# Patient Record
Sex: MALE | Race: WHITE | NOT HISPANIC OR LATINO
[De-identification: names, ages, dates, MRNs, and addresses within clinical notes are randomized per-mention and may not be internally consistent; named-entity substitution may affect disease eponyms.]

---

## 2018-05-23 ENCOUNTER — OTHER (OUTPATIENT)
Age: 83
End: 2018-05-23

## 2019-05-20 ENCOUNTER — EMERGENCY (EMERGENCY)
Facility: HOSPITAL | Age: 84
LOS: 1 days | Discharge: HOME | End: 2019-05-20
Attending: EMERGENCY MEDICINE | Admitting: EMERGENCY MEDICINE
Payer: MEDICARE

## 2019-05-20 VITALS
TEMPERATURE: 98 F | DIASTOLIC BLOOD PRESSURE: 68 MMHG | HEIGHT: 68 IN | RESPIRATION RATE: 16 BRPM | SYSTOLIC BLOOD PRESSURE: 145 MMHG | WEIGHT: 175.93 LBS | HEART RATE: 62 BPM | OXYGEN SATURATION: 97 %

## 2019-05-20 VITALS
OXYGEN SATURATION: 97 % | DIASTOLIC BLOOD PRESSURE: 65 MMHG | SYSTOLIC BLOOD PRESSURE: 153 MMHG | TEMPERATURE: 98 F | HEART RATE: 56 BPM | RESPIRATION RATE: 17 BRPM

## 2019-05-20 DIAGNOSIS — K62.5 HEMORRHAGE OF ANUS AND RECTUM: ICD-10-CM

## 2019-05-20 DIAGNOSIS — K64.4 RESIDUAL HEMORRHOIDAL SKIN TAGS: ICD-10-CM

## 2019-05-20 DIAGNOSIS — Z79.899 OTHER LONG TERM (CURRENT) DRUG THERAPY: ICD-10-CM

## 2019-05-20 LAB
ALBUMIN SERPL ELPH-MCNC: 3.7 G/DL — SIGNIFICANT CHANGE UP (ref 3.3–5)
ALP SERPL-CCNC: 67 U/L — SIGNIFICANT CHANGE UP (ref 40–120)
ALT FLD-CCNC: 23 U/L — SIGNIFICANT CHANGE UP (ref 10–45)
ANION GAP SERPL CALC-SCNC: 8 MMOL/L — SIGNIFICANT CHANGE UP (ref 5–17)
APTT BLD: 22 SEC — LOW (ref 27.5–36.3)
AST SERPL-CCNC: 28 U/L — SIGNIFICANT CHANGE UP (ref 10–40)
BASOPHILS # BLD AUTO: 0.03 K/UL — SIGNIFICANT CHANGE UP (ref 0–0.2)
BASOPHILS NFR BLD AUTO: 0.5 % — SIGNIFICANT CHANGE UP (ref 0–2)
BILIRUB SERPL-MCNC: 0.2 MG/DL — SIGNIFICANT CHANGE UP (ref 0.2–1.2)
BLD GP AB SCN SERPL QL: NEGATIVE — SIGNIFICANT CHANGE UP
BUN SERPL-MCNC: 34 MG/DL — HIGH (ref 7–23)
CALCIUM SERPL-MCNC: 8.9 MG/DL — SIGNIFICANT CHANGE UP (ref 8.4–10.5)
CHLORIDE SERPL-SCNC: 106 MMOL/L — SIGNIFICANT CHANGE UP (ref 96–108)
CO2 SERPL-SCNC: 24 MMOL/L — SIGNIFICANT CHANGE UP (ref 22–31)
CREAT SERPL-MCNC: 1.29 MG/DL — SIGNIFICANT CHANGE UP (ref 0.5–1.3)
EOSINOPHIL # BLD AUTO: 0.3 K/UL — SIGNIFICANT CHANGE UP (ref 0–0.5)
EOSINOPHIL NFR BLD AUTO: 4.9 % — SIGNIFICANT CHANGE UP (ref 0–6)
GLUCOSE SERPL-MCNC: 91 MG/DL — SIGNIFICANT CHANGE UP (ref 70–99)
HCT VFR BLD CALC: 36 % — LOW (ref 39–50)
HGB BLD-MCNC: 11.6 G/DL — LOW (ref 13–17)
IMM GRANULOCYTES NFR BLD AUTO: 0.2 % — SIGNIFICANT CHANGE UP (ref 0–1.5)
INR BLD: 0.99 — SIGNIFICANT CHANGE UP (ref 0.88–1.16)
LYMPHOCYTES # BLD AUTO: 0.95 K/UL — LOW (ref 1–3.3)
LYMPHOCYTES # BLD AUTO: 15.5 % — SIGNIFICANT CHANGE UP (ref 13–44)
MCHC RBC-ENTMCNC: 32.2 GM/DL — SIGNIFICANT CHANGE UP (ref 32–36)
MCHC RBC-ENTMCNC: 33 PG — SIGNIFICANT CHANGE UP (ref 27–34)
MCV RBC AUTO: 102.3 FL — HIGH (ref 80–100)
MONOCYTES # BLD AUTO: 0.72 K/UL — SIGNIFICANT CHANGE UP (ref 0–0.9)
MONOCYTES NFR BLD AUTO: 11.8 % — SIGNIFICANT CHANGE UP (ref 2–14)
NEUTROPHILS # BLD AUTO: 4.1 K/UL — SIGNIFICANT CHANGE UP (ref 1.8–7.4)
NEUTROPHILS NFR BLD AUTO: 67.1 % — SIGNIFICANT CHANGE UP (ref 43–77)
NRBC # BLD: 0 /100 WBCS — SIGNIFICANT CHANGE UP (ref 0–0)
OB PNL STL: NEGATIVE — SIGNIFICANT CHANGE UP
PLATELET # BLD AUTO: 189 K/UL — SIGNIFICANT CHANGE UP (ref 150–400)
POTASSIUM SERPL-MCNC: 4.6 MMOL/L — SIGNIFICANT CHANGE UP (ref 3.5–5.3)
POTASSIUM SERPL-SCNC: 4.6 MMOL/L — SIGNIFICANT CHANGE UP (ref 3.5–5.3)
PROT SERPL-MCNC: 7.1 G/DL — SIGNIFICANT CHANGE UP (ref 6–8.3)
PROTHROM AB SERPL-ACNC: 11.2 SEC — SIGNIFICANT CHANGE UP (ref 10–12.9)
RBC # BLD: 3.52 M/UL — LOW (ref 4.2–5.8)
RBC # FLD: 13.7 % — SIGNIFICANT CHANGE UP (ref 10.3–14.5)
RH IG SCN BLD-IMP: POSITIVE — SIGNIFICANT CHANGE UP
SODIUM SERPL-SCNC: 138 MMOL/L — SIGNIFICANT CHANGE UP (ref 135–145)
WBC # BLD: 6.11 K/UL — SIGNIFICANT CHANGE UP (ref 3.8–10.5)
WBC # FLD AUTO: 6.11 K/UL — SIGNIFICANT CHANGE UP (ref 3.8–10.5)

## 2019-05-20 PROCEDURE — 99284 EMERGENCY DEPT VISIT MOD MDM: CPT

## 2019-05-20 PROCEDURE — 86900 BLOOD TYPING SEROLOGIC ABO: CPT

## 2019-05-20 PROCEDURE — 99284 EMERGENCY DEPT VISIT MOD MDM: CPT | Mod: 25

## 2019-05-20 PROCEDURE — 80053 COMPREHEN METABOLIC PANEL: CPT

## 2019-05-20 PROCEDURE — 85610 PROTHROMBIN TIME: CPT

## 2019-05-20 PROCEDURE — 85025 COMPLETE CBC W/AUTO DIFF WBC: CPT

## 2019-05-20 PROCEDURE — 93010 ELECTROCARDIOGRAM REPORT: CPT

## 2019-05-20 PROCEDURE — 93005 ELECTROCARDIOGRAM TRACING: CPT

## 2019-05-20 PROCEDURE — 36415 COLL VENOUS BLD VENIPUNCTURE: CPT

## 2019-05-20 PROCEDURE — 86850 RBC ANTIBODY SCREEN: CPT

## 2019-05-20 PROCEDURE — 85730 THROMBOPLASTIN TIME PARTIAL: CPT

## 2019-05-20 PROCEDURE — 86901 BLOOD TYPING SEROLOGIC RH(D): CPT

## 2019-05-20 NOTE — ED ADULT NURSE NOTE - NSIMPLEMENTINTERV_GEN_ALL_ED
Implemented All Universal Safety Interventions:  [place] to call system. Call bell, personal items and telephone within reach. Instruct patient to call for assistance. Room bathroom lighting operational. Non-slip footwear when patient is off stretcher. Physically safe environment: no spills, clutter or unnecessary equipment. Stretcher in lowest position, wheels locked, appropriate side rails in place.

## 2019-05-20 NOTE — ED PROVIDER NOTE - OBJECTIVE STATEMENT
83 yo with rectal bleeding this morning, recently constipation w straining to have BM, bright red blood in toilet x 1 today and occurred a few times early in week. pt reports feeling well, had nl Colonoscopy within a few years. no abd pain, no fever, no dizzyness. went to pcp who did rectal and saw blood as per patient.

## 2019-05-20 NOTE — ED ADULT TRIAGE NOTE - CHIEF COMPLAINT QUOTE
pt c/o rectal bleeding, sent by PMD for colonoscopy in the morning. denies any dizziness, use of blood thinners.

## 2019-05-20 NOTE — ED ADULT NURSE NOTE - OBJECTIVE STATEMENT
Patient c /o of rectal bleed, bright red blood when having BM, states ongoing for weeks becoming worse, sent by Dr. Venegas PMD to ED.  States has to strain hard to have BM.  Denies any abdominal or rectal pain, no nausea/vomitting, denies dizziness, weakness.

## 2019-05-20 NOTE — ED CLERICAL - NS ED CLERK NOTE PRE-ARRIVAL INFORMATION; ADDITIONAL PRE-ARRIVAL INFORMATION
85 Y/O M COY PAIZ (SHABBIR IZABEL FATHER) BEING SENT IN BY DR JOSE ALLEN FOR LOWER GI BLEED PROSTSATE CA SLEEP APNEA KAYKAY AND [facility]

## 2019-05-20 NOTE — ED ADULT NURSE NOTE - CHPI ED NUR SYMPTOMS NEG
no fever/no diarrhea/no vomiting/no dysuria/no burning urination/no hematuria/no chills/no nausea/no abdominal distension

## 2019-05-20 NOTE — ED PROVIDER NOTE - CARE PROVIDER_API CALL
Ismael Cobb)  [facility]  [address], [address]  [place] [address]  Phone: [phone]  Fax: [phone]  Follow Up Time: V-Y Plasty Text: The defect edges were debeveled with a #15 scalpel blade.  Given the location of the defect, shape of the defect and the proximity to free margins an V-Y advancement flap was deemed most appropriate.  Using a sterile surgical marker, an appropriate advancement flap was drawn incorporating the defect and placing the expected incisions within the relaxed skin tension lines where possible.    The area thus outlined was incised deep to adipose tissue with a #15 scalpel blade.  The skin margins were undermined to an appropriate distance in all directions utilizing iris scissors.

## 2019-05-20 NOTE — PROGRESS NOTE ADULT - ASSESSMENT
ASSESSMENT/PLAN 85y/o male pt c H/o bronchiectases, asthma, CAROL, prostate ca, hemorrhoids presents c new onset of rectal bledding and new weheezin sensation    1. O2 observe off, recommend CPAP HS 5/0, titrate to comfort  2. Bronchodilators:  Atrovent/ albuterol q 4 – 6 hours as needed, OCEAN nasal spray  3. Corticosteroids: off  4. ID/Antibiotics: off  5. Cardiac/HTN: continue Tamsulosin  6. GI: Rx/ prophylaxis c PPI/H2B, GI   7. Heme: Rx/VT prophylaxis c SCD pending GI work up  8. Obtain CXR in view of new wheezing  9. Obtain CBC/Diff, metabolic panel, PT/PTT  10. Maintain NPO pending GI feedback/Labs  Discussed with ER, ,  ASSESSMENT/PLAN 83y/o male pt c H/o bronchiectases, asthma, CAROL, prostate ca, hemorrhoids presents c new onset of rectal bledding and new wheezing sensation    1. O2 observe off, recommend CPAP HS 5/0, titrate to comfort  2. Bronchodilators:  Atrovent/ albuterol q 4 – 6 hours as needed, OCEAN nasal spray  3. Corticosteroids: off  4. ID/Antibiotics: off  5. Cardiac/HTN: continue Tamsulosin  6. GI: Rx/ prophylaxis c PPI/H2B, GI   7. Heme: Rx/VT prophylaxis c SCD pending GI work up  8. Obtain CXR in view of new wheezing  9. Obtain CBC/Diff, metabolic panel, PT/PTT  10. Maintain NPO pending GI feedback/Labs  Discussed with ER, ,

## 2019-05-20 NOTE — ED PROVIDER NOTE - GASTROINTESTINAL, MLM
Abdomen soft, non-tender, no guarding. tiny external hemorrhoid without bleeding, rectal exam w brown stool, non tender.

## 2019-05-20 NOTE — ED PROVIDER NOTE - NSFOLLOWUPINSTRUCTIONS_ED_ALL_ED_FT
Rectal Bleeding    WHAT YOU NEED TO KNOW:    What can cause rectal bleeding?     Constipation      Hemorrhoids (swollen blood vessels in your rectum)      Anal fissures (tears in the tissue inside your anus)      Medical conditions, such as cancer, colitis, or diverticulitis       Growths, such as tumors or polyps      Medical treatments, such as radiation or rectal surgery    What increases my risk for rectal bleeding?     Older age      Certain medicines, such as blood thinners and NSAIDs      Medical conditions, such as inflammatory bowel disease, liver disease, or HIV    What other signs and symptoms may happen with rectal bleeding? You may have pain in your rectum or anus. You may also have abdominal pain or cramping.    How is the cause of rectal bleeding diagnosed?     Rectal exam: Your healthcare provider may gently insert a gloved finger into your anus. He will collect a bowel movement sample and send it to a lab for tests.      Blood tests: You may need blood taken to check for anemia (low amount of red blood cells).      CT scan: This test is also called a CAT scan. An x-ray machine uses a computer to take pictures of the organs and blood vessels in your abdomen. The pictures may show problems that could cause bleeding. You may be given a dye before the pictures are taken to help healthcare providers see the pictures better. Tell the healthcare provider if you have ever had an allergic reaction to contrast dye.      Colonoscopy: This is a procedure to look inside your lower bowel. It may show where the bleeding is coming from and what is causing it. A tube with a light on the end will be put into your anus and then moved into your colon. If your healthcare provider finds a growth, he may remove it.      Endoscopy: This is a procedure to look inside your upper bowel. It may show where the bleeding is coming from and what is causing it. A tube with a light on the end is inserted into your throat and moved down into your stomach and upper bowel. If your healthcare provider finds a growth, he may remove it. He may put a shot of medicine in bleeding areas to narrow the blood vessels and stop the bleeding. Heat, laser, or electric currents may also be used to make the blood clot.    How is rectal bleeding treated?     Medicine:   Pain medicine: You may be given a prescription medicine to decrease pain. Do not wait until the pain is severe before you take this medicine.      Vasoconstrictors: This medicine decreases the size of your blood vessels and may help stop the bleeding.       Iron supplement: Iron helps your body make more red blood cells.       Steroids: This medicine decreases inflammation in your rectum. It may be applied as a cream, ointment, or lotion.      IV: You may need an IV if you are dehydrated and need extra liquids.      Blood transfusion: You will get whole or parts of blood through an IV during a transfusion. Blood is tested for diseases, such as hepatitis and HIV, to be sure it is safe.       Surgery: You may need surgery to remove hemorrhoids, tumors, or polyps.    What are the risks of rectal bleeding?     You may have abdominal pain or damage to nearby organs and blood vessels with surgery. Even with treatment, rectal bleeding may continue. Or, it may go away for a time and start again.       Without treatment, you may continue to have pain and cramping. You may develop anemia. You may need a blood transfusion. You may lose a large amount of blood. This can be life-threatening.    How can I manage my symptoms? Ask your healthcare provider how much liquid to drink each day and which liquids are best for you. This will help prevent dehydration and constipation.    When should I contact my healthcare provider?     You have a fever.      Your rectal bleeding stopped for a time, but has started again.       You have nausea.      You have cold, sweaty, pale skin.      You have changes in your bowel movements, such as diarrhea.      You have questions or concerns about your condition or care.    When should I seek immediate care or call 911?     You are breathing faster than usual.      You are dizzy, lightheaded, or feel faint.      You are confused or cannot think clearly.      You urinate less than usual or not at all.      Your rectal bleeding is constant or heavy.      You have severe abdominal pain or cramping.    CARE AGREEMENT:    You have the right to help plan your care. Learn about your health condition and how it may be treated. Discuss treatment options with your healthcare providers to decide what care you want to receive. You always have the right to refuse treatment.

## 2019-05-20 NOTE — ED PROVIDER NOTE - CLINICAL SUMMARY MEDICAL DECISION MAKING FREE TEXT BOX
recet 85 yo with rectal bleeding , neg guaic here , stable hbg, most likely hemmorhoid vs radiation proctitis , seen by Dr. Cobb in ER, will d/c for outpt GI f/u

## 2019-05-20 NOTE — PROGRESS NOTE ADULT - SUBJECTIVE AND OBJECTIVE BOX
Interventional, Pulmonary, Critical, Chest Special Procedures.    Pt was seen and fully examined by myself.     Time spent with patient in minutes:    Patient is a 84y old  Male who presents with a chief complaint of rectal bleeding and wheezing sensation. Events leading to this admission reviewed.  The patient now ill appearing, pain free, no active bleeding when seen. Eupneic at rest on RA, minimal wheezing sensation improving c "clearing throat".  HPI:      REVIEW OF SYSTEMS:  Constitutional: No fever, weight loss, chills or fatigue  Eyes: No eye pain, visual disturbances, or discharge  ENMT:  No difficulty hearing, tinnitus, vertigo; No sinus or throat pain. No epistaxis, dysphagia, dysphonia, hoarseness or odynophagia  Neck: No pain, stiffness or neck swelling.  No masses or deformities  Respiratory: No cough, +wheezing, hemoptysis  - COPD  - ILD   - PE   + ASTHMA     - PNEUMONIA  Cardiovascular: No chest pain, dysrhythmia, palpitations, dizziness or edema - CAD   - CHF   - HTN  Gastrointestinal: No abdominal or epigastric pain. No nausea, vomiting or hematemesis; No diarrhea or constipation. + melena no  hematochezia, Icterus.          Genitourinary: No dysuria, frequency, hematuria or incontinence   - CKD/GEMA      - ESRD  Neurological: No headaches, memory loss, loss of strength, numbness or tremors      -DEMENTIA     - STROKE    - SEIZURE  Skin: No itching, burning, rashes or lesions   Lymph Nodes: No enlarged glands  Endocrine: No heat or cold intolerance; No hair loss       - DM     - THYROID DISORDER  Musculoskeletal: No joint pain or swelling; No muscle, back or extremity pain, No edema  Psychiatric: No depression, anxiety, mood swings or difficulty sleeping  Heme/Lymph: No easy bruising or bleeding gums         - ANEMIA      + prostate CANCER   -COAGULOPATHY  Allergy and Immunologic: No hives or eczema    PAST MEDICAL & SURGICAL HISTORY:  CAROL  Prostate surgery    FAMILY HISTORY:    SOCIAL HISTORY:      + former Tobacco     soporadic ETOH    Allergies NKA    No Known Allergies    Intolerances      Vital Signs Last 24 Hrs  T(C): 36.6 (20 May 2019 15:47), Max: 36.6 (20 May 2019 15:47)  T(F): 97.8 (20 May 2019 15:47), Max: 97.8 (20 May 2019 15:47)  HR: 62 (20 May 2019 15:47) (62 - 62)  BP: 145/68 (20 May 2019 15:47) (145/68 - 145/68)  BP(mean): --  RR: 16 (20 May 2019 15:47) (16 - 16)  SpO2: 97% (20 May 2019 15:47) (97% - 97%)        MEDICATIONS:  MEDICATIONS  (STANDING):    MEDICATIONS  (PRN):      PHYSICAL EXAM:  Un Comfortable, no immediate distress  Eyes: PERRL, EOM intact; conjunctiva and sclera clear  Head: Normocephalic;  No Trauma  ENMT: some  nasal discharge, no  hoarseness, cough or hemoptysis  Neck: Supple; non tender; no masses or deformities.    No JVD  Respiratory: CTA,  minimal wheezing R>L  WHEEZING   - RHONCHI  - RALES  - CRACKLES.  Diminished breath sounds  BILATERAL  RIGHT  LEFT  Cardiovascular: Regular rate and rhythm. S1 and S2 Normal; No murmurs, gallops or rubs     - PPM/AICD  Gastrointestinal: Soft non-tender, non-distended; Normal bowel sounds; No hepatosplenomegaly.     -PEG    -  GT   - MOLINA  Genitourinary: No costovertebral angle tenderness. No dysuria  Extremities: AROM, No clubbing, cyanosis or edema    Vascular: Peripheral pulses palpable 2+ bilaterally  Neurological: Alert and responisve to stimuli   Skin: Warm and dry. No obvious rash  Lymph Nodes: No acute cervical or supraclavicular adenopathy  Psychiatric: Cooperative and appropriate mood    DEVICES:  - DENTURES   +IV R / L     - ETUBE   -TRACH   -CTUBE  R / L    LABS:              RADIOLOGY & ADDITIONAL STUDIES (The following images were personally reviewed): Interventional, Pulmonary, Critical, Chest Special Procedures.    Pt was seen and fully examined by myself.     Time spent with patient in minutes:77    Patient is a 84y old  Male who presents with a chief complaint of rectal bleeding and wheezing sensation. Events leading to this admission reviewed.  The patient now ill appearing, pain free, no active bleeding when seen. Eupneic at rest on RA, minimal wheezing sensation improving c "clearing throat      REVIEW OF SYSTEMS:  Constitutional: No fever, weight loss, chills + fatigue  Eyes: No eye pain, visual disturbances, or discharge  ENMT:  No difficulty hearing, tinnitus, vertigo; No sinus or throat pain. No epistaxis, dysphagia, dysphonia, hoarseness or odynophagia  Neck: No pain, stiffness or neck swelling.  No masses or deformities  Respiratory: +cough, +wheezing, hemoptysis  - COPD  - ILD   - PE   + ASTHMA     - PNEUMONIA  Cardiovascular: No chest pain, dysrhythmia, palpitations, dizziness or edema - CAD   - CHF   - HTN  Gastrointestinal: No abdominal or epigastric pain. No nausea, vomiting or hematemesis; No diarrhea or constipation. + melena no  hematochezia, Icterus.          Genitourinary: No dysuria, frequency, hematuria or incontinence   - CKD/GEMA      - ESRD  Neurological: No headaches, memory loss, loss of strength, numbness or tremors      -DEMENTIA     - STROKE    - SEIZURE  Skin: No itching, burning, rashes or lesions   Lymph Nodes: No enlarged glands  Endocrine: No heat or cold intolerance; No hair loss       - DM     - THYROID DISORDER  Musculoskeletal: No joint pain or swelling; No muscle, back or extremity pain, No edema  Psychiatric: No depression, anxiety, mood swings or difficulty sleeping  Heme/Lymph: No easy bruising or bleeding gums         - ANEMIA      + prostate CANCER   -COAGULOPATHY  Allergy and Immunologic: No hives or eczema    PAST MEDICAL & SURGICAL HISTORY:  CAROL  Prostate surgery    FAMILY HISTORY:    SOCIAL HISTORY:      + former Tobacco     soporadic ETOH    Allergies NKA    No Known Allergies    Intolerances      Vital Signs Last 24 Hrs  T(C): 36.6 (20 May 2019 15:47), Max: 36.6 (20 May 2019 15:47)  T(F): 97.8 (20 May 2019 15:47), Max: 97.8 (20 May 2019 15:47)  HR: 62 (20 May 2019 15:47) (62 - 62)  BP: 145/68 (20 May 2019 15:47) (145/68 - 145/68)  BP(mean): --  RR: 16 (20 May 2019 15:47) (16 - 16)  SpO2: 97% (20 May 2019 15:47) (97% - 97%)        MEDICATIONS:  MEDICATIONS  (STANDING):    MEDICATIONS  (PRN):      PHYSICAL EXAM:  Un Comfortable, no immediate distress  Eyes: PERRL, EOM intact; conjunctiva and sclera clear  Head: Normocephalic;  No Trauma  ENMT: some  nasal discharge, no  hoarseness, cough or hemoptysis  Neck: Supple; non tender; no masses or deformities.    No JVD  Respiratory:   minimal wheezing R>L  WHEEZING   - RHONCHI  - RALES  - CRACKLES.  Diminished breath sounds  BILATERAL  RIGHT  LEFT  Cardiovascular: Regular rate and rhythm. S1 and S2 Normal; No murmurs, gallops or rubs     - PPM/AICD  Gastrointestinal: Soft non-tender, non-distended; Normal bowel sounds; No hepatosplenomegaly.     -PEG    -  GT   - MOLINA  Genitourinary: No costovertebral angle tenderness. No dysuria  Extremities: AROM, No clubbing, cyanosis or edema    Vascular: Peripheral pulses palpable 2+ bilaterally  Neurological: Alert and responisve to stimuli   Skin: Warm and dry. No obvious rash  Lymph Nodes: No acute cervical or supraclavicular adenopathy  Psychiatric: Cooperative and appropriate mood    DEVICES:  - DENTURES   +IV R / L     - ETUBE   -TRACH   -CTUBE  R / L    LABS:              RADIOLOGY & ADDITIONAL STUDIES (The following images were personally reviewed):

## 2019-06-25 ENCOUNTER — INPATIENT (INPATIENT)
Facility: HOSPITAL | Age: 84
LOS: 3 days | Discharge: HOME | DRG: 871 | End: 2019-06-29
Attending: INTERNAL MEDICINE | Admitting: INTERNAL MEDICINE
Payer: MEDICARE

## 2019-06-25 VITALS
DIASTOLIC BLOOD PRESSURE: 79 MMHG | HEART RATE: 130 BPM | TEMPERATURE: 102 F | SYSTOLIC BLOOD PRESSURE: 129 MMHG | RESPIRATION RATE: 17 BRPM | OXYGEN SATURATION: 98 %

## 2019-06-25 DIAGNOSIS — J45.909 UNSPECIFIED ASTHMA, UNCOMPLICATED: ICD-10-CM

## 2019-06-25 DIAGNOSIS — A41.9 SEPSIS, UNSPECIFIED ORGANISM: ICD-10-CM

## 2019-06-25 DIAGNOSIS — I48.92 UNSPECIFIED ATRIAL FLUTTER: ICD-10-CM

## 2019-06-25 LAB
ALBUMIN SERPL ELPH-MCNC: 4.5 G/DL — SIGNIFICANT CHANGE UP (ref 3.3–5)
ALP SERPL-CCNC: 80 U/L — SIGNIFICANT CHANGE UP (ref 40–120)
ALT FLD-CCNC: 24 U/L — SIGNIFICANT CHANGE UP (ref 10–45)
ANION GAP SERPL CALC-SCNC: 15 MMOL/L — SIGNIFICANT CHANGE UP (ref 5–17)
APPEARANCE UR: CLEAR — SIGNIFICANT CHANGE UP
APTT BLD: 25.7 SEC — LOW (ref 27.5–36.3)
APTT BLD: 42.3 SEC — HIGH (ref 27.5–36.3)
AST SERPL-CCNC: 27 U/L — SIGNIFICANT CHANGE UP (ref 10–40)
BASOPHILS # BLD AUTO: 0 K/UL — SIGNIFICANT CHANGE UP (ref 0–0.2)
BASOPHILS NFR BLD AUTO: 0.7 % — SIGNIFICANT CHANGE UP (ref 0–2)
BILIRUB SERPL-MCNC: 0.4 MG/DL — SIGNIFICANT CHANGE UP (ref 0.2–1.2)
BILIRUB UR-MCNC: NEGATIVE — SIGNIFICANT CHANGE UP
BLD GP AB SCN SERPL QL: NEGATIVE — SIGNIFICANT CHANGE UP
BUN SERPL-MCNC: 36 MG/DL — HIGH (ref 7–23)
CALCIUM SERPL-MCNC: 9.3 MG/DL — SIGNIFICANT CHANGE UP (ref 8.4–10.5)
CHLORIDE SERPL-SCNC: 100 MMOL/L — SIGNIFICANT CHANGE UP (ref 96–108)
CO2 SERPL-SCNC: 23 MMOL/L — SIGNIFICANT CHANGE UP (ref 22–31)
COLOR SPEC: SIGNIFICANT CHANGE UP
CREAT ?TM UR-MCNC: 93 MG/DL — SIGNIFICANT CHANGE UP
CREAT ?TM UR-MCNC: 94 MG/DL — SIGNIFICANT CHANGE UP
CREAT SERPL-MCNC: 1.36 MG/DL — HIGH (ref 0.5–1.3)
DIFF PNL FLD: ABNORMAL
EOSINOPHIL # BLD AUTO: 0 K/UL — SIGNIFICANT CHANGE UP (ref 0–0.5)
EOSINOPHIL NFR BLD AUTO: 0.2 % — SIGNIFICANT CHANGE UP (ref 0–6)
GLUCOSE SERPL-MCNC: 151 MG/DL — HIGH (ref 70–99)
GLUCOSE UR QL: NEGATIVE — SIGNIFICANT CHANGE UP
HCT VFR BLD CALC: 34.1 % — LOW (ref 39–50)
HCT VFR BLD CALC: 44.7 % — SIGNIFICANT CHANGE UP (ref 39–50)
HGB BLD-MCNC: 11.7 G/DL — LOW (ref 13–17)
HGB BLD-MCNC: 14.3 G/DL — SIGNIFICANT CHANGE UP (ref 13–17)
INR BLD: 1.01 RATIO — SIGNIFICANT CHANGE UP (ref 0.88–1.16)
INR BLD: 1.25 RATIO — HIGH (ref 0.88–1.16)
KETONES UR-MCNC: ABNORMAL
LACTATE BLDV-MCNC: 2 MMOL/L — SIGNIFICANT CHANGE UP (ref 0.7–2)
LACTATE BLDV-MCNC: 2.1 MMOL/L — HIGH (ref 0.7–2)
LEUKOCYTE ESTERASE UR-ACNC: NEGATIVE — SIGNIFICANT CHANGE UP
LYMPHOCYTES # BLD AUTO: 0.2 K/UL — LOW (ref 1–3.3)
LYMPHOCYTES # BLD AUTO: 3 % — LOW (ref 13–44)
MCHC RBC-ENTMCNC: 31.7 PG — SIGNIFICANT CHANGE UP (ref 27–34)
MCHC RBC-ENTMCNC: 31.9 GM/DL — LOW (ref 32–36)
MCHC RBC-ENTMCNC: 34.3 GM/DL — SIGNIFICANT CHANGE UP (ref 32–36)
MCHC RBC-ENTMCNC: 34.3 PG — HIGH (ref 27–34)
MCV RBC AUTO: 99.5 FL — SIGNIFICANT CHANGE UP (ref 80–100)
MCV RBC AUTO: 99.9 FL — SIGNIFICANT CHANGE UP (ref 80–100)
MONOCYTES # BLD AUTO: 0.2 K/UL — SIGNIFICANT CHANGE UP (ref 0–0.9)
MONOCYTES NFR BLD AUTO: 3.7 % — SIGNIFICANT CHANGE UP (ref 2–14)
NEUTROPHILS # BLD AUTO: 5.8 K/UL — SIGNIFICANT CHANGE UP (ref 1.8–7.4)
NEUTROPHILS NFR BLD AUTO: 92.4 % — HIGH (ref 43–77)
NITRITE UR-MCNC: NEGATIVE — SIGNIFICANT CHANGE UP
OB PNL STL: NEGATIVE — SIGNIFICANT CHANGE UP
PH UR: 5.5 — SIGNIFICANT CHANGE UP (ref 5–8)
PLATELET # BLD AUTO: 149 K/UL — LOW (ref 150–400)
PLATELET # BLD AUTO: 209 K/UL — SIGNIFICANT CHANGE UP (ref 150–400)
POTASSIUM SERPL-MCNC: 4.3 MMOL/L — SIGNIFICANT CHANGE UP (ref 3.5–5.3)
POTASSIUM SERPL-SCNC: 4.3 MMOL/L — SIGNIFICANT CHANGE UP (ref 3.5–5.3)
PROT ?TM UR-MCNC: 21 MG/DL — HIGH (ref 0–12)
PROT SERPL-MCNC: 8.4 G/DL — HIGH (ref 6–8.3)
PROT UR-MCNC: ABNORMAL
PROT/CREAT UR-RTO: 0.2 RATIO — SIGNIFICANT CHANGE UP (ref 0–0.2)
PROTHROM AB SERPL-ACNC: 11.5 SEC — SIGNIFICANT CHANGE UP (ref 10–12.9)
PROTHROM AB SERPL-ACNC: 14.3 SEC — HIGH (ref 10–12.9)
RAPID RVP RESULT: SIGNIFICANT CHANGE UP
RBC # BLD: 3.41 M/UL — LOW (ref 4.2–5.8)
RBC # BLD: 4.5 M/UL — SIGNIFICANT CHANGE UP (ref 4.2–5.8)
RBC # FLD: 12.5 % — SIGNIFICANT CHANGE UP (ref 10.3–14.5)
RBC # FLD: 12.6 % — SIGNIFICANT CHANGE UP (ref 10.3–14.5)
RH IG SCN BLD-IMP: POSITIVE — SIGNIFICANT CHANGE UP
RH IG SCN BLD-IMP: POSITIVE — SIGNIFICANT CHANGE UP
SODIUM SERPL-SCNC: 138 MMOL/L — SIGNIFICANT CHANGE UP (ref 135–145)
SODIUM UR-SCNC: 71 MMOL/L — SIGNIFICANT CHANGE UP
SP GR SPEC: 1.02 — SIGNIFICANT CHANGE UP (ref 1.01–1.02)
UROBILINOGEN FLD QL: NEGATIVE — SIGNIFICANT CHANGE UP
WBC # BLD: 6 K/UL — SIGNIFICANT CHANGE UP (ref 3.8–10.5)
WBC # BLD: 6.3 K/UL — SIGNIFICANT CHANGE UP (ref 3.8–10.5)
WBC # FLD AUTO: 6 K/UL — SIGNIFICANT CHANGE UP (ref 3.8–10.5)
WBC # FLD AUTO: 6.3 K/UL — SIGNIFICANT CHANGE UP (ref 3.8–10.5)

## 2019-06-25 PROCEDURE — 71260 CT THORAX DX C+: CPT | Mod: 26

## 2019-06-25 PROCEDURE — 71045 X-RAY EXAM CHEST 1 VIEW: CPT | Mod: 26

## 2019-06-25 PROCEDURE — 74177 CT ABD & PELVIS W/CONTRAST: CPT | Mod: 26

## 2019-06-25 PROCEDURE — 99223 1ST HOSP IP/OBS HIGH 75: CPT

## 2019-06-25 PROCEDURE — 99291 CRITICAL CARE FIRST HOUR: CPT | Mod: GC

## 2019-06-25 PROCEDURE — 93010 ELECTROCARDIOGRAM REPORT: CPT | Mod: 76

## 2019-06-25 RX ORDER — HEPARIN SODIUM 5000 [USP'U]/ML
6500 INJECTION INTRAVENOUS; SUBCUTANEOUS EVERY 6 HOURS
Refills: 0 | Status: DISCONTINUED | OUTPATIENT
Start: 2019-06-25 | End: 2019-06-28

## 2019-06-25 RX ORDER — ALBUTEROL 90 UG/1
2 AEROSOL, METERED ORAL EVERY 6 HOURS
Refills: 0 | Status: DISCONTINUED | OUTPATIENT
Start: 2019-06-25 | End: 2019-06-29

## 2019-06-25 RX ORDER — HEPARIN SODIUM 5000 [USP'U]/ML
3000 INJECTION INTRAVENOUS; SUBCUTANEOUS EVERY 6 HOURS
Refills: 0 | Status: DISCONTINUED | OUTPATIENT
Start: 2019-06-25 | End: 2019-06-28

## 2019-06-25 RX ORDER — PANTOPRAZOLE SODIUM 20 MG/1
40 TABLET, DELAYED RELEASE ORAL
Refills: 0 | Status: DISCONTINUED | OUTPATIENT
Start: 2019-06-25 | End: 2019-06-29

## 2019-06-25 RX ORDER — PIPERACILLIN AND TAZOBACTAM 4; .5 G/20ML; G/20ML
3.38 INJECTION, POWDER, LYOPHILIZED, FOR SOLUTION INTRAVENOUS ONCE
Refills: 0 | Status: COMPLETED | OUTPATIENT
Start: 2019-06-25 | End: 2019-06-25

## 2019-06-25 RX ORDER — ONDANSETRON 8 MG/1
4 TABLET, FILM COATED ORAL ONCE
Refills: 0 | Status: COMPLETED | OUTPATIENT
Start: 2019-06-25 | End: 2019-06-25

## 2019-06-25 RX ORDER — SODIUM CHLORIDE 9 MG/ML
1000 INJECTION INTRAMUSCULAR; INTRAVENOUS; SUBCUTANEOUS
Refills: 0 | Status: DISCONTINUED | OUTPATIENT
Start: 2019-06-25 | End: 2019-06-26

## 2019-06-25 RX ORDER — DILTIAZEM HCL 120 MG
120 CAPSULE, EXT RELEASE 24 HR ORAL DAILY
Refills: 0 | Status: DISCONTINUED | OUTPATIENT
Start: 2019-06-25 | End: 2019-06-29

## 2019-06-25 RX ORDER — HEPARIN SODIUM 5000 [USP'U]/ML
INJECTION INTRAVENOUS; SUBCUTANEOUS
Qty: 25000 | Refills: 0 | Status: DISCONTINUED | OUTPATIENT
Start: 2019-06-25 | End: 2019-06-28

## 2019-06-25 RX ORDER — TAMSULOSIN HYDROCHLORIDE 0.4 MG/1
0.4 CAPSULE ORAL AT BEDTIME
Refills: 0 | Status: DISCONTINUED | OUTPATIENT
Start: 2019-06-25 | End: 2019-06-29

## 2019-06-25 RX ORDER — VANCOMYCIN HCL 1 G
1000 VIAL (EA) INTRAVENOUS ONCE
Refills: 0 | Status: COMPLETED | OUTPATIENT
Start: 2019-06-25 | End: 2019-06-25

## 2019-06-25 RX ORDER — PIPERACILLIN AND TAZOBACTAM 4; .5 G/20ML; G/20ML
3.38 INJECTION, POWDER, LYOPHILIZED, FOR SOLUTION INTRAVENOUS EVERY 8 HOURS
Refills: 0 | Status: DISCONTINUED | OUTPATIENT
Start: 2019-06-25 | End: 2019-06-29

## 2019-06-25 RX ORDER — SODIUM CHLORIDE 9 MG/ML
2400 INJECTION, SOLUTION INTRAVENOUS ONCE
Refills: 0 | Status: COMPLETED | OUTPATIENT
Start: 2019-06-25 | End: 2019-06-25

## 2019-06-25 RX ORDER — ACETAMINOPHEN 500 MG
975 TABLET ORAL ONCE
Refills: 0 | Status: COMPLETED | OUTPATIENT
Start: 2019-06-25 | End: 2019-06-25

## 2019-06-25 RX ADMIN — SODIUM CHLORIDE 2400 MILLILITER(S): 9 INJECTION, SOLUTION INTRAVENOUS at 09:15

## 2019-06-25 RX ADMIN — Medication 975 MILLIGRAM(S): at 09:00

## 2019-06-25 RX ADMIN — Medication 250 MILLIGRAM(S): at 08:34

## 2019-06-25 RX ADMIN — TAMSULOSIN HYDROCHLORIDE 0.4 MILLIGRAM(S): 0.4 CAPSULE ORAL at 21:21

## 2019-06-25 RX ADMIN — Medication 1000 MILLIGRAM(S): at 08:34

## 2019-06-25 RX ADMIN — PIPERACILLIN AND TAZOBACTAM 3.38 GRAM(S): 4; .5 INJECTION, POWDER, LYOPHILIZED, FOR SOLUTION INTRAVENOUS at 08:35

## 2019-06-25 RX ADMIN — Medication 975 MILLIGRAM(S): at 08:29

## 2019-06-25 RX ADMIN — Medication 120 MILLIGRAM(S): at 16:33

## 2019-06-25 RX ADMIN — ONDANSETRON 4 MILLIGRAM(S): 8 TABLET, FILM COATED ORAL at 08:05

## 2019-06-25 RX ADMIN — PIPERACILLIN AND TAZOBACTAM 200 GRAM(S): 4; .5 INJECTION, POWDER, LYOPHILIZED, FOR SOLUTION INTRAVENOUS at 08:05

## 2019-06-25 RX ADMIN — SODIUM CHLORIDE 2400 MILLILITER(S): 9 INJECTION, SOLUTION INTRAVENOUS at 08:00

## 2019-06-25 RX ADMIN — PIPERACILLIN AND TAZOBACTAM 25 GRAM(S): 4; .5 INJECTION, POWDER, LYOPHILIZED, FOR SOLUTION INTRAVENOUS at 23:28

## 2019-06-25 RX ADMIN — PANTOPRAZOLE SODIUM 40 MILLIGRAM(S): 20 TABLET, DELAYED RELEASE ORAL at 18:38

## 2019-06-25 RX ADMIN — HEPARIN SODIUM 1400 UNIT(S)/HR: 5000 INJECTION INTRAVENOUS; SUBCUTANEOUS at 18:03

## 2019-06-25 RX ADMIN — PIPERACILLIN AND TAZOBACTAM 25 GRAM(S): 4; .5 INJECTION, POWDER, LYOPHILIZED, FOR SOLUTION INTRAVENOUS at 15:31

## 2019-06-25 RX ADMIN — SODIUM CHLORIDE 80 MILLILITER(S): 9 INJECTION INTRAMUSCULAR; INTRAVENOUS; SUBCUTANEOUS at 15:31

## 2019-06-25 NOTE — H&P ADULT - NSHPSOURCEINFORD_GEN_ALL_CORE
Immediate Operative Summary


Operative Date


Aug 31, 2017.





Pre-Operative Diagnosis





Right ureteral and renal stones, solitary kidney





Post-Operative Diagnosis





Same as preop





Procedure(s) Performed





Cystoscopy, Right Retrograde pyleogram, right Ureteral Stent insertion





Surgeon


Dr. SHIMON Phillips





Assistant Surgeon(s)


NA





Estimated Blood Loss


0 ml





Findings


Good stent position after completion, stones not clearly seen on  despite 

being present on KUB





Specimens





None, as per surgeon





Drains


6 fr multilength ureteral stent





Anesthesia


MAC





Complication(s)


None





Disposition


Recovery Room / PACU Patient/Chart(s)

## 2019-06-25 NOTE — PATIENT PROFILE ADULT - NSPROGENANESREACTION_GEN_A_NUR
CERTIFICATE OF RETURN TO WORK        This is to certify that Henny Phillip has been under my care on 2/8/2018. She can return to regular work on 2/13/2018.        SIGNATURE:___________________________________________,   2/8/2018                                                        [place] [address]  [phone]      
no previous reaction

## 2019-06-25 NOTE — ED ADULT NURSE NOTE - NSIMPLEMENTINTERV_GEN_ALL_ED
Implemented All Fall with Harm Risk Interventions:  [place] to call system. Call bell, personal items and telephone within reach. Instruct patient to call for assistance. Room bathroom lighting operational. Non-slip footwear when patient is off stretcher. Physically safe environment: no spills, clutter or unnecessary equipment. Stretcher in lowest position, wheels locked, appropriate side rails in place. Provide visual cue, wrist band, yellow gown, etc. Monitor gait and stability. Monitor for mental status changes and reorient to person, place, and time. Review medications for side effects contributing to fall risk. Reinforce activity limits and safety measures with patient and family. Provide visual clues: red socks.

## 2019-06-25 NOTE — ED PROVIDER NOTE - NS ED MD EKG INTERPRETATION 1
narrow complex irregular rhythm with concerns atrial flutter with variable AV block vs. new-onset afib

## 2019-06-25 NOTE — ED PROVIDER NOTE - OBJECTIVE STATEMENT
84 year-old male with history of asthma presents to the Emergency Department for nausea/vomiting ongoing since last night; family reports vomitus was red in color at times.  No fevers, chills, nausea, vomiting, chest pain, shortness of breath, abdominal pain, diarrhea, blood in stool.  Did not take any medications at home.  Denies travel/sick contacts.  No history of alcohol abuse.  No abdominal surgeries.

## 2019-06-25 NOTE — CHART NOTE - NSCHARTNOTEFT_GEN_A_CORE
MEDICINE PA NOTE     CHIEF COMPLAINT  Patient is a 84y old  Male who presents with a chief complaint of nausea and vomiting (25 Jun 2019 15:46)    EVENT SUMMARY   Notified by RN for BRBPR. Pt seen and examined at the bedside. Pt is alert; states that MEDICINE PA NOTE     CHIEF COMPLAINT  Patient is a 84y old  Male who presents with a chief complaint of nausea and vomiting (25 Jun 2019 15:46)    EVENT SUMMARY   Notified by RN for BRBPR. Pt seen and examined at the bedside. Pt is alert; states that when he had a BM earlier around 6pm noticed bright blood on the tissue when he cleaned himself; denies blood in the toilet. Pt states that he had brown loose stool. Pt endorses that he had an episode previously 2- 3 months ago where he was seen in the ED at [facility] monitored and discharged. Pt just started on heparin gtt for afib around 6 pm without bolus.+ flatulence and BM. PT denies abd pain, cramping, constipation, pain with BM, N/V, melena, hematemesis, hemoptysis, numbness, tingling, headache, CP, SOB, dyspnea, palpitations. MEDICINE PA NOTE     CHIEF COMPLAINT  Patient is a 84y old  Male who presents with a chief complaint of nausea and vomiting (25 Jun 2019 15:46)    EVENT SUMMARY   Notified by RN for BRBPR. Pt seen and examined at the bedside. Pt is alert; states that when he had a BM earlier around 6pm noticed bright blood on the tissue when he cleaned himself; denies blood in the toilet. Pt states that he had brown loose stool. Pt endorses that he had an episode previously 2- 3 months ago where he was seen in the ED at [facility] monitored and discharged. Pt just started on heparin gtt for afib around 6 pm without bolus.+ flatulence and BM. PT denies abd pain, cramping, constipation, pain with BM, N/V, melena, hematemesis, hemoptysis, numbness, tingling, headache, CP, SOB, dyspnea, palpitations.      MEDICATIONS  (STANDING):  diltiazem    milliGRAM(s) Oral daily  heparin  Infusion.  Unit(s)/Hr (14 mL/Hr) IV Continuous <Continuous>  pantoprazole    Tablet 40 milliGRAM(s) Oral before breakfast  piperacillin/tazobactam IVPB.. 3.375 Gram(s) IV Intermittent every 8 hours  sodium chloride 0.9%. 1000 milliLiter(s) (80 mL/Hr) IV Continuous <Continuous>  tamsulosin 0.4 milliGRAM(s) Oral at bedtime    MEDICATIONS  (PRN):  ALBUTerol    90 MICROgram(s) HFA Inhaler 2 Puff(s) Inhalation every 6 hours PRN Bronchospasm  heparin  Injectable 6500 Unit(s) IV Push every 6 hours PRN For aPTT less than 40  heparin  Injectable 3000 Unit(s) IV Push every 6 hours PRN For aPTT between 40 - 57    ICU Vital Signs Last 24 Hrs  T(C): 37.8 (25 Jun 2019 21:08), Max: 39.1 (25 Jun 2019 07:40)  T(F): 100.1 (25 Jun 2019 21:08), Max: 102.4 (25 Jun 2019 07:40)  HR: 81 (25 Jun 2019 21:08) (81 - 130)  BP: 105/61 (25 Jun 2019 21:08) (105/61 - 146/83)  BP(mean): --  ABP: --  ABP(mean): --  RR: 18 (25 Jun 2019 21:08) (16 - 36)  SpO2: 94% (25 Jun 2019 21:08) (94% - 100%)    PHYSICAL EXAM   Constitutional:     Eyes:    ENMT:    Neck:    Breasts:    Back:    Respiratory:    Cardiovascular:    Gastrointestinal:    Genitourinary:    Rectal:    Extremities:    Vascular:    Neurological:    Skin:    Lymph Nodes:    Musculoskeletal:    Psychiatric: MEDICINE PA NOTE     CHIEF COMPLAINT  Patient is a 84y old  Male who presents with a chief complaint of nausea and vomiting (25 Jun 2019 15:46)    EVENT SUMMARY   Notified by RN for BRBPR. Pt seen and examined at the bedside. Pt is alert; states that when he had a BM earlier around 6pm noticed bright blood on the tissue when he cleaned himself; denies blood in the toilet. Pt states that he had brown loose stool. Pt endorses that he had an episode previously 2- 3 months ago where he was seen in the ED at [facility] monitored and discharged. Pt just started on heparin gtt for afib around 6 pm without bolus.+ flatulence and BM. PT denies abd pain, cramping, constipation, pain with BM, N/V, melena, hematuria, hematemesis, hemoptysis, numbness, tingling, headache, CP, SOB, dyspnea, palpitations.      MEDICATIONS  (STANDING):  diltiazem    milliGRAM(s) Oral daily  heparin  Infusion.  Unit(s)/Hr (14 mL/Hr) IV Continuous <Continuous>  pantoprazole    Tablet 40 milliGRAM(s) Oral before breakfast  piperacillin/tazobactam IVPB.. 3.375 Gram(s) IV Intermittent every 8 hours  sodium chloride 0.9%. 1000 milliLiter(s) (80 mL/Hr) IV Continuous <Continuous>  tamsulosin 0.4 milliGRAM(s) Oral at bedtime    MEDICATIONS  (PRN):  ALBUTerol    90 MICROgram(s) HFA Inhaler 2 Puff(s) Inhalation every 6 hours PRN Bronchospasm  heparin  Injectable 6500 Unit(s) IV Push every 6 hours PRN For aPTT less than 40  heparin  Injectable 3000 Unit(s) IV Push every 6 hours PRN For aPTT between 40 - 57    ICU Vital Signs Last 24 Hrs  T(C): 37.8 (25 Jun 2019 21:08), Max: 39.1 (25 Jun 2019 07:40)  T(F): 100.1 (25 Jun 2019 21:08), Max: 102.4 (25 Jun 2019 07:40)  HR: 81 (25 Jun 2019 21:08) (81 - 130)  BP: 105/61 (25 Jun 2019 21:08) (105/61 - 146/83)  BP(mean): --  ABP: --  ABP(mean): --  RR: 18 (25 Jun 2019 21:08) (16 - 36)  SpO2: 94% (25 Jun 2019 21:08) (94% - 100%)    PHYSICAL EXAM   Constitutional: NAD, resting comfortably  Respiratory: CTA b/l non labored  Cardiovascular: S1, S2 irregular   Gastrointestinal: soft NT ND, + BS  Neurological: AO x 3    Complete Blood Count + Automated Diff (06.25.19 @ 08:20)    WBC Count: 6.3 K/uL    RBC Count: 4.50 M/uL    Hemoglobin: 14.3 g/dL    Hematocrit: 44.7 %    Mean Cell Volume: 99.5 fl    Mean Cell Hemoglobin: 31.7 pg    Mean Cell Hemoglobin Conc: 31.9 gm/dL    Red Cell Distrib Width: 12.5 %    Platelet Count - Automated: 209 K/uL    Auto Neutrophil #: 5.8 K/uL    Auto Lymphocyte #: 0.2 K/uL    Auto Monocyte #: 0.2 K/uL    Auto Eosinophil #: 0.0 K/uL    Auto Basophil #: 0.0 K/uL    Auto Neutrophil %: 92.4: Differential percentages must be correlated with absolute numbers for  clinical significance. %    Auto Lymphocyte %: 3.0 %    Auto Monocyte %: 3.7 %    Auto Eosinophil %: 0.2 %    Auto Basophil %: 0.7 %    Activated Partial Thromboplastin Time (06.25.19 @ 08:20)    Activated Partial Thromboplastin Time: 25.7: The recommended therapeutic heparin range (full dose) is 58-99 seconds.  Recommended therapeutic Argatroban range is 1.5 to 3.0 times the baseline  APTT value, not to exceed 100 seconds. Recommended therapeutic Refludan  range is 1.5 to 2.5 times thebaseline APTT.  Effective October 30, 2018 the reference range has changed. sec    < from: CT Abdomen and Pelvis w/ IV Cont (06.25.19 @ 09:34) >  FINDINGS:    LOWER CHEST: A large hiatal hernia with a largely intrathoracic stomach   with resultant mild right basilar subsegmental atelectasis. Small   tree-in-bud nodularity in the dependent medial right lower lobe.    LIVER: Ill-defined bilobar hypodensities measuring up to 3.5 cm in the   posterior right hepatic lobe (series 2, image 31).  BILE DUCTS: Normal caliber.  GALLBLADDER: Within normal limits.  SPLEEN: Within normal limits.  PANCREAS: Within normal limits.  ADRENALS: Within normal limits.  KIDNEYS/URETERS: A right lower pole cyst.    BLADDER: Within normal limits.  REPRODUCTIVE ORGANS: Prostate brachytherapy seeds.    BOWEL: Partially imaged predominantly intrathoracic stomach and a large   hiatal hernia, as above. Scattered colonic diverticula without acute   diverticulitis. No bowel obstruction. Appendix not visualized.  PERITONEUM: No ascites.  VESSELS:  Atherosclerotic change.  RETROPERITONEUM: No lymphadenopathy.    ABDOMINAL WALL: Small bilateral fat-containing inguinal hernias.  BONES: Scoliosis with multilevel degenerative change. Calcific tendinitis   of the bilateral hamstrings. Also subchondral cystic change and   chondrocalcinosis of the bilateral hip joints and pubic symphysis.    IMPRESSION:    A partially imaged large hiatal hernia with a largely intrathoracic   stomach.    Indeterminate bilobar liver lesions measuring up to 3.5 cm in the right   hepatic lobe. Cannot exclude metastatic disease in the setting of known   malignancy. Recommend further evaluation with contrast-enhanced MR.    Subchondral cystic change and chondrocalcinosis of the bilateral hip   joints and pubic symphysis, suggestive of CPPD arthropathy.    < end of copied text >    A&P  84 year-old male with history of asthma presents to the Emergency Department for nausea/vomiting ongoing since last night; family reports vomitus was red in color at times. Per pt he went for dinner the night before and Gym the day off felling well until the symptoms started all of sudden last night. The vomiting was copious. He wasn't aware of fever, when vomiting might have coughed up but no cough or SOB prior to the episode. No chills, chest pain, shortness of breath, abdominal pain, diarrhea, blood in stool.  Did not take any medications at home.  Denies travel/sick contacts.  No history of alcohol abuse.  No abdominal surgeries. Pt has a hx of prostate ca many years ago s/p seeds, has been in remission since then, sees his Urologist every few months. (25 Jun 2019 15:46)    BRBPR  - STAT CBC, Coags and T&S  - c/w tele and vitals  - MRI abdomen pending   D/w Dr. Lopez; c/w hep gtt at this time. Will monitor for further bleed. Rn made aware of the plan.     Maryan LI  #[id number]    ADDENDUM MEDICINE PA NOTE     CHIEF COMPLAINT  Patient is a 84y old  Male who presents with a chief complaint of nausea and vomiting (25 Jun 2019 15:46)    EVENT SUMMARY   Notified by RN for BRBPR. Pt seen and examined at the bedside. Pt is alert; states that when he had a BM earlier around 6pm noticed bright blood on the tissue when he cleaned himself; denies blood in the toilet. Pt states that he had brown loose stool. Pt endorses that he had an episode previously 2- 3 months ago where he was seen in the ED at [facility] monitored and discharged. Pt just started on heparin gtt for afib around 6 pm without bolus.+ flatulence and BM. PT denies abd pain, cramping, constipation, pain with BM, N/V, melena, hematuria, hematemesis, hemoptysis, numbness, tingling, headache, CP, SOB, dyspnea, palpitations.      MEDICATIONS  (STANDING):  diltiazem    milliGRAM(s) Oral daily  heparin  Infusion.  Unit(s)/Hr (14 mL/Hr) IV Continuous <Continuous>  pantoprazole    Tablet 40 milliGRAM(s) Oral before breakfast  piperacillin/tazobactam IVPB.. 3.375 Gram(s) IV Intermittent every 8 hours  sodium chloride 0.9%. 1000 milliLiter(s) (80 mL/Hr) IV Continuous <Continuous>  tamsulosin 0.4 milliGRAM(s) Oral at bedtime    MEDICATIONS  (PRN):  ALBUTerol    90 MICROgram(s) HFA Inhaler 2 Puff(s) Inhalation every 6 hours PRN Bronchospasm  heparin  Injectable 6500 Unit(s) IV Push every 6 hours PRN For aPTT less than 40  heparin  Injectable 3000 Unit(s) IV Push every 6 hours PRN For aPTT between 40 - 57    ICU Vital Signs Last 24 Hrs  T(C): 37.8 (25 Jun 2019 21:08), Max: 39.1 (25 Jun 2019 07:40)  T(F): 100.1 (25 Jun 2019 21:08), Max: 102.4 (25 Jun 2019 07:40)  HR: 81 (25 Jun 2019 21:08) (81 - 130)  BP: 105/61 (25 Jun 2019 21:08) (105/61 - 146/83)  BP(mean): --  ABP: --  ABP(mean): --  RR: 18 (25 Jun 2019 21:08) (16 - 36)  SpO2: 94% (25 Jun 2019 21:08) (94% - 100%)    PHYSICAL EXAM   Constitutional: NAD, resting comfortably  Respiratory: CTA b/l non labored  Cardiovascular: S1, S2 irregular   Gastrointestinal: soft NT ND, + BS  Neurological: AO x 3    Complete Blood Count + Automated Diff (06.25.19 @ 08:20)    WBC Count: 6.3 K/uL    RBC Count: 4.50 M/uL    Hemoglobin: 14.3 g/dL    Hematocrit: 44.7 %    Mean Cell Volume: 99.5 fl    Mean Cell Hemoglobin: 31.7 pg    Mean Cell Hemoglobin Conc: 31.9 gm/dL    Red Cell Distrib Width: 12.5 %    Platelet Count - Automated: 209 K/uL    Auto Neutrophil #: 5.8 K/uL    Auto Lymphocyte #: 0.2 K/uL    Auto Monocyte #: 0.2 K/uL    Auto Eosinophil #: 0.0 K/uL    Auto Basophil #: 0.0 K/uL    Auto Neutrophil %: 92.4: Differential percentages must be correlated with absolute numbers for  clinical significance. %    Auto Lymphocyte %: 3.0 %    Auto Monocyte %: 3.7 %    Auto Eosinophil %: 0.2 %    Auto Basophil %: 0.7 %    Activated Partial Thromboplastin Time (06.25.19 @ 08:20)    Activated Partial Thromboplastin Time: 25.7: The recommended therapeutic heparin range (full dose) is 58-99 seconds.  Recommended therapeutic Argatroban range is 1.5 to 3.0 times the baseline  APTT value, not to exceed 100 seconds. Recommended therapeutic Refludan  range is 1.5 to 2.5 times thebaseline APTT.  Effective October 30, 2018 the reference range has changed. sec    < from: CT Abdomen and Pelvis w/ IV Cont (06.25.19 @ 09:34) >  FINDINGS:    LOWER CHEST: A large hiatal hernia with a largely intrathoracic stomach   with resultant mild right basilar subsegmental atelectasis. Small   tree-in-bud nodularity in the dependent medial right lower lobe.    LIVER: Ill-defined bilobar hypodensities measuring up to 3.5 cm in the   posterior right hepatic lobe (series 2, image 31).  BILE DUCTS: Normal caliber.  GALLBLADDER: Within normal limits.  SPLEEN: Within normal limits.  PANCREAS: Within normal limits.  ADRENALS: Within normal limits.  KIDNEYS/URETERS: A right lower pole cyst.    BLADDER: Within normal limits.  REPRODUCTIVE ORGANS: Prostate brachytherapy seeds.    BOWEL: Partially imaged predominantly intrathoracic stomach and a large   hiatal hernia, as above. Scattered colonic diverticula without acute   diverticulitis. No bowel obstruction. Appendix not visualized.  PERITONEUM: No ascites.  VESSELS:  Atherosclerotic change.  RETROPERITONEUM: No lymphadenopathy.    ABDOMINAL WALL: Small bilateral fat-containing inguinal hernias.  BONES: Scoliosis with multilevel degenerative change. Calcific tendinitis   of the bilateral hamstrings. Also subchondral cystic change and   chondrocalcinosis of the bilateral hip joints and pubic symphysis.    IMPRESSION:    A partially imaged large hiatal hernia with a largely intrathoracic   stomach.    Indeterminate bilobar liver lesions measuring up to 3.5 cm in the right   hepatic lobe. Cannot exclude metastatic disease in the setting of known   malignancy. Recommend further evaluation with contrast-enhanced MR.    Subchondral cystic change and chondrocalcinosis of the bilateral hip   joints and pubic symphysis, suggestive of CPPD arthropathy.    < end of copied text >    A&P  84 year-old male with history of asthma presents to the Emergency Department for nausea/vomiting ongoing since last night; family reports vomitus was red in color at times. Per pt he went for dinner the night before and Gym the day off felling well until the symptoms started all of sudden last night. The vomiting was copious. He wasn't aware of fever, when vomiting might have coughed up but no cough or SOB prior to the episode. No chills, chest pain, shortness of breath, abdominal pain, diarrhea, blood in stool.  Did not take any medications at home.  Denies travel/sick contacts.  No history of alcohol abuse.  No abdominal surgeries. Pt has a hx of prostate ca many years ago s/p seeds, has been in remission since then, sees his Urologist every few months. (25 Jun 2019 15:46)    BRBPR  - STAT CBC, Coags and T&S  - c/w tele and vitals  - MRI abdomen pending   D/w Dr. Lopez; c/w hep gtt at this time. Will monitor for further bleed. Rn made aware of the plan.     Maryan LI  #[id number]    ADDENDUM  Complete Blood Count (06.25.19 @ 21:30)    WBC Count: 6.0 K/uL    RBC Count: 3.41 M/uL    Hemoglobin: 11.7: Test repeated. g/dL    Hematocrit: 34.1 %    Mean Cell Volume: 99.9 fl    Mean Cell Hemoglobin: 34.3 pg    Mean Cell Hemoglobin Conc: 34.3 gm/dL    Red Cell Distrib Width: 12.6 %    Platelet Count - Automated: 149 K/uL  Pt with no further BMs; Physical exam unremarkable; rectal exam rectal exam WNL w/o blood. CBC q 6 hrs. Will continue to monitor. RN made aware. MEDICINE PA NOTE     CHIEF COMPLAINT  Patient is a 84y old  Male who presents with a chief complaint of nausea and vomiting (25 Jun 2019 15:46)    EVENT SUMMARY   Notified by RN for BRBPR. Pt seen and examined at the bedside. Pt is alert; states that when he had a BM earlier around 6pm noticed bright blood on the tissue when he cleaned himself; denies blood in the toilet. Pt states that he had brown loose stool. Pt endorses that he had an episode previously 2- 3 months ago where he was seen in the ED at [facility] monitored and discharged. Pt just started on heparin gtt for afib around 6 pm without bolus.+ flatulence and BM. PT denies abd pain, cramping, constipation, pain with BM, N/V, melena, hematuria, hematemesis, hemoptysis, numbness, tingling, headache, CP, SOB, dyspnea, palpitations.      MEDICATIONS  (STANDING):  diltiazem    milliGRAM(s) Oral daily  heparin  Infusion.  Unit(s)/Hr (14 mL/Hr) IV Continuous <Continuous>  pantoprazole    Tablet 40 milliGRAM(s) Oral before breakfast  piperacillin/tazobactam IVPB.. 3.375 Gram(s) IV Intermittent every 8 hours  sodium chloride 0.9%. 1000 milliLiter(s) (80 mL/Hr) IV Continuous <Continuous>  tamsulosin 0.4 milliGRAM(s) Oral at bedtime    MEDICATIONS  (PRN):  ALBUTerol    90 MICROgram(s) HFA Inhaler 2 Puff(s) Inhalation every 6 hours PRN Bronchospasm  heparin  Injectable 6500 Unit(s) IV Push every 6 hours PRN For aPTT less than 40  heparin  Injectable 3000 Unit(s) IV Push every 6 hours PRN For aPTT between 40 - 57    ICU Vital Signs Last 24 Hrs  T(C): 37.8 (25 Jun 2019 21:08), Max: 39.1 (25 Jun 2019 07:40)  T(F): 100.1 (25 Jun 2019 21:08), Max: 102.4 (25 Jun 2019 07:40)  HR: 81 (25 Jun 2019 21:08) (81 - 130)  BP: 105/61 (25 Jun 2019 21:08) (105/61 - 146/83)  BP(mean): --  ABP: --  ABP(mean): --  RR: 18 (25 Jun 2019 21:08) (16 - 36)  SpO2: 94% (25 Jun 2019 21:08) (94% - 100%)    PHYSICAL EXAM   Constitutional: NAD, resting comfortably  Respiratory: CTA b/l non labored  Cardiovascular: S1, S2 irregular   Gastrointestinal: soft NT ND, + BS  Neurological: AO x 3    Complete Blood Count + Automated Diff (06.25.19 @ 08:20)    WBC Count: 6.3 K/uL    RBC Count: 4.50 M/uL    Hemoglobin: 14.3 g/dL    Hematocrit: 44.7 %    Mean Cell Volume: 99.5 fl    Mean Cell Hemoglobin: 31.7 pg    Mean Cell Hemoglobin Conc: 31.9 gm/dL    Red Cell Distrib Width: 12.5 %    Platelet Count - Automated: 209 K/uL    Auto Neutrophil #: 5.8 K/uL    Auto Lymphocyte #: 0.2 K/uL    Auto Monocyte #: 0.2 K/uL    Auto Eosinophil #: 0.0 K/uL    Auto Basophil #: 0.0 K/uL    Auto Neutrophil %: 92.4: Differential percentages must be correlated with absolute numbers for  clinical significance. %    Auto Lymphocyte %: 3.0 %    Auto Monocyte %: 3.7 %    Auto Eosinophil %: 0.2 %    Auto Basophil %: 0.7 %    Activated Partial Thromboplastin Time (06.25.19 @ 08:20)    Activated Partial Thromboplastin Time: 25.7: The recommended therapeutic heparin range (full dose) is 58-99 seconds.  Recommended therapeutic Argatroban range is 1.5 to 3.0 times the baseline  APTT value, not to exceed 100 seconds. Recommended therapeutic Refludan  range is 1.5 to 2.5 times thebaseline APTT.  Effective October 30, 2018 the reference range has changed. sec    < from: CT Abdomen and Pelvis w/ IV Cont (06.25.19 @ 09:34) >  FINDINGS:    LOWER CHEST: A large hiatal hernia with a largely intrathoracic stomach   with resultant mild right basilar subsegmental atelectasis. Small   tree-in-bud nodularity in the dependent medial right lower lobe.    LIVER: Ill-defined bilobar hypodensities measuring up to 3.5 cm in the   posterior right hepatic lobe (series 2, image 31).  BILE DUCTS: Normal caliber.  GALLBLADDER: Within normal limits.  SPLEEN: Within normal limits.  PANCREAS: Within normal limits.  ADRENALS: Within normal limits.  KIDNEYS/URETERS: A right lower pole cyst.    BLADDER: Within normal limits.  REPRODUCTIVE ORGANS: Prostate brachytherapy seeds.    BOWEL: Partially imaged predominantly intrathoracic stomach and a large   hiatal hernia, as above. Scattered colonic diverticula without acute   diverticulitis. No bowel obstruction. Appendix not visualized.  PERITONEUM: No ascites.  VESSELS:  Atherosclerotic change.  RETROPERITONEUM: No lymphadenopathy.    ABDOMINAL WALL: Small bilateral fat-containing inguinal hernias.  BONES: Scoliosis with multilevel degenerative change. Calcific tendinitis   of the bilateral hamstrings. Also subchondral cystic change and   chondrocalcinosis of the bilateral hip joints and pubic symphysis.    IMPRESSION:    A partially imaged large hiatal hernia with a largely intrathoracic   stomach.    Indeterminate bilobar liver lesions measuring up to 3.5 cm in the right   hepatic lobe. Cannot exclude metastatic disease in the setting of known   malignancy. Recommend further evaluation with contrast-enhanced MR.    Subchondral cystic change and chondrocalcinosis of the bilateral hip   joints and pubic symphysis, suggestive of CPPD arthropathy.    < end of copied text >    A&P  84 year-old male with history of asthma presents to the Emergency Department for nausea/vomiting ongoing since last night; family reports vomitus was red in color at times. Per pt he went for dinner the night before and Gym the day off felling well until the symptoms started all of sudden last night. The vomiting was copious. He wasn't aware of fever, when vomiting might have coughed up but no cough or SOB prior to the episode. No chills, chest pain, shortness of breath, abdominal pain, diarrhea, blood in stool.  Did not take any medications at home.  Denies travel/sick contacts.  No history of alcohol abuse.  No abdominal surgeries. Pt has a hx of prostate ca many years ago s/p seeds, has been in remission since then, sees his Urologist every few months. (25 Jun 2019 15:46)    BRBPR  - STAT CBC, Coags and T&S  - c/w tele and vitals  - MRI abdomen pending   D/w Dr. Lopez; c/w hep gtt at this time. Will monitor for further bleed. Rn made aware of the plan.     Maryan LI  #[id number]    ADDENDUM  Complete Blood Count (06.25.19 @ 21:30)    WBC Count: 6.0 K/uL    RBC Count: 3.41 M/uL    Hemoglobin: 11.7: Test repeated. g/dL    Hematocrit: 34.1 %    Mean Cell Volume: 99.9 fl    Mean Cell Hemoglobin: 34.3 pg    Mean Cell Hemoglobin Conc: 34.3 gm/dL    Red Cell Distrib Width: 12.6 %    Platelet Count - Automated: 149 K/uL  Pt with no further BMs; Physical exam unremarkable; rectal exam rectal exam WNL w/o blood. CBC q 6 hrs. Will continue to monitor. RN made aware.    ADDENDUM @ 7 AM  D/w Dr. Lopez regarding overnight event; recommended CBC q 12. Will continue to monitor.     Maryan LI  #[id number]

## 2019-06-25 NOTE — CHART NOTE - NSCHARTNOTEFT_GEN_A_CORE
COY PAIZ    Notified by NP Cardiology start patient on Heparin gtt patient with episode of 's Afib    Seen and Examined asymptomatic   patient without bleeding        Interventions taken   monitor CBC   Discussed With Dr Lopez Cleared to start Heparin Gtt                           Ena ÁLVAREZC

## 2019-06-25 NOTE — H&P ADULT - ASSESSMENT
84 year-old male with history of asthma presents to the Emergency Department for nausea/vomiting ongoing since last night; family reports vomitus was red in color at times. Per pt he went for dinner the night before and Gym the day off felling well until the symptoms started all of sudden last night. The vomiting was copious. He wasn't aware of fever, when vomiting might have coughed up but no cough or SOB prior to the episode. No chills, chest pain, shortness of breath, abdominal pain, diarrhea, blood in stool.  Did not take any medications at home.  Denies travel/sick contacts.  No history of alcohol abuse.  No abdominal surgeries. Pt has a hx of prostate ca many years ago s/p seeds, has been in remission since then, sees his Urologist every few months.

## 2019-06-25 NOTE — ED ADULT NURSE NOTE - OBJECTIVE STATEMENT
Pt is an 85 yo M who was brought to the ED via EMS c/o vomiting. Per EMS, pt stared he vomited x5 since midnight, no diarrhea. On arrival in ED pt febrile, denies fever in the past.. Pt is an 85 yo M who was brought to the ED via EMS c/o vomiting. Per EMS, pt stared he vomited x5 since midnight, no diarrhea. On arrival in ED pt febrile, denies fever in the past. Denies cp/sob/palpitations, no abd pain, no chills, no weakness. A/O x3, no bleeding, no vomiting since arrival in ED.

## 2019-06-25 NOTE — CONSULT NOTE ADULT - SUBJECTIVE AND OBJECTIVE BOX
Patient is a 84y old  Male who presents with a chief complaint of N/V.     HPI:  84 year-old male with history of asthma presents to the Emergency Department for nausea/vomiting ongoing since last night; family reports vomitus was red in color at times. Per pt he went for dinner the night before and Gym the day off felling well until the symptoms started all of sudden last night. The vomiting was copious. He wasn't aware of fever, when vomiting might have coughed up but no cough or SOB prior to the episode. No chills, chest pain, shortness of breath, abdominal pain, diarrhea, blood in stool.  Did not take any medications at home.  Denies travel/sick contacts.  No history of alcohol abuse.  No abdominal surgeries. Pt has a hx of prostate ca many years ago s/p seeds, has been in remission since then, sees his Urologist every few months. Pt febrile in ER.   Currently feels well, no N/V, no cough or SOB right now. Spoke to son he did have hemangiomas in CT in 2002, the largest a little over a cm, but no recent scan.       PAST MEDICAL & SURGICAL HISTORY:  Asthma  Prostate cancer   No significant past surgical history      REVIEW OF SYSTEMS    General: Denies any chills. + Fevers    Skin: No rash  	  Ophthalmologic: Denies any discharge, redness.  	  ENT: No nasal congestion or throat pain.     Respiratory and Thorax: No cough, sputum. Denies shortness of breath.  	  Cardiovascular: No chest pain, palpitations.    Gastrointestinal: Per HPI.     Genitourinary: No dysuria, frequency. No flank pain.    Musculoskeletal: No joint swelling or pain.    Neurological: No extremity weakness.    Psychiatric: No hallucinations	    Endocrine: No abnormal heat/cold intolerance    Allergic/Immunologic: No hives or rash       Social history:  , lives with spouse, no smoking.       FAMILY HISTORY:  Mother: Lung cancer.       Allergies  No Known Allergies      Antimicrobials:  piperacillin/tazobactam IVPB.. 3.375 Gram(s) IV Intermittent every 8 hours        Vital Signs Last 24 Hrs  T(C): 37.2 (25 Jun 2019 12:23), Max: 39.1 (25 Jun 2019 07:40)  T(F): 99 (25 Jun 2019 12:23), Max: 102.4 (25 Jun 2019 07:40)  HR: 107 (25 Jun 2019 12:23) (103 - 130)  BP: 127/64 (25 Jun 2019 12:23) (110/63 - 146/83)  RR: 18 (25 Jun 2019 12:23) (16 - 36)  SpO2: 99% (25 Jun 2019 12:23) (97% - 100%)      PHYSICAL EXAM: Patient in no acute distress.    Constitutional: Comfortable. Awake and alert    Eyes: No discharge or conjunctival injection    ENT: No thrush. No pharyngeal exudate or erythema.    Neck: Supple,     Respiratory: Good air entry bilaterally.    Cardiovascular: S1 S2 wnl, No murmurs.    Gastrointestinal: Soft BS(+) no tenderness, non distended.    Genitourinary: No CVA tenderness     Extremities: No edema.    Vascular: peripheral pulses felt    Neurological: AAO X 3. No grossly focal deficits.    Skin: No rash     Musculoskeletal: No joint swelling.    Psychiatric: Affect normal.                              14.3   6.3   )-----------( 209      ( 25 Jun 2019 08:20 )             44.7       06-25    138  |  100  |  36<H>  ----------------------------<  151<H>  4.3   |  23  |  1.36<H>    Ca    9.3      25 Jun 2019 08:20    TPro  8.4<H>  /  Alb  4.5  /  TBili  0.4  /  DBili  x   /  AST  27  /  ALT  24  /  AlkPhos  80  06-25      Rapid Respiratory Viral Panel (06.25.19 @ 08:20)    Rapid RVP Result: [facility]    Urinalysis (06.25.19 @ 08:46)    Glucose Qualitative, Urine: Negative    Blood, Urine: Trace    pH Urine: 5.5    Color: Light Yellow    Urine Appearance: Clear    Bilirubin: Negative    Ketone - Urine: Small    Specific Gravity: 1.024    Protein, Urine: 30 mg/dL    Urobilinogen: Negative    Nitrite: Negative    Leukocyte Esterase Concentration: Negative    Red Blood Cell - Urine: 1 /hpf    White Blood Cell - Urine: 1 /HPF    Hyaline Casts: 1 /lpf    Bacteria: Negative    Epithelial Cells: 0 /hpf        Radiology: Imaging reviewed personally [ x]  < from: Xray Chest 1 View AP/PA (06.25.19 @ 08:16) >    Impression: Limited exam. No focal consolidation is visualized.      < from: CT Abdomen and Pelvis w/ IV Cont (06.25.19 @ 09:34) >  IMPRESSION:    A partially imaged large hiatal hernia with a largely intrathoracic   stomach.    Indeterminate bilobar liver lesions measuring up to 3.5 cm in the right   hepatic lobe. Cannot exclude metastatic disease in the setting of known   malignancy. Recommend further evaluation with contrast-enhanced MR.    Subchondral cystic change and chondrocalcinosis of the bilateral hip   joints and pubic symphysis, suggestive of CPPD arthropathy.

## 2019-06-25 NOTE — ED PROVIDER NOTE - PHYSICAL EXAMINATION
*Gen: NAD, AAO*3  *HEENT: NC/AT, MMM, airway patent, trachea midline  *CV: tachycardia, S1/S2 present, no murmurs/rubs/gallops  *Resp: no respiratory distress, LCTAB, no wheezing/rales/rhonchi  *Abd: non-distended, soft N/Tx4, no guarding or rigidity  *Neuro: no focal neuro deficits, moving all limbs appropriately  *Extremities: no gross deformity  *Skin: no rashes, no wounds   ~ Chaparro Gonzalez M.D. *Gen: NAD, AAO*3  *HEENT: NC/AT, MMM, airway patent, trachea midline  *CV: tachycardia, S1/S2 present, no murmurs/rubs/gallops  *Resp: no respiratory distress, LCTAB, no wheezing/rales/rhonchi  *Abd: non-distended, soft N/Tx4, no guarding or rigidity  *: Chaperone: Xenia WALKER; rectal exam WNL w/o blood; oocult blood test performed  *Neuro: no focal neuro deficits, moving all limbs appropriately  *Extremities: no gross deformity  *Skin: no rashes, no wounds   ~ Chaparro Gonzalez M.D.

## 2019-06-25 NOTE — H&P ADULT - NSHPLABSRESULTS_GEN_ALL_CORE
Lab Results:  CBC  CBC Full  -  ( 2019 08:20 )  WBC Count : 6.3 K/uL  RBC Count : 4.50 M/uL  Hemoglobin : 14.3 g/dL  Hematocrit : 44.7 %  Platelet Count - Automated : 209 K/uL  Mean Cell Volume : 99.5 fl  Mean Cell Hemoglobin : 31.7 pg  Mean Cell Hemoglobin Concentration : 31.9 gm/dL  Auto Neutrophil # : 5.8 K/uL  Auto Lymphocyte # : 0.2 K/uL  Auto Monocyte # : 0.2 K/uL  Auto Eosinophil # : 0.0 K/uL  Auto Basophil # : 0.0 K/uL  Auto Neutrophil % : 92.4 %  Auto Lymphocyte % : 3.0 %  Auto Monocyte % : 3.7 %  Auto Eosinophil % : 0.2 %  Auto Basophil % : 0.7 %    .		Differential:	[] Automated		[] Manual  Chemistry                        14.3   6.3   )-----------( 209      ( 2019 08:20 )             44.7     06-25    138  |  100  |  36<H>  ----------------------------<  151<H>  4.3   |  23  |  1.36<H>    Ca    9.3      2019 08:20    TPro  8.4<H>  /  Alb  4.5  /  TBili  0.4  /  DBili  x   /  AST  27  /  ALT  24  /  AlkPhos  80  06-25    LIVER FUNCTIONS - ( 2019 08:20 )  Alb: 4.5 g/dL / Pro: 8.4 g/dL / ALK PHOS: 80 U/L / ALT: 24 U/L / AST: 27 U/L / GGT: x           PT/INR - ( 2019 08:20 )   PT: 11.5 sec;   INR: 1.01 ratio         PTT - ( 2019 08:20 )  PTT:25.7 sec  Urinalysis Basic - ( 2019 08:46 )    Color: Light Yellow / Appearance: Clear / S.024 / pH: x  Gluc: x / Ketone: Small  / Bili: Negative / Urobili: Negative   Blood: x / Protein: 30 mg/dL / Nitrite: Negative   Leuk Esterase: Negative / RBC: 1 /hpf / WBC 1 /HPF   Sq Epi: x / Non Sq Epi: 0 /hpf / Bacteria: Negative            MICROBIOLOGY/CULTURES:      RADIOLOGY RESULTS: reviewed

## 2019-06-25 NOTE — ED PROVIDER NOTE - ATTENDING CONTRIBUTION TO CARE
Attending MD [place]:  I personally have seen and examined this patient.  Resident note reviewed and agree on plan of care and except where noted.  See HPI, PE, and MDM for details.      84M presenting from home with 1 day of nausea and vomiting, patient arrives tachycardia, febrile to 102, concern for sepsis of uncertain source at this juncture but concern for intra-abdominal sepsis. Plan for pan culture, empiric IV vanco/zosyn, CT a/p, IV crystalloid resuscitation with close reassessment

## 2019-06-25 NOTE — CONSULT NOTE ADULT - ASSESSMENT
84 year-old male with history of asthma presents to the Emergency Department for nausea/vomiting ongoing since last night.  Pt found to be in sepsis (fever, tachycardia, tachypnea) ? source.   Reviewed CT with radiology, cannot R/O liver abscess.   CT chest with some nonspecific scattered ground glass on my review.       Plan:   Sepsis:   MRI abd/pelvis with contrast   Started Zosyn q8h  Follow up blood cx.   ESR/CRP/Procalcitonin ordered.   GI PCR to r/o gastroenteritis.     GEMA:   Cr higher than his last Cr we have in our system   ? secondary to sepsis  Monitor Cr

## 2019-06-25 NOTE — CONSULT NOTE ADULT - ASSESSMENT
84y Male with history of GERD, asthma presents with nausea and vomiting. Nephrology consulted for elevated Scr.    1) GEMA: Baseline Scr unknown however suspect patient with element of GEMA secondary to pre-renal azotemia in setting of poor PO intake and vomiting. UA bland not suggestive of interstitial disease from PPI and CT without post-obstructive process. Check urine sodium and urine creatinine. Would start maintenance IVF and monitor for ARIANE given CT with IV contrast earlier today. Avoid nephrotoxins. Monitor electrolytes.    2) HTN: BP controlled. Monitor off of medications.    3) Lactic acidosis: Mild and concern for underlying infection for which ID is following. IVF as above. Monitor serum lactate.     4) Proteinuria: Mild and in setting of concentrated urine. Check spot urine TP/CR to quantify.

## 2019-06-25 NOTE — CONSULT NOTE ADULT - ASSESSMENT
84 year old male with hx of asthma, prostate ca s/p seeds , bronchiectases presenting with nausea and vomiting new onset afib/ aflutter     1. New onset afib/ Aflutter  likely in the setting of underlying GI etiology, dehydration; recent n/v, r/o sepsis   Afib on tele with episodes of RVR up to 140, asymptomatic   ekg no evidence of ACS  start cardizem 120 mg daily    CHADsVASc =2 , start hep gtt for now   check echo to eval LV fx, valvular disease   IVF , cv stable no chf on exam. no cp   chest xray unremarkable     2. nausea and vomiting   CT a/p with large hiatal hernia, + liver lesions ? mets   med f/u , plan for MRI abdomen  ID f/u r/o sepsis, pending bcx   iv abx     dvt ppx

## 2019-06-25 NOTE — ED PROVIDER NOTE - CARE PLAN
Principal Discharge DX:	Sepsis Principal Discharge DX:	Sepsis  Secondary Diagnosis:	Atrial flutter with rapid ventricular response

## 2019-06-25 NOTE — ED PROVIDER NOTE - CLINICAL SUMMARY MEDICAL DECISION MAKING FREE TEXT BOX
84 year-old male with history of asthma presents to the Emergency Department for nausea/vomiting ongoing since last night; concern for hematemesis.  + sepsis.  Plan to CT scan to eval for intra-abdominal path, labs, fluids and likely TBA.

## 2019-06-25 NOTE — CONSULT NOTE ADULT - SUBJECTIVE AND OBJECTIVE BOX
CARDIOLOGY CONSULT - Dr. Gilmore         HPI:  84y Male with history of below presents with nausea and vomiting since last night.  Patient being evaluated today for new onset afib/ aflutter noted in the ED. Currently on tele rapid afib hr up to 140s. Patient is asymptomatic. He denies of CHF, arrhythmias, valvular disease or CAD. No recent cardiac testing. He denies any recent or ongoing chest pain, sob, palpitations, orthopnea, dizziness, fever or chills.  ROS otherwise negative.       PAST MEDICAL & SURGICAL HISTORY:  Asthma  prostate CA s/p prostate seeds   No significant past surgical history          PREVIOUS DIAGNOSTIC TESTING:    [ ] Echocardiogram:  [ ]  Catheterization:  [ ] Stress Test:  	        MEDICATIONS:  Home Medications:  Flomax 0.4 mg oral capsule: 1 cap(s) orally once a day (25 Jun 2019 10:57)  omeprazole 40 mg oral delayed release capsule: 1 cap(s) orally once a day (25 Jun 2019 10:57)  ProAir HFA 90 mcg/inh inhalation aerosol: 2 puff(s) inhaled every 6 hours, As Needed (25 Jun 2019 10:57)        MEDICATIONS  (STANDING):  piperacillin/tazobactam IVPB.. 3.375 Gram(s) IV Intermittent every 8 hours  sodium chloride 0.9%. 1000 milliLiter(s) (80 mL/Hr) IV Continuous <Continuous>      FAMILY HISTORY:      SOCIAL HISTORY:    [x ]  + former Tobacco      Allergies    No Known Allergies    Intolerances    	    REVIEW OF SYSTEMS:  CONSTITUTIONAL: No fever, weight loss, or fatigue  EYES: No eye pain, visual disturbances, or discharge  ENMT:  No difficulty hearing, tinnitus, vertigo; No sinus or throat pain  NECK: No pain or stiffness  RESPIRATORY: No cough, wheezing, chills or hemoptysis; No Shortness of Breath  CARDIOVASCULAR: No chest pain, palpitations, passing out, dizziness, or leg swelling  GASTROINTESTINAL: see HPI  GENITOURINARY: No dysuria, frequency, hematuria, or incontinence  NEUROLOGICAL: No headaches, memory loss, loss of strength, numbness, or tremors  SKIN: No itching, burning, rashes, or lesions   	    [x ] All others negative	  [ ] Unable to obtain    PHYSICAL EXAM:  T(C): 37.6 (06-25-19 @ 14:46), Max: 39.1 (06-25-19 @ 07:40)  HR: 93 (06-25-19 @ 14:46) (93 - 130)  BP: 129/85 (06-25-19 @ 14:46) (110/63 - 146/83)  RR: 18 (06-25-19 @ 14:46) (16 - 36)  SpO2: 94% (06-25-19 @ 14:46) (94% - 100%)  Wt(kg): --  I&O's Summary    25 Jun 2019 07:01  -  25 Jun 2019 15:35  --------------------------------------------------------  IN: 210 mL / OUT: 0 mL / NET: 210 mL        Appearance: Normal	  Psychiatry: A & O x 3, Mood & affect appropriate  HEENT:   Normal oral mucosa, PERRL, EOMI	  Lymphatic: No lymphadenopathy  Cardiovascular: Normal S1 S2, irregular tachycardic   Respiratory: Lungs clear to auscultation	  Gastrointestinal:  Soft, Non-tender, + BS	  Skin: No rashes, No ecchymoses, No cyanosis	  Neurologic: Non-focal  Extremities: Normal range of motion, No clubbing, cyanosis or edema  Vascular: Peripheral pulses palpable 2+ bilaterally    TELEMETRY: afib HR 90s, up to 140s when OOB 	    ECG:  aflutter hr 127   lateral TWI	  RADIOLOGY:    < from: CT Abdomen and Pelvis w/ IV Cont (06.25.19 @ 09:34) >  IMPRESSION:    A partially imaged large hiatal hernia with a largely intrathoracic   stomach.    Indeterminate bilobar liver lesions measuring up to 3.5 cm in the right   hepatic lobe. Cannot exclude metastatic disease in the setting of known   malignancy. Recommend further evaluation with contrast-enhanced MR.    Subchondral cystic change and chondrocalcinosis of the bilateral hip   joints and pubic symphysis, suggestive of CPPD arthropathy.      -------------------------------------------------------------------------------------------------------------------  	  < from: Xray Chest 1 View AP/PA (06.25.19 @ 08:16) >    Comparison: None    Findings: The study is somewhat limited secondary to rotation. There is   elevation of the left hemidiaphragm. No focal consolidation or pleural   effusion is seen.    Impression: Limited exam. No focal consolidation is visualized.      < end of copied text >    LABS:	 	    CARDIAC MARKERS:                                  14.3   6.3   )-----------( 209      ( 25 Jun 2019 08:20 )             44.7     06-25    138  |  100  |  36<H>  ----------------------------<  151<H>  4.3   |  23  |  1.36<H>    Ca    9.3      25 Jun 2019 08:20    TPro  8.4<H>  /  Alb  4.5  /  TBili  0.4  /  DBili  x   /  AST  27  /  ALT  24  /  AlkPhos  80  06-25    PT/INR - ( 25 Jun 2019 08:20 )   PT: 11.5 sec;   INR: 1.01 ratio         PTT - ( 25 Jun 2019 08:20 )  PTT:25.7 sec  proBNP:   Lipid Profile:   HgA1c:   TSH:

## 2019-06-25 NOTE — H&P ADULT - NSHPPHYSICALEXAM_GEN_ALL_CORE
General: WN/WD NAD  PERRLA  Neurology: A&Ox3, nonfocal, HENAO x 4  Respiratory: CTA B/L  CV: RRR, S1S2, no murmurs, rubs or gallops  Abdominal: Soft, NT, ND +BS, Last BM  Extremities: No edema, + peripheral pulses  Skin Normal

## 2019-06-25 NOTE — CONSULT NOTE ADULT - SUBJECTIVE AND OBJECTIVE BOX
[facility] NEPHROLOGY- CONSULTATION NOTE    84y Male with history of below presents with nausea and vomiting. Nephrology consulted for elevated Scr.    Patient denies any prior history of kidney disease, proteinuria or microscopic hematuria. Patient denies any NSAID use PTA. Patient on chronic PPI for GERD. Patient with poor appetite and complaints of nausea and vomiting since yesterday evening. No baseline Scr available. Patient not on any diuretic or ACE-I/ARB.    REVIEW OF SYSTEMS:  Gen: no changes in weight  HEENT: no rhinorrhea  Neck: no sore throat  Cards: no chest pain  Resp: no dyspnea  GI: + nausea, + vomiting, no diarrhea  : no dysuria or hematuria  Vascular: no LE edema  Derm: no rashes  Neuro: no numbness/tingling    No Known Allergies      Home Medications Reviewed  Hospital Medications:   MEDICATIONS  (STANDING):  piperacillin/tazobactam IVPB.. 3.375 Gram(s) IV Intermittent every 8 hours      PAST MEDICAL & SURGICAL HISTORY:  Asthma  No significant past surgical history      FAMILY HISTORY:  N/C    SOCIAL HISTORY:  Denies toxic substance use     VITALS:  T(F): 99.6 (19 @ 14:46), Max: 102.4 (19 @ 07:40)  HR: 93 (19 @ 14:46)  BP: 129/85 (19 @ 14:46)  RR: 18 (19 @ 14:46)  SpO2: 94% (19 @ 14:46)  Wt(kg): --     @ 07:01  -   @ 14:55  --------------------------------------------------------  IN: 210 mL / OUT: 0 mL / NET: 210 mL        Weight (kg): 77 ( @ 07:57)    PHYSICAL EXAM:  Gen: NAD, calm  HEENT: dry MM  Neck: no JVD  Cards: RRR, +S1/S2, no M/G/R  Resp: CTA B/L  GI: soft, NT/ND, NABS  : no CVA tenderness  Vascular: no LE edema B/L  Derm: no rashes  Neuro: non-focal    LABS:      138  |  100  |  36<H>  ----------------------------<  151<H>  4.3   |  23  |  1.36<H>    Ca    9.3      2019 08:20    TPro  8.4<H>  /  Alb  4.5  /  TBili  0.4  /  DBili      /  AST  27  /  ALT  24  /  AlkPhos  80      Creatinine Trend: 1.36 <--                        14.3   6.3   )-----------( 209      ( 2019 08:20 )             44.7     Urine Studies:  Urinalysis Basic - ( 2019 08:46 )    Color: Light Yellow / Appearance: Clear / S.024 / pH:   Gluc:  / Ketone: Small  / Bili: Negative / Urobili: Negative   Blood:  / Protein: 30 mg/dL / Nitrite: Negative   Leuk Esterase: Negative / RBC: 1 /hpf / WBC 1 /HPF   Sq Epi:  / Non Sq Epi: 0 /hpf / Bacteria: Negative          RADIOLOGY & ADDITIONAL STUDIES:  < from: Xray Chest 1 View AP/PA (19 @ 08:16) >    Impression: Limited exam. No focal consolidation is visualized.    < end of copied text >      < from: CT Abdomen and Pelvis w/ IV Cont (19 @ 09:34) >  KIDNEYS/URETERS: A right lower pole cyst.    BLADDER: Within normal limits.    < end of copied text >      < from: CT Abdomen and Pelvis w/ IV Cont (19 @ 09:34) >  IMPRESSION:    A partially imaged large hiatal hernia with a largely intrathoracic   stomach.    Indeterminate bilobar liver lesions measuring up to 3.5 cm in the right   hepatic lobe. Cannot exclude metastatic disease in the setting of known   malignancy. Recommend further evaluation with contrast-enhanced MR.    Subchondral cystic change and chondrocalcinosis of the bilateral hip   joints and pubic symphysis, suggestive of CPPD arthropathy.    < end of copied text >

## 2019-06-26 LAB
ALBUMIN SERPL ELPH-MCNC: 2.9 G/DL — LOW (ref 3.3–5)
ALP SERPL-CCNC: 47 U/L — SIGNIFICANT CHANGE UP (ref 40–120)
ALT FLD-CCNC: 23 U/L — SIGNIFICANT CHANGE UP (ref 10–45)
ANION GAP SERPL CALC-SCNC: 10 MMOL/L — SIGNIFICANT CHANGE UP (ref 5–17)
APTT BLD: 62.2 SEC — HIGH (ref 27.5–36.3)
APTT BLD: 77.4 SEC — HIGH (ref 27.5–36.3)
AST SERPL-CCNC: 51 U/L — HIGH (ref 10–40)
BILIRUB SERPL-MCNC: 0.3 MG/DL — SIGNIFICANT CHANGE UP (ref 0.2–1.2)
BUN SERPL-MCNC: 26 MG/DL — HIGH (ref 7–23)
CALCIUM SERPL-MCNC: 7.9 MG/DL — LOW (ref 8.4–10.5)
CHLORIDE SERPL-SCNC: 105 MMOL/L — SIGNIFICANT CHANGE UP (ref 96–108)
CO2 SERPL-SCNC: 23 MMOL/L — SIGNIFICANT CHANGE UP (ref 22–31)
CREAT SERPL-MCNC: 1.43 MG/DL — HIGH (ref 0.5–1.3)
CULTURE RESULTS: SIGNIFICANT CHANGE UP
GLUCOSE SERPL-MCNC: 105 MG/DL — HIGH (ref 70–99)
HCT VFR BLD CALC: 33 % — LOW (ref 39–50)
HCT VFR BLD CALC: 33.1 % — LOW (ref 39–50)
HGB BLD-MCNC: 11 G/DL — LOW (ref 13–17)
HGB BLD-MCNC: 11.2 G/DL — LOW (ref 13–17)
LACTATE SERPL-SCNC: 1 MMOL/L — SIGNIFICANT CHANGE UP (ref 0.7–2)
MCHC RBC-ENTMCNC: 33.4 GM/DL — SIGNIFICANT CHANGE UP (ref 32–36)
MCHC RBC-ENTMCNC: 33.4 PG — SIGNIFICANT CHANGE UP (ref 27–34)
MCHC RBC-ENTMCNC: 33.8 PG — SIGNIFICANT CHANGE UP (ref 27–34)
MCHC RBC-ENTMCNC: 33.9 GM/DL — SIGNIFICANT CHANGE UP (ref 32–36)
MCV RBC AUTO: 100 FL — SIGNIFICANT CHANGE UP (ref 80–100)
MCV RBC AUTO: 99.7 FL — SIGNIFICANT CHANGE UP (ref 80–100)
PLATELET # BLD AUTO: 136 K/UL — LOW (ref 150–400)
PLATELET # BLD AUTO: 159 K/UL — SIGNIFICANT CHANGE UP (ref 150–400)
POTASSIUM SERPL-MCNC: 3.8 MMOL/L — SIGNIFICANT CHANGE UP (ref 3.5–5.3)
POTASSIUM SERPL-SCNC: 3.8 MMOL/L — SIGNIFICANT CHANGE UP (ref 3.5–5.3)
PROT SERPL-MCNC: 5.8 G/DL — LOW (ref 6–8.3)
RBC # BLD: 3.31 M/UL — LOW (ref 4.2–5.8)
RBC # BLD: 3.31 M/UL — LOW (ref 4.2–5.8)
RBC # FLD: 12.5 % — SIGNIFICANT CHANGE UP (ref 10.3–14.5)
RBC # FLD: 12.6 % — SIGNIFICANT CHANGE UP (ref 10.3–14.5)
SODIUM SERPL-SCNC: 138 MMOL/L — SIGNIFICANT CHANGE UP (ref 135–145)
SPECIMEN SOURCE: SIGNIFICANT CHANGE UP
WBC # BLD: 5 K/UL — SIGNIFICANT CHANGE UP (ref 3.8–10.5)
WBC # BLD: 5.4 K/UL — SIGNIFICANT CHANGE UP (ref 3.8–10.5)
WBC # FLD AUTO: 5 K/UL — SIGNIFICANT CHANGE UP (ref 3.8–10.5)
WBC # FLD AUTO: 5.4 K/UL — SIGNIFICANT CHANGE UP (ref 3.8–10.5)

## 2019-06-26 PROCEDURE — 99232 SBSQ HOSP IP/OBS MODERATE 35: CPT

## 2019-06-26 PROCEDURE — 74181 MRI ABDOMEN W/O CONTRAST: CPT | Mod: 26

## 2019-06-26 RX ORDER — SODIUM CHLORIDE 9 MG/ML
1000 INJECTION INTRAMUSCULAR; INTRAVENOUS; SUBCUTANEOUS
Refills: 0 | Status: DISCONTINUED | OUTPATIENT
Start: 2019-06-26 | End: 2019-06-27

## 2019-06-26 RX ORDER — ACETAMINOPHEN 500 MG
650 TABLET ORAL EVERY 6 HOURS
Refills: 0 | Status: DISCONTINUED | OUTPATIENT
Start: 2019-06-26 | End: 2019-06-29

## 2019-06-26 RX ADMIN — Medication 650 MILLIGRAM(S): at 10:53

## 2019-06-26 RX ADMIN — SODIUM CHLORIDE 80 MILLILITER(S): 9 INJECTION INTRAMUSCULAR; INTRAVENOUS; SUBCUTANEOUS at 05:54

## 2019-06-26 RX ADMIN — Medication 120 MILLIGRAM(S): at 05:52

## 2019-06-26 RX ADMIN — HEPARIN SODIUM 1400 UNIT(S)/HR: 5000 INJECTION INTRAVENOUS; SUBCUTANEOUS at 00:50

## 2019-06-26 RX ADMIN — PIPERACILLIN AND TAZOBACTAM 25 GRAM(S): 4; .5 INJECTION, POWDER, LYOPHILIZED, FOR SOLUTION INTRAVENOUS at 15:16

## 2019-06-26 RX ADMIN — TAMSULOSIN HYDROCHLORIDE 0.4 MILLIGRAM(S): 0.4 CAPSULE ORAL at 21:00

## 2019-06-26 RX ADMIN — PIPERACILLIN AND TAZOBACTAM 25 GRAM(S): 4; .5 INJECTION, POWDER, LYOPHILIZED, FOR SOLUTION INTRAVENOUS at 09:03

## 2019-06-26 RX ADMIN — Medication 650 MILLIGRAM(S): at 11:20

## 2019-06-26 RX ADMIN — HEPARIN SODIUM 1400 UNIT(S)/HR: 5000 INJECTION INTRAVENOUS; SUBCUTANEOUS at 08:18

## 2019-06-26 RX ADMIN — PIPERACILLIN AND TAZOBACTAM 25 GRAM(S): 4; .5 INJECTION, POWDER, LYOPHILIZED, FOR SOLUTION INTRAVENOUS at 23:52

## 2019-06-26 RX ADMIN — SODIUM CHLORIDE 80 MILLILITER(S): 9 INJECTION INTRAMUSCULAR; INTRAVENOUS; SUBCUTANEOUS at 15:17

## 2019-06-26 RX ADMIN — PANTOPRAZOLE SODIUM 40 MILLIGRAM(S): 20 TABLET, DELAYED RELEASE ORAL at 05:52

## 2019-06-26 NOTE — PROGRESS NOTE ADULT - ASSESSMENT
84 year old male with hx of asthma, prostate ca s/p seeds , bronchiectases presenting with nausea and vomiting new onset afib/ aflutter     1. New onset afib/ Aflutter  likely in the setting of underlying GI etiology, dehydration; recent n/v, r/o sepsis   rate improved/ c/w  cardizem 120 mg daily    ekg no evidence of ACS  events noted, cbc noted, stool guaic negative. closely monitor CBC, no active bleeding noted  CHADsVASc =2 , c/w hep gtt for now   check echo to eval LV fx, valvular disease   cv stable no chf on exam. no cp   chest xray unremarkable     2. nausea and vomiting   CT a/p with large hiatal hernia, + liver lesions ? mets   ct chest with findings suspicious for bronchopneumonia    med f/u , plan for MRI abdomen  ID f/u r/o sepsis, pending bcx   iv abx     dvt ppx

## 2019-06-26 NOTE — PROGRESS NOTE ADULT - ASSESSMENT
84y Male with history of GERD, asthma presents with nausea and vomiting. Nephrology consulted for elevated Scr.    1) GEMA: Baseline Scr unknown however suspect patient with element of GEMA secondary to pre-renal azotemia in setting of poor PO intake and vomiting given low FeNa. UA bland not suggestive of interstitial disease from PPI and CT without post-obstructive process. Suspect delay in renal recovery due to contrast administration on 6/25. Would continue with gentle IVF for another 24 hours. Avoid nephrotoxins. Monitor electrolytes.    2) HTN: BP controlled. Rate control as per cardiology.    3) Lactic acidosis: Resolved with IVF.    4) Proteinuria: Mild (spot urine TP/CR 0.2) for which would defer further inpatient work up at this time.

## 2019-06-26 NOTE — PROGRESS NOTE ADULT - ASSESSMENT
84 year-old male with history of asthma presents to the Emergency Department for nausea/vomiting ongoing since last night.  Pt found to be in sepsis (fever, tachycardia, tachypnea) ? source possible pneumonia vs liver   Reviewed CT with radiology, cannot R/O liver abscess.   CT chest with possible pneumonia, maybe aspirated when vomiting.       Plan:   Sepsis:   MRI abd/pelvis with contrast if feasible given elevated Cr.   Continue with Zosyn q8h  Follow prelim up blood cx. NTD   ESR/CRP/Procalcitonin ordered, not performed.   GI PCR to r/o gastroenteritis.       GEMA:   Cr higher   ? secondary to sepsis  Monitor Cr

## 2019-06-26 NOTE — PROGRESS NOTE ADULT - ASSESSMENT
Problem/Plan - 1:  ·  Problem: Sepsis.  Plan: ro liver abscess  check MRI abdomen  cw zosyn  fu cultures  ID fu.     Problem/Plan - 2:  ·  Problem: Atrial flutter with rapid ventricular response.  Plan: telemonitor   cards fu.   cw heparin drip     Problem/Plan - 3:  ·  Problem: Asthma.  Plan: cw proair.

## 2019-06-26 NOTE — PROGRESS NOTE ADULT - SUBJECTIVE AND OBJECTIVE BOX
Patient is a 84y old  Male who presents with a chief complaint of nausea and vomiting (2019 17:25)      INTERVAL HPI/OVERNIGHT EVENTS:  T(C): 37 (19 @ 11:55), Max: 37.8 (19 @ 21:08)  HR: 72 (19 @ 11:55) (72 - 95)  BP: 118/65 (19 @ 11:55) (105/61 - 128/68)  RR: 18 (19 @ 11:55) (18 - 20)  SpO2: 93% (19 @ 11:55) (92% - 94%)  Wt(kg): --  I&O's Summary    2019 07:  -  2019 07:00  --------------------------------------------------------  IN: 1758 mL / OUT: 400 mL / NET: 1358 mL    2019 07:01  -  2019 18:33  --------------------------------------------------------  IN: 420 mL / OUT: 0 mL / NET: 420 mL        LABS:                        11.0   5.0   )-----------( 136      ( 2019 07:19 )             33.1         138  |  105  |  26<H>  ----------------------------<  105<H>  3.8   |  23  |  1.43<H>    Ca    7.9<L>      2019 07:19    TPro  5.8<L>  /  Alb  2.9<L>  /  TBili  0.3  /  DBili  x   /  AST  51<H>  /  ALT  23  /  AlkPhos  47      PT/INR - ( 2019 21:30 )   PT: 14.3 sec;   INR: 1.25 ratio         PTT - ( 2019 07:19 )  PTT:77.4 sec  Urinalysis Basic - ( 2019 08:46 )    Color: Light Yellow / Appearance: Clear / S.024 / pH: x  Gluc: x / Ketone: Small  / Bili: Negative / Urobili: Negative   Blood: x / Protein: 30 mg/dL / Nitrite: Negative   Leuk Esterase: Negative / RBC: 1 /hpf / WBC 1 /HPF   Sq Epi: x / Non Sq Epi: 0 /hpf / Bacteria: Negative      CAPILLARY BLOOD GLUCOSE            Urinalysis Basic - ( 2019 08:46 )    Color: Light Yellow / Appearance: Clear / S.024 / pH: x  Gluc: x / Ketone: Small  / Bili: Negative / Urobili: Negative   Blood: x / Protein: 30 mg/dL / Nitrite: Negative   Leuk Esterase: Negative / RBC: 1 /hpf / WBC 1 /HPF   Sq Epi: x / Non Sq Epi: 0 /hpf / Bacteria: Negative        MEDICATIONS  (STANDING):  diltiazem    milliGRAM(s) Oral daily  heparin  Infusion.  Unit(s)/Hr (14 mL/Hr) IV Continuous <Continuous>  pantoprazole    Tablet 40 milliGRAM(s) Oral before breakfast  piperacillin/tazobactam IVPB.. 3.375 Gram(s) IV Intermittent every 8 hours  sodium chloride 0.9%. 1000 milliLiter(s) (80 mL/Hr) IV Continuous <Continuous>  tamsulosin 0.4 milliGRAM(s) Oral at bedtime    MEDICATIONS  (PRN):  acetaminophen   Tablet .. 650 milliGRAM(s) Oral every 6 hours PRN Temp greater or equal to 38C (100.4F), Mild Pain (1 - 3)  ALBUTerol    90 MICROgram(s) HFA Inhaler 2 Puff(s) Inhalation every 6 hours PRN Bronchospasm  heparin  Injectable 6500 Unit(s) IV Push every 6 hours PRN For aPTT less than 40  heparin  Injectable 3000 Unit(s) IV Push every 6 hours PRN For aPTT between 40 - 57          PHYSICAL EXAM:  GENERAL: NAD, well-groomed, well-developed  HEAD:  Atraumatic, Normocephalic  CHEST/LUNG: Clear to percussion bilaterally; No rales, rhonchi, wheezing, or rubs  HEART: Regular rate and rhythm; No murmurs, rubs, or gallops  ABDOMEN: Soft, Nontender, Nondistended; Bowel sounds present  EXTREMITIES:  2+ Peripheral Pulses, No clubbing, cyanosis, or edema  LYMPH: No lymphadenopathy noted  SKIN: No rashes or lesions    Care Discussed with Consultants/Other Providers [ ] YES  [ ] NO

## 2019-06-26 NOTE — PROVIDER CONTACT NOTE (OTHER) - ACTION/TREATMENT ORDERED:
PA made aware. Oral Tylenol to be given and brace from home to be used as per PA. Continue to monitor patient.
will continue to monitor, will do STAT lab coags and CBC

## 2019-06-26 NOTE — PROVIDER CONTACT NOTE (OTHER) - ASSESSMENT
Pt A&Ox4. VSS. Patient complains of right hand pain with numbing sensation on some fingertips. Pulses are +2 and palpable. Right hand has +1 edema. Pt denies tingling feeling, states it is an aching and throbbing pain 4/10. Patient has a right hand brace from home at bedside, not in use.

## 2019-06-26 NOTE — PROGRESS NOTE ADULT - SUBJECTIVE AND OBJECTIVE BOX
84y old  Male who presents with a chief complaint of nausea and vomiting (26 Jun 2019 12:01)      Interval history:  Afebrile, had BRBPR overnight, no abdominal pain, denies cough.     No Known Allergies    Antimicrobials:    piperacillin/tazobactam IVPB.. 3.375 Gram(s) IV Intermittent every 8 hours    REVIEW OF SYSTEMS:    No chest pain or palpitations  No cough, no SOB  No N/V/D, no abdominal pain  No dysuria or frequency  No rash.     Vital Signs Last 24 Hrs  T(C): 37 (06-26-19 @ 11:55), Max: 37.8 (06-25-19 @ 21:08)  T(F): 98.6 (06-26-19 @ 11:55), Max: 100.1 (06-25-19 @ 21:08)  HR: 72 (06-26-19 @ 11:55) (72 - 95)  BP: 118/65 (06-26-19 @ 11:55) (105/61 - 128/68)  BP(mean): --  RR: 18 (06-26-19 @ 11:55) (18 - 20)  SpO2: 93% (06-26-19 @ 11:55) (92% - 94%)    PHYSICAL EXAM:                            11.0   5.0   )-----------( 136      ( 26 Jun 2019 07:19 )             33.1   06-26    138  |  105  |  26<H>  ----------------------------<  105<H>  3.8   |  23  |  1.43<H>    Ca    7.9<L>      26 Jun 2019 07:19    TPro  5.8<L>  /  Alb  2.9<L>  /  TBili  0.3  /  DBili  x   /  AST  51<H>  /  ALT  23  /  AlkPhos  47  06-26      LIVER FUNCTIONS - ( 26 Jun 2019 07:19 )  Alb: 2.9 g/dL / Pro: 5.8 g/dL / ALK PHOS: 47 U/L / ALT: 23 U/L / AST: 51 U/L / GGT: x               Culture - Blood (collected 25 Jun 2019 11:15)  Source: .Blood  Preliminary Report (26 Jun 2019 12:01):    No growth to date.    Culture - Blood (collected 25 Jun 2019 11:15)  Source: .Blood  Preliminary Report (26 Jun 2019 12:01):    No growth to date.    Culture - Urine (collected 25 Jun 2019 11:14)  Source: .Urine  Final Report (26 Jun 2019 11:11):    <10,000 CFU/mL Normal Urogenital Maria Guadalupe        Radiology: 84y old  Male who presents with a chief complaint of nausea and vomiting (26 Jun 2019 12:01)      Interval history:  Afebrile, had BRBPR overnight, no abdominal pain, denies cough.       No Known Allergies      Antimicrobials:  piperacillin/tazobactam IVPB.. 3.375 Gram(s) IV Intermittent every 8 hours      REVIEW OF SYSTEMS:  No chest pain   No N/V  No dysuria   No rash.       Vital Signs Last 24 Hrs  T(C): 37 (06-26-19 @ 11:55), Max: 37.8 (06-25-19 @ 21:08)  T(F): 98.6 (06-26-19 @ 11:55), Max: 100.1 (06-25-19 @ 21:08)  HR: 72 (06-26-19 @ 11:55) (72 - 95)  BP: 118/65 (06-26-19 @ 11:55) (105/61 - 128/68)  RR: 18 (06-26-19 @ 11:55) (18 - 20)  SpO2: 93% (06-26-19 @ 11:55) (92% - 94%)      PHYSICAL EXAM:  Patient in no acute distress. Alert, awake.   No icterus, no oral ulcers.  Cardiovascular: S1S2 normal.  Lungs: + air entry B/L lung fields.  Gastrointestinal: soft, nontender, nondistended.  Extremities: no edema.  IV sites not inflamed.                           11.0   5.0   )-----------( 136      ( 26 Jun 2019 07:19 )             33.1   06-26    138  |  105  |  26<H>  ----------------------------<  105<H>  3.8   |  23  |  1.43<H>    Ca    7.9<L>      26 Jun 2019 07:19    TPro  5.8<L>  /  Alb  2.9<L>  /  TBili  0.3  /  DBili  x   /  AST  51<H>  /  ALT  23  /  AlkPhos  47  06-26      LIVER FUNCTIONS - ( 26 Jun 2019 07:19 )  Alb: 2.9 g/dL / Pro: 5.8 g/dL / ALK PHOS: 47 U/L / ALT: 23 U/L / AST: 51 U/L / GGT: x               Culture - Blood (collected 25 Jun 2019 11:15)  Source: .Blood  Preliminary Report (26 Jun 2019 12:01):    No growth to date.    Culture - Blood (collected 25 Jun 2019 11:15)  Source: .Blood  Preliminary Report (26 Jun 2019 12:01):    No growth to date.    Culture - Urine (collected 25 Jun 2019 11:14)  Source: .Urine  Final Report (26 Jun 2019 11:11):    <10,000 CFU/mL Normal Urogenital Maria Guadalupe      < from: CT Chest w/ IV Cont (06.25.19 @ 09:51) >  IMPRESSION: Scattered tree-in-bud nodules in both lungs and small   infiltrate in the right lower lobe.  The findings are suspicious for   bronchopneumonia.  Follow-up chest CT is recommended in one month to   ensure resolution of the imaging findings.

## 2019-06-26 NOTE — PROGRESS NOTE ADULT - SUBJECTIVE AND OBJECTIVE BOX
[facility] NEPHROLOGY- PROGRESS NOTE    84y Male with history of GERD, asthma presents with nausea and vomiting. Nephrology consulted for elevated Scr.    REVIEW OF SYSTEMS:  Gen: no changes in weight  Cards: no chest pain  Resp: no dyspnea  GI: + nausea and vomiting resolved, no diarrhea  Vascular: no LE edema    No Known Allergies      Hospital Medications: Medications reviewed      VITALS:  T(F): 98.6 (19 @ 11:55), Max: 100.1 (19 @ 21:08)  HR: 72 (19 @ 11:55)  BP: 118/65 (19 @ 11:55)  RR: 18 (19 @ 11:55)  SpO2: 93% (19 @ 11:55)  Wt(kg): --     @ 07:01  -   @ 07:00  --------------------------------------------------------  IN: 1758 mL / OUT: 400 mL / NET: 1358 mL     @ 07:01  -   @ 12:03  --------------------------------------------------------  IN: 240 mL / OUT: 0 mL / NET: 240 mL        PHYSICAL EXAM:    Gen: NAD, calm  Cards: Irregularly irregular, +S1/S2, no M/G/R  Resp: CTA B/L  GI: soft, NT/ND, NABS  Vascular: no LE edema B/L      LABS:      138  |  105  |  26<H>  ----------------------------<  105<H>  3.8   |  23  |  1.43<H>    Ca    7.9<L>      2019 07:19    TPro  5.8<L>  /  Alb  2.9<L>  /  TBili  0.3  /  DBili      /  AST  51<H>  /  ALT  23  /  AlkPhos  47      Creatinine Trend: 1.43 <--, 1.36 <--                        11.0   5.0   )-----------( 136      ( 2019 07:19 )             33.1     Urine Studies:  Urinalysis Basic - ( 2019 08:46 )    Color: Light Yellow / Appearance: Clear / S.024 / pH:   Gluc:  / Ketone: Small  / Bili: Negative / Urobili: Negative   Blood:  / Protein: 30 mg/dL / Nitrite: Negative   Leuk Esterase: Negative / RBC: 1 /hpf / WBC 1 /HPF   Sq Epi:  / Non Sq Epi: 0 /hpf / Bacteria: Negative      Sodium, Random Urine: 71 mmol/L ( @ 15:48)  Creatinine, Random Urine: 93 mg/dL ( @ 15:48)  Creatinine, Random Urine: 94 mg/dL ( @ 15:48)  Protein/Creatinine Ratio Calculation: 0.2 Ratio ( @ 15:48)

## 2019-06-26 NOTE — PROGRESS NOTE ADULT - SUBJECTIVE AND OBJECTIVE BOX
CARDIOLOGY FOLLOW UP - Dr. Gilmore    CC no cp or sob       PHYSICAL EXAM:  T(C): 36.9 (06-26-19 @ 05:29), Max: 37.8 (06-25-19 @ 21:08)  HR: 93 (06-26-19 @ 05:29) (81 - 107)  BP: 124/62 (06-26-19 @ 05:29) (105/61 - 129/85)  RR: 20 (06-26-19 @ 05:29) (18 - 20)  SpO2: 93% (06-26-19 @ 05:29) (92% - 99%)  Wt(kg): --  I&O's Summary    25 Jun 2019 07:01  -  26 Jun 2019 07:00  --------------------------------------------------------  IN: 1758 mL / OUT: 400 mL / NET: 1358 mL    26 Jun 2019 07:01  -  26 Jun 2019 10:49  --------------------------------------------------------  IN: 240 mL / OUT: 0 mL / NET: 240 mL        Appearance: Normal	  Cardiovascular: Normal S1 S2,irregular   Respiratory: diminished at base 	  Gastrointestinal:  Soft, Non-tender, + BS	  Extremities: Normal range of motion, No clubbing, cyanosis or edema        MEDICATIONS  (STANDING):  diltiazem    milliGRAM(s) Oral daily  heparin  Infusion.  Unit(s)/Hr (14 mL/Hr) IV Continuous <Continuous>  pantoprazole    Tablet 40 milliGRAM(s) Oral before breakfast  piperacillin/tazobactam IVPB.. 3.375 Gram(s) IV Intermittent every 8 hours  sodium chloride 0.9%. 1000 milliLiter(s) (80 mL/Hr) IV Continuous <Continuous>  tamsulosin 0.4 milliGRAM(s) Oral at bedtime      TELEMETRY: afib hr 90s - 100, pvc 	    ECG:  	  RADIOLOGY:   DIAGNOSTIC TESTING:  [ ] Echocardiogram:  [ ]  Catheterization:  [ ] Stress Test:    OTHER: 	  < from: CT Chest w/ IV Cont (06.25.19 @ 09:51) >    IMPRESSION: Scattered tree-in-bud nodules in both lungs and small   infiltrate in the right lower lobe.  The findings are suspicious for   bronchopneumonia.  Follow-up chest CTis recommended in one month to   ensure resolution of the imaging findings.        < end of copied text >    LABS:	 	                            11.0   5.0   )-----------( 136      ( 26 Jun 2019 07:19 )             33.1     06-26    138  |  105  |  26<H>  ----------------------------<  105<H>  3.8   |  23  |  1.43<H>    Ca    7.9<L>      26 Jun 2019 07:19    TPro  5.8<L>  /  Alb  2.9<L>  /  TBili  0.3  /  DBili  x   /  AST  51<H>  /  ALT  23  /  AlkPhos  47  06-26    PT/INR - ( 25 Jun 2019 21:30 )   PT: 14.3 sec;   INR: 1.25 ratio         PTT - ( 26 Jun 2019 07:19 )  PTT:77.4 sec

## 2019-06-27 DIAGNOSIS — R11.2 NAUSEA WITH VOMITING, UNSPECIFIED: ICD-10-CM

## 2019-06-27 LAB
ANION GAP SERPL CALC-SCNC: 11 MMOL/L — SIGNIFICANT CHANGE UP (ref 5–17)
APTT BLD: 95.6 SEC — HIGH (ref 27.5–36.3)
BUN SERPL-MCNC: 21 MG/DL — SIGNIFICANT CHANGE UP (ref 7–23)
CALCIUM SERPL-MCNC: 7.8 MG/DL — LOW (ref 8.4–10.5)
CHLORIDE SERPL-SCNC: 104 MMOL/L — SIGNIFICANT CHANGE UP (ref 96–108)
CO2 SERPL-SCNC: 23 MMOL/L — SIGNIFICANT CHANGE UP (ref 22–31)
CREAT SERPL-MCNC: 1.48 MG/DL — HIGH (ref 0.5–1.3)
GLUCOSE BLDC GLUCOMTR-MCNC: 104 MG/DL — HIGH (ref 70–99)
GLUCOSE BLDC GLUCOMTR-MCNC: 94 MG/DL — SIGNIFICANT CHANGE UP (ref 70–99)
GLUCOSE SERPL-MCNC: 108 MG/DL — HIGH (ref 70–99)
HCT VFR BLD CALC: 32.1 % — LOW (ref 39–50)
HGB BLD-MCNC: 10.7 G/DL — LOW (ref 13–17)
MCHC RBC-ENTMCNC: 33.3 GM/DL — SIGNIFICANT CHANGE UP (ref 32–36)
MCHC RBC-ENTMCNC: 33.5 PG — SIGNIFICANT CHANGE UP (ref 27–34)
MCV RBC AUTO: 100 FL — SIGNIFICANT CHANGE UP (ref 80–100)
PLATELET # BLD AUTO: 141 K/UL — LOW (ref 150–400)
POTASSIUM SERPL-MCNC: 3.8 MMOL/L — SIGNIFICANT CHANGE UP (ref 3.5–5.3)
POTASSIUM SERPL-SCNC: 3.8 MMOL/L — SIGNIFICANT CHANGE UP (ref 3.5–5.3)
RBC # BLD: 3.2 M/UL — LOW (ref 4.2–5.8)
RBC # FLD: 12.6 % — SIGNIFICANT CHANGE UP (ref 10.3–14.5)
SODIUM SERPL-SCNC: 138 MMOL/L — SIGNIFICANT CHANGE UP (ref 135–145)
WBC # BLD: 4.6 K/UL — SIGNIFICANT CHANGE UP (ref 3.8–10.5)
WBC # FLD AUTO: 4.6 K/UL — SIGNIFICANT CHANGE UP (ref 3.8–10.5)

## 2019-06-27 PROCEDURE — 99232 SBSQ HOSP IP/OBS MODERATE 35: CPT

## 2019-06-27 PROCEDURE — 74183 MRI ABD W/O CNTR FLWD CNTR: CPT | Mod: 26

## 2019-06-27 RX ORDER — IPRATROPIUM/ALBUTEROL SULFATE 18-103MCG
3 AEROSOL WITH ADAPTER (GRAM) INHALATION ONCE
Refills: 0 | Status: COMPLETED | OUTPATIENT
Start: 2019-06-27 | End: 2019-06-28

## 2019-06-27 RX ORDER — SODIUM CHLORIDE 0.65 %
1 AEROSOL, SPRAY (ML) NASAL
Refills: 0 | Status: DISCONTINUED | OUTPATIENT
Start: 2019-06-27 | End: 2019-06-29

## 2019-06-27 RX ADMIN — PIPERACILLIN AND TAZOBACTAM 25 GRAM(S): 4; .5 INJECTION, POWDER, LYOPHILIZED, FOR SOLUTION INTRAVENOUS at 09:02

## 2019-06-27 RX ADMIN — TAMSULOSIN HYDROCHLORIDE 0.4 MILLIGRAM(S): 0.4 CAPSULE ORAL at 21:07

## 2019-06-27 RX ADMIN — PIPERACILLIN AND TAZOBACTAM 25 GRAM(S): 4; .5 INJECTION, POWDER, LYOPHILIZED, FOR SOLUTION INTRAVENOUS at 23:18

## 2019-06-27 RX ADMIN — PIPERACILLIN AND TAZOBACTAM 25 GRAM(S): 4; .5 INJECTION, POWDER, LYOPHILIZED, FOR SOLUTION INTRAVENOUS at 17:20

## 2019-06-27 RX ADMIN — ALBUTEROL 2 PUFF(S): 90 AEROSOL, METERED ORAL at 23:13

## 2019-06-27 RX ADMIN — Medication 120 MILLIGRAM(S): at 05:38

## 2019-06-27 RX ADMIN — PANTOPRAZOLE SODIUM 40 MILLIGRAM(S): 20 TABLET, DELAYED RELEASE ORAL at 05:38

## 2019-06-27 RX ADMIN — Medication 1 SPRAY(S): at 07:03

## 2019-06-27 RX ADMIN — HEPARIN SODIUM 1400 UNIT(S)/HR: 5000 INJECTION INTRAVENOUS; SUBCUTANEOUS at 09:02

## 2019-06-27 NOTE — PROGRESS NOTE ADULT - ASSESSMENT
84 year old male with hx of asthma, prostate ca s/p seeds , bronchiectases presenting with nausea and vomiting new onset afib/ aflutter     1. New onset afib/ Aflutter  likely in the setting of underlying GI etiology, dehydration; recent n/v  rate improved/ c/w  cardizem 120 mg daily    ekg no evidence of ACS  cbc noted, stool guaic negative. closely monitor CBC, no active bleeding noted  CHADsVASc =2 , c/w hep gtt for now   pending echo to eval LV fx, valvular disease   cv stable no chf on exam. no cp   chest xray unremarkable     2. nausea and vomiting   CT a/p with large hiatal hernia, + liver lesions ? mets   ct chest with findings suspicious for bronchopneumonia    MRI abdomen  limited study, 3 cm mass in segment 6 of the liver remains indeterminate; med f/u   ID f/u bc negative   iv abx     3. Anemia   cbc slowly downtrending, recent occult stool negative   on hep gtt for afib   med f/u , consider gi or hem c/s ?       dvt ppx

## 2019-06-27 NOTE — PROGRESS NOTE ADULT - SUBJECTIVE AND OBJECTIVE BOX
CARDIOLOGY FOLLOW UP - Dr. Gilmore    CC no cp or sob   no overt signs of bleeding        PHYSICAL EXAM:  T(C): 36.7 (06-27-19 @ 05:22), Max: 37.4 (06-26-19 @ 21:15)  HR: 98 (06-27-19 @ 05:22) (72 - 98)  BP: 124/58 (06-27-19 @ 05:22) (107/53 - 154/71)  RR: 18 (06-27-19 @ 05:22) (18 - 18)  SpO2: 95% (06-27-19 @ 05:22) (93% - 95%)  Wt(kg): --  I&O's Summary    26 Jun 2019 07:01  -  27 Jun 2019 07:00  --------------------------------------------------------  IN: 2336 mL / OUT: 700 mL / NET: 1636 mL        Appearance: Normal	  Cardiovascular: Normal S1 S2,irregular   Respiratory: Lungs clear to auscultation	  Gastrointestinal:  Soft, Non-tender, + BS	  Extremities: Normal range of motion, No clubbing, cyanosis or edema        MEDICATIONS  (STANDING):  diltiazem    milliGRAM(s) Oral daily  heparin  Infusion.  Unit(s)/Hr (14 mL/Hr) IV Continuous <Continuous>  pantoprazole    Tablet 40 milliGRAM(s) Oral before breakfast  piperacillin/tazobactam IVPB.. 3.375 Gram(s) IV Intermittent every 8 hours  sodium chloride 0.9%. 1000 milliLiter(s) (80 mL/Hr) IV Continuous <Continuous>  tamsulosin 0.4 milliGRAM(s) Oral at bedtime      TELEMETRY: Afib HR 80-90. pvc	    ECG:  	  < from: MR Abdomen No Cont (06.26.19 @ 18:20) >    IMPRESSION:     Limited study. 3 cm mass in segment 6 of the liver remains indeterminate,   although abscess appears unlikely. Consider contrast-enhanced ultrasound   for further characterization.          < end of copied text >    RADIOLOGY:   DIAGNOSTIC TESTING:  [ ] Echocardiogram:  [ ]  Catheterization:  [ ] Stress Test:    OTHER: 	    LABS:	 	                            10.7   4.6   )-----------( 141      ( 27 Jun 2019 07:24 )             32.1     06-27    138  |  104  |  21  ----------------------------<  108<H>  3.8   |  23  |  1.48<H>    Ca    7.8<L>      27 Jun 2019 07:24    TPro  5.8<L>  /  Alb  2.9<L>  /  TBili  0.3  /  DBili  x   /  AST  51<H>  /  ALT  23  /  AlkPhos  47  06-26    PT/INR - ( 25 Jun 2019 21:30 )   PT: 14.3 sec;   INR: 1.25 ratio         PTT - ( 27 Jun 2019 07:24 )  PTT:95.6 sec

## 2019-06-27 NOTE — PROGRESS NOTE ADULT - ASSESSMENT
Problem/Plan - 1:  ·  Problem: Sepsis.  Plan:   MRI abdomen reviewed  cw zosyn  fu cultures  ID fu.     Problem/Plan - 2:  ·  Problem: Atrial flutter with rapid ventricular response.  Plan: telemonitor   cards fu.   cw heparin drip     Problem/Plan - 3:  ·  Problem: Asthma.  Plan: cw proair.

## 2019-06-27 NOTE — PROGRESS NOTE ADULT - SUBJECTIVE AND OBJECTIVE BOX
Patient is a 84y old  Male who presents with a chief complaint of nausea and vomiting (27 Jun 2019 16:57)      INTERVAL HPI/OVERNIGHT EVENTS:  T(C): 36.7 (06-27-19 @ 11:56), Max: 37.4 (06-26-19 @ 21:15)  HR: 67 (06-27-19 @ 11:56) (67 - 98)  BP: 133/65 (06-27-19 @ 11:56) (107/53 - 154/71)  RR: 18 (06-27-19 @ 11:56) (18 - 18)  SpO2: 92% (06-27-19 @ 11:56) (92% - 95%)  Wt(kg): --  I&O's Summary    26 Jun 2019 07:01  -  27 Jun 2019 07:00  --------------------------------------------------------  IN: 2336 mL / OUT: 700 mL / NET: 1636 mL    27 Jun 2019 07:01  -  27 Jun 2019 17:31  --------------------------------------------------------  IN: 240 mL / OUT: 0 mL / NET: 240 mL        LABS:                        10.7   4.6   )-----------( 141      ( 27 Jun 2019 07:24 )             32.1     06-27    138  |  104  |  21  ----------------------------<  108<H>  3.8   |  23  |  1.48<H>    Ca    7.8<L>      27 Jun 2019 07:24    TPro  5.8<L>  /  Alb  2.9<L>  /  TBili  0.3  /  DBili  x   /  AST  51<H>  /  ALT  23  /  AlkPhos  47  06-26    PT/INR - ( 25 Jun 2019 21:30 )   PT: 14.3 sec;   INR: 1.25 ratio         PTT - ( 27 Jun 2019 07:24 )  PTT:95.6 sec    CAPILLARY BLOOD GLUCOSE      POCT Blood Glucose.: 104 mg/dL (27 Jun 2019 12:36)  POCT Blood Glucose.: 94 mg/dL (27 Jun 2019 08:37)            MEDICATIONS  (STANDING):  diltiazem    milliGRAM(s) Oral daily  heparin  Infusion.  Unit(s)/Hr (14 mL/Hr) IV Continuous <Continuous>  pantoprazole    Tablet 40 milliGRAM(s) Oral before breakfast  piperacillin/tazobactam IVPB.. 3.375 Gram(s) IV Intermittent every 8 hours  tamsulosin 0.4 milliGRAM(s) Oral at bedtime    MEDICATIONS  (PRN):  acetaminophen   Tablet .. 650 milliGRAM(s) Oral every 6 hours PRN Temp greater or equal to 38C (100.4F), Mild Pain (1 - 3)  ALBUTerol    90 MICROgram(s) HFA Inhaler 2 Puff(s) Inhalation every 6 hours PRN Bronchospasm  heparin  Injectable 6500 Unit(s) IV Push every 6 hours PRN For aPTT less than 40  heparin  Injectable 3000 Unit(s) IV Push every 6 hours PRN For aPTT between 40 - 57  sodium chloride 0.65% Nasal 1 Spray(s) Both Nostrils four times a day PRN Nasal Congestion          PHYSICAL EXAM:  GENERAL: NAD, well-groomed, well-developed  HEAD:  Atraumatic, Normocephalic  CHEST/LUNG: Clear to percussion bilaterally; No rales, rhonchi, wheezing, or rubs  HEART: Regular rate and rhythm; No murmurs, rubs, or gallops  ABDOMEN: Soft, Nontender, Nondistended; Bowel sounds present  EXTREMITIES:  2+ Peripheral Pulses, No clubbing, cyanosis, or edema  LYMPH: No lymphadenopathy noted  SKIN: No rashes or lesions    Care Discussed with Consultants/Other Providers [ ] YES  [ ] NO

## 2019-06-27 NOTE — PROGRESS NOTE ADULT - ASSESSMENT
84 year-old male with history of asthma presents to the Emergency Department for nausea/vomiting ongoing since last night.  Pt found to be in sepsis (fever, tachycardia, tachypnea) ? source possible pneumonia vs liver   Reviewed CT with radiology, cannot R/O liver abscess.   CT chest with possible pneumonia, maybe aspirated when vomiting.       Plan:   Sepsis: resolved.   MRI abd/pelvis with hemangioma.   Continue with Zosyn q8h for possible aspiration pneumonia.   day 3/7 of abx today.  Can switch to po Moxifloxacin 400 mg po q24h on discharge to complete therapy until 7/1/19.   Follow prelim up blood cx. NTD   GI PCR to r/o gastroenteritis as an etiology for N/V.        GEMA:   Cr higher   ? secondary to sepsis  Monitor Cr

## 2019-06-27 NOTE — CONSULT NOTE ADULT - PROBLEM SELECTOR RECOMMENDATION 9
nausea vomiting resolved  can do gi pcr if diarrhea persists   ? resolving gastroenteritis  does have intrathoracic stomach but no further nausea or vomiting  mri revealed hemangioma; no further workup   d/w patient

## 2019-06-27 NOTE — PROGRESS NOTE ADULT - SUBJECTIVE AND OBJECTIVE BOX
[facility] NEPHROLOGY- PROGRESS NOTE    84y Male with history of GERD, asthma presents with nausea and vomiting. Nephrology consulted for elevated Scr.    REVIEW OF SYSTEMS:  Gen: no changes in weight  Cards: no chest pain  Resp: no dyspnea  GI: + nausea and vomiting resolved, no diarrhea  Vascular: no LE edema    No Known Allergies      Hospital Medications: Medications reviewed      VITALS:  T(F): 98 (19 @ 11:56), Max: 99.4 (19 @ 21:15)  HR: 67 (19 @ 11:56)  BP: 133/65 (19 @ 11:56)  RR: 18 (19 @ 11:56)  SpO2: 92% (19 @ 11:56)  Wt(kg): --     @ 07:01  -   @ 07:00  --------------------------------------------------------  IN: 2336 mL / OUT: 700 mL / NET: 1636 mL      PHYSICAL EXAM:    Gen: NAD, calm  Cards: Irregularly irregular, +S1/S2, no M/G/R  Resp: CTA B/L  GI: soft, NT/ND, NABS  Vascular: no LE edema B/L      LABS:      138  |  104  |  21  ----------------------------<  108<H>  3.8   |  23  |  1.48<H>    Ca    7.8<L>      2019 07:24    TPro  5.8<L>  /  Alb  2.9<L>  /  TBili  0.3  /  DBili      /  AST  51<H>  /  ALT  23  /  AlkPhos  47      Creatinine Trend: 1.48 <--, 1.43 <--, 1.36 <--                        10.7   4.6   )-----------( 141      ( 2019 07:24 )             32.1     Urine Studies:  Urinalysis Basic - ( 2019 08:46 )    Color: Light Yellow / Appearance: Clear / S.024 / pH:   Gluc:  / Ketone: Small  / Bili: Negative / Urobili: Negative   Blood:  / Protein: 30 mg/dL / Nitrite: Negative   Leuk Esterase: Negative / RBC: 1 /hpf / WBC 1 /HPF   Sq Epi:  / Non Sq Epi: 0 /hpf / Bacteria: Negative      Sodium, Random Urine: 71 mmol/L (-25 @ 15:48)  Creatinine, Random Urine: 93 mg/dL ( @ 15:48)  Creatinine, Random Urine: 94 mg/dL (25 @ 15:48)  Protein/Creatinine Ratio Calculation: 0.2 Ratio (06-25 @ 15:48)        < from: MR Abdomen No Cont (19 @ 18:20) >  IMPRESSION:     Limited study. 3 cm mass in segment 6 of the liver remains indeterminate,   although abscess appears unlikely. Consider contrast-enhanced ultrasound   for further characterization.    < end of copied text >

## 2019-06-27 NOTE — CONSULT NOTE ADULT - SUBJECTIVE AND OBJECTIVE BOX
[facility]  Pankaj Pandya PA-C  [address] [place] [address]  [phone]      Chief Complaint:  Patient is a 84y old  Male who presents with a chief complaint of nausea and vomiting (2019 14:12)      HPI:84 year-old male with history of asthma presents to the Emergency Department for nausea/vomiting ongoing since last night; family reports vomitus was red in color at times. Per pt he went for dinner the night before and Gym the day off felling well until the symptoms started all of sudden last night. The vomiting was copious. He wasn't aware of fever, when vomiting might have coughed up but no cough or SOB prior to the episode. No chills, chest pain, shortness of breath, abdominal pain, diarrhea, blood in stool.  Did not take any medications at home.  Denies travel/sick contacts.  No history of alcohol abuse.  No abdominal surgeries. Pt has a hx of prostate ca many years ago s/p seeds, has been in remission since then, sees his Urologist every few months.   his nausea and vomiting has resolved  he is eating well but did have diarrhea today  his MRI revealed a hemangioma     Allergies:  No Known Allergies      Medications:  acetaminophen   Tablet .. 650 milliGRAM(s) Oral every 6 hours PRN  ALBUTerol    90 MICROgram(s) HFA Inhaler 2 Puff(s) Inhalation every 6 hours PRN  diltiazem    milliGRAM(s) Oral daily  heparin  Infusion.  Unit(s)/Hr IV Continuous <Continuous>  heparin  Injectable 6500 Unit(s) IV Push every 6 hours PRN  heparin  Injectable 3000 Unit(s) IV Push every 6 hours PRN  pantoprazole    Tablet 40 milliGRAM(s) Oral before breakfast  piperacillin/tazobactam IVPB.. 3.375 Gram(s) IV Intermittent every 8 hours  sodium chloride 0.65% Nasal 1 Spray(s) Both Nostrils four times a day PRN  tamsulosin 0.4 milliGRAM(s) Oral at bedtime      PMHX/PSHX:  Asthma  GERD (gastroesophageal reflux disease)  No significant past surgical history      Family history:  No pertinent family history in first degree relatives      Social History:     ROS:     General:  No wt loss, fevers, chills, night sweats, fatigue,   Eyes:  Good vision, no reported pain  ENT:  No sore throat, pain, runny nose, dysphagia  CV:  No pain, palpitations, hypo/hypertension  Resp:  No dyspnea, cough, tachypnea, wheezing  GI:  No pain, No nausea, No vomiting, No diarrhea, No constipation, No weight loss, No fever, No pruritis, No rectal bleeding, No tarry stools, No dysphagia,  :  No pain, bleeding, incontinence, nocturia  Muscle:  No pain, weakness  Neuro:  No weakness, tingling, memory problems  Psych:  No fatigue, insomnia, mood problems, depression  Endocrine:  No polyuria, polydipsia, cold/heat intolerance  Heme:  No petechiae, ecchymosis, easy bruisability  Skin:  No rash, tattoos, scars, edema      PHYSICAL EXAM:   Vital Signs:  Vital Signs Last 24 Hrs  T(C): 36.7 (2019 11:56), Max: 37.4 (2019 21:15)  T(F): 98 (2019 11:56), Max: 99.4 (2019 21:15)  HR: 67 (2019 11:56) (67 - 98)  BP: 133/65 (2019 11:56) (107/53 - 154/71)  BP(mean): --  RR: 18 (2019 11:56) (18 - 18)  SpO2: 92% (2019 11:56) (92% - 95%)  Daily     Daily Weight in k.1 (2019 05:22)    GENERAL:  Appears stated age, well-groomed, well-nourished, no distress  HEENT:  NC/AT,  conjunctivae clear and pink, no thyromegaly, nodules, adenopathy, no JVD, sclera -anicteric  CHEST:  Full & symmetric excursion, no increased effort, breath sounds clear  HEART:  Regular rhythm, S1, S2, no murmur/rub/S3/S4, no abdominal bruit, no edema  ABDOMEN:  Soft, non-tender, non-distended, normoactive bowel sounds,  no masses ,no hepato-splenomegaly, no signs of chronic liver disease  EXTEREMITIES:  no cyanosis,clubbing or edema  SKIN:  No rash/erythema/ecchymoses/petechiae/wounds/abscess/warm/dry  NEURO:  Alert, oriented, no asterixis, no tremor, no encephalopathy    LABS:                        10.7   4.6   )-----------( 141      ( 2019 07:24 )             32.1     06-    138  |  104  |  21  ----------------------------<  108<H>  3.8   |  23  |  1.48<H>    Ca    7.8<L>      2019 07:24    TPro  5.8<L>  /  Alb  2.9<L>  /  TBili  0.3  /  DBili  x   /  AST  51<H>  /  ALT  23  /  AlkPhos  47  06-26    LIVER FUNCTIONS - ( 2019 07:19 )  Alb: 2.9 g/dL / Pro: 5.8 g/dL / ALK PHOS: 47 U/L / ALT: 23 U/L / AST: 51 U/L / GGT: x           PT/INR - ( 2019 21:30 )   PT: 14.3 sec;   INR: 1.25 ratio         PTT - ( 2019 07:24 )  PTT:95.6 sec        Imaging:

## 2019-06-27 NOTE — PROGRESS NOTE ADULT - ASSESSMENT
84y Male with history of GERD, asthma presents with nausea and vomiting. Nephrology consulted for elevated Scr.    1) GEMA: Baseline Scr unknown however suspect patient with element of GEMA secondary to pre-renal azotemia in setting of poor PO intake and vomiting given low FeNa. UA bland not suggestive of interstitial disease from PPI and CT without post-obstructive process. Suspect delay in renal recovery due to contrast administration on 6/25. Continue with supportive care. Avoid nephrotoxins. Monitor electrolytes.    2) HTN: BP controlled. Rate control as per cardiology.    3) Lactic acidosis: Resolved with IVF.    4) Proteinuria: Mild (spot urine TP/CR 0.2) for which would defer further inpatient work up at this time.

## 2019-06-27 NOTE — PROGRESS NOTE ADULT - SUBJECTIVE AND OBJECTIVE BOX
84y old  Male who presents with a chief complaint of nausea and vomiting (27 Jun 2019 17:30)      Interval history:  Afebrile, had an episode of loose BM after eating today. S/P MRI, wants to go home before weekend.     No Known Allergies    Antimicrobials:  piperacillin/tazobactam IVPB.. 3.375 Gram(s) IV Intermittent every 8 hours      REVIEW OF SYSTEMS:  No chest pain  No SOB  No N/V  No dysuria  No rash.       Vital Signs Last 24 Hrs  T(C): 36.7 (06-27-19 @ 11:56), Max: 37.4 (06-26-19 @ 21:15)  T(F): 98 (06-27-19 @ 11:56), Max: 99.4 (06-26-19 @ 21:15)  HR: 67 (06-27-19 @ 11:56) (67 - 98)  BP: 133/65 (06-27-19 @ 11:56) (107/53 - 154/71)  RR: 18 (06-27-19 @ 11:56) (18 - 18)  SpO2: 92% (06-27-19 @ 11:56) (92% - 95%)      PHYSICAL EXAM:  Patient in no acute distress. Alert, awake.   No icterus, no oral ulcers.  Cardiovascular: S1S2 normal.  Lungs: + air entry B/L lung fields.  Gastrointestinal: soft, nontender, nondistended.  Extremities: no edema.  IV sites not inflamed.                             10.7   4.6   )-----------( 141      ( 27 Jun 2019 07:24 )             32.1   06-27    138  |  104  |  21  ----------------------------<  108<H>  3.8   |  23  |  1.48<H>    Ca    7.8<L>      27 Jun 2019 07:24    TPro  5.8<L>  /  Alb  2.9<L>  /  TBili  0.3  /  DBili  x   /  AST  51<H>  /  ALT  23  /  AlkPhos  47  06-26      LIVER FUNCTIONS - ( 26 Jun 2019 07:19 )  Alb: 2.9 g/dL / Pro: 5.8 g/dL / ALK PHOS: 47 U/L / ALT: 23 U/L / AST: 51 U/L / GGT: x               Culture - Blood (collected 25 Jun 2019 11:15)  Source: .Blood  Preliminary Report (26 Jun 2019 12:01):    No growth to date.    Culture - Blood (collected 25 Jun 2019 11:15)  Source: .Blood  Preliminary Report (26 Jun 2019 12:01):    No growth to date.    Culture - Urine (collected 25 Jun 2019 11:14)  Source: .Urine  Final Report (26 Jun 2019 11:11):    <10,000 CFU/mL Normal Urogenital Maria Guadalupe      Radiology:  < from: MR Abdomen w/wo IV Cont (06.27.19 @ 14:20) >  IMPRESSION:     Hepatic hemangioma, described above.

## 2019-06-28 ENCOUNTER — TRANSCRIPTION ENCOUNTER (OUTPATIENT)
Age: 84
End: 2019-06-28

## 2019-06-28 DIAGNOSIS — Z71.89 OTHER SPECIFIED COUNSELING: ICD-10-CM

## 2019-06-28 LAB
ANION GAP SERPL CALC-SCNC: 10 MMOL/L — SIGNIFICANT CHANGE UP (ref 5–17)
APTT BLD: 86 SEC — HIGH (ref 27.5–36.3)
BUN SERPL-MCNC: 14 MG/DL — SIGNIFICANT CHANGE UP (ref 7–23)
CA-I BLD-SCNC: 1.14 MMOL/L — SIGNIFICANT CHANGE UP (ref 1.12–1.3)
CALCIUM SERPL-MCNC: 8.6 MG/DL — SIGNIFICANT CHANGE UP (ref 8.4–10.5)
CHLORIDE SERPL-SCNC: 104 MMOL/L — SIGNIFICANT CHANGE UP (ref 96–108)
CO2 SERPL-SCNC: 25 MMOL/L — SIGNIFICANT CHANGE UP (ref 22–31)
CREAT SERPL-MCNC: 1.39 MG/DL — HIGH (ref 0.5–1.3)
GLUCOSE SERPL-MCNC: 163 MG/DL — HIGH (ref 70–99)
HCT VFR BLD CALC: 31.6 % — LOW (ref 39–50)
HGB BLD-MCNC: 10.8 G/DL — LOW (ref 13–17)
MCHC RBC-ENTMCNC: 34 GM/DL — SIGNIFICANT CHANGE UP (ref 32–36)
MCHC RBC-ENTMCNC: 34.1 PG — HIGH (ref 27–34)
MCV RBC AUTO: 100 FL — SIGNIFICANT CHANGE UP (ref 80–100)
PLATELET # BLD AUTO: 141 K/UL — LOW (ref 150–400)
POTASSIUM SERPL-MCNC: 4 MMOL/L — SIGNIFICANT CHANGE UP (ref 3.5–5.3)
POTASSIUM SERPL-SCNC: 4 MMOL/L — SIGNIFICANT CHANGE UP (ref 3.5–5.3)
RBC # BLD: 3.16 M/UL — LOW (ref 4.2–5.8)
RBC # FLD: 12.4 % — SIGNIFICANT CHANGE UP (ref 10.3–14.5)
SODIUM SERPL-SCNC: 139 MMOL/L — SIGNIFICANT CHANGE UP (ref 135–145)
WBC # BLD: 5.4 K/UL — SIGNIFICANT CHANGE UP (ref 3.8–10.5)
WBC # FLD AUTO: 5.4 K/UL — SIGNIFICANT CHANGE UP (ref 3.8–10.5)

## 2019-06-28 RX ORDER — METOPROLOL TARTRATE 50 MG
25 TABLET ORAL
Refills: 0 | Status: DISCONTINUED | OUTPATIENT
Start: 2019-06-28 | End: 2019-06-29

## 2019-06-28 RX ORDER — APIXABAN 2.5 MG/1
5 TABLET, FILM COATED ORAL
Refills: 0 | Status: DISCONTINUED | OUTPATIENT
Start: 2019-06-28 | End: 2019-06-29

## 2019-06-28 RX ADMIN — TAMSULOSIN HYDROCHLORIDE 0.4 MILLIGRAM(S): 0.4 CAPSULE ORAL at 21:39

## 2019-06-28 RX ADMIN — PIPERACILLIN AND TAZOBACTAM 25 GRAM(S): 4; .5 INJECTION, POWDER, LYOPHILIZED, FOR SOLUTION INTRAVENOUS at 15:56

## 2019-06-28 RX ADMIN — Medication 120 MILLIGRAM(S): at 06:00

## 2019-06-28 RX ADMIN — ALBUTEROL 2 PUFF(S): 90 AEROSOL, METERED ORAL at 12:07

## 2019-06-28 RX ADMIN — Medication 3 MILLILITER(S): at 00:02

## 2019-06-28 RX ADMIN — PANTOPRAZOLE SODIUM 40 MILLIGRAM(S): 20 TABLET, DELAYED RELEASE ORAL at 06:00

## 2019-06-28 RX ADMIN — PIPERACILLIN AND TAZOBACTAM 25 GRAM(S): 4; .5 INJECTION, POWDER, LYOPHILIZED, FOR SOLUTION INTRAVENOUS at 09:26

## 2019-06-28 RX ADMIN — HEPARIN SODIUM 1400 UNIT(S)/HR: 5000 INJECTION INTRAVENOUS; SUBCUTANEOUS at 08:00

## 2019-06-28 RX ADMIN — APIXABAN 5 MILLIGRAM(S): 2.5 TABLET, FILM COATED ORAL at 20:21

## 2019-06-28 NOTE — PHYSICAL THERAPY INITIAL EVALUATION ADULT - PRECAUTIONS/LIMITATIONS, REHAB EVAL
He wasn't aware of fever, when vomiting might have coughed up but no cough or SOB prior to the episode. No chills, chest pain, shortness of breath, abdominal pain, diarrhea, blood in stool.  Did not take any medications at home.  Denies travel/sick contacts.  No history of alcohol abuse.  No abdominal surgeries. Pt has a hx of prostate ca many years ago s/p seeds, has been in remission since then, sees his Urologist every few months. He wasn't aware of fever, when vomiting might have coughed up but no cough or SOB prior to the episode. No chills, chest pain, shortness of breath, abdominal pain, diarrhea, blood in stool.  Did not take any medications at home.  Denies travel/sick contacts.  No history of alcohol abuse.  No abdominal surgeries. Pt has a hx of prostate ca many years ago s/p seeds, has been in remission since then, sees his Urologist every few months.  Pt admitted with new onset Afib/Aflutter. Also found to be in sepsis (fever, tachycardia, tachypnea) ? source possible pneumonia vs liver. fall precautions/He wasn't aware of fever, when vomiting might have coughed up but no cough or SOB prior to the episode. No chills, chest pain, shortness of breath, abdominal pain, diarrhea, blood in stool.  Did not take any medications at home.  Denies travel/sick contacts.  No history of alcohol abuse.  No abdominal surgeries. Pt has a hx of prostate ca many years ago s/p seeds, has been in remission since then, sees his Urologist every few months.  Pt admitted with new onset Afib/Aflutter. Also found to be in sepsis (fever, tachycardia, tachypnea) ? source possible pneumonia vs liver.

## 2019-06-28 NOTE — PROGRESS NOTE ADULT - SUBJECTIVE AND OBJECTIVE BOX
[facility] NEPHROLOGY- PROGRESS NOTE    84y Male with history of GERD, asthma presents with nausea and vomiting. Nephrology consulted for elevated Scr.    REVIEW OF SYSTEMS:  Gen: no changes in weight  Cards: no chest pain  Resp: no dyspnea  GI: + nausea and vomiting resolved, no diarrhea  Vascular: no LE edema    No Known Allergies      Hospital Medications: Medications reviewed      VITALS:  T(F): 98.7 (19 @ 05:21), Max: 99 (19 @ 21:21)  HR: 82 (19 @ 05:21)  BP: 156/61 (19 @ 05:21)  RR: 18 (19 @ 05:21)  SpO2: 96% (19 @ 05:21)  Wt(kg): --     @ 07:01  -   @ 07:00  --------------------------------------------------------  IN: 748 mL / OUT: 500 mL / NET: 248 mL     @ 07:01  -   @ 10:20  --------------------------------------------------------  IN: 0 mL / OUT: 400 mL / NET: -400 mL      PHYSICAL EXAM:    Gen: NAD, calm  Cards: Irregularly irregular, +S1/S2, no M/G/R  Resp: CTA B/L  GI: soft, NT/ND, NABS  Vascular: no LE edema B/L      LABS:      138  |  104  |  21  ----------------------------<  108<H>  3.8   |  23  |  1.48<H>    Ca    7.8<L>      2019 07:24      Creatinine Trend: 1.48 <--, 1.43 <--, 1.36 <--                        10.8   5.4   )-----------( 141      ( 2019 07:15 )             31.6     Urine Studies:  Urinalysis Basic - ( 2019 08:46 )    Color: Light Yellow / Appearance: Clear / S.024 / pH:   Gluc:  / Ketone: Small  / Bili: Negative / Urobili: Negative   Blood:  / Protein: 30 mg/dL / Nitrite: Negative   Leuk Esterase: Negative / RBC: 1 /hpf / WBC 1 /HPF   Sq Epi:  / Non Sq Epi: 0 /hpf / Bacteria: Negative      Sodium, Random Urine: 71 mmol/L ( @ 15:48)  Creatinine, Random Urine: 93 mg/dL ( @ 15:48)  Creatinine, Random Urine: 94 mg/dL ( @ 15:48)  Protein/Creatinine Ratio Calculation: 0.2 Ratio ( @ 15:48)

## 2019-06-28 NOTE — CONSULT NOTE ADULT - ATTENDING COMMENTS
Abelardo Villegas  Pager: [phone]. If no response or past 5 pm call [phone].
[facility] NEPHROLOGY  Bismark Ware M.D.  Brenden Rangel D.O.  Barbi Quiñones M.D.  Cindi Flores, MSN, ANP-C    Telephone: [phone]  Facsimile: [phone]    [address] [place] [address]
Patient seen and examined, agree with the above assessment and plan by HEIDE Sung.  start cardizem for rate control  echo  hydration  IVF
I personally interviewed and examined the patient. He has right atrial flutter with fair heart rate control on diltiazem. We should commence apixaban 5 mg bid for anticoagulation. Given that he has a large hiatus hernia, JOSE may not be desirable and we could wait for 3-4 weeks of anticoagulation before ablation or cardioversion to restore sinus rhythm. It would also be worth advancing low dose beta blockers - metoprolol tartrate 25 bid.     He is still short of breath and we should obtain an echocardiogram to ensure he has normal LV function. If LV function is impaired, earlier restoration of sinus rhythm would be worthwhile.

## 2019-06-28 NOTE — PHYSICAL THERAPY INITIAL EVALUATION ADULT - PERTINENT HX OF CURRENT PROBLEM, REHAB EVAL
Pt is an 84 year-old male with history of asthma presents to the Emergency Department for nausea/vomiting ongoing since last night; family reports vomitus was red in color at times. Per pt he went for dinner the night before and Gym the day of felling well until the symptoms started all of sudden last night. The vomiting was copious.

## 2019-06-28 NOTE — DISCHARGE NOTE NURSING/CASE MANAGEMENT/SOCIAL WORK - NSDCDPATPORTLINK_GEN_ALL_CORE
You can access the [facility] Portal, offered by [facility], by registering with the following website: [email or web address]

## 2019-06-28 NOTE — PHYSICAL THERAPY INITIAL EVALUATION ADULT - LIVES WITH, PROFILE
Pt lives in a private house with spouse. Pt reports 0 stairs to enter, ~18 stairs to bedroom/bathroom.

## 2019-06-28 NOTE — DISCHARGE NOTE PROVIDER - NSDCCPCAREPLAN_GEN_ALL_CORE_FT
PRINCIPAL DISCHARGE DIAGNOSIS  Diagnosis: Sepsis  Assessment and Plan of Treatment: sepsis from aspiration pneumonia  Now resolved      SECONDARY DISCHARGE DIAGNOSES  Diagnosis: GEMA (acute kidney injury)  Assessment and Plan of Treatment: Kidney injury from dehydration; improving    Diagnosis: Aspiration pneumonia  Assessment and Plan of Treatment: complete antibiotics    Diagnosis: Atrial flutter with rapid ventricular response  Assessment and Plan of Treatment: Atrial fibrillation is the most common heart rhythm problem.  The condition puts you at risk for has stroke and heart attack  It helps if you control your blood pressure, not drink more than 1-2 alcohol drinks per day, cut down on caffeine, getting treatment for over active thyroid gland, and get regular exercise  Call your doctor if you feel your heart racing or beating unusually, chest tightness or pain, lightheaded, faint, shortness of breath especially with exercise  It is important to take your heart medication as prescribed  You may be on anticoagulation which is very important to take as directed - you may need blood work to monitor drug levels PRINCIPAL DISCHARGE DIAGNOSIS  Diagnosis: Sepsis  Assessment and Plan of Treatment: sepsis from aspiration pneumonia  Now resolved      SECONDARY DISCHARGE DIAGNOSES  Diagnosis: GEMA (acute kidney injury)  Assessment and Plan of Treatment: Kidney injury from dehydration; improving    Diagnosis: Aspiration pneumonia  Assessment and Plan of Treatment: complete antibiotics    Diagnosis: Atrial flutter with rapid ventricular response  Assessment and Plan of Treatment: Atrial fibrillation is the most common heart rhythm problem.  The condition puts you at risk for has stroke and heart attack  It helps if you control your blood pressure, not drink more than 1-2 alcohol drinks per day, cut down on caffeine, getting treatment for over active thyroid gland, and get regular exercise  Call your doctor if you feel your heart racing or beating unusually, chest tightness or pain, lightheaded, faint, shortness of breath especially with exercise  It is important to take your heart medication as prescribed  You may be on anticoagulation which is very important to take as directed -   Follow up with EPS in 3 weeks

## 2019-06-28 NOTE — PROGRESS NOTE ADULT - SUBJECTIVE AND OBJECTIVE BOX
INTERVAL HPI/OVERNIGHT EVENTS:    pt seen and examined, he denies abdominal pain, n/v/d  tolerating PO well    MEDICATIONS  (STANDING):  diltiazem    milliGRAM(s) Oral daily  heparin  Infusion.  Unit(s)/Hr (14 mL/Hr) IV Continuous <Continuous>  pantoprazole    Tablet 40 milliGRAM(s) Oral before breakfast  piperacillin/tazobactam IVPB.. 3.375 Gram(s) IV Intermittent every 8 hours  tamsulosin 0.4 milliGRAM(s) Oral at bedtime    MEDICATIONS  (PRN):  acetaminophen   Tablet .. 650 milliGRAM(s) Oral every 6 hours PRN Temp greater or equal to 38C (100.4F), Mild Pain (1 - 3)  ALBUTerol    90 MICROgram(s) HFA Inhaler 2 Puff(s) Inhalation every 6 hours PRN Bronchospasm  heparin  Injectable 6500 Unit(s) IV Push every 6 hours PRN For aPTT less than 40  heparin  Injectable 3000 Unit(s) IV Push every 6 hours PRN For aPTT between 40 - 57  sodium chloride 0.65% Nasal 1 Spray(s) Both Nostrils four times a day PRN Nasal Congestion      Allergies    No Known Allergies    Intolerances        Review of Systems:    General:  No wt loss, fevers, chills, night sweats,fatigue,   Eyes:  Good vision, no reported pain  ENT:  No sore throat, pain, runny nose, dysphagia  CV:  No pain, palpitations, hypo/hypertension  Resp:  No dyspnea, cough, tachypnea, wheezing  GI:  No pain, No nausea, No vomiting, No diarrhea, No constipation, No weight loss, No fever, No pruritis, No rectal bleeding, No melena, No dysphagia  :  No pain, bleeding, incontinence, nocturia  Muscle:  No pain, weakness  Neuro:  No weakness, tingling, memory problems  Psych:  No fatigue, insomnia, mood problems, depression  Endocrine:  No polyuria, polydypsia, cold/heat intolerance  Heme:  No petechiae, ecchymosis, easy bruisability  Skin:  No rash, tattoos, scars, edema      Vital Signs Last 24 Hrs  T(C): 36.6 (28 Jun 2019 12:48), Max: 37.2 (27 Jun 2019 21:21)  T(F): 97.9 (28 Jun 2019 12:48), Max: 99 (27 Jun 2019 21:21)  HR: 95 (28 Jun 2019 14:55) (79 - 95)  BP: 168/80 (28 Jun 2019 14:55) (132/63 - 168/80)  BP(mean): --  RR: 19 (28 Jun 2019 12:48) (18 - 19)  SpO2: 96% (28 Jun 2019 14:55) (93% - 96%)    PHYSICAL EXAM:    Constitutional: NAD, well-developed  HEENT: EOMI, throat clear  Neck: No LAD, supple  Respiratory: CTA and P  Cardiovascular: S1 and S2, RRR, no M  Gastrointestinal: BS+, soft, NT/ND, neg HSM,  Extremities: No peripheral edema, neg clubing, cyanosis  Vascular: 2+ peripheral pulses  Neurological: A/O x 3, no focal deficits  Psychiatric: Normal mood, normal affect  Skin: No rashes      LABS:                        10.8   5.4   )-----------( 141      ( 28 Jun 2019 07:15 )             31.6     06-28    139  |  104  |  14  ----------------------------<  163<H>  4.0   |  25  |  1.39<H>    Ca    8.6      28 Jun 2019 10:46      PTT - ( 28 Jun 2019 07:15 )  PTT:86.0 sec      RADIOLOGY & ADDITIONAL TESTS:

## 2019-06-28 NOTE — PHYSICAL THERAPY INITIAL EVALUATION ADULT - IMPAIRMENTS CONTRIBUTING TO GAIT DEVIATIONS, PT EVAL
decreased strength/impaired postural control/impaired balance/narrow base of support/Pt mildly unsteady without AD, 0 LOB while ambulating.

## 2019-06-28 NOTE — DISCHARGE NOTE PROVIDER - CARE PROVIDER_API CALL
Hamlet Chavis (MD)  Cardiac Electrophysiology; Cardiovascular Disease; Internal Medicine  [address] [place] [address]  Phone: [phone]  Fax: [phone]  Follow Up Time:

## 2019-06-28 NOTE — PROGRESS NOTE ADULT - SUBJECTIVE AND OBJECTIVE BOX
CARDIOLOGY FOLLOW UP - Dr. Gilmore    CC no cp, sob, palps       PHYSICAL EXAM:  T(C): 37.1 (06-28-19 @ 05:21), Max: 37.2 (06-27-19 @ 21:21)  HR: 82 (06-28-19 @ 05:21) (67 - 84)  BP: 156/61 (06-28-19 @ 05:21) (132/63 - 156/61)  RR: 18 (06-28-19 @ 05:21) (18 - 18)  SpO2: 96% (06-28-19 @ 05:21) (92% - 96%)  Wt(kg): --  I&O's Summary    27 Jun 2019 07:01  -  28 Jun 2019 07:00  --------------------------------------------------------  IN: 748 mL / OUT: 500 mL / NET: 248 mL    28 Jun 2019 07:01  -  28 Jun 2019 10:44  --------------------------------------------------------  IN: 240 mL / OUT: 400 mL / NET: -160 mL        Appearance: Normal	  Cardiovascular: Normal S1 S2 irregular   Respiratory: diminished at base   Gastrointestinal:  Soft, Non-tender, + BS	  Extremities: Normal range of motion, No clubbing, cyanosis or edema        MEDICATIONS  (STANDING):  diltiazem    milliGRAM(s) Oral daily  heparin  Infusion.  Unit(s)/Hr (14 mL/Hr) IV Continuous <Continuous>  pantoprazole    Tablet 40 milliGRAM(s) Oral before breakfast  piperacillin/tazobactam IVPB.. 3.375 Gram(s) IV Intermittent every 8 hours  tamsulosin 0.4 milliGRAM(s) Oral at bedtime      TELEMETRY: afib / aflutter hr 80, pvc 	    ECG:  	  RADIOLOGY:   DIAGNOSTIC TESTING:  [ ] Echocardiogram:  [ ]  Catheterization:  [ ] Stress Test:    OTHER: 	    LABS:	 	                            10.8   5.4   )-----------( 141      ( 28 Jun 2019 07:15 )             31.6     06-27    138  |  104  |  21  ----------------------------<  108<H>  3.8   |  23  |  1.48<H>    Ca    7.8<L>      27 Jun 2019 07:24      PTT - ( 28 Jun 2019 07:15 )  PTT:86.0 sec

## 2019-06-28 NOTE — PROGRESS NOTE ADULT - ASSESSMENT
Problem/Plan - 1:  ·  Problem: Sepsis.  Plan:   MRI abdomen reviewed  cw zosyn  fu cultures  ID fu.     Problem/Plan - 2:  ·  Problem: Atrial flutter with rapid ventricular response.  Plan: telemonitor   cards fu.   cw heparin drip   switch to NOVC as per cards  EP fu    Problem/Plan - 3:  ·  Problem: Asthma.  Plan: cw proair.

## 2019-06-28 NOTE — PHYSICAL THERAPY INITIAL EVALUATION ADULT - DISCHARGE DISPOSITION, PT EVAL
Home PT for strength/balance training, progression of gait . Family to assist pt as needed upon d/c.

## 2019-06-28 NOTE — CONSULT NOTE ADULT - SUBJECTIVE AND OBJECTIVE BOX
Author: VIV Davis, cardiology fellow   All cardiology service information may be found 24/7 at [email or web address], password: [email or web address]    CHIEF COMPLAINT: nausea/vomiting    HISTORY OF PRESENT ILLNESS:  84M with PMH of asthma and prostate CA s/p seeds who presented with nausea/vomiting. Admitted for sepsis and new AFib with RVR. EP consulted for cardioversion.       Cardiologist:    Allergies  No Known Allergies    MEDICATIONS:  diltiazem    milliGRAM(s) Oral daily  heparin  Infusion.  Unit(s)/Hr IV Continuous <Continuous>  heparin  Injectable 6500 Unit(s) IV Push every 6 hours PRN  heparin  Injectable 3000 Unit(s) IV Push every 6 hours PRN  tamsulosin 0.4 milliGRAM(s) Oral at bedtime  piperacillin/tazobactam IVPB.. 3.375 Gram(s) IV Intermittent every 8 hours  ALBUTerol    90 MICROgram(s) HFA Inhaler 2 Puff(s) Inhalation every 6 hours PRN  acetaminophen   Tablet .. 650 milliGRAM(s) Oral every 6 hours PRN  pantoprazole    Tablet 40 milliGRAM(s) Oral before breakfast  sodium chloride 0.65% Nasal 1 Spray(s) Both Nostrils four times a day PRN    PAST MEDICAL & SURGICAL HISTORY:  Asthma  GERD (gastroesophageal reflux disease)  No significant past surgical history    FAMILY HISTORY:  No pertinent family history in first degree relatives    SOCIAL HISTORY:    Non-smoker  Denies EtOH, illicit drugs    REVIEW OF SYSTEMS:  General: no fatigue/malaise, weight loss/gain.  Skin: no rashes.  Ophthalmologic: no blurred vision, no loss of vision. 	  ENT: no sore throat, rhinorrhea, sinus congestion.  Respiratory: no SOB, cough or wheeze.  Gastrointestinal:  no N/V/D, no melena/hematemesis/hematochezia.  Genitourinary: no dysuria/hesitancy or hematuria.  Musculoskeletal: no myalgias or arthralgias.  Neurological: no changes in vision or hearing, no lightheadedness/dizziness, no syncope/near syncope	  Psychiatric: no unusual stress/anxiety.   Hematology/Lymphatics: no unusual bleeding, bruising and no lymphadenopathy.  Endocrine: no unusual thirst.   All others negative except as stated above and in HPI.    PHYSICAL EXAM:  T(C): 36.6 (06-28-19 @ 12:48), Max: 37.2 (06-27-19 @ 21:21)  HR: 84 (06-28-19 @ 12:48) (79 - 84)  BP: 134/63 (06-28-19 @ 12:48) (132/63 - 156/61)  RR: 19 (06-28-19 @ 12:48) (18 - 19)  SpO2: 95% (06-28-19 @ 12:48) (93% - 96%)  Wt(kg): --  I&O's Summary    27 Jun 2019 07:01  -  28 Jun 2019 07:00  --------------------------------------------------------  IN: 748 mL / OUT: 500 mL / NET: 248 mL    28 Jun 2019 07:01  -  28 Jun 2019 13:53  --------------------------------------------------------  IN: 240 mL / OUT: 400 mL / NET: -160 mL        Appearance: no acute distress  HEENT:   Normal oral mucosa, PERRL, EOMI	  Lymphatic: No lymphadenopathy  Cardiovascular: RRR, Normal S1 S2, No JVD, No murmurs, No edema  Respiratory: Lungs clear to auscultation	  Psychiatry: A & O x 3, Mood & affect appropriate  Gastrointestinal:  Soft, Non-tender, + BS	  Skin: No rashes, No ecchymoses, No cyanosis	  Neurologic: Non-focal  Extremities: Normal range of motion, No clubbing, cyanosis or edema  Vascular: Peripheral pulses palpable 2+ bilaterally      LABS:	 	    CBC Full  -  ( 28 Jun 2019 07:15 )  WBC Count : 5.4 K/uL  Hemoglobin : 10.8 g/dL  Hematocrit : 31.6 %  Platelet Count - Automated : 141 K/uL  Mean Cell Volume : 100.0 fl  Mean Cell Hemoglobin : 34.1 pg  Mean Cell Hemoglobin Concentration : 34.0 gm/dL  Auto Neutrophil # : x  Auto Lymphocyte # : x  Auto Monocyte # : x  Auto Eosinophil # : x  Auto Basophil # : x  Auto Neutrophil % : x  Auto Lymphocyte % : x  Auto Monocyte % : x  Auto Eosinophil % : x  Auto Basophil % : x    06-28    139  |  104  |  14  ----------------------------<  163<H>  4.0   |  25  |  1.39<H>  06-27    138  |  104  |  21  ----------------------------<  108<H>  3.8   |  23  |  1.48<H>    Ca    8.6      28 Jun 2019 10:46  Ca    7.8<L>      27 Jun 2019 07:24        proBNP:   Lipid Profile:   HgA1c:   TSH:       CARDIAC MARKERS:        TELEMETRY: 	    EKG:  	    RADIOLOGY:  < from: MR Abdomen w/wo IV Cont (06.27.19 @ 14:20) >  FINDINGS:    LOWER CHEST: Large esophageal hiatal hernia with intrathoracic stomach.    LIVER: Normal size and contour. Lobular mass in segment 6 measures 3.0 x   2.5 cm, demonstrates T1 hypointensity, T2 hyperintensity and has an early   discontinuous peripheral nodular enhancement with centripetal filling   compatible with hemangioma. Additional scattered hepatic cysts.    IMPRESSION:     Hepatic hemangioma, described above.    < end of copied text >    	  ASSESSMENT/PLAN:    #AFib/Flutter	  - [id number]: 2  - Continue therapeutic anticoagulation      ***In progress    Marcelle Davis MD  Cardiology Fellow - PGY4  Text or call: [phone] M-F from [phone]  For all New Consults and Questions:  [email or web address]  Login: [email or web address] Author: VIV Davis, cardiology fellow   All cardiology service information may be found 24/7 at [email or web address], password: [email or web address]    CHIEF COMPLAINT: nausea/vomiting    HISTORY OF PRESENT ILLNESS:  84M with PMH of asthma and prostate CA s/p seeds who presented with nausea/vomiting. Admitted for sepsis and new AFib with RVR. EP consulted for cardioversion.       Allergies  No Known Allergies    MEDICATIONS:  diltiazem    milliGRAM(s) Oral daily  heparin  Infusion.  Unit(s)/Hr IV Continuous <Continuous>  heparin  Injectable 6500 Unit(s) IV Push every 6 hours PRN  heparin  Injectable 3000 Unit(s) IV Push every 6 hours PRN  tamsulosin 0.4 milliGRAM(s) Oral at bedtime  piperacillin/tazobactam IVPB.. 3.375 Gram(s) IV Intermittent every 8 hours  ALBUTerol    90 MICROgram(s) HFA Inhaler 2 Puff(s) Inhalation every 6 hours PRN  acetaminophen   Tablet .. 650 milliGRAM(s) Oral every 6 hours PRN  pantoprazole    Tablet 40 milliGRAM(s) Oral before breakfast  sodium chloride 0.65% Nasal 1 Spray(s) Both Nostrils four times a day PRN    PAST MEDICAL & SURGICAL HISTORY:  Asthma  GERD (gastroesophageal reflux disease)  No significant past surgical history    FAMILY HISTORY:  No pertinent family history in first degree relatives    SOCIAL HISTORY:    Non-smoker  Denies EtOH, illicit drugs    REVIEW OF SYSTEMS:  General: no fatigue/malaise, weight loss/gain.  Skin: no rashes.  Ophthalmologic: no blurred vision, no loss of vision. 	  ENT: no sore throat, rhinorrhea, sinus congestion.  Respiratory: no SOB, cough or wheeze.  Gastrointestinal:  no N/V/D, no melena/hematemesis/hematochezia.  Genitourinary: no dysuria/hesitancy or hematuria.  Musculoskeletal: no myalgias or arthralgias.  Neurological: no changes in vision or hearing, no lightheadedness/dizziness, no syncope/near syncope	  Psychiatric: no unusual stress/anxiety.   Hematology/Lymphatics: no unusual bleeding, bruising and no lymphadenopathy.  Endocrine: no unusual thirst.   All others negative except as stated above and in HPI.    PHYSICAL EXAM:  T(C): 36.6 (06-28-19 @ 12:48), Max: 37.2 (06-27-19 @ 21:21)  HR: 84 (06-28-19 @ 12:48) (79 - 84)  BP: 134/63 (06-28-19 @ 12:48) (132/63 - 156/61)  RR: 19 (06-28-19 @ 12:48) (18 - 19)  SpO2: 95% (06-28-19 @ 12:48) (93% - 96%)  Wt(kg): --  I&O's Summary    27 Jun 2019 07:01  -  28 Jun 2019 07:00  --------------------------------------------------------  IN: 748 mL / OUT: 500 mL / NET: 248 mL    28 Jun 2019 07:01  -  28 Jun 2019 13:53  --------------------------------------------------------  IN: 240 mL / OUT: 400 mL / NET: -160 mL        Appearance: no acute distress  HEENT:   Normal oral mucosa, PERRL, EOMI	  Lymphatic: No lymphadenopathy  Cardiovascular: RRR, Normal S1 S2, No JVD, No murmurs, No edema  Respiratory: Lungs clear to auscultation	  Psychiatry: A & O x 3, Mood & affect appropriate  Gastrointestinal:  Soft, Non-tender, + BS	  Skin: No rashes, No ecchymoses, No cyanosis	  Neurologic: Non-focal  Extremities: Normal range of motion, No clubbing, cyanosis or edema  Vascular: Peripheral pulses palpable 2+ bilaterally      LABS:	 	    CBC Full  -  ( 28 Jun 2019 07:15 )  WBC Count : 5.4 K/uL  Hemoglobin : 10.8 g/dL  Hematocrit : 31.6 %  Platelet Count - Automated : 141 K/uL  Mean Cell Volume : 100.0 fl  Mean Cell Hemoglobin : 34.1 pg  Mean Cell Hemoglobin Concentration : 34.0 gm/dL  Auto Neutrophil # : x  Auto Lymphocyte # : x  Auto Monocyte # : x  Auto Eosinophil # : x  Auto Basophil # : x  Auto Neutrophil % : x  Auto Lymphocyte % : x  Auto Monocyte % : x  Auto Eosinophil % : x  Auto Basophil % : x    06-28    139  |  104  |  14  ----------------------------<  163<H>  4.0   |  25  |  1.39<H>  06-27    138  |  104  |  21  ----------------------------<  108<H>  3.8   |  23  |  1.48<H>    Ca    8.6      28 Jun 2019 10:46  Ca    7.8<L>      27 Jun 2019 07:24        proBNP:   Lipid Profile:   HgA1c:   TSH:       CARDIAC MARKERS:        TELEMETRY: AFlutter 80-100s, occasionally up to 120 bpm, PVCs    EKG:  	    RADIOLOGY:  < from: MR Abdomen w/wo IV Cont (06.27.19 @ 14:20) >  FINDINGS:    LOWER CHEST: Large esophageal hiatal hernia with intrathoracic stomach.    LIVER: Normal size and contour. Lobular mass in segment 6 measures 3.0 x   2.5 cm, demonstrates T1 hypointensity, T2 hyperintensity and has an early   discontinuous peripheral nodular enhancement with centripetal filling   compatible with hemangioma. Additional scattered hepatic cysts.    IMPRESSION:     Hepatic hemangioma, described above.    < end of copied text >    	  ASSESSMENT/PLAN:    #AFib/Flutter	  - [id number]: 2  - Continue therapeutic anticoagulation      ***In progress    Marcelle Davis MD  Cardiology Fellow - PGY4  Text or call: [phone] M-F from [phone]  For all New Consults and Questions:  [email or web address]  Login: [email or web address] Author: VIV Davis, cardiology fellow   All cardiology service information may be found 24/7 at [email or web address], password: [email or web address]    CHIEF COMPLAINT: nausea/vomiting    HISTORY OF PRESENT ILLNESS:  84M with PMH of asthma and prostate CA s/p seeds who presented with nausea/vomiting. Admitted for sepsis and new AFib with RVR. EP consulted for cardioversion.       Allergies  No Known Allergies    MEDICATIONS:  diltiazem    milliGRAM(s) Oral daily  heparin  Infusion.  Unit(s)/Hr IV Continuous <Continuous>  heparin  Injectable 6500 Unit(s) IV Push every 6 hours PRN  heparin  Injectable 3000 Unit(s) IV Push every 6 hours PRN  tamsulosin 0.4 milliGRAM(s) Oral at bedtime  piperacillin/tazobactam IVPB.. 3.375 Gram(s) IV Intermittent every 8 hours  ALBUTerol    90 MICROgram(s) HFA Inhaler 2 Puff(s) Inhalation every 6 hours PRN  acetaminophen   Tablet .. 650 milliGRAM(s) Oral every 6 hours PRN  pantoprazole    Tablet 40 milliGRAM(s) Oral before breakfast  sodium chloride 0.65% Nasal 1 Spray(s) Both Nostrils four times a day PRN    PAST MEDICAL & SURGICAL HISTORY:  Asthma  GERD (gastroesophageal reflux disease)  No significant past surgical history    FAMILY HISTORY:  No pertinent family history in first degree relatives    SOCIAL HISTORY:    Non-smoker  Denies EtOH, illicit drugs    REVIEW OF SYSTEMS:  General: no fatigue/malaise, weight loss/gain.  Skin: no rashes.  Ophthalmologic: no blurred vision, no loss of vision. 	  ENT: no sore throat, rhinorrhea, sinus congestion.  Respiratory: no SOB, cough or wheeze.  Gastrointestinal:  no N/V/D, no melena/hematemesis/hematochezia.  Genitourinary: no dysuria/hesitancy or hematuria.  Musculoskeletal: no myalgias or arthralgias.  Neurological: no changes in vision or hearing, no lightheadedness/dizziness, no syncope/near syncope	  Psychiatric: no unusual stress/anxiety.   Hematology/Lymphatics: no unusual bleeding, bruising and no lymphadenopathy.  Endocrine: no unusual thirst.   All others negative except as stated above and in HPI.    PHYSICAL EXAM:  T(C): 36.6 (06-28-19 @ 12:48), Max: 37.2 (06-27-19 @ 21:21)  HR: 84 (06-28-19 @ 12:48) (79 - 84)  BP: 134/63 (06-28-19 @ 12:48) (132/63 - 156/61)  RR: 19 (06-28-19 @ 12:48) (18 - 19)  SpO2: 95% (06-28-19 @ 12:48) (93% - 96%)  Wt(kg): --  I&O's Summary    27 Jun 2019 07:01  -  28 Jun 2019 07:00  --------------------------------------------------------  IN: 748 mL / OUT: 500 mL / NET: 248 mL    28 Jun 2019 07:01  -  28 Jun 2019 13:53  --------------------------------------------------------  IN: 240 mL / OUT: 400 mL / NET: -160 mL        Appearance: no acute distress  HEENT:   Normal oral mucosa, PERRL, EOMI	  Lymphatic: No lymphadenopathy  Cardiovascular: RRR, Normal S1 S2, No JVD, No murmurs, No edema  Respiratory: Lungs clear to auscultation	  Psychiatry: A & O x 3, Mood & affect appropriate  Gastrointestinal:  Soft, Non-tender, + BS	  Skin: No rashes, No ecchymoses, No cyanosis	  Neurologic: Non-focal  Extremities: Normal range of motion, No clubbing, cyanosis or edema  Vascular: Peripheral pulses palpable 2+ bilaterally      LABS:	 	    CBC Full  -  ( 28 Jun 2019 07:15 )  WBC Count : 5.4 K/uL  Hemoglobin : 10.8 g/dL  Hematocrit : 31.6 %  Platelet Count - Automated : 141 K/uL  Mean Cell Volume : 100.0 fl  Mean Cell Hemoglobin : 34.1 pg  Mean Cell Hemoglobin Concentration : 34.0 gm/dL  Auto Neutrophil # : x  Auto Lymphocyte # : x  Auto Monocyte # : x  Auto Eosinophil # : x  Auto Basophil # : x  Auto Neutrophil % : x  Auto Lymphocyte % : x  Auto Monocyte % : x  Auto Eosinophil % : x  Auto Basophil % : x    06-28    139  |  104  |  14  ----------------------------<  163<H>  4.0   |  25  |  1.39<H>  06-27    138  |  104  |  21  ----------------------------<  108<H>  3.8   |  23  |  1.48<H>    Ca    8.6      28 Jun 2019 10:46  Ca    7.8<L>      27 Jun 2019 07:24        proBNP:   Lipid Profile:   HgA1c:   TSH:       CARDIAC MARKERS:        TELEMETRY: AFlutter 80-100s, occasionally up to 120 bpm, PVCs    EKG:  	    RADIOLOGY:  < from: MR Abdomen w/wo IV Cont (06.27.19 @ 14:20) >  FINDINGS:    LOWER CHEST: Large esophageal hiatal hernia with intrathoracic stomach.    LIVER: Normal size and contour. Lobular mass in segment 6 measures 3.0 x   2.5 cm, demonstrates T1 hypointensity, T2 hyperintensity and has an early   discontinuous peripheral nodular enhancement with centripetal filling   compatible with hemangioma. Additional scattered hepatic cysts.    IMPRESSION:     Hepatic hemangioma, described above.    < end of copied text >    	  ASSESSMENT/PLAN:    #Typical AFlutter	  - [id number]: 2  - Continue therapeutic anticoagulation      ***In progress    Marcelle Davis MD  Cardiology Fellow - PGY4  Text or call: [phone] M-F from [phone]  For all New Consults and Questions:  [email or web address]  Login: [email or web address] Author: VIV Davis, cardiology fellow   All cardiology service information may be found 24/7 at [email or web address], password: [email or web address]    CHIEF COMPLAINT: nausea/vomiting    HISTORY OF PRESENT ILLNESS:  84M with PMH of bronchiectasis, sleep apnea and prostate CA s/p seeds who presented with nausea/vomiting. Admitted for sepsis possibly due to aspiration PNA and new AFlutter with RVR. EP consulted for further management of AFlutter    Patient has no prior cardiac history. He denies chest pain, palpitations, lightheadedness/dizziness, pre-syncope/syncope. He does have chronic dyspnea on exertion, but attributes it to his reactive airway disease.     Allergies  No Known Allergies    MEDICATIONS:  diltiazem    milliGRAM(s) Oral daily  heparin  Infusion.  Unit(s)/Hr IV Continuous <Continuous>  heparin  Injectable 6500 Unit(s) IV Push every 6 hours PRN  heparin  Injectable 3000 Unit(s) IV Push every 6 hours PRN  tamsulosin 0.4 milliGRAM(s) Oral at bedtime  piperacillin/tazobactam IVPB.. 3.375 Gram(s) IV Intermittent every 8 hours  ALBUTerol    90 MICROgram(s) HFA Inhaler 2 Puff(s) Inhalation every 6 hours PRN  acetaminophen   Tablet .. 650 milliGRAM(s) Oral every 6 hours PRN  pantoprazole    Tablet 40 milliGRAM(s) Oral before breakfast  sodium chloride 0.65% Nasal 1 Spray(s) Both Nostrils four times a day PRN    PAST MEDICAL & SURGICAL HISTORY:  Asthma  GERD (gastroesophageal reflux disease)  No significant past surgical history    FAMILY HISTORY:  No pertinent family history in first degree relatives    SOCIAL HISTORY:    Non-smoker  Denies EtOH, illicit drugs    REVIEW OF SYSTEMS:  General: no fatigue/malaise, weight loss/gain.  Skin: no rashes.  Ophthalmologic: no blurred vision, no loss of vision. 	  ENT: no sore throat, rhinorrhea, sinus congestion.  Respiratory: no SOB, cough or wheeze.  Gastrointestinal:  see HPI  Genitourinary: no dysuria/hesitancy or hematuria.  Musculoskeletal: no myalgias or arthralgias.  Neurological: no changes in vision or hearing, no lightheadedness/dizziness, no syncope/near syncope	  Psychiatric: no unusual stress/anxiety.   Hematology/Lymphatics: no unusual bleeding, bruising and no lymphadenopathy.  Endocrine: no unusual thirst.   All others negative except as stated above and in HPI.    PHYSICAL EXAM:  T(C): 36.6 (06-28-19 @ 12:48), Max: 37.2 (06-27-19 @ 21:21)  HR: 84 (06-28-19 @ 12:48) (79 - 84)  BP: 134/63 (06-28-19 @ 12:48) (132/63 - 156/61)  RR: 19 (06-28-19 @ 12:48) (18 - 19)  SpO2: 95% (06-28-19 @ 12:48) (93% - 96%)  Wt(kg): --  I&O's Summary    27 Jun 2019 07:01  -  28 Jun 2019 07:00  --------------------------------------------------------  IN: 748 mL / OUT: 500 mL / NET: 248 mL    28 Jun 2019 07:01  -  28 Jun 2019 13:53  --------------------------------------------------------  IN: 240 mL / OUT: 400 mL / NET: -160 mL        Appearance: no acute distress, sitting in chair  HEENT:   Normal oral mucosa, PERRL, EOMI	  Lymphatic: No lymphadenopathy  Cardiovascular: regular rate, irregularly irregular, Normal S1 S2, No JVD, No murmurs, trace LE pitting edema  Respiratory: Lungs clear to auscultation	  Psychiatry: A & O x 3, Mood & affect appropriate  Gastrointestinal:  Soft, Non-tender, + BS	  Skin: No rashes, No ecchymoses, No cyanosis	  Neurologic: Non-focal  Extremities: Normal range of motion, No clubbing, cyanosis  Vascular: Peripheral pulses palpable 2+ bilaterally      LABS:	 	    CBC Full  -  ( 28 Jun 2019 07:15 )  WBC Count : 5.4 K/uL  Hemoglobin : 10.8 g/dL  Hematocrit : 31.6 %  Platelet Count - Automated : 141 K/uL  Mean Cell Volume : 100.0 fl  Mean Cell Hemoglobin : 34.1 pg  Mean Cell Hemoglobin Concentration : 34.0 gm/dL  Auto Neutrophil # : x  Auto Lymphocyte # : x  Auto Monocyte # : x  Auto Eosinophil # : x  Auto Basophil # : x  Auto Neutrophil % : x  Auto Lymphocyte % : x  Auto Monocyte % : x  Auto Eosinophil % : x  Auto Basophil % : x    06-28    139  |  104  |  14  ----------------------------<  163<H>  4.0   |  25  |  1.39<H>  06-27    138  |  104  |  21  ----------------------------<  108<H>  3.8   |  23  |  1.48<H>    Ca    8.6      28 Jun 2019 10:46  Ca    7.8<L>      27 Jun 2019 07:24        proBNP:   Lipid Profile:   HgA1c:   TSH:       CARDIAC MARKERS:        TELEMETRY: AFlutter 80-100s, occasionally up to 120 bpm, PVCs    EKG 6/25/19 10:29: Typical AFlutter,  bpm	    RADIOLOGY:  < from: MR Abdomen w/wo IV Cont (06.27.19 @ 14:20) >  FINDINGS:    LOWER CHEST: Large esophageal hiatal hernia with intrathoracic stomach.    LIVER: Normal size and contour. Lobular mass in segment 6 measures 3.0 x   2.5 cm, demonstrates T1 hypointensity, T2 hyperintensity and has an early   discontinuous peripheral nodular enhancement with centripetal filling   compatible with hemangioma. Additional scattered hepatic cysts.    IMPRESSION:     Hepatic hemangioma, described above.    < end of copied text >    	  ASSESSMENT/PLAN:    #Typical AFlutter	  - [id number]: 2  - Continue therapeutic anticoagulation  - Check TTE and TSH      ***In progress    Marcelle Davis MD  Cardiology Fellow - PGY4  Text or call: [phone] M-F from [phone]  For all New Consults and Questions:  [email or web address]  Login: Secret Author: VIV Davis, cardiology fellow   All cardiology service information may be found 24/7 at [email or web address], password: [email or web address]    CHIEF COMPLAINT: nausea/vomiting    HISTORY OF PRESENT ILLNESS:  84M with PMH of bronchiectasis, sleep apnea and prostate CA s/p seeds who presented with nausea/vomiting. Admitted for sepsis possibly due to aspiration PNA and new AFlutter with RVR. EP consulted for further management of AFlutter    Patient has no prior cardiac history. He denies chest pain, palpitations, lightheadedness/dizziness, pre-syncope/syncope. He does have chronic dyspnea on exertion, but attributes it to his reactive airway disease.     Allergies  No Known Allergies    MEDICATIONS:  diltiazem    milliGRAM(s) Oral daily  heparin  Infusion.  Unit(s)/Hr IV Continuous <Continuous>  heparin  Injectable 6500 Unit(s) IV Push every 6 hours PRN  heparin  Injectable 3000 Unit(s) IV Push every 6 hours PRN  tamsulosin 0.4 milliGRAM(s) Oral at bedtime  piperacillin/tazobactam IVPB.. 3.375 Gram(s) IV Intermittent every 8 hours  ALBUTerol    90 MICROgram(s) HFA Inhaler 2 Puff(s) Inhalation every 6 hours PRN  acetaminophen   Tablet .. 650 milliGRAM(s) Oral every 6 hours PRN  pantoprazole    Tablet 40 milliGRAM(s) Oral before breakfast  sodium chloride 0.65% Nasal 1 Spray(s) Both Nostrils four times a day PRN    PAST MEDICAL & SURGICAL HISTORY:  Asthma  GERD (gastroesophageal reflux disease)  No significant past surgical history    FAMILY HISTORY:  No pertinent family history in first degree relatives    SOCIAL HISTORY:    Non-smoker  Denies EtOH, illicit drugs    REVIEW OF SYSTEMS:  General: no fatigue/malaise, weight loss/gain.  Skin: no rashes.  Ophthalmologic: no blurred vision, no loss of vision. 	  ENT: no sore throat, rhinorrhea, sinus congestion.  Respiratory: no SOB, cough or wheeze.  Gastrointestinal:  see HPI  Genitourinary: no dysuria/hesitancy or hematuria.  Musculoskeletal: no myalgias or arthralgias.  Neurological: no changes in vision or hearing, no lightheadedness/dizziness, no syncope/near syncope	  Psychiatric: no unusual stress/anxiety.   Hematology/Lymphatics: no unusual bleeding, bruising and no lymphadenopathy.  Endocrine: no unusual thirst.   All others negative except as stated above and in HPI.    PHYSICAL EXAM:  T(C): 36.6 (06-28-19 @ 12:48), Max: 37.2 (06-27-19 @ 21:21)  HR: 84 (06-28-19 @ 12:48) (79 - 84)  BP: 134/63 (06-28-19 @ 12:48) (132/63 - 156/61)  RR: 19 (06-28-19 @ 12:48) (18 - 19)  SpO2: 95% (06-28-19 @ 12:48) (93% - 96%)  Wt(kg): --  I&O's Summary    27 Jun 2019 07:01  -  28 Jun 2019 07:00  --------------------------------------------------------  IN: 748 mL / OUT: 500 mL / NET: 248 mL    28 Jun 2019 07:01  -  28 Jun 2019 13:53  --------------------------------------------------------  IN: 240 mL / OUT: 400 mL / NET: -160 mL        Appearance: no acute distress, sitting in chair  HEENT:   Normal oral mucosa, PERRL, EOMI	  Lymphatic: No lymphadenopathy  Cardiovascular: regular rate, irregularly irregular, Normal S1 S2, No JVD, No murmurs, trace LE pitting edema  Respiratory: Lungs clear to auscultation	  Psychiatry: A & O x 3, Mood & affect appropriate  Gastrointestinal:  Soft, Non-tender, + BS	  Skin: No rashes, No ecchymoses, No cyanosis	  Neurologic: Non-focal  Extremities: Normal range of motion, No clubbing, cyanosis  Vascular: Peripheral pulses palpable 2+ bilaterally      LABS:	 	    CBC Full  -  ( 28 Jun 2019 07:15 )  WBC Count : 5.4 K/uL  Hemoglobin : 10.8 g/dL  Hematocrit : 31.6 %  Platelet Count - Automated : 141 K/uL  Mean Cell Volume : 100.0 fl  Mean Cell Hemoglobin : 34.1 pg  Mean Cell Hemoglobin Concentration : 34.0 gm/dL  Auto Neutrophil # : x  Auto Lymphocyte # : x  Auto Monocyte # : x  Auto Eosinophil # : x  Auto Basophil # : x  Auto Neutrophil % : x  Auto Lymphocyte % : x  Auto Monocyte % : x  Auto Eosinophil % : x  Auto Basophil % : x    06-28    139  |  104  |  14  ----------------------------<  163<H>  4.0   |  25  |  1.39<H>  06-27    138  |  104  |  21  ----------------------------<  108<H>  3.8   |  23  |  1.48<H>    Ca    8.6      28 Jun 2019 10:46  Ca    7.8<L>      27 Jun 2019 07:24        proBNP:   Lipid Profile:   HgA1c:   TSH:       CARDIAC MARKERS:        TELEMETRY: AFlutter 80-100s, occasionally up to 120 bpm, PVCs    EKG 6/25/19 10:29: Typical AFlutter,  bpm	    RADIOLOGY:  < from: MR Abdomen w/wo IV Cont (06.27.19 @ 14:20) >  FINDINGS:    LOWER CHEST: Large esophageal hiatal hernia with intrathoracic stomach.    LIVER: Normal size and contour. Lobular mass in segment 6 measures 3.0 x   2.5 cm, demonstrates T1 hypointensity, T2 hyperintensity and has an early   discontinuous peripheral nodular enhancement with centripetal filling   compatible with hemangioma. Additional scattered hepatic cysts.    IMPRESSION:     Hepatic hemangioma, described above.    < end of copied text >    	  ASSESSMENT/PLAN:  84M with PMH of bronchiectasis, sleep apnea and prostate CA s/p seeds who presented with nausea/vomiting. Admitted for sepsis possibly due to aspiration PNA and new AFlutter with RVR. EP consulted for further management of AFlutter    #Typical AFlutter	  - [id number]: 2  - Continue therapeutic anticoagulation  - Check TTE and TSH      ***In progress    Marcelle Davis MD  Cardiology Fellow - PGY4  Text or call: [phone] M-F from [phone]  For all New Consults and Questions:  [email or web address]  Login: [email or web address] Author: VIV Davis, cardiology fellow   All cardiology service information may be found 24/7 at [email or web address], password: [email or web address]    CHIEF COMPLAINT: nausea/vomiting    HISTORY OF PRESENT ILLNESS:  84M with PMH of bronchiectasis, sleep apnea and prostate CA s/p seeds who presented with nausea/vomiting. Admitted for sepsis possibly due to aspiration PNA and new AFlutter with RVR. EP consulted for further management of AFlutter    Patient has no prior cardiac history. He denies chest pain, palpitations, lightheadedness/dizziness, pre-syncope/syncope. He does have chronic dyspnea on exertion, but attributes it to his reactive airway disease.     Allergies  No Known Allergies    MEDICATIONS:  diltiazem    milliGRAM(s) Oral daily  heparin  Infusion.  Unit(s)/Hr IV Continuous <Continuous>  heparin  Injectable 6500 Unit(s) IV Push every 6 hours PRN  heparin  Injectable 3000 Unit(s) IV Push every 6 hours PRN  tamsulosin 0.4 milliGRAM(s) Oral at bedtime  piperacillin/tazobactam IVPB.. 3.375 Gram(s) IV Intermittent every 8 hours  ALBUTerol    90 MICROgram(s) HFA Inhaler 2 Puff(s) Inhalation every 6 hours PRN  acetaminophen   Tablet .. 650 milliGRAM(s) Oral every 6 hours PRN  pantoprazole    Tablet 40 milliGRAM(s) Oral before breakfast  sodium chloride 0.65% Nasal 1 Spray(s) Both Nostrils four times a day PRN    PAST MEDICAL & SURGICAL HISTORY:  Asthma  GERD (gastroesophageal reflux disease)  No significant past surgical history    FAMILY HISTORY:  No pertinent family history in first degree relatives    SOCIAL HISTORY:    Non-smoker  Denies EtOH, illicit drugs    REVIEW OF SYSTEMS:  General: no fatigue/malaise, weight loss/gain.  Skin: no rashes.  Ophthalmologic: no blurred vision, no loss of vision. 	  ENT: no sore throat, rhinorrhea, sinus congestion.  Respiratory: no SOB, cough or wheeze.  Gastrointestinal:  see HPI  Genitourinary: no dysuria/hesitancy or hematuria.  Musculoskeletal: no myalgias or arthralgias.  Neurological: no changes in vision or hearing, no lightheadedness/dizziness, no syncope/near syncope	  Psychiatric: no unusual stress/anxiety.   Hematology/Lymphatics: no unusual bleeding, bruising and no lymphadenopathy.  Endocrine: no unusual thirst.   All others negative except as stated above and in HPI.    PHYSICAL EXAM:  T(C): 36.6 (06-28-19 @ 12:48), Max: 37.2 (06-27-19 @ 21:21)  HR: 84 (06-28-19 @ 12:48) (79 - 84)  BP: 134/63 (06-28-19 @ 12:48) (132/63 - 156/61)  RR: 19 (06-28-19 @ 12:48) (18 - 19)  SpO2: 95% (06-28-19 @ 12:48) (93% - 96%)  Wt(kg): --  I&O's Summary    27 Jun 2019 07:01  -  28 Jun 2019 07:00  --------------------------------------------------------  IN: 748 mL / OUT: 500 mL / NET: 248 mL    28 Jun 2019 07:01  -  28 Jun 2019 13:53  --------------------------------------------------------  IN: 240 mL / OUT: 400 mL / NET: -160 mL        Appearance: no acute distress, sitting in chair  HEENT:   Normal oral mucosa, PERRL, EOMI	  Lymphatic: No lymphadenopathy  Cardiovascular: regular rate, irregularly irregular, Normal S1 S2, No JVD, No murmurs, trace LE pitting edema  Respiratory: Lungs clear to auscultation	  Psychiatry: A & O x 3, Mood & affect appropriate  Gastrointestinal:  Soft, Non-tender, + BS	  Skin: No rashes, No ecchymoses, No cyanosis	  Neurologic: Non-focal  Extremities: Normal range of motion, No clubbing, cyanosis  Vascular: Peripheral pulses palpable 2+ bilaterally      LABS:	 	    CBC Full  -  ( 28 Jun 2019 07:15 )  WBC Count : 5.4 K/uL  Hemoglobin : 10.8 g/dL  Hematocrit : 31.6 %  Platelet Count - Automated : 141 K/uL  Mean Cell Volume : 100.0 fl  Mean Cell Hemoglobin : 34.1 pg  Mean Cell Hemoglobin Concentration : 34.0 gm/dL  Auto Neutrophil # : x  Auto Lymphocyte # : x  Auto Monocyte # : x  Auto Eosinophil # : x  Auto Basophil # : x  Auto Neutrophil % : x  Auto Lymphocyte % : x  Auto Monocyte % : x  Auto Eosinophil % : x  Auto Basophil % : x    06-28    139  |  104  |  14  ----------------------------<  163<H>  4.0   |  25  |  1.39<H>  06-27    138  |  104  |  21  ----------------------------<  108<H>  3.8   |  23  |  1.48<H>    Ca    8.6      28 Jun 2019 10:46  Ca    7.8<L>      27 Jun 2019 07:24        proBNP:   Lipid Profile:   HgA1c:   TSH:       CARDIAC MARKERS:        TELEMETRY: AFlutter 80-100s, occasionally up to 120 bpm, PVCs    EKG 6/25/19 10:29: Typical AFlutter,  bpm	    RADIOLOGY:  < from: MR Abdomen w/wo IV Cont (06.27.19 @ 14:20) >  FINDINGS:    LOWER CHEST: Large esophageal hiatal hernia with intrathoracic stomach.    LIVER: Normal size and contour. Lobular mass in segment 6 measures 3.0 x   2.5 cm, demonstrates T1 hypointensity, T2 hyperintensity and has an early   discontinuous peripheral nodular enhancement with centripetal filling   compatible with hemangioma. Additional scattered hepatic cysts.    IMPRESSION:     Hepatic hemangioma, described above.    < end of copied text >    	  ASSESSMENT/PLAN:  84M with PMH of bronchiectasis, sleep apnea and prostate CA s/p seeds who presented with nausea/vomiting. Admitted for sepsis possibly due to aspiration PNA and new AFlutter with RVR. EP consulted for further management of AFlutter. Of note, MRI abdomen shows a large hiatal hernia and intrathoracic stomach, which will make a visualizing the JEREMÍAS difficult with JOSE. Would prefer to anticoagulate for 4 weeks and have patient f/u with Dr. Chavis in clinic to discuss ablation.     #Typical AFlutter	  - [id number]: 2  - Rate controlled  - Continue diltiazem, but check TTE to make sure EF is okay  - Continue therapeutic anticoagulation, recommend switching to apixaban 5 mg BID  - Start metoprolol tartrate 25 mg BID  - Check TTE (assess EF) and TSH  - Patient should ambulate to see if symptomatic   - If he remains rate controlled and asymptomatic, possible discharge home this weekend with follow-up with Dr. Chavis in 3-4 weeks    Plan discussed with patient and Dr. Partha Davis MD  Cardiology Fellow - PGY4  Text or call: [phone] M-F from [phone]  For all New Consults and Questions:  [email or web address]  Login: [email or web address]

## 2019-06-28 NOTE — DISCHARGE NOTE PROVIDER - HOSPITAL COURSE
84 year-old male with history of asthma presents to the Emergency Department for nausea/vomiting ongoing since last night; family reports vomitus was red in color at times; Pt found to be in sepsis (fever, tachycardia, tachypnea) ? source. Started Zosyn; CT  chest with Tree in bud opacities,  likely PNA. CT Abd with lrge hiatal hernia and bilobar liver lesions.      MRI abd/pelvis confirmed liver lesion as hemangioma. Continued with Zosyn q8h for possible aspiration pneumonia. Plan to Rx  for 7 days until  7/1/19.     Noted GEMA on admission 1.36-1.48.  Baseline Scr unknown however suspect patient with element of GEMA secondary to pre-renal azotemia in setting of poor PO intake and vomiting given low FeNa. UA bland not suggestive of interstitial disease from PPI and CT without post-obstructive process. Suspect delay in renal recovery due to contrast administration on 6/25. Renal consult appreciated. .    Pt with new onset afib/ aflutter ; likely in the setting of underlying GI etiology, dehydration; recent n/v    rate stable c/w  cardizem 120 mg daily      ekg no evidence of ACS    cbc noted, stool guaic negative. closely monitor CBC, no active bleeding noted    CHADsVASc =2 , c/w hep gtt for now     pending echo to eval LV fx, valvular disease, will likely transition to NOAC pending echo results/renal fx    Ep to eval for new afib/aflutter    cv stable no chf on exam. no cp     chest xray unremarkable 84 year-old male with history of asthma presents to the Emergency Department for nausea/vomiting ongoing since last night; family reports vomitus was red in color at times; Pt found to be in sepsis (fever, tachycardia, tachypnea) ? source. Started Zosyn; CT  chest with Tree in bud opacities,  likely PNA. CT Abd with lrge hiatal hernia and bilobar liver lesions.      MRI abd/pelvis confirmed liver lesion as hemangioma. Continued with Zosyn q8h for possible aspiration pneumonia. Plan to Rx  for 7 days until  7/1/19.     Noted GEMA on admission 1.36-1.48.  Baseline Scr unknown; GEMA secondary to pre-renal azotemia in setting of poor PO intake and vomiting given low FeNa.  Suspect delay in renal recovery due to contrast administration on 6/25. Renal consult appreciated. .    Pt with new onset afib/ aflutter ; likely in the setting of underlying GI etiology, dehydration; recent n/v; rate stable c/w  cardizem 120 mg daily      ekg no evidence of ACS; CHADsVASc =2 , c/w hep gtt for now ; Ep  eval for new afib/aflutter 84 year-old male with history of asthma presents to the Emergency Department for nausea/vomiting ongoing since last night; family reports vomitus was red in color at times; Pt found to be in sepsis (fever, tachycardia, tachypnea) ? source. Started Zosyn; CT  chest with Tree in bud opacities,  likely PNA. CT Abd with lrge hiatal hernia and bilobar liver lesions.      MRI abd/pelvis confirmed liver lesion as hemangioma. Continued with Zosyn q8h for possible aspiration pneumonia. Plan to Rx  for 7 days until  7/1/19. switched to Avelox on discharge.     Noted GEMA on admission 1.36-1.48.  Baseline Scr unknown; GEMA secondary to pre-renal azotemia in setting of poor PO intake and vomiting given low FeNa.  Suspect delay in renal recovery due to contrast administration on 6/25. Renal consult appreciated. .    Pt with new onset afib/ aflutter ; likely in the setting of underlying GI etiology, dehydration; recent n/v; rate stable c/w  cardizem 120 mg daily      ekg no evidence of ACS; CHADsVASc =2 , c/w hep gtt for now ; Ep  eval for new afib/aflutter.  ECHO with no valvular Dz; switched to ELiquis. Started metoprolol for better rate  control. ADvised follow up in 3-4 weeks for cardioversion Vs ablation. discharged home.

## 2019-06-28 NOTE — DISCHARGE NOTE PROVIDER - NSDCQMSTAIRS_GEN_ALL_CORE
Discussed preventative health, cancer screening, immunizations, and safety issues  I recommend getting the Shingrix shot to help prevent Shingles  You can get it a pharmacy, and they can administer it there  It is a two shot series with the second shot needed between 2-6 months after the first shot  I would not recommend getting the shot before an important or fun event in case you were to have a reaction to the shot like a sore arm or flu-like symptoms  I make the same recommendation about any shot, as people can have a reaction to any shot  Yes

## 2019-06-28 NOTE — PROGRESS NOTE ADULT - SUBJECTIVE AND OBJECTIVE BOX
Patient is a 84y old  Male who presents with a chief complaint of nausea and vomiting (28 Jun 2019 15:43)      INTERVAL HPI/OVERNIGHT EVENTS:  T(C): 36.6 (06-28-19 @ 12:48), Max: 37.2 (06-27-19 @ 21:21)  HR: 95 (06-28-19 @ 14:55) (79 - 95)  BP: 168/80 (06-28-19 @ 14:55) (132/63 - 168/80)  RR: 19 (06-28-19 @ 12:48) (18 - 19)  SpO2: 96% (06-28-19 @ 14:55) (93% - 96%)  Wt(kg): --  I&O's Summary    27 Jun 2019 07:01  -  28 Jun 2019 07:00  --------------------------------------------------------  IN: 748 mL / OUT: 500 mL / NET: 248 mL    28 Jun 2019 07:01  -  28 Jun 2019 16:58  --------------------------------------------------------  IN: 340 mL / OUT: 400 mL / NET: -60 mL        LABS:                        10.8   5.4   )-----------( 141      ( 28 Jun 2019 07:15 )             31.6     06-28    139  |  104  |  14  ----------------------------<  163<H>  4.0   |  25  |  1.39<H>    Ca    8.6      28 Jun 2019 10:46      PTT - ( 28 Jun 2019 07:15 )  PTT:86.0 sec    CAPILLARY BLOOD GLUCOSE                MEDICATIONS  (STANDING):  diltiazem    milliGRAM(s) Oral daily  heparin  Infusion.  Unit(s)/Hr (14 mL/Hr) IV Continuous <Continuous>  pantoprazole    Tablet 40 milliGRAM(s) Oral before breakfast  piperacillin/tazobactam IVPB.. 3.375 Gram(s) IV Intermittent every 8 hours  tamsulosin 0.4 milliGRAM(s) Oral at bedtime    MEDICATIONS  (PRN):  acetaminophen   Tablet .. 650 milliGRAM(s) Oral every 6 hours PRN Temp greater or equal to 38C (100.4F), Mild Pain (1 - 3)  ALBUTerol    90 MICROgram(s) HFA Inhaler 2 Puff(s) Inhalation every 6 hours PRN Bronchospasm  heparin  Injectable 6500 Unit(s) IV Push every 6 hours PRN For aPTT less than 40  heparin  Injectable 3000 Unit(s) IV Push every 6 hours PRN For aPTT between 40 - 57  sodium chloride 0.65% Nasal 1 Spray(s) Both Nostrils four times a day PRN Nasal Congestion          PHYSICAL EXAM:  GENERAL: NAD, well-groomed, well-developed  HEAD:  Atraumatic, Normocephalic  CHEST/LUNG: Clear to percussion bilaterally; No rales, rhonchi, wheezing, or rubs  HEART: Regular rate and rhythm; No murmurs, rubs, or gallops  ABDOMEN: Soft, Nontender, Nondistended; Bowel sounds present  EXTREMITIES:  2+ Peripheral Pulses, No clubbing, cyanosis, or edema  LYMPH: No lymphadenopathy noted  SKIN: No rashes or lesions    Care Discussed with Consultants/Other Providers [ ] YES  [ ] NO

## 2019-06-28 NOTE — PROGRESS NOTE ADULT - ASSESSMENT
84 year old male with hx of asthma, prostate ca s/p seeds , bronchiectases presenting with nausea and vomiting new onset afib/ aflutter     1. New onset afib/ Aflutter  likely in the setting of underlying GI etiology, dehydration; recent n/v  rate stable c/w  cardizem 120 mg daily    ekg no evidence of ACS  cbc noted, stool guaic negative. closely monitor CBC, no active bleeding noted  CHADsVASc =2 , c/w hep gtt for now   pending echo to eval LV fx, valvular disease, will transition to NOAC pending echo results  cv stable no chf on exam. no cp   chest xray unremarkable     2. nausea and vomiting   CT a/p with large hiatal hernia, + liver lesions ? mets   ct chest with findings suspicious for bronchopneumonia    MRI abdomen  limited study, 3 cm mass in segment 6 of the liver remains indeterminate; med f/u   bcx negative   iv abx possible aspiration pneumonia. ID f/u    3. Anemia   recent occult stool negative   med f/u , gi f/u    dvt ppx 84 year old male with hx of asthma, prostate ca s/p seeds , bronchiectases presenting with nausea and vomiting new onset afib/ aflutter     1. New onset afib/ Aflutter  likely in the setting of underlying GI etiology, dehydration; recent n/v  rate stable c/w  cardizem 120 mg daily    ekg no evidence of ACS  cbc noted, stool guaic negative. closely monitor CBC, no active bleeding noted  CHADsVASc =2 , c/w hep gtt for now   pending echo to eval LV fx, valvular disease, will likely transition to NOAC pending echo results/renal fx  Ep to eval for new afib/aflutter  cv stable no chf on exam. no cp   chest xray unremarkable     2. nausea and vomiting   CT a/p with large hiatal hernia, + liver lesions ? mets   ct chest with findings suspicious for bronchopneumonia    MRI abdomen  limited study, 3 cm mass in segment 6 of the liver remains indeterminate; med f/u   bcx negative   iv abx possible aspiration pneumonia. ID f/u    3. Anemia   recent occult stool negative   med f/u , gi f/u    dvt ppx

## 2019-06-28 NOTE — PHYSICAL THERAPY INITIAL EVALUATION ADULT - PLANNED THERAPY INTERVENTIONS, PT EVAL
Stair Negotiation Training: GOAL- Patient will be able to negotiate up & down 1 flight of stairs independently with single railing, step to gait pattern, in 2 weeks./gait training/balance training/bed mobility training/transfer training/strengthening

## 2019-06-29 VITALS — SYSTOLIC BLOOD PRESSURE: 158 MMHG | HEART RATE: 82 BPM | DIASTOLIC BLOOD PRESSURE: 62 MMHG

## 2019-06-29 LAB
ANION GAP SERPL CALC-SCNC: 12 MMOL/L — SIGNIFICANT CHANGE UP (ref 5–17)
BUN SERPL-MCNC: 14 MG/DL — SIGNIFICANT CHANGE UP (ref 7–23)
CALCIUM SERPL-MCNC: 8.8 MG/DL — SIGNIFICANT CHANGE UP (ref 8.4–10.5)
CHLORIDE SERPL-SCNC: 101 MMOL/L — SIGNIFICANT CHANGE UP (ref 96–108)
CO2 SERPL-SCNC: 24 MMOL/L — SIGNIFICANT CHANGE UP (ref 22–31)
CREAT SERPL-MCNC: 1.35 MG/DL — HIGH (ref 0.5–1.3)
GLUCOSE SERPL-MCNC: 112 MG/DL — HIGH (ref 70–99)
HCT VFR BLD CALC: 30 % — LOW (ref 39–50)
HGB BLD-MCNC: 9.8 G/DL — LOW (ref 13–17)
MCHC RBC-ENTMCNC: 32.7 GM/DL — SIGNIFICANT CHANGE UP (ref 32–36)
MCHC RBC-ENTMCNC: 32.9 PG — SIGNIFICANT CHANGE UP (ref 27–34)
MCV RBC AUTO: 101 FL — HIGH (ref 80–100)
PLATELET # BLD AUTO: 134 K/UL — LOW (ref 150–400)
POTASSIUM SERPL-MCNC: 4 MMOL/L — SIGNIFICANT CHANGE UP (ref 3.5–5.3)
POTASSIUM SERPL-SCNC: 4 MMOL/L — SIGNIFICANT CHANGE UP (ref 3.5–5.3)
RBC # BLD: 2.99 M/UL — LOW (ref 4.2–5.8)
RBC # FLD: 12.4 % — SIGNIFICANT CHANGE UP (ref 10.3–14.5)
SODIUM SERPL-SCNC: 137 MMOL/L — SIGNIFICANT CHANGE UP (ref 135–145)
TSH SERPL-MCNC: 1.66 UIU/ML — SIGNIFICANT CHANGE UP (ref 0.27–4.2)
WBC # BLD: 4.4 K/UL — SIGNIFICANT CHANGE UP (ref 3.8–10.5)
WBC # FLD AUTO: 4.4 K/UL — SIGNIFICANT CHANGE UP (ref 3.8–10.5)

## 2019-06-29 PROCEDURE — 86850 RBC ANTIBODY SCREEN: CPT

## 2019-06-29 PROCEDURE — 83605 ASSAY OF LACTIC ACID: CPT

## 2019-06-29 PROCEDURE — 86900 BLOOD TYPING SEROLOGIC ABO: CPT

## 2019-06-29 PROCEDURE — 71260 CT THORAX DX C+: CPT

## 2019-06-29 PROCEDURE — 85730 THROMBOPLASTIN TIME PARTIAL: CPT

## 2019-06-29 PROCEDURE — 94640 AIRWAY INHALATION TREATMENT: CPT

## 2019-06-29 PROCEDURE — 87798 DETECT AGENT NOS DNA AMP: CPT

## 2019-06-29 PROCEDURE — 85610 PROTHROMBIN TIME: CPT

## 2019-06-29 PROCEDURE — 93005 ELECTROCARDIOGRAM TRACING: CPT

## 2019-06-29 PROCEDURE — 82570 ASSAY OF URINE CREATININE: CPT

## 2019-06-29 PROCEDURE — 82330 ASSAY OF CALCIUM: CPT

## 2019-06-29 PROCEDURE — 86901 BLOOD TYPING SEROLOGIC RH(D): CPT

## 2019-06-29 PROCEDURE — 87633 RESP VIRUS 12-25 TARGETS: CPT

## 2019-06-29 PROCEDURE — 71045 X-RAY EXAM CHEST 1 VIEW: CPT

## 2019-06-29 PROCEDURE — 87486 CHLMYD PNEUM DNA AMP PROBE: CPT

## 2019-06-29 PROCEDURE — 81001 URINALYSIS AUTO W/SCOPE: CPT

## 2019-06-29 PROCEDURE — 93306 TTE W/DOPPLER COMPLETE: CPT

## 2019-06-29 PROCEDURE — 80053 COMPREHEN METABOLIC PANEL: CPT

## 2019-06-29 PROCEDURE — 93306 TTE W/DOPPLER COMPLETE: CPT | Mod: 26

## 2019-06-29 PROCEDURE — 80048 BASIC METABOLIC PNL TOTAL CA: CPT

## 2019-06-29 PROCEDURE — 96361 HYDRATE IV INFUSION ADD-ON: CPT

## 2019-06-29 PROCEDURE — 82272 OCCULT BLD FECES 1-3 TESTS: CPT

## 2019-06-29 PROCEDURE — 74183 MRI ABD W/O CNTR FLWD CNTR: CPT

## 2019-06-29 PROCEDURE — 74181 MRI ABDOMEN W/O CONTRAST: CPT

## 2019-06-29 PROCEDURE — 99285 EMERGENCY DEPT VISIT HI MDM: CPT | Mod: 25

## 2019-06-29 PROCEDURE — 84156 ASSAY OF PROTEIN URINE: CPT

## 2019-06-29 PROCEDURE — 74177 CT ABD & PELVIS W/CONTRAST: CPT

## 2019-06-29 PROCEDURE — 87040 BLOOD CULTURE FOR BACTERIA: CPT

## 2019-06-29 PROCEDURE — 85027 COMPLETE CBC AUTOMATED: CPT

## 2019-06-29 PROCEDURE — 87581 M.PNEUMON DNA AMP PROBE: CPT

## 2019-06-29 PROCEDURE — 94660 CPAP INITIATION&MGMT: CPT

## 2019-06-29 PROCEDURE — 96375 TX/PRO/DX INJ NEW DRUG ADDON: CPT | Mod: XU

## 2019-06-29 PROCEDURE — 82962 GLUCOSE BLOOD TEST: CPT

## 2019-06-29 PROCEDURE — 84300 ASSAY OF URINE SODIUM: CPT

## 2019-06-29 PROCEDURE — 97162 PT EVAL MOD COMPLEX 30 MIN: CPT

## 2019-06-29 PROCEDURE — 84443 ASSAY THYROID STIM HORMONE: CPT

## 2019-06-29 PROCEDURE — 87086 URINE CULTURE/COLONY COUNT: CPT

## 2019-06-29 PROCEDURE — 96365 THER/PROPH/DIAG IV INF INIT: CPT | Mod: XU

## 2019-06-29 PROCEDURE — A9585: CPT

## 2019-06-29 RX ORDER — DILTIAZEM HCL 120 MG
1 CAPSULE, EXT RELEASE 24 HR ORAL
Qty: 30 | Refills: 0
Start: 2019-06-29 | End: 2019-07-28

## 2019-06-29 RX ORDER — APIXABAN 2.5 MG/1
1 TABLET, FILM COATED ORAL
Qty: 60 | Refills: 0
Start: 2019-06-29 | End: 2019-07-28

## 2019-06-29 RX ORDER — APIXABAN 2.5 MG/1
1 TABLET, FILM COATED ORAL
Qty: 0 | Refills: 0 | DISCHARGE
Start: 2019-06-29

## 2019-06-29 RX ORDER — OMEPRAZOLE 10 MG/1
0 CAPSULE, DELAYED RELEASE ORAL
Qty: 0 | Refills: 0 | DISCHARGE

## 2019-06-29 RX ORDER — METOPROLOL TARTRATE 50 MG
1 TABLET ORAL
Qty: 60 | Refills: 0
Start: 2019-06-29 | End: 2019-07-28

## 2019-06-29 RX ORDER — MOXIFLOXACIN HYDROCHLORIDE TABLETS, 400 MG 400 MG/1
1 TABLET, FILM COATED ORAL
Qty: 2 | Refills: 0
Start: 2019-06-29 | End: 2019-06-30

## 2019-06-29 RX ADMIN — PIPERACILLIN AND TAZOBACTAM 25 GRAM(S): 4; .5 INJECTION, POWDER, LYOPHILIZED, FOR SOLUTION INTRAVENOUS at 16:25

## 2019-06-29 RX ADMIN — Medication 25 MILLIGRAM(S): at 18:43

## 2019-06-29 RX ADMIN — PIPERACILLIN AND TAZOBACTAM 25 GRAM(S): 4; .5 INJECTION, POWDER, LYOPHILIZED, FOR SOLUTION INTRAVENOUS at 00:13

## 2019-06-29 RX ADMIN — APIXABAN 5 MILLIGRAM(S): 2.5 TABLET, FILM COATED ORAL at 08:11

## 2019-06-29 RX ADMIN — Medication 120 MILLIGRAM(S): at 05:57

## 2019-06-29 RX ADMIN — Medication 25 MILLIGRAM(S): at 05:57

## 2019-06-29 RX ADMIN — PANTOPRAZOLE SODIUM 40 MILLIGRAM(S): 20 TABLET, DELAYED RELEASE ORAL at 05:57

## 2019-06-29 RX ADMIN — PIPERACILLIN AND TAZOBACTAM 25 GRAM(S): 4; .5 INJECTION, POWDER, LYOPHILIZED, FOR SOLUTION INTRAVENOUS at 08:11

## 2019-06-29 NOTE — PROGRESS NOTE ADULT - ATTENDING COMMENTS
Abelardo Villegas  Pager: [phone]. If no response or past 5 pm call [phone].
[facility] NEPHROLOGY  Bismark Ware M.D.  Brenden Rangel D.O.  Barbi Quiñones M.D.  Cindi Flores, MSN, ANP-C    Telephone: [phone]  Facsimile: [phone]    [address] [place] [address]
[facility] NEPHROLOGY  Bismark Ware M.D.  Brenden Rangel D.O.  Barbi Quiñones M.D.  Cindi Flores, MSN, ANP-C    Telephone: [phone]  Facsimile: [phone]    [address] [place] [address]
[facility] NEPHROLOGY  Bismark Ware M.D.  Brenden Rangel D.O.  Barbi Quiñones M.D.  Cindi Flores, MSN, ANP-C    Telephone: [phone]  Facsimile: [phone]    [address] [place] [address]
[facility] NEPHROLOGY  Bismark Ware M.D.  Brenden Rangel D.O.  Barbi Quiñones M.D.  Cindi Flores, MSN, ANP-C    Telephone: [phone]  Facsimile: [phone]    [address] [place] [address]
Agree with above NP note.  cv stable  new onset AF with RVR'  now rate better controlled  cont cardizem as ordered  cont a/c  hold off on oral a/c until further work up completed for liver mass  trend heme  await echo
Agree with above NP note.  cv stable  now rate better controlled  cont cardizem as ordered  eventual NOAC pending echo
agree with the above assessment and plan by HEIDE Sung.  yudi norris for rate control  echo  hydration
Abelardo Villegas  Pager: [phone]. If no response or past 5 pm call [phone].
Agree with above NP note.  cv stable  now rate better controlled  cont cardizem as ordered  eventual NOAC pending echo  spoke to son, cont current mgmt

## 2019-06-29 NOTE — PROGRESS NOTE ADULT - ASSESSMENT
84M with PMH of bronchiectasis, sleep apnea and prostate CA s/p seeds who presented with nausea/vomiting. Admitted for sepsis possibly due to aspiration PNA and new AFlutter with RVR. MRI of abdomen with large hiatal hernia and intrathoracic stomach. EP consulted for further management of AFlutter.

## 2019-06-29 NOTE — PROGRESS NOTE ADULT - ASSESSMENT
84y Male with history of GERD, asthma presents with nausea and vomiting. Nephrology consulted for elevated Scr.    1) GEMA: Baseline Scr unknown however suspect patient with element of GEMA secondary to pre-renal azotemia in setting of poor PO intake and vomiting given low FeNa. UA bland not suggestive of interstitial disease from PPI and CT without post-obstructive process. Suspect delay in renal recovery due to contrast administration on 6/25, though now starting to improve again. Continue with supportive care. Avoid nephrotoxins. Monitor electrolytes.    2) HTN: BP controlled. Rate control as per cardiology.    3) Lactic acidosis: Resolved with IVF.    4) Proteinuria: Mild (spot urine TP/CR 0.2) for which would defer further inpatient work up at this time.

## 2019-06-29 NOTE — PROGRESS NOTE ADULT - REASON FOR ADMISSION
nausea and vomiting

## 2019-06-29 NOTE — PROGRESS NOTE ADULT - PROVIDER SPECIALTY LIST ADULT
Cardiology
Gastroenterology
Hospitalist
Hospitalist
Infectious Disease
Nephrology
Hospitalist
Infectious Disease
Electrophysiology
Gastroenterology

## 2019-06-29 NOTE — PROGRESS NOTE ADULT - SUBJECTIVE AND OBJECTIVE BOX
CARDIOLOGY FOLLOW UP - Dr. Gilmore    CC no cp or sob       PHYSICAL EXAM:  T(C): 36.7 (06-29-19 @ 06:33), Max: 36.9 (06-28-19 @ 21:43)  HR: 84 (06-29-19 @ 06:33) (64 - 99)  BP: 121/65 (06-29-19 @ 06:33) (121/65 - 168/80)  RR: 18 (06-29-19 @ 06:33) (18 - 19)  SpO2: 95% (06-29-19 @ 06:33) (95% - 96%)  Wt(kg): --  I&O's Summary    28 Jun 2019 07:01  -  29 Jun 2019 07:00  --------------------------------------------------------  IN: 580 mL / OUT: 920 mL / NET: -340 mL        Appearance: Normal	  Cardiovascular: Normal S1 S2, irregular   Respiratory: Lungs clear to auscultation	  Gastrointestinal:  Soft, Non-tender, + BS	  Extremities: Normal range of motion, No clubbing, cyanosis or edema        MEDICATIONS  (STANDING):  apixaban 5 milliGRAM(s) Oral two times a day  diltiazem    milliGRAM(s) Oral daily  metoprolol tartrate 25 milliGRAM(s) Oral two times a day  pantoprazole    Tablet 40 milliGRAM(s) Oral before breakfast  piperacillin/tazobactam IVPB.. 3.375 Gram(s) IV Intermittent every 8 hours  tamsulosin 0.4 milliGRAM(s) Oral at bedtime      TELEMETRY: afib HR 90s , pvc 	    ECG:  	  RADIOLOGY:   DIAGNOSTIC TESTING:  [ ] Echocardiogram: PENDING    [ ]  Catheterization:  [ ] Stress Test:    OTHER: 	    LABS:	 	                            10.8   5.4   )-----------( 141      ( 28 Jun 2019 07:15 )             31.6     06-29    137  |  101  |  14  ----------------------------<  112<H>  4.0   |  24  |  1.35<H>    Ca    8.8      29 Jun 2019 06:48      PTT - ( 28 Jun 2019 07:15 )  PTT:86.0 sec

## 2019-06-29 NOTE — PROGRESS NOTE ADULT - PROBLEM SELECTOR PLAN 1
nausea vomiting resolved  can do gi pcr if diarrhea persists   ? resolving gastroenteritis  does have intrathoracic stomach but no further nausea or vomiting  mri revealed hemangioma; no further workup   d/w patient.
Rate controlled Typical Aflutter continue to monitor telemetry   keep K+>4,MG++>2  Continue diltiazem, but check TTE check LV function   continue metoprolol tartrate 25 mg BID  [id number]: 2 Continue anticoagulation apixaban 5 mg BID  Given large hiatus hernia, and intrathoracic stomach JOSE may not be desirable favor anticoagulation x 3- 4 weeks before ablation or cardioversion to restore sinus rhythm. Although OOB to chair Remains slightly SOB which he attributes to asthma, follow up echocardiogram to ensure he has normal LV function. If LV function is impaired, earlier restoration of sinus rhythm would be worthwhile.   If he remains rate controlled and asymptomatic, possible discharge home this weekend with follow-up with Dr. Chavis 3- 4 weeks    [phone]
nausea vomiting resolved  can do gi pcr if diarrhea persists   ? resolving gastroenteritis  does have intrathoracic stomach but no further nausea or vomiting  mri revealed hemangioma; no further workup   d/w patient.

## 2019-06-29 NOTE — PROGRESS NOTE ADULT - PROBLEM SELECTOR PLAN 2
Advanced care planning was discussed with patient and family.  Advanced care planning forms were reviewed and discussed.  Risks, benefits and alternatives of gastroenterologic procedures were discussed in detail and all questions were answered.    30 minutes spent.
Advanced care planning was discussed with patient and family.  Advanced care planning forms were reviewed and discussed.  Risks, benefits and alternatives of gastroenterologic procedures were discussed in detail and all questions were answered.    30 minutes spent.

## 2019-06-29 NOTE — PROGRESS NOTE ADULT - SUBJECTIVE AND OBJECTIVE BOX
24H hour events: no complaints     MEDICATIONS:  apixaban 5 milliGRAM(s) Oral two times a day  diltiazem    milliGRAM(s) Oral daily  metoprolol tartrate 25 milliGRAM(s) Oral two times a day  tamsulosin 0.4 milliGRAM(s) Oral at bedtime    piperacillin/tazobactam IVPB.. 3.375 Gram(s) IV Intermittent every 8 hours    ALBUTerol    90 MICROgram(s) HFA Inhaler 2 Puff(s) Inhalation every 6 hours PRN    acetaminophen   Tablet .. 650 milliGRAM(s) Oral every 6 hours PRN    pantoprazole    Tablet 40 milliGRAM(s) Oral before breakfast      sodium chloride 0.65% Nasal 1 Spray(s) Both Nostrils four times a day PRN      REVIEW OF SYSTEMS:  See HPI, otherwise ROS negative.    PHYSICAL EXAM:  T(C): 36.9 (06-29-19 @ 11:45), Max: 36.9 (06-28-19 @ 21:43)  HR: 77 (06-29-19 @ 11:45) (64 - 99)  BP: 127/67 (06-29-19 @ 11:45) (121/65 - 168/80)  RR: 16 (06-29-19 @ 11:45) (16 - 19)  SpO2: 94% (06-29-19 @ 11:45) (94% - 96%)  Wt(kg): --  I&O's Summary    28 Jun 2019 07:01  -  29 Jun 2019 07:00  --------------------------------------------------------  IN: 580 mL / OUT: 920 mL / NET: -340 mL    Appearance: Alert. NAD	OOB to chair   Cardiovascular: +S1S2 RRR no m/g/r  Respiratory: CTA B/L	  Psychiatry: A & O x 3, Mood & affect appropriate  Gastrointestinal:  Soft, NT. ND. +BS	  Skin: No rashes	  Neurologic: Non-focal  Extremities: No edema BLE  Vascular: Peripheral pulses feet warm     LABS:	 	    CBC Full  -  ( 29 Jun 2019 06:48 )  WBC Count : 4.4 K/uL  Hemoglobin : 9.8 g/dL  Hematocrit : 30.0 %  Platelet Count - Automated : 134 K/uL  Mean Cell Volume : 101.0 fl  Mean Cell Hemoglobin : 32.9 pg  Mean Cell Hemoglobin Concentration : 32.7 gm/dL  Auto Neutrophil # : x  Auto Lymphocyte # : x  Auto Monocyte # : x  Auto Eosinophil # : x  Auto Basophil # : x  Auto Neutrophil % : x  Auto Lymphocyte % : x  Auto Monocyte % : x  Auto Eosinophil % : x  Auto Basophil % : x    06-29    137  |  101  |  14  ----------------------------<  112<H>  4.0   |  24  |  1.35<H>  06-28    139  |  104  |  14  ----------------------------<  163<H>  4.0   |  25  |  1.39<H>    Ca    8.8      29 Jun 2019 06:48  Ca    8.6      28 Jun 2019 10:46    proBNP:   Lipid Profile:   HgA1c:   TSH: Thyroid Stimulating Hormone, Serum: 1.66 uIU/mL (06-29 @ 10:03)    CARDIAC MARKERS:    TELEMETRY: Atria flutter 70 -100 occ PVC   < from: CT Chest w/ IV Cont (06.25.19 @ 09:51) >  CT of the Chest was performed without intravenous contrast.  Sagittal and coronal reformats were performed.      FINDINGS:    LUNGS AND AIRWAYS: Patent central airways.  There are patchy tree-in-bud   nodules in the left upper lobe, lingula, right middle lobe and right   lower lobe.  There is a small infiltrate posteriorly in the right lower   lobe (2:82).  There is subsegmental atelectasis in the right lung base.    PLEURA: No pleural effusion or pneumothorax.    MEDIASTINUM AND ZANE: No gross lymphadenopathy; hilar lymph nodes are   suboptimally assessed without contrast..    VESSELS: Mild atherosclerotic calcification of the thoracic aorta.    HEART: The heart appears mildly enlarged.  No pericardial effusion.  Mild   atherosclerotic calcification of the coronary arteries.  Dystrophic   calcification of the mitral annulus.    CHEST WALL AND LOWER NECK: Trace gynecomastia bilaterally.    VISUALIZED UPPER ABDOMEN: Large hiatal hernia with intrathoracic stomach.    Hypodense liver lesions reported on recent abdominal CT scan are not   well visualized on this noncontrast chest CT.  There is residual IV   contrast in the renal collecting systems from prior abdominal CT scan    BONES: Severe osteoarthrosis in both glenohumeral joints.  Marked   dextroscoliosis of the thoracolumbar spine.    IMPRESSION: Scattered tree-in-bud nodules in both lungs and small   infiltrate in the right lower lobe.  The findings are suspicious for   bronchopneumonia.  Follow-up chest CTis recommended in one month to   ensure resolution of the imaging findings.

## 2019-06-29 NOTE — PROGRESS NOTE ADULT - SUBJECTIVE AND OBJECTIVE BOX
GASTROENTEROLOGY    Patient seen and examined at bedside  He denies nausea/vomiting, abdominal pain  Tolerating diet      MEDICATIONS  (STANDING):  apixaban 5 milliGRAM(s) Oral two times a day  diltiazem    milliGRAM(s) Oral daily  metoprolol tartrate 25 milliGRAM(s) Oral two times a day  pantoprazole    Tablet 40 milliGRAM(s) Oral before breakfast  piperacillin/tazobactam IVPB.. 3.375 Gram(s) IV Intermittent every 8 hours  tamsulosin 0.4 milliGRAM(s) Oral at bedtime        T(F): 98.4 (06-29-19 @ 11:45), Max: 98.4 (06-28-19 @ 21:43)  HR: 77 (06-29-19 @ 11:45) (64 - 99)  BP: 127/67 (06-29-19 @ 11:45) (121/65 - 168/80)  RR: 16 (06-29-19 @ 11:45) (16 - 18)  SpO2: 94% (06-29-19 @ 11:45) (94% - 96%)  Wt(kg): --    PHYSICAL EXAM  GENERAL:   NAD  HEENT:  NC/AT, no JVD, sclera-anicteric  CHEST:  clear to ascultation bilaterally, respirations nonlabored  HEART:  +S1+S2 regular rate and rhythm   ABDOMEN:  Soft, non-tender, non-distended, normoactive bowel sounds, no masses  EXTREMITIES:  no cyanosis, clubbing or edema  NEURO:  Alert, oriented  SKIN:  No rash/warm/dry      LABS:                        9.8<L>  4.4   )-----------( 134<L>    ( 29 Jun 2019 06:48 )             30.0<L>  29 Jun 2019 06:48    06-29    137  |  101  |  14  ----------------------------<  112<H>  4.0   |  24  |  1.35<H>    Ca    8.8      29 Jun 2019 06:48        PTT - ( 28 Jun 2019 07:15 )  PTT:86.0 sec  I&O's Detail    28 Jun 2019 07:01  -  29 Jun 2019 07:00  --------------------------------------------------------  IN:    Oral Fluid: 580 mL  Total IN: 580 mL    OUT:    Voided: 920 mL  Total OUT: 920 mL    Total NET: -340 mL

## 2019-06-29 NOTE — PROGRESS NOTE ADULT - ASSESSMENT
84 year old male with hx of asthma, prostate ca s/p seeds , bronchiectases presenting with nausea and vomiting new onset afib/ aflutter     1. New onset afib/ Aflutter  likely in the setting of underlying GI etiology, dehydration; recent n/v  ekg no evidence of ACS  EP eval noted :JOSE may not be desirable given that he has a large hiatus hernia, will continue  3-4 weeks of anticoagulation before ablation or cardioversion is planned   rate controlled  c/w  cardizem 120 mg daily , started on lopressor 25 mg BID   cbc stable; CHADsVASc =2 , now on apixaban 5 mg BID  pending echo to eval LV fx, valvular disease  cv stable no chf on exam. no cp   chest xray unremarkable     2. nausea and vomiting , resolved   CT a/p with large hiatal hernia, + liver lesions ? mets   ct chest with findings suspicious for bronchopneumonia    MRI abdomen  limited study, 3 cm mass in segment 6 of the liver remains indeterminate; med f/u   bcx negative   iv abx possible aspiration pneumonia. ID f/u    3. Anemia   recent occult stool negative   med f/u , gi f/u    dvt ppx  dc planning pending echo, will f/u with EP

## 2019-06-29 NOTE — PROGRESS NOTE ADULT - SUBJECTIVE AND OBJECTIVE BOX
[facility] NEPHROLOGY- PROGRESS NOTE    84y Male with history of GERD, asthma presents with nausea and vomiting. Nephrology consulted for elevated Scr.    Pt feels okay.    REVIEW OF SYSTEMS:  Gen: no changes in weight  Cards: no chest pain  Resp: no dyspnea  GI: + nausea and vomiting resolved, no diarrhea  Vascular: no LE edema    No Known Allergies      Hospital Medications: Medications reviewed    VITALS:  T(F): 98 (19 @ 06:33), Max: 98.4 (19 @ 21:43)  HR: 84 (19 @ 06:33)  BP: 121/65 (19 @ 06:33)  RR: 18 (19 @ 06:33)  SpO2: 95% (19 @ 06:33)  Wt(kg): --     @ 07:01  -   @ 07:00  --------------------------------------------------------  IN: 580 mL / OUT: 920 mL / NET: -340 mL      PHYSICAL EXAM:  Gen: NAD, calm  Cards: Irregularly irregular, +S1/S2, no M/G/R  Resp: CTA B/L  GI: soft, NT/ND, NABS  Vascular: no LE edema B/L          LABS:      137  |  101  |  14  ----------------------------<  112<H>  4.0   |  24  |  1.35<H>    Ca    8.8      2019 06:48      Creatinine Trend: 1.35 <--, 1.39 <--, 1.48 <--, 1.43 <--, 1.36 <--                        9.8    4.4   )-----------( 134      ( 2019 06:48 )             30.0     Urine Studies:  Urinalysis Basic - ( 2019 08:46 )    Color: Light Yellow / Appearance: Clear / S.024 / pH:   Gluc:  / Ketone: Small  / Bili: Negative / Urobili: Negative   Blood:  / Protein: 30 mg/dL / Nitrite: Negative   Leuk Esterase: Negative / RBC: 1 /hpf / WBC 1 /HPF   Sq Epi:  / Non Sq Epi: 0 /hpf / Bacteria: Negative      Sodium, Random Urine: 71 mmol/L ( @ 15:48)  Creatinine, Random Urine: 93 mg/dL ( @ 15:48)  Creatinine, Random Urine: 94 mg/dL ( @ 15:48)  Protein/Creatinine Ratio Calculation: 0.2 Ratio ( @ 15:48)

## 2019-06-30 LAB
CULTURE RESULTS: SIGNIFICANT CHANGE UP
CULTURE RESULTS: SIGNIFICANT CHANGE UP
SPECIMEN SOURCE: SIGNIFICANT CHANGE UP
SPECIMEN SOURCE: SIGNIFICANT CHANGE UP

## 2019-07-09 ENCOUNTER — MOBILE ON CALL (OUTPATIENT)
Age: 84
End: 2019-07-09

## 2019-07-09 ENCOUNTER — OTHER (OUTPATIENT)
Age: 84
End: 2019-07-09

## 2019-09-30 NOTE — ED ADULT TRIAGE NOTE - WEIGHT IN LBS
HOSPITALIST ATTENDING PROGRESS NOTE    Chart and meds reviewed.  Patient seen and examined.    HPI: Tolerated chemo yesterday, Pulse ox test documents that she qualifies for home oxygen. d/w , He would like her to have pulm evaluaiton and rad onc evaluation and have a plan for outpatient management prior to going home.     All 10 systems reviewed and found to be negative with the exception of what has been described above.    MEDICATIONS  (STANDING):  ALBUTerol/ipratropium for Nebulization 3 milliLiter(s) Nebulizer every 6 hours  apixaban 5 milliGRAM(s) Oral every 12 hours  aspirin enteric coated 81 milliGRAM(s) Oral daily  atorvastatin 40 milliGRAM(s) Oral at bedtime  buDESOnide 160 MICROgram(s)/formoterol 4.5 MICROgram(s) Inhaler 2 Puff(s) Inhalation two times a day  dextrose 5%. 1000 milliLiter(s) (50 mL/Hr) IV Continuous <Continuous>  dextrose 50% Injectable 12.5 Gram(s) IV Push once  dextrose 50% Injectable 25 Gram(s) IV Push once  dextrose 50% Injectable 25 Gram(s) IV Push once  diltiazem    milliGRAM(s) Oral daily  diltiazem Infusion 5 mG/Hr (5 mL/Hr) IV Continuous <Continuous>  fentaNYL   Patch  12 MICROgram(s)/Hr 1 Patch Transdermal every 72 hours  fentaNYL   Patch  25 MICROgram(s)/Hr 1 Patch Transdermal every 72 hours  gabapentin 300 milliGRAM(s) Oral three times a day  guaiFENesin  milliGRAM(s) Oral every 12 hours  insulin lispro (HumaLOG) corrective regimen sliding scale   SubCutaneous three times a day before meals  losartan 50 milliGRAM(s) Oral daily  methylPREDNISolone sodium succinate Injectable 40 milliGRAM(s) IV Push every 8 hours  senna 2 Tablet(s) Oral at bedtime    MEDICATIONS  (PRN):  ALPRAZolam 0.25 milliGRAM(s) Oral two times a day PRN Anxiety  dextrose 40% Gel 15 Gram(s) Oral once PRN Blood Glucose LESS THAN 70 milliGRAM(s)/deciliter  glucagon  Injectable 1 milliGRAM(s) IntraMuscular once PRN Glucose LESS THAN 70 milligrams/deciliter  morphine  - Injectable 4 milliGRAM(s) IV Push every 4 hours PRN Severe Pain (7 - 10)  morphine  - Injectable 2 milliGRAM(s) IV Push every 4 hours PRN Moderate Pain (4 - 6)  morphine Concentrate 5 milliGRAM(s) Oral every 4 hours PRN pain or dyspnea      VITALS:  T(F): 98.3 (09-30-19 @ 11:50), Max: 98.3 (09-30-19 @ 11:50)  HR: 92 (09-30-19 @ 11:50) (74 - 99)  BP: 150/67 (09-30-19 @ 11:50) (107/73 - 150/67)  RR: 20 (09-30-19 @ 11:50) (19 - 20)  SpO2: 93% (09-30-19 @ 11:50) (93% - 97%)  Wt(kg): --    I&O's Summary      CAPILLARY BLOOD GLUCOSE      POCT Blood Glucose.: 181 mg/dL (30 Sep 2019 11:58)  POCT Blood Glucose.: 158 mg/dL (30 Sep 2019 08:34)  POCT Blood Glucose.: 181 mg/dL (30 Sep 2019 07:22)  POCT Blood Glucose.: 140 mg/dL (29 Sep 2019 16:49)      PHYSICAL EXAM:    HEENT:  pupils equal and reactive, EOMI, no oropharyngeal lesions, erythema, exudates, oral thrush  NECK:   supple, no carotid bruits, no palpable lymph nodes, no thyromegaly  CV:  +S1, +S2, regular, no murmurs or rubs  RESP:   lungs clear to auscultation bilaterally, no wheezing, rales, rhonchi, good air entry bilaterally  BREAST:  not examined  GI:  abdomen soft, non-tender, non-distended, normal BS, no bruits, no abdominal masses, no palpable masses  RECTAL:  not examined  :  not examined  MSK:   normal muscle tone, no atrophy, no rigidity, no contractions  EXT:  no clubbing, no cyanosis, no edema, no calf pain, swelling or erythema  VASCULAR:  pulses equal and symmetric in the upper and lower extremities  NEURO:  AAOX3, no focal neurological deficits, follows all commands, able to move extremities spontaneously  SKIN:  no ulcers, lesions or rashes    LABS:                            12.5   12.60 )-----------( 226      ( 30 Sep 2019 07:13 )             38.8     09-30    141  |  108  |  28<H>  ----------------------------<  189<H>  4.7   |  26  |  0.77    Ca    8.4<L>      30 Sep 2019 07:13    TPro  5.6<L>  /  Alb  2.8<L>  /  TBili  0.3  /  DBili  x   /  AST  16  /  ALT  38  /  AlkPhos  80  09-30        LIVER FUNCTIONS - ( 30 Sep 2019 07:13 )  Alb: 2.8 g/dL / Pro: 5.6 gm/dL / ALK PHOS: 80 U/L / ALT: 38 U/L / AST: 16 U/L / GGT: x                                             CULTURES: 175.9

## 2020-02-03 NOTE — ED ADULT NURSE NOTE - CAS EDN DISCHARGE ASSESSMENT
[place]  Database checked on 2/3/2020 for [place] prior to prescribing controlled substances and no activity (or no suspected fraudulent activity) was found. Last filled on 12/30/19 #28, 1/7/2020 #28, 1/16/2020 #28 and 1/21/2020 #28.  Called to [facility] [address].  
Alert and oriented to person, place and time

## 2021-05-19 ENCOUNTER — INPATIENT (INPATIENT)
Facility: HOSPITAL | Age: 86
LOS: 0 days | Discharge: HOME | DRG: 812 | End: 2021-05-20
Attending: INTERNAL MEDICINE | Admitting: INTERNAL MEDICINE
Payer: COMMERCIAL

## 2021-05-19 VITALS
HEIGHT: 68 IN | OXYGEN SATURATION: 98 % | TEMPERATURE: 98 F | HEART RATE: 74 BPM | DIASTOLIC BLOOD PRESSURE: 78 MMHG | RESPIRATION RATE: 18 BRPM | WEIGHT: 184.97 LBS | SYSTOLIC BLOOD PRESSURE: 129 MMHG

## 2021-05-19 DIAGNOSIS — I48.92 UNSPECIFIED ATRIAL FLUTTER: ICD-10-CM

## 2021-05-19 DIAGNOSIS — K92.2 GASTROINTESTINAL HEMORRHAGE, UNSPECIFIED: ICD-10-CM

## 2021-05-19 DIAGNOSIS — R63.8 OTHER SYMPTOMS AND SIGNS CONCERNING FOOD AND FLUID INTAKE: ICD-10-CM

## 2021-05-19 DIAGNOSIS — D64.9 ANEMIA, UNSPECIFIED: ICD-10-CM

## 2021-05-19 DIAGNOSIS — G47.33 OBSTRUCTIVE SLEEP APNEA (ADULT) (PEDIATRIC): ICD-10-CM

## 2021-05-19 DIAGNOSIS — Z98.890 OTHER SPECIFIED POSTPROCEDURAL STATES: Chronic | ICD-10-CM

## 2021-05-19 DIAGNOSIS — N18.9 CHRONIC KIDNEY DISEASE, UNSPECIFIED: ICD-10-CM

## 2021-05-19 LAB
ALBUMIN SERPL ELPH-MCNC: 2.9 G/DL — LOW (ref 3.3–5)
ALP SERPL-CCNC: 58 U/L — SIGNIFICANT CHANGE UP (ref 40–120)
ALT FLD-CCNC: 15 U/L — SIGNIFICANT CHANGE UP (ref 10–45)
ANION GAP SERPL CALC-SCNC: 7 MMOL/L — SIGNIFICANT CHANGE UP (ref 5–17)
ANISOCYTOSIS BLD QL: SLIGHT — SIGNIFICANT CHANGE UP
APTT BLD: 32.6 SEC — SIGNIFICANT CHANGE UP (ref 27.5–35.5)
AST SERPL-CCNC: 22 U/L — SIGNIFICANT CHANGE UP (ref 10–40)
BASOPHILS # BLD AUTO: 0.04 K/UL — SIGNIFICANT CHANGE UP (ref 0–0.2)
BASOPHILS NFR BLD AUTO: 0.9 % — SIGNIFICANT CHANGE UP (ref 0–2)
BILIRUB SERPL-MCNC: <0.2 MG/DL — SIGNIFICANT CHANGE UP (ref 0.2–1.2)
BLD GP AB SCN SERPL QL: NEGATIVE — SIGNIFICANT CHANGE UP
BLD GP AB SCN SERPL QL: NEGATIVE — SIGNIFICANT CHANGE UP
BUN SERPL-MCNC: 35 MG/DL — HIGH (ref 7–23)
CALCIUM SERPL-MCNC: 8.3 MG/DL — LOW (ref 8.4–10.5)
CHLORIDE SERPL-SCNC: 107 MMOL/L — SIGNIFICANT CHANGE UP (ref 96–108)
CO2 SERPL-SCNC: 26 MMOL/L — SIGNIFICANT CHANGE UP (ref 22–31)
CREAT SERPL-MCNC: 1.32 MG/DL — HIGH (ref 0.5–1.3)
EOSINOPHIL # BLD AUTO: 0.08 K/UL — SIGNIFICANT CHANGE UP (ref 0–0.5)
EOSINOPHIL NFR BLD AUTO: 1.7 % — SIGNIFICANT CHANGE UP (ref 0–6)
FERRITIN SERPL-MCNC: 99 NG/ML — SIGNIFICANT CHANGE UP (ref 30–400)
GIANT PLATELETS BLD QL SMEAR: PRESENT — SIGNIFICANT CHANGE UP
GLUCOSE SERPL-MCNC: 82 MG/DL — SIGNIFICANT CHANGE UP (ref 70–99)
HAPTOGLOB SERPL-MCNC: 134 MG/DL — SIGNIFICANT CHANGE UP (ref 34–200)
HCT VFR BLD CALC: 22.9 % — LOW (ref 39–50)
HGB BLD-MCNC: 7 G/DL — CRITICAL LOW (ref 13–17)
HYPOCHROMIA BLD QL: SLIGHT — SIGNIFICANT CHANGE UP
INR BLD: 0.98 — SIGNIFICANT CHANGE UP (ref 0.88–1.16)
IRON SATN MFR SERPL: 234 UG/DL — HIGH (ref 45–165)
IRON SATN MFR SERPL: 82 % — HIGH (ref 16–55)
LDH SERPL L TO P-CCNC: 166 U/L — SIGNIFICANT CHANGE UP (ref 50–242)
LYMPHOCYTES # BLD AUTO: 0.54 K/UL — LOW (ref 1–3.3)
LYMPHOCYTES # BLD AUTO: 11.3 % — LOW (ref 13–44)
MACROCYTES BLD QL: SLIGHT — SIGNIFICANT CHANGE UP
MANUAL SMEAR VERIFICATION: SIGNIFICANT CHANGE UP
MCHC RBC-ENTMCNC: 30.6 GM/DL — LOW (ref 32–36)
MCHC RBC-ENTMCNC: 32.6 PG — SIGNIFICANT CHANGE UP (ref 27–34)
MCV RBC AUTO: 106.5 FL — HIGH (ref 80–100)
MONOCYTES # BLD AUTO: 0.37 K/UL — SIGNIFICANT CHANGE UP (ref 0–0.9)
MONOCYTES NFR BLD AUTO: 7.8 % — SIGNIFICANT CHANGE UP (ref 2–14)
NEUTROPHILS # BLD AUTO: 3.75 K/UL — SIGNIFICANT CHANGE UP (ref 1.8–7.4)
NEUTROPHILS NFR BLD AUTO: 78.3 % — HIGH (ref 43–77)
OVALOCYTES BLD QL SMEAR: SLIGHT — SIGNIFICANT CHANGE UP
PLAT MORPH BLD: NORMAL — SIGNIFICANT CHANGE UP
PLATELET # BLD AUTO: 234 K/UL — SIGNIFICANT CHANGE UP (ref 150–400)
POIKILOCYTOSIS BLD QL AUTO: SLIGHT — SIGNIFICANT CHANGE UP
POLYCHROMASIA BLD QL SMEAR: SLIGHT — SIGNIFICANT CHANGE UP
POTASSIUM SERPL-MCNC: 4.5 MMOL/L — SIGNIFICANT CHANGE UP (ref 3.5–5.3)
POTASSIUM SERPL-SCNC: 4.5 MMOL/L — SIGNIFICANT CHANGE UP (ref 3.5–5.3)
PROT SERPL-MCNC: 6.1 G/DL — SIGNIFICANT CHANGE UP (ref 6–8.3)
PROTHROM AB SERPL-ACNC: 11.8 SEC — SIGNIFICANT CHANGE UP (ref 10.6–13.6)
RBC # BLD: 2.15 M/UL — LOW (ref 4.2–5.8)
RBC # BLD: 2.15 M/UL — LOW (ref 4.2–5.8)
RBC # FLD: 15.3 % — HIGH (ref 10.3–14.5)
RBC BLD AUTO: ABNORMAL
RETICS #: 112.4 K/UL — SIGNIFICANT CHANGE UP (ref 25–125)
RETICS/RBC NFR: 5.2 % — HIGH (ref 0.5–2.5)
RH IG SCN BLD-IMP: POSITIVE — SIGNIFICANT CHANGE UP
RH IG SCN BLD-IMP: POSITIVE — SIGNIFICANT CHANGE UP
SARS-COV-2 RNA SPEC QL NAA+PROBE: SIGNIFICANT CHANGE UP
SODIUM SERPL-SCNC: 140 MMOL/L — SIGNIFICANT CHANGE UP (ref 135–145)
TIBC SERPL-MCNC: 284 UG/DL — SIGNIFICANT CHANGE UP (ref 220–430)
TSH SERPL-MCNC: 1.11 UIU/ML — SIGNIFICANT CHANGE UP (ref 0.27–4.2)
UIBC SERPL-MCNC: 50 UG/DL — LOW (ref 110–370)
WBC # BLD: 4.79 K/UL — SIGNIFICANT CHANGE UP (ref 3.8–10.5)
WBC # FLD AUTO: 4.79 K/UL — SIGNIFICANT CHANGE UP (ref 3.8–10.5)

## 2021-05-19 PROCEDURE — 93010 ELECTROCARDIOGRAM REPORT: CPT

## 2021-05-19 PROCEDURE — 99285 EMERGENCY DEPT VISIT HI MDM: CPT | Mod: CS

## 2021-05-19 RX ORDER — ALBUTEROL 90 UG/1
2 AEROSOL, METERED ORAL
Qty: 0 | Refills: 0 | DISCHARGE

## 2021-05-19 RX ORDER — TAMSULOSIN HYDROCHLORIDE 0.4 MG/1
1 CAPSULE ORAL
Qty: 0 | Refills: 0 | DISCHARGE

## 2021-05-19 RX ORDER — PANTOPRAZOLE SODIUM 20 MG/1
40 TABLET, DELAYED RELEASE ORAL EVERY 12 HOURS
Refills: 0 | Status: DISCONTINUED | OUTPATIENT
Start: 2021-05-19 | End: 2021-05-20

## 2021-05-19 RX ORDER — OMEPRAZOLE 10 MG/1
1 CAPSULE, DELAYED RELEASE ORAL
Qty: 0 | Refills: 0 | DISCHARGE

## 2021-05-19 RX ADMIN — PANTOPRAZOLE SODIUM 40 MILLIGRAM(S): 20 TABLET, DELAYED RELEASE ORAL at 18:11

## 2021-05-19 NOTE — H&P ADULT - NSHPPHYSICALEXAM_GEN_ALL_CORE
VITAL SIGNS:  T(C): 36.8 (05-19-21 @ 18:23), Max: 36.8 (05-19-21 @ 18:23)  T(F): 98.2 (05-19-21 @ 18:23), Max: 98.2 (05-19-21 @ 18:23)  HR: 68 (05-19-21 @ 18:23) (68 - 79)  BP: 116/64 (05-19-21 @ 18:23) (116/54 - 129/78)  BP(mean): --  RR: 18 (05-19-21 @ 18:23) (18 - 18)  SpO2: 98% (05-19-21 @ 18:23) (98% - 98%)  Wt(kg): --    PHYSICAL EXAM:  Constitutional: WDWN resting comfortably in bed; NAD  Head: NC/AT  Eyes: PERRL, EOMI, anicteric sclera  ENT: no nasal discharge; uvula midline, no oropharyngeal erythema or exudates; MMM  Neck: supple; no JVD or thyromegaly  Respiratory: CTA B/L; no W/R/R, no retractions  Cardiac: +S1/S2; RRR; no M/R/G; PMI non-displaced  Gastrointestinal: soft, NT/ND; no rebound or guarding; +BSx4  Genitourinary: normal external genitalia  Back: spine midline, no bony tenderness or step-offs; no CVAT B/L  Extremities: WWP, no clubbing or cyanosis; no peripheral edema  Musculoskeletal: NROM x4; no joint swelling, tenderness or erythema  Vascular: 2+ radial, femoral, DP/PT pulses B/L  Dermatologic: skin warm, dry and intact; no rashes, wounds, or scars  Lymphatic: no submandibular or cervical LAD  Neurologic: AAOx3; CNII-XII grossly intact; no focal deficits  Psychiatric: affect and characteristics of appearance, verbalizations, behaviors are appropriate VITAL SIGNS:  T(C): 36.8 (05-19-21 @ 18:23), Max: 36.8 (05-19-21 @ 18:23)  T(F): 98.2 (05-19-21 @ 18:23), Max: 98.2 (05-19-21 @ 18:23)  HR: 68 (05-19-21 @ 18:23) (68 - 79)  BP: 116/64 (05-19-21 @ 18:23) (116/54 - 129/78)  BP(mean): --  RR: 18 (05-19-21 @ 18:23) (18 - 18)  SpO2: 98% (05-19-21 @ 18:23) (98% - 98%)  Wt(kg): --    PHYSICAL EXAM:  Constitutional: WDWN resting comfortably in bed; NAD  Head: NC/AT  Eyes: PERRL, EOMI, anicteric sclera  ENT: no nasal discharge; uvula midline, no oropharyngeal erythema or exudates; MMM  Neck: supple, no LAD  Respiratory: CTA B/L; no W/R/R, no retractions  Cardiac: +S1/S2; RRR; no M/R/G  Gastrointestinal: soft, NT/ND; no rebound or guarding; +BSx4  Extremities: WWP, mild pitting edema in ankles R>L  Vascular: 2+ radial, DP pulses B/L  Dermatologic: no rashes  Neurologic: AAOx3; CNII-XII grossly intact; no focal deficits  Psychiatric: affect and characteristics of appearance, verbalizations, behaviors are appropriate

## 2021-05-19 NOTE — H&P ADULT - PROBLEM SELECTOR PLAN 4
Patient with elevated Cr upon admission to 1.32.  Appears to be at baseline.  Unsure etiology.    - ctm  - daily BMP

## 2021-05-19 NOTE — ED PROVIDER NOTE - CLINICAL SUMMARY MEDICAL DECISION MAKING FREE TEXT BOX
85M PMH asthma, liver hemangioma, hiatal hernia, pafib, p/w anemia. Pt referred to Dr. Urena (heme) by PMD Dr. Venegas. Hgb today was 7, referred to ED for 2 U PRBC. Pt c/o occasional BRBPR. Also occasional black stool which he attributes to being on iron. Had brief lightheadedness 2d ago but none since then. No other systemic symptoms. Vitals wnl, exam as above. Very well appearing.  ddx: Anemia, unclear etiology.  Labs, likely PRBC/admission.   hgb 7.9 on 3/17/21

## 2021-05-19 NOTE — H&P ADULT - NSICDXPASTSURGICALHX_GEN_ALL_CORE_FT
PAST SURGICAL HISTORY:  No significant past surgical history      PAST SURGICAL HISTORY:  H/O knee surgery

## 2021-05-19 NOTE — ED PROVIDER NOTE - OBJECTIVE STATEMENT
85M PMH asthma, liver hemangioma, hiatal hernia, pafib, p/w anemia. Pt referred to Dr. Urena (heme) by PMD Dr. Venegas. Hgb today was 7, referred to ED for 2 U PRBC. Pt c/o occasional BRBPR. Also occasional black stool which he attributes to being on iron. Had brief lightheadedness 2d ago but none since then. No other systemic symptoms.   Denies f/c, URI symptoms, SOB/CP, fatigue, NVD, abd pain, urinary complaints, focal weakness/numbness.

## 2021-05-19 NOTE — H&P ADULT - HISTORY OF PRESENT ILLNESS
Pt is an 86 y/o male w hx of sleep apnea, osteoarthritis, remote history of prostate CA in remission, paroxysmal AF (off AC), large hiatal hernia, and iron deficiency anemia who presents for evaluation of anemia. Patient was seen by Dr. Urena outpatient and noted to have significantly decreased hemoglobin of 7.6 from baseline of 9-10 and patient was sent in to ED for evaluation and treatment. He denies any dizziness or lightheadedness at present as well as any shortness of breath. Of note, he has been having occasional BRBPR and dark stool but he was also recently placed on iron. He does not take any blood thinners, was briefly on eliquis in the past for paroxysmal AF which occurred during sepsis one time and has not recurred.  Pt is an 84 y/o male w hx of sleep apnea, osteoarthritis, carpal tunnel, remote history of prostate CA in remission, paroxysmal AF (off AC), large hiatal hernia, and iron deficiency anemia who presents for evaluation of anemia. Patient was seen by Dr. Urena outpatient and noted to have significantly decreased hemoglobin of 7.6 from baseline of 9-10 and patient was sent in to ED for evaluation and treatment. He denies any dizziness or lightheadedness at present as well as any shortness of breath but has been feeling "sleepy". Of note, for the past year he has been having occasional BRBPR when he wiped after bowel movements but thought it was 2/2 straining.  He denies any rectal or abdominal pain.  He has also had dark stool but was recently placed on iron.  He does not take any blood thinners, and per Dr. Venegas, he was briefly on eliquis in the past for paroxysmal AF which occurred during sepsis one time and has not recurred.  His last C-scope was "a couple of years ago".  In the ED:  Initial vital signs: T: 97.8 F, HR: 74, BP: 129/78, R: 18, SpO2: 98% on RA  ED course:   Labs: significant for Hgb 7.0, BUN/Cr 35/1.32  Imaging:  CXR: none  EKG: sinus rhythm with 1st degree AVB and LBB  Medications: none  Consults: none

## 2021-05-19 NOTE — ED ADULT TRIAGE NOTE - CHIEF COMPLAINT QUOTE
sent in by MD fernando for blood transfusion. Hgb 7.7 yesterday. last week has had x 1 episode of bright red blood per rectum.

## 2021-05-19 NOTE — ED PROVIDER NOTE - PROGRESS NOTE DETAILS
Mary: dr. jonas had requested 2U PRBC. I d/w dr. fernando who will call GI. I also updated pt's son (pt request), Dr. pro. Will admit for further care.

## 2021-05-19 NOTE — H&P ADULT - ASSESSMENT
Pt is an 86 y/o male w hx of sleep apnea, osteoarthritis, carpal tunnel, remote history of prostate CA in remission, paroxysmal AF (off AC), large hiatal hernia, and iron deficiency anemia who presents for evaluation of anemia

## 2021-05-19 NOTE — CONSULT NOTE ADULT - SUBJECTIVE AND OBJECTIVE BOX
Pt is an 86 y/o male w hx of sleep apnea, osteoarthritis, carpal tunnel, remote history of prostate CA in remission, paroxysmal AF (off AC), large hiatal hernia, and iron deficiency anemia who presents for evaluation of anemia. Patient was seen by Dr. Urena outpatient and noted to have significantly decreased hemoglobin of 7.6 from baseline of 9-10 and patient was sent in to ED for evaluation and treatment.    REVIEW OF SYSTEMS:  Constitutional: No fever, weight loss or fatigue  ENMT:  No difficulty hearing, tinnitus, vertigo; No sinus or throat pain  Respiratory: No cough, wheezing, chills or hemoptysis  Cardiovascular: No chest pain, palpitations, dizziness or leg swelling  Gastrointestinal: No abdominal or epigastric pain. No nausea, vomiting or hematemesis; No diarrhea or constipation. ? melena   Skin: No itching, burning, rashes or lesions   Musculoskeletal: No joint pain or swelling; No muscle, back or extremity pain    PAST MEDICAL & SURGICAL HISTORY:  Obstructive sleep apnea on CPAP    Carpal tunnel syndrome    H/O prostate cancer    Hiatal hernia    JULIUS (iron deficiency anemia)    H/O knee surgery        FAMILY HISTORY:  FH: heart attack (Father)        SOCIAL HISTORY:  Smoking Status: [ ] Current, [ ] Former, [ ] Never  Pack Years:    MEDICATIONS:  MEDICATIONS  (STANDING):  pantoprazole  Injectable 40 milliGRAM(s) IV Push every 12 hours    MEDICATIONS  (PRN):      Allergies    No Known Allergies    Intolerances        Vital Signs Last 24 Hrs  T(C): 36.5 (20 May 2021 10:16), Max: 36.8 (19 May 2021 18:23)  T(F): 97.7 (20 May 2021 10:16), Max: 98.2 (19 May 2021 18:23)  HR: 65 (20 May 2021 10:16) (62 - 79)  BP: 112/60 (20 May 2021 10:16) (112/59 - 136/63)  BP(mean): --  RR: 16 (20 May 2021 10:16) (14 - 19)  SpO2: 96% (20 May 2021 10:16) (96% - 99%)        PHYSICAL EXAM:    General:  in no acute distress  HEENT: MMM, conjunctiva and sclera clear  Gastrointestinal: Soft, non-tender non-distended; Normal bowel sounds; No rebound or guarding  Extremities: Normal range of motion, No clubbing, cyanosis or edema  Neurological: Alert and oriented x3  Skin: Warm and dry. No obvious rash              RADIOLOGY & ADDITIONAL STUDIES:

## 2021-05-19 NOTE — H&P ADULT - NSHPSOCIALHISTORY_GEN_ALL_CORE
Tobacco: Denies  EtOH: Denies  Drugs: Denies Tobacco: remote  EtOH: occasional  Drugs: Denies  Retired   , lives with wife

## 2021-05-19 NOTE — H&P ADULT - PROBLEM SELECTOR PLAN 2
Hgb upon admission 7.0.  Baseline ~9-10.  Has noticed blood in stool X1 year.  Hx of JULIUS and has been taking iron.  However, Iron studies upon admission more consistent with ACD.  Has had colonoscopies in the past.  - workup as above

## 2021-05-19 NOTE — H&P ADULT - PROBLEM SELECTOR PLAN 6
F: none  E: Replete K<4, Mg<2  N: Regular diet, NPO after midnight  DVT proph: SCD  GI proph: protonix

## 2021-05-19 NOTE — CONSULT NOTE ADULT - ASSESSMENT
acute posthemorrhagic anemia  h/o hiatal hernia  melena  Plan: IV PPI  may eat today  NPO after midnight for EGD in am

## 2021-05-19 NOTE — H&P ADULT - NSICDXFAMILYHX_GEN_ALL_CORE_FT
FAMILY HISTORY:  No pertinent family history in first degree relatives     FAMILY HISTORY:  Father  Still living? Unknown  FH: heart attack, Age at diagnosis: Age Unknown

## 2021-05-19 NOTE — H&P ADULT - NSHPLABSRESULTS_GEN_ALL_CORE
LABS:                        7.0    4.79  )-----------( 234      ( 19 May 2021 14:42 )             22.9     05-19    140  |  107  |  35<H>  ----------------------------<  82  4.5   |  26  |  1.32<H>    Ca    8.3<L>      19 May 2021 14:42    TPro  6.1  /  Alb  2.9<L>  /  TBili  <0.2  /  DBili  x   /  AST  22  /  ALT  15  /  AlkPhos  58  05-19    PT/INR - ( 19 May 2021 14:42 )   PT: 11.8 sec;   INR: 0.98          PTT - ( 19 May 2021 14:42 )  PTT:32.6 sec    CAPILLARY BLOOD GLUCOSE          RADIOLOGY & ADDITIONAL TESTS: Reviewed.

## 2021-05-19 NOTE — H&P ADULT - NSICDXPASTMEDICALHX_GEN_ALL_CORE_FT
PAST MEDICAL HISTORY:  Asthma     GERD (gastroesophageal reflux disease)      PAST MEDICAL HISTORY:  Carpal tunnel syndrome     H/O prostate cancer     Hiatal hernia     JULIUS (iron deficiency anemia)     Obstructive sleep apnea on CPAP

## 2021-05-19 NOTE — ED ADULT NURSE NOTE - OBJECTIVE STATEMENT
c/o dark stool and dizziness x 1 episode 2 days ago.  Sent in by Dr. Urena for low hgb.  Asymptomatic on arrival. Hx anemia.  Denies pain, dizziness, sob, falls / trauma, n/v/d, chest pain, fever, chills.  VSS.  Labs sent.  Patient in stable condition.  Continue to monitor.

## 2021-05-19 NOTE — ED PROVIDER NOTE - PHYSICAL EXAMINATION
RN basilio chaperone: soft formed brown stool, no black stool or brbpr.   trace b/l LE pitting edema  no lightheaded even w/ standing/walking  normal equal distal pulses, steady unassisted gait.

## 2021-05-19 NOTE — H&P ADULT - PROBLEM SELECTOR PLAN 1
Patient with hx of blood in stool X1 year.  Hgb 7.7 on outpatient labs.  GI consulted.    - f/u Dr. Cobb  - EGD tomorrow  - ordered for 2u PRBC  - maintain active type and screen

## 2021-05-20 ENCOUNTER — TRANSCRIPTION ENCOUNTER (OUTPATIENT)
Age: 86
End: 2021-05-20

## 2021-05-20 ENCOUNTER — RESULT REVIEW (OUTPATIENT)
Age: 86
End: 2021-05-20

## 2021-05-20 VITALS — OXYGEN SATURATION: 96 % | RESPIRATION RATE: 18 BRPM

## 2021-05-20 LAB
ANION GAP SERPL CALC-SCNC: 7 MMOL/L — SIGNIFICANT CHANGE UP (ref 5–17)
BASOPHILS # BLD AUTO: 0.03 K/UL — SIGNIFICANT CHANGE UP (ref 0–0.2)
BASOPHILS NFR BLD AUTO: 0.7 % — SIGNIFICANT CHANGE UP (ref 0–2)
BUN SERPL-MCNC: 27 MG/DL — HIGH (ref 7–23)
CALCIUM SERPL-MCNC: 8 MG/DL — LOW (ref 8.4–10.5)
CHLORIDE SERPL-SCNC: 107 MMOL/L — SIGNIFICANT CHANGE UP (ref 96–108)
CO2 SERPL-SCNC: 27 MMOL/L — SIGNIFICANT CHANGE UP (ref 22–31)
COVID-19 SPIKE DOMAIN AB INTERP: POSITIVE
COVID-19 SPIKE DOMAIN ANTIBODY RESULT: >250 U/ML — HIGH
CREAT SERPL-MCNC: 1.25 MG/DL — SIGNIFICANT CHANGE UP (ref 0.5–1.3)
EOSINOPHIL # BLD AUTO: 0.25 K/UL — SIGNIFICANT CHANGE UP (ref 0–0.5)
EOSINOPHIL NFR BLD AUTO: 5.5 % — SIGNIFICANT CHANGE UP (ref 0–6)
FOLATE SERPL-MCNC: 19.3 NG/ML — SIGNIFICANT CHANGE UP
GLUCOSE SERPL-MCNC: 77 MG/DL — SIGNIFICANT CHANGE UP (ref 70–99)
HCT VFR BLD CALC: 28.2 % — LOW (ref 39–50)
HGB BLD-MCNC: 8.8 G/DL — LOW (ref 13–17)
IMM GRANULOCYTES NFR BLD AUTO: 0.4 % — SIGNIFICANT CHANGE UP (ref 0–1.5)
LYMPHOCYTES # BLD AUTO: 0.9 K/UL — LOW (ref 1–3.3)
LYMPHOCYTES # BLD AUTO: 20 % — SIGNIFICANT CHANGE UP (ref 13–44)
MAGNESIUM SERPL-MCNC: 1.9 MG/DL — SIGNIFICANT CHANGE UP (ref 1.6–2.6)
MCHC RBC-ENTMCNC: 31 PG — SIGNIFICANT CHANGE UP (ref 27–34)
MCHC RBC-ENTMCNC: 31.2 GM/DL — LOW (ref 32–36)
MCV RBC AUTO: 99.3 FL — SIGNIFICANT CHANGE UP (ref 80–100)
MONOCYTES # BLD AUTO: 0.65 K/UL — SIGNIFICANT CHANGE UP (ref 0–0.9)
MONOCYTES NFR BLD AUTO: 14.4 % — HIGH (ref 2–14)
NEUTROPHILS # BLD AUTO: 2.66 K/UL — SIGNIFICANT CHANGE UP (ref 1.8–7.4)
NEUTROPHILS NFR BLD AUTO: 59 % — SIGNIFICANT CHANGE UP (ref 43–77)
NRBC # BLD: 0 /100 WBCS — SIGNIFICANT CHANGE UP (ref 0–0)
PHOSPHATE SERPL-MCNC: 3.6 MG/DL — SIGNIFICANT CHANGE UP (ref 2.5–4.5)
PLATELET # BLD AUTO: 227 K/UL — SIGNIFICANT CHANGE UP (ref 150–400)
POTASSIUM SERPL-MCNC: 4.3 MMOL/L — SIGNIFICANT CHANGE UP (ref 3.5–5.3)
POTASSIUM SERPL-SCNC: 4.3 MMOL/L — SIGNIFICANT CHANGE UP (ref 3.5–5.3)
RBC # BLD: 2.84 M/UL — LOW (ref 4.2–5.8)
RBC # FLD: 17.8 % — HIGH (ref 10.3–14.5)
SARS-COV-2 IGG+IGM SERPL QL IA: >250 U/ML — HIGH
SARS-COV-2 IGG+IGM SERPL QL IA: POSITIVE
SODIUM SERPL-SCNC: 141 MMOL/L — SIGNIFICANT CHANGE UP (ref 135–145)
VIT B12 SERPL-MCNC: 610 PG/ML — SIGNIFICANT CHANGE UP (ref 232–1245)
WBC # BLD: 4.51 K/UL — SIGNIFICANT CHANGE UP (ref 3.8–10.5)
WBC # FLD AUTO: 4.51 K/UL — SIGNIFICANT CHANGE UP (ref 3.8–10.5)

## 2021-05-20 PROCEDURE — 36415 COLL VENOUS BLD VENIPUNCTURE: CPT

## 2021-05-20 PROCEDURE — 84443 ASSAY THYROID STIM HORMONE: CPT

## 2021-05-20 PROCEDURE — 86850 RBC ANTIBODY SCREEN: CPT

## 2021-05-20 PROCEDURE — 86900 BLOOD TYPING SEROLOGIC ABO: CPT

## 2021-05-20 PROCEDURE — 82607 VITAMIN B-12: CPT

## 2021-05-20 PROCEDURE — 83550 IRON BINDING TEST: CPT

## 2021-05-20 PROCEDURE — 80048 BASIC METABOLIC PNL TOTAL CA: CPT

## 2021-05-20 PROCEDURE — 43239 EGD BIOPSY SINGLE/MULTIPLE: CPT

## 2021-05-20 PROCEDURE — U0005: CPT

## 2021-05-20 PROCEDURE — 36430 TRANSFUSION BLD/BLD COMPNT: CPT

## 2021-05-20 PROCEDURE — P9016: CPT

## 2021-05-20 PROCEDURE — 83010 ASSAY OF HAPTOGLOBIN QUANT: CPT

## 2021-05-20 PROCEDURE — 85025 COMPLETE CBC W/AUTO DIFF WBC: CPT

## 2021-05-20 PROCEDURE — 85610 PROTHROMBIN TIME: CPT

## 2021-05-20 PROCEDURE — 80053 COMPREHEN METABOLIC PANEL: CPT

## 2021-05-20 PROCEDURE — 83615 LACTATE (LD) (LDH) ENZYME: CPT

## 2021-05-20 PROCEDURE — 86901 BLOOD TYPING SEROLOGIC RH(D): CPT

## 2021-05-20 PROCEDURE — 85045 AUTOMATED RETICULOCYTE COUNT: CPT

## 2021-05-20 PROCEDURE — U0003: CPT

## 2021-05-20 PROCEDURE — 84100 ASSAY OF PHOSPHORUS: CPT

## 2021-05-20 PROCEDURE — 85730 THROMBOPLASTIN TIME PARTIAL: CPT

## 2021-05-20 PROCEDURE — 88305 TISSUE EXAM BY PATHOLOGIST: CPT | Mod: 26

## 2021-05-20 PROCEDURE — 83735 ASSAY OF MAGNESIUM: CPT

## 2021-05-20 PROCEDURE — 88305 TISSUE EXAM BY PATHOLOGIST: CPT

## 2021-05-20 PROCEDURE — 83540 ASSAY OF IRON: CPT

## 2021-05-20 PROCEDURE — 99285 EMERGENCY DEPT VISIT HI MDM: CPT | Mod: 25

## 2021-05-20 PROCEDURE — 82728 ASSAY OF FERRITIN: CPT

## 2021-05-20 PROCEDURE — 86923 COMPATIBILITY TEST ELECTRIC: CPT

## 2021-05-20 PROCEDURE — 94660 CPAP INITIATION&MGMT: CPT

## 2021-05-20 PROCEDURE — 86769 SARS-COV-2 COVID-19 ANTIBODY: CPT

## 2021-05-20 PROCEDURE — 82746 ASSAY OF FOLIC ACID SERUM: CPT

## 2021-05-20 RX ORDER — PANTOPRAZOLE SODIUM 20 MG/1
1 TABLET, DELAYED RELEASE ORAL
Qty: 60 | Refills: 0
Start: 2021-05-20 | End: 2021-06-18

## 2021-05-20 RX ORDER — PANTOPRAZOLE SODIUM 20 MG/1
1 TABLET, DELAYED RELEASE ORAL
Qty: 0 | Refills: 0 | DISCHARGE

## 2021-05-20 RX ORDER — ASPIRIN/CALCIUM CARB/MAGNESIUM 324 MG
0 TABLET ORAL
Qty: 0 | Refills: 0 | DISCHARGE

## 2021-05-20 RX ADMIN — PANTOPRAZOLE SODIUM 40 MILLIGRAM(S): 20 TABLET, DELAYED RELEASE ORAL at 06:50

## 2021-05-20 NOTE — DISCHARGE NOTE PROVIDER - NSDCCPCAREPLAN_GEN_ALL_CORE_FT
PRINCIPAL DISCHARGE DIAGNOSIS  Diagnosis: Anemia  Assessment and Plan of Treatment:        PRINCIPAL DISCHARGE DIAGNOSIS  Diagnosis: Anemia  Assessment and Plan of Treatment: You came into the hospital because your blood levels were low. You were given 2 units of blood to help get your blood levels up. You had an EGD done to look for a source of bleeding, which none was seen. You will be discharged on Protonix 40mg to take once in the morning and once at night. Please do not take Kenneth 325mg until your see Dr. Cobb at your appointment.  Please follow up with Dr. Ismael Cobb at his office [facility] [address] [place] [id number]. His office phone number is [phone].

## 2021-05-20 NOTE — DISCHARGE NOTE PROVIDER - NSDCMRMEDTOKEN_GEN_ALL_CORE_FT
Kenneth Aspirin 325 mg oral tablet:   Centrum oral tablet, chewable:   iron sulfate: 65 milligram(s) orally once a day  pantoprazole 40 mg oral delayed release tablet: 1 tab(s) orally once a day   Kenneth Aspirin 325 mg oral tablet:   Centrum oral tablet, chewable:   iron sulfate: 65 milligram(s) orally once a day  Protonix 40 mg oral delayed release tablet: 1 tab(s) orally 2 times a day    Centrum oral tablet, chewable:   iron sulfate: 65 milligram(s) orally once a day  Protonix 40 mg oral delayed release tablet: 1 tab(s) orally 2 times a day

## 2021-05-20 NOTE — PROGRESS NOTE ADULT - PROBLEM SELECTOR PLAN 1
Patient with hx of blood in stool X1 year.  Hgb 7.7 on outpatient labs.  GI consulted.    - f/u Dr. Cobb  - EGD tomorrow  - s/p for 2u PRBC  - maintain active type and screen

## 2021-05-20 NOTE — DISCHARGE NOTE PROVIDER - HOSPITAL COURSE
Pt is an 84 y/o male w hx of sleep apnea, osteoarthritis, carpal tunnel, remote history of prostate CA in remission, paroxysmal AF (off AC), large hiatal hernia, and iron deficiency anemia who presents for evaluation of anemia       Problem/Plan - 1:  ·  Problem: GIB (gastrointestinal bleeding).  Plan: Patient with hx of blood in stool X1 year.  Hgb 7.7 on outpatient labs.  GI consulted.    - f/u Dr. Cobb  - EGD tomorrow  - s/p for 2u PRBC  - maintain active type and screen.      Problem/Plan - 2:  ·  Problem: Anemia.  Plan: Hgb upon admission 7.0.  Baseline ~9-10.  Has noticed blood in stool X1 year.  Hx of JULIUS and has been taking iron.  However, Iron studies upon admission more consistent with ACD.  Has had colonoscopies in the past.  - s/p 2U PRBCs Hb corrected appropriately to 8.8  - workup as above.      Problem/Plan - 3:  ·  Problem: CAROL (obstructive sleep apnea).  Plan: On home CPAP 9.  - CPAP overnight.      Problem/Plan - 4:  ·  Problem: Chronic kidney disease.  Plan: Patient with elevated Cr upon admission to 1.32.  Appears to be at baseline.  Unsure etiology.    - ctm  - daily BMP.      Problem/Plan - 5:  ·  Problem: Atrial flutter.  Plan: Not on any home meds.      Pt is an 86 y/o male w hx of sleep apnea, osteoarthritis, carpal tunnel, remote history of prostate CA in remission, paroxysmal AF (off AC), large hiatal hernia, and iron deficiency anemia who presents for evaluation of anemia       Problem/Plan - 1:  ·  Problem: GIB (gastrointestinal bleeding).  Plan: Patient with hx of blood in stool X1 year.    - Hb on admission 7.0 s/p for 2u PRBC corrected to 8.8  - EGD with no active bleed  - dc'd on PPI BID  - f/u Dr. Cobb Outpt     Problem/Plan - 2:  ·  Problem: Anemia.   - Hgb upon admission 7.0.  Baseline ~9-10.  Has noticed blood in stool X1 year.  Hx of JULIUS and has been taking iron.  However, Iron studies upon admission more consistent with ACD.  Has had colonoscopies in the past.  - s/p 2U PRBCs Hb corrected appropriately to 8.8       Problem/Plan - 3:  ·  Problem: CAROL (obstructive sleep apnea).    - C/w home CPAP 9.      Problem/Plan - 4:  ·  Problem: Chronic kidney disease  - Patient with elevated Cr upon admission to 1.32.  Appears to be at baseline, Etiology Unclear       Problem/Plan - 5:  ·  Problem: Atrial flutter  - Not on any home meds.     New Meds: PPI 40mg BID

## 2021-05-20 NOTE — PATIENT PROFILE ADULT - FALL HARM RISK
Area M Indication Text: Tumors in this location are included in Area M (cheek, forehead, scalp, neck, jawline and pretibial skin).  Mohs surgery is indicated for tumors in these anatomic locations. other

## 2021-05-20 NOTE — DISCHARGE NOTE NURSING/CASE MANAGEMENT/SOCIAL WORK - PATIENT PORTAL LINK FT
You can access the FollowMyHealth Patient Portal offered by [facility] by registering at the following website: [email or web address]. By joining [employer]’s FollowMyHealth portal, you will also be able to view your health information using other applications (apps) compatible with our system.

## 2021-05-20 NOTE — PROGRESS NOTE ADULT - ASSESSMENT
EGD = severe erosive esophagitis with shallow ulcers, large hiatal hernia with erosions in esophagus and gastritis, + duodenitis  Plan: try using PPI BID  this is likely cause of iron deficiency from chromic blood loss from erosive esophagitis
Pt is an 84 y/o male w hx of sleep apnea, osteoarthritis, carpal tunnel, remote history of prostate CA in remission, paroxysmal AF (off AC), large hiatal hernia, and iron deficiency anemia who presents for evaluation of anemia

## 2021-05-20 NOTE — PROGRESS NOTE ADULT - PROBLEM SELECTOR PLAN 2
Hgb upon admission 7.0.  Baseline ~9-10.  Has noticed blood in stool X1 year.  Hx of JULIUS and has been taking iron.  However, Iron studies upon admission more consistent with ACD.  Has had colonoscopies in the past.  - s/p 2U PRBCs Hb corrected appropriately to 8.8  - workup as above

## 2021-05-20 NOTE — DISCHARGE NOTE PROVIDER - NSDCFUADDAPPT_GEN_ALL_CORE_FT
Please follow up with Dr. Ismael Cobb at his office [address], [place] [address]. His office phone number is [phone].

## 2021-05-20 NOTE — PROGRESS NOTE ADULT - SUBJECTIVE AND OBJECTIVE BOX
Pt seen and examined   EGD today    REVIEW OF SYSTEMS:  Constitutional: No fever, weight loss or fatigue  Cardiovascular: No chest pain, palpitations, dizziness or leg swelling  Gastrointestinal: No abdominal or epigastric pain. No nausea, vomiting or hematemesis; No diarrhea or constipation. No melena or hematochezia.  Skin: No itching, burning, rashes or lesions       MEDICATIONS:  MEDICATIONS  (STANDING):  pantoprazole  Injectable 40 milliGRAM(s) IV Push every 12 hours    MEDICATIONS  (PRN):      Allergies    No Known Allergies    Intolerances        Vital Signs Last 24 Hrs  T(C): 36.5 (20 May 2021 10:16), Max: 36.8 (19 May 2021 18:23)  T(F): 97.7 (20 May 2021 10:16), Max: 98.2 (19 May 2021 18:23)  HR: 65 (20 May 2021 10:16) (62 - 79)  BP: 112/60 (20 May 2021 10:16) (112/59 - 136/63)  BP(mean): --  RR: 16 (20 May 2021 10:16) (14 - 19)  SpO2: 96% (20 May 2021 10:16) (96% - 99%)      PHYSICAL EXAM:    General: Wthin; in no acute distress  HEENT: MMM, conjunctiva and sclera clear  Gastrointestinal: Soft non-tender non-distended; Normal bowel sounds; No hepatosplenomegaly  Skin: Warm and dry. No obvious rash    LABS:      CBC Full  -  ( 20 May 2021 07:12 )  WBC Count : 4.51 K/uL  RBC Count : 2.84 M/uL  Hemoglobin : 8.8 g/dL  Hematocrit : 28.2 %  Platelet Count - Automated : 227 K/uL  Mean Cell Volume : 99.3 fl  Mean Cell Hemoglobin : 31.0 pg  Mean Cell Hemoglobin Concentration : 31.2 gm/dL  Auto Neutrophil # : 2.66 K/uL  Auto Lymphocyte # : 0.90 K/uL  Auto Monocyte # : 0.65 K/uL  Auto Eosinophil # : 0.25 K/uL  Auto Basophil # : 0.03 K/uL  Auto Neutrophil % : 59.0 %  Auto Lymphocyte % : 20.0 %  Auto Monocyte % : 14.4 %  Auto Eosinophil % : 5.5 %  Auto Basophil % : 0.7 %    05-20    141  |  107  |  27<H>  ----------------------------<  77  4.3   |  27  |  1.25    Ca    8.0<L>      20 May 2021 07:12  Phos  3.6     05-20  Mg     1.9     05-20    TPro  6.1  /  Alb  2.9<L>  /  TBili  <0.2  /  DBili  x   /  AST  22  /  ALT  15  /  AlkPhos  58  05-19    PT/INR - ( 19 May 2021 14:42 )   PT: 11.8 sec;   INR: 0.98          PTT - ( 19 May 2021 14:42 )  PTT:32.6 sec                  RADIOLOGY & ADDITIONAL STUDIES (The following images were personally reviewed):
INTERVAL HPI/OVERNIGHT EVENTS:  Patient was seen and examined at bedside. As per nurse and patient, no o/n events, patient resting comfortably. No complaints at this time. Patient denies: fever, chills, dizziness, weakness, HA, Changes in vision, CP, palpitations, SOB, cough, N/V/D/C, dysuria, changes in bowel movements, LE edema. ROS otherwise negative.    VITAL SIGNS:  T(F): 97.7 (05-20-21 @ 10:16)  HR: 65 (05-20-21 @ 10:16)  BP: 112/60 (05-20-21 @ 10:16)  RR: 16 (05-20-21 @ 10:16)  SpO2: 96% (05-20-21 @ 10:16)  Wt(kg): --    PHYSICAL EXAM:    Constitutional: resting comfortably in bed; NAD  Head: NC/AT  Eyes: PERRL, EOMI, anicteric sclera  ENT: no nasal discharge; uvula midline, no oropharyngeal erythema or exudates; MMM  Neck: supple, no LAD  Respiratory: CTA B/L; no W/R/R, no retractions  Cardiac: +S1/S2; RRR; no M/R/G  Gastrointestinal: soft, NT/ND; no rebound or guarding; +BSx4  Extremities: WWP, mild pitting edema in ankles R>L  Vascular: 2+ radial, DP pulses B/L  Dermatologic: no rashes  Neurologic: AAOx3; CNII-XII grossly intact; no focal deficits  Psychiatric: affect and characteristics of appearance, verbalizations, behaviors are appropriate    MEDICATIONS  (STANDING):  pantoprazole  Injectable 40 milliGRAM(s) IV Push every 12 hours    MEDICATIONS  (PRN):      Allergies    No Known Allergies    Intolerances        LABS:                        8.8    4.51  )-----------( 227      ( 20 May 2021 07:12 )             28.2     05-20    141  |  107  |  27<H>  ----------------------------<  77  4.3   |  27  |  1.25    Ca    8.0<L>      20 May 2021 07:12  Phos  3.6     05-20  Mg     1.9     05-20    TPro  6.1  /  Alb  2.9<L>  /  TBili  <0.2  /  DBili  x   /  AST  22  /  ALT  15  /  AlkPhos  58  05-19    PT/INR - ( 19 May 2021 14:42 )   PT: 11.8 sec;   INR: 0.98          PTT - ( 19 May 2021 14:42 )  PTT:32.6 sec      RADIOLOGY & ADDITIONAL TESTS:  Reviewed

## 2021-05-20 NOTE — DISCHARGE NOTE PROVIDER - CARE PROVIDER_API CALL
Ismael Cobb)  [facility]  [address]  [place] [address]  Phone: [phone]  Fax: [phone]  Scheduled Appointment: 05/29/2021 01:00 PM

## 2021-05-20 NOTE — DISCHARGE NOTE NURSING/CASE MANAGEMENT/SOCIAL WORK - NSDCFUADDAPPT_GEN_ALL_CORE_FT
Please follow up with Dr. Ismael Cobb at his office [address], [place] [address]. His office phone number is [phone].

## 2021-05-21 LAB — SURGICAL PATHOLOGY STUDY: SIGNIFICANT CHANGE UP

## 2021-06-03 DIAGNOSIS — K29.80 DUODENITIS WITHOUT BLEEDING: ICD-10-CM

## 2021-06-03 DIAGNOSIS — N18.9 CHRONIC KIDNEY DISEASE, UNSPECIFIED: ICD-10-CM

## 2021-06-03 DIAGNOSIS — Z99.89 DEPENDENCE ON OTHER ENABLING MACHINES AND DEVICES: ICD-10-CM

## 2021-06-03 DIAGNOSIS — I48.92 UNSPECIFIED ATRIAL FLUTTER: ICD-10-CM

## 2021-06-03 DIAGNOSIS — D50.9 IRON DEFICIENCY ANEMIA, UNSPECIFIED: ICD-10-CM

## 2021-06-03 DIAGNOSIS — D50.0 IRON DEFICIENCY ANEMIA SECONDARY TO BLOOD LOSS (CHRONIC): ICD-10-CM

## 2021-06-03 DIAGNOSIS — D62 ACUTE POSTHEMORRHAGIC ANEMIA: ICD-10-CM

## 2021-06-03 DIAGNOSIS — I12.9 HYPERTENSIVE CHRONIC KIDNEY DISEASE WITH STAGE 1 THROUGH STAGE 4 CHRONIC KIDNEY DISEASE, OR UNSPECIFIED CHRONIC KIDNEY DISEASE: ICD-10-CM

## 2021-06-03 DIAGNOSIS — D18.03 HEMANGIOMA OF INTRA-ABDOMINAL STRUCTURES: ICD-10-CM

## 2021-06-03 DIAGNOSIS — G47.33 OBSTRUCTIVE SLEEP APNEA (ADULT) (PEDIATRIC): ICD-10-CM

## 2021-06-03 DIAGNOSIS — Z85.46 PERSONAL HISTORY OF MALIGNANT NEOPLASM OF PROSTATE: ICD-10-CM

## 2021-06-03 DIAGNOSIS — I48.0 PAROXYSMAL ATRIAL FIBRILLATION: ICD-10-CM

## 2021-06-03 DIAGNOSIS — K20.90 ESOPHAGITIS, UNSPECIFIED WITHOUT BLEEDING: ICD-10-CM

## 2021-06-03 DIAGNOSIS — I44.0 ATRIOVENTRICULAR BLOCK, FIRST DEGREE: ICD-10-CM

## 2021-06-03 DIAGNOSIS — J45.909 UNSPECIFIED ASTHMA, UNCOMPLICATED: ICD-10-CM

## 2021-06-03 DIAGNOSIS — K44.9 DIAPHRAGMATIC HERNIA WITHOUT OBSTRUCTION OR GANGRENE: ICD-10-CM

## 2021-07-26 NOTE — ED ADULT NURSE NOTE - NS ED NURSE LEVEL OF CONSCIOUSNESS ORIENTATION
Oriented - self; Oriented - place; Oriented - time Graft Donor Site Dermal Sutures (Optional): 4-0 Polysorb

## 2022-04-05 NOTE — PATIENT PROFILE ADULT - NSPROMUTANXFEARADDRESSFT_GEN_A_NUR
VIDEO VISIT PROGRESS NOTE  This visit was performed via live interactive two-way video.    During their visit, the patient was informed that their consent to treat includes permission to submit claims to their insurance on their behalf for the services received.  Clinician Location: [facility] [address]    Patient Location: Home    Chief Complaint  Telephonic Visit, Weight Management, and Pain (knee/ankle/back)    HISTORY OF PRESENT ILLNESS  Social Determinants  Trend Vitals        Jose Raul Colindres is a pleasant 46 year old female who is requesting a Video Visit who had concerns including Telephonic Visit, Weight Management, and Pain (knee/ankle/back).    04/05/22 Pt has been having more chronic pain in her back and her knees. She tells me that she has gotten injections in her back. She would like to see a specialist. She is also requesting seeing a weight loss specialist because she needs to loose weight for up coming surgeries that she would like to schedule. She does not want to go see an [place] specialist and is calling because she wants to see someone outside of [place].    Social history:   Social History Narrative    (none)    Tobacco, alcohol and other:  reports that she has been smoking cigarettes. She started smoking about 22 years ago. She has a 12.00 pack-year smoking history. She has never used smokeless tobacco. She reports that she does not drink alcohol and does not use drugs.    REVIEW OF SYSTEMS  All other pertinent systems are reviewed and are negative except as documented above.     HISTORIES  I have personally reviewed and updated the following electronic medical record sections: Current medications and Allergies      MEDICATIONS  The medication list in the medical record as well as updates to the medication list submitted by the patient with this V-Visit were reviewed and updated today.       PHYSICAL EXAM     Vital Signs Per Patient:   Vitals - Patient Reported  Weight -  Patient Reported: 215 lb (97.5 kg)    Psychiatric: non-anxious, normal affect      ASSESSMENT AND PLAN   Jose Raul Colindres is a pleasant 46 year old female we discussed the following:    Diagnoses and all orders for this visit:  Chronic pain syndrome  Class 2 obesity due to excess calories without serious comorbidity with body mass index (BMI) of 36.0 to 36.9 in adult  -     SERVICE TO MEDICAL WEIGHT MANAGEMENT  Severe obesity (BMI 35.0-39.9) with comorbidity (CMS/HCC)  - Refer to weight loss specialist.  - Pt would like to pursue outside pain provider for her chronic pain.     Follow Up:   No follow-ups on file.    Upcoming Appointments Already Scheduled  Future Appointments   Date Time Provider Department Center   4/12/2022 10:10 AM Felicia Root MD [id number] [facility]   4/12/2022 12:00 PM Haider Montano MD [id number] [facility]   4/13/2022  2:30 PM Jann Jones MD [email or web address]   4/25/2022  3:20 PM Jann Barragan MD [id number] [facility]   Total time spent 11-20 min.    Kassandra Joshua, DO Family Medicine     none

## 2022-06-27 ENCOUNTER — EMERGENCY (EMERGENCY)
Facility: HOSPITAL | Age: 87
LOS: 1 days | Discharge: HOME | End: 2022-06-27
Attending: EMERGENCY MEDICINE | Admitting: EMERGENCY MEDICINE
Payer: MEDICARE

## 2022-06-27 VITALS
OXYGEN SATURATION: 98 % | HEART RATE: 63 BPM | TEMPERATURE: 98 F | DIASTOLIC BLOOD PRESSURE: 58 MMHG | RESPIRATION RATE: 16 BRPM | SYSTOLIC BLOOD PRESSURE: 131 MMHG

## 2022-06-27 VITALS
RESPIRATION RATE: 16 BRPM | HEART RATE: 73 BPM | DIASTOLIC BLOOD PRESSURE: 61 MMHG | HEIGHT: 68 IN | OXYGEN SATURATION: 95 % | SYSTOLIC BLOOD PRESSURE: 122 MMHG | WEIGHT: 160.06 LBS | TEMPERATURE: 98 F

## 2022-06-27 DIAGNOSIS — D50.9 IRON DEFICIENCY ANEMIA, UNSPECIFIED: ICD-10-CM

## 2022-06-27 DIAGNOSIS — Z20.822 CONTACT WITH AND (SUSPECTED) EXPOSURE TO COVID-19: ICD-10-CM

## 2022-06-27 DIAGNOSIS — K59.00 CONSTIPATION, UNSPECIFIED: ICD-10-CM

## 2022-06-27 DIAGNOSIS — G47.33 OBSTRUCTIVE SLEEP APNEA (ADULT) (PEDIATRIC): ICD-10-CM

## 2022-06-27 DIAGNOSIS — C61 MALIGNANT NEOPLASM OF PROSTATE: ICD-10-CM

## 2022-06-27 DIAGNOSIS — R63.0 ANOREXIA: ICD-10-CM

## 2022-06-27 DIAGNOSIS — K44.9 DIAPHRAGMATIC HERNIA WITHOUT OBSTRUCTION OR GANGRENE: ICD-10-CM

## 2022-06-27 DIAGNOSIS — Z98.890 OTHER SPECIFIED POSTPROCEDURAL STATES: Chronic | ICD-10-CM

## 2022-06-27 PROBLEM — Z85.46 PERSONAL HISTORY OF MALIGNANT NEOPLASM OF PROSTATE: Chronic | Status: ACTIVE | Noted: 2021-05-19

## 2022-06-27 PROBLEM — G56.00 CARPAL TUNNEL SYNDROME, UNSPECIFIED UPPER LIMB: Chronic | Status: ACTIVE | Noted: 2021-05-19

## 2022-06-27 LAB
ALBUMIN SERPL ELPH-MCNC: 3.4 G/DL — SIGNIFICANT CHANGE UP (ref 3.3–5)
ALP SERPL-CCNC: 78 U/L — SIGNIFICANT CHANGE UP (ref 40–120)
ALT FLD-CCNC: 31 U/L — SIGNIFICANT CHANGE UP (ref 10–45)
ANION GAP SERPL CALC-SCNC: 13 MMOL/L — SIGNIFICANT CHANGE UP (ref 5–17)
AST SERPL-CCNC: 50 U/L — HIGH (ref 10–40)
BASOPHILS # BLD AUTO: 0.02 K/UL — SIGNIFICANT CHANGE UP (ref 0–0.2)
BASOPHILS NFR BLD AUTO: 0.3 % — SIGNIFICANT CHANGE UP (ref 0–2)
BILIRUB SERPL-MCNC: 0.3 MG/DL — SIGNIFICANT CHANGE UP (ref 0.2–1.2)
BUN SERPL-MCNC: 18 MG/DL — SIGNIFICANT CHANGE UP (ref 7–23)
CALCIUM SERPL-MCNC: 8.9 MG/DL — SIGNIFICANT CHANGE UP (ref 8.4–10.5)
CHLORIDE SERPL-SCNC: 99 MMOL/L — SIGNIFICANT CHANGE UP (ref 96–108)
CO2 SERPL-SCNC: 26 MMOL/L — SIGNIFICANT CHANGE UP (ref 22–31)
CREAT SERPL-MCNC: 1.29 MG/DL — SIGNIFICANT CHANGE UP (ref 0.5–1.3)
EGFR: 54 ML/MIN/1.73M2 — LOW
EOSINOPHIL # BLD AUTO: 0.09 K/UL — SIGNIFICANT CHANGE UP (ref 0–0.5)
EOSINOPHIL NFR BLD AUTO: 1.5 % — SIGNIFICANT CHANGE UP (ref 0–6)
GLUCOSE SERPL-MCNC: 84 MG/DL — SIGNIFICANT CHANGE UP (ref 70–99)
HCT VFR BLD CALC: 34.5 % — LOW (ref 39–50)
HGB BLD-MCNC: 11.4 G/DL — LOW (ref 13–17)
IMM GRANULOCYTES NFR BLD AUTO: 0.2 % — SIGNIFICANT CHANGE UP (ref 0–1.5)
LIDOCAIN IGE QN: 31 U/L — SIGNIFICANT CHANGE UP (ref 7–60)
LYMPHOCYTES # BLD AUTO: 0.69 K/UL — LOW (ref 1–3.3)
LYMPHOCYTES # BLD AUTO: 11.8 % — LOW (ref 13–44)
MCHC RBC-ENTMCNC: 31.7 PG — SIGNIFICANT CHANGE UP (ref 27–34)
MCHC RBC-ENTMCNC: 33 GM/DL — SIGNIFICANT CHANGE UP (ref 32–36)
MCV RBC AUTO: 95.8 FL — SIGNIFICANT CHANGE UP (ref 80–100)
MONOCYTES # BLD AUTO: 0.66 K/UL — SIGNIFICANT CHANGE UP (ref 0–0.9)
MONOCYTES NFR BLD AUTO: 11.3 % — SIGNIFICANT CHANGE UP (ref 2–14)
NEUTROPHILS # BLD AUTO: 4.36 K/UL — SIGNIFICANT CHANGE UP (ref 1.8–7.4)
NEUTROPHILS NFR BLD AUTO: 74.9 % — SIGNIFICANT CHANGE UP (ref 43–77)
NRBC # BLD: 0 /100 WBCS — SIGNIFICANT CHANGE UP (ref 0–0)
PLATELET # BLD AUTO: 261 K/UL — SIGNIFICANT CHANGE UP (ref 150–400)
POTASSIUM SERPL-MCNC: 4.8 MMOL/L — SIGNIFICANT CHANGE UP (ref 3.5–5.3)
POTASSIUM SERPL-SCNC: 4.8 MMOL/L — SIGNIFICANT CHANGE UP (ref 3.5–5.3)
PROT SERPL-MCNC: 6.8 G/DL — SIGNIFICANT CHANGE UP (ref 6–8.3)
RBC # BLD: 3.6 M/UL — LOW (ref 4.2–5.8)
RBC # FLD: 13.5 % — SIGNIFICANT CHANGE UP (ref 10.3–14.5)
SARS-COV-2 RNA SPEC QL NAA+PROBE: NEGATIVE — SIGNIFICANT CHANGE UP
SODIUM SERPL-SCNC: 138 MMOL/L — SIGNIFICANT CHANGE UP (ref 135–145)
WBC # BLD: 5.83 K/UL — SIGNIFICANT CHANGE UP (ref 3.8–10.5)
WBC # FLD AUTO: 5.83 K/UL — SIGNIFICANT CHANGE UP (ref 3.8–10.5)

## 2022-06-27 PROCEDURE — 80053 COMPREHEN METABOLIC PANEL: CPT

## 2022-06-27 PROCEDURE — 85025 COMPLETE CBC W/AUTO DIFF WBC: CPT

## 2022-06-27 PROCEDURE — 74177 CT ABD & PELVIS W/CONTRAST: CPT | Mod: MA

## 2022-06-27 PROCEDURE — 99284 EMERGENCY DEPT VISIT MOD MDM: CPT

## 2022-06-27 PROCEDURE — 87635 SARS-COV-2 COVID-19 AMP PRB: CPT

## 2022-06-27 PROCEDURE — 99284 EMERGENCY DEPT VISIT MOD MDM: CPT | Mod: 25

## 2022-06-27 PROCEDURE — 74177 CT ABD & PELVIS W/CONTRAST: CPT | Mod: 26,MA

## 2022-06-27 PROCEDURE — 36415 COLL VENOUS BLD VENIPUNCTURE: CPT

## 2022-06-27 PROCEDURE — 83690 ASSAY OF LIPASE: CPT

## 2022-06-27 RX ORDER — DIATRIZOATE MEGLUMINE 180 MG/ML
30 INJECTION, SOLUTION INTRAVESICAL ONCE
Refills: 0 | Status: COMPLETED | OUTPATIENT
Start: 2022-06-27 | End: 2022-06-27

## 2022-06-27 RX ORDER — SODIUM CHLORIDE 9 MG/ML
1000 INJECTION INTRAMUSCULAR; INTRAVENOUS; SUBCUTANEOUS ONCE
Refills: 0 | Status: COMPLETED | OUTPATIENT
Start: 2022-06-27 | End: 2022-06-27

## 2022-06-27 RX ADMIN — SODIUM CHLORIDE 1000 MILLILITER(S): 9 INJECTION INTRAMUSCULAR; INTRAVENOUS; SUBCUTANEOUS at 09:39

## 2022-06-27 RX ADMIN — DIATRIZOATE MEGLUMINE 30 MILLILITER(S): 180 INJECTION, SOLUTION INTRAVESICAL at 09:39

## 2022-06-27 NOTE — ED PROVIDER NOTE - PATIENT PORTAL LINK FT
You can access the FollowMyHealth Patient Portal offered by [facility] by registering at the following website: [email or web address]. By joining [employer]’s FollowMyHealth portal, you will also be able to view your health information using other applications (apps) compatible with our system.

## 2022-06-27 NOTE — ED PROVIDER NOTE - CLINICAL SUMMARY MEDICAL DECISION MAKING FREE TEXT BOX
constipation more than a week. no pain but given severe symptoms not responsive to enema, labs/ct ordered to r/o mass/infection/obstruction. ct with incrased stool burden otherwise no acute pathology. constipation more than a week. no pain but given severe symptoms not responsive to enema, labs/ct ordered to r/o mass/infection/obstruction. ct with incrased stool burden otherwise no acute pathology. known to DR. Cobb who came to ED, case discussed. Disimpacted by Dr. Cobb, reports ok to dc without any new medicines, will f/u outpatient. return precautions discussed

## 2022-06-27 NOTE — ED PROVIDER NOTE - OBJECTIVE STATEMENT
history of anemia on iron pills, here with constipation x 1-2 weeks. Denies abdominal pain, n/v, fever/chills, but says he has no appetite anymore, didn't eat anything today. No prior abdominal surgeries. Tried enemas without relief. Spoke to Dr. Cobb, told to come to ED>

## 2022-06-27 NOTE — ED PROVIDER NOTE - NSFOLLOWUPINSTRUCTIONS_ED_ALL_ED_FT
Please see your primary care provider for followup.  Call for appointment.  If you have any problems with followup, please call the ED Referral Coordinator at [phone].  Return to the ER if symptoms worsen or other concerns.    Constipation    WHAT YOU NEED TO KNOW:    Constipation is when you have hard, dry bowel movements, or you go longer than usual between bowel movements.     DISCHARGE INSTRUCTIONS:    Call your doctor if:     You have blood in your bowel movements.      You have a fever and abdominal pain with the constipation.      Your constipation gets worse.       You start to vomit.      You have questions or concerns about your condition or care.    Medicines:     Medicine such as a laxative may help relax and loosen your intestines to help you have a bowel movement. Your provider may recommend you only use laxatives for a short time. Long-term use may make your bowels dependent on the medicine.      Take your medicine as directed. Contact your healthcare provider if you think your medicine is not helping or if you have side effects. Tell him of her if you are allergic to any medicine. Keep a list of the medicines, vitamins, and herbs you take. Include the amounts, and when and why you take them. Bring the list or the pill bottles to follow-up visits. Carry your medicine list with you in case of an emergency.    Relieve constipation:     A suppository may be used to help soften your bowel movements. This may make them easier to pass. A suppository is guided into your rectum through your anus.Suppository for Constipation           An enema is liquid medicine used to clear bowel movement from your rectum. The medicine is put into your rectum through your anus.Enemas         Prevent constipation:     Drink liquids as directed. You may need to drink extra liquids to help soften and move your bowels. Ask how much liquid to drink each day and which liquids are best for you.       Eat high-fiber foods. This may help decrease constipation by adding bulk to your bowel movements. High-fiber foods include fruit, vegetables, whole-grain breads and cereals, and beans. Your healthcare provider or dietitian can help you create a high-fiber meal plan. Your provider may also recommend a fiber supplement if you cannot get enough fiber from food.            Exercise regularly. Regular physical activity can help stimulate your intestines. Walking is a good exercise to prevent or relieve constipation. Ask which exercises are best for you.Walking for Exercise           Schedule a time each day to have a bowel movement. This may help train your body to have regular bowel movements. Bend forward while you are on the toilet to help move the bowel movement out. Sit on the toilet for at least 10 minutes, even if you do not have a bowel movement.       Talk to your healthcare provider about your medicines. Certain medicines, such as opioids, can cause constipation. Your provider may be able to make medicine changes. For example, he or she may change the kind of medicine, or change when you take it.    Follow up with your healthcare provider as directed: Write down your questions so you remember to ask them during your visits.

## 2022-06-27 NOTE — CONSULT NOTE ADULT - ASSESSMENT
Rectal exam: lots of hard stools  Scybalous  stools  manually extracted  Plans: d/c home anusol prn rectal pain. miralax daily

## 2022-06-27 NOTE — CONSULT NOTE ADULT - SUBJECTIVE AND OBJECTIVE BOX
Patient is a 87y old  Male known  to be with history of anemia due to intrathoracic stomach with GERwho presents with a chief complaint of           REVIEW OF SYSTEMS:  Constitutional: No fever, weight loss or fatigue  ENMT:  No difficulty hearing, tinnitus, vertigo; No sinus or throat pain  Respiratory: No cough, wheezing, chills or hemoptysis  Cardiovascular: No chest pain, palpitations, dizziness or leg swelling  Gastrointestinal: No abdominal or epigastric pain. No nausea, vomiting or hematemesis; No diarrhea or constipation. No melena or hematochezia.  Skin: No itching, burning, rashes or lesions   Musculoskeletal: No joint pain or swelling; No muscle, back or extremity pain    PAST MEDICAL & SURGICAL HISTORY:  Obstructive sleep apnea on CPAP      Carpal tunnel syndrome      H/O prostate cancer      Hiatal hernia      JULIUS (iron deficiency anemia)      H/O knee surgery          FAMILY HISTORY:  FH: heart attack (Father)        SOCIAL HISTORY:  Smoking Status: [ ] Current, [ ] Former, [ ] Never  Pack Years:    MEDICATIONS:  MEDICATIONS  (STANDING):    MEDICATIONS  (PRN):      Allergies    No Known Allergies    Intolerances        Vital Signs Last 24 Hrs  T(C): 36.7 (27 Jun 2022 13:21), Max: 36.7 (27 Jun 2022 09:17)  T(F): 98 (27 Jun 2022 13:21), Max: 98.1 (27 Jun 2022 09:17)  HR: 63 (27 Jun 2022 13:21) (63 - 73)  BP: 131/58 (27 Jun 2022 13:21) (122/61 - 131/58)  BP(mean): --  RR: 16 (27 Jun 2022 13:21) (16 - 16)  SpO2: 98% (27 Jun 2022 13:21) (95% - 98%)        PHYSICAL EXAM:    General: Well developed; well nourished; in no acute distress  HEENT: MMM, conjunctiva and sclera clear  Gastrointestinal: Soft, non-tender non-distended; Normal bowel sounds; No rebound or guarding  Extremities: Normal range of motion, No clubbing, cyanosis or edema  Neurological: Alert and oriented x3  Skin: Warm and dry. 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 No nausea, vomiting + constipation. No melena or hematochezia.  Skin: No itching, burning, rashes or lesions   Musculoskeletal: No joint pain or swelling; No muscle, back or extremity pain    PAST MEDICAL & SURGICAL HISTORY:  Obstructive sleep apnea on CPAP      Carpal tunnel syndrome      H/O prostate cancer      Hiatal hernia      JULIUS (iron deficiency anemia)      H/O knee surgery          FAMILY HISTORY:  FH: heart attack (Father)        SOCIAL HISTORY:  Smoking Status: [ ] Current, [ ] Former, [ ] Never  Pack Years:    MEDICATIONS:  MEDICATIONS  (STANDING):    MEDICATIONS  (PRN):      Allergies    No Known Allergies    Intolerances        Vital Signs Last 24 Hrs  T(C): 36.7 (27 Jun 2022 13:21), Max: 36.7 (27 Jun 2022 09:17)  T(F): 98 (27 Jun 2022 13:21), Max: 98.1 (27 Jun 2022 09:17)  HR: 63 (27 Jun 2022 13:21) (63 - 73)  BP: 131/58 (27 Jun 2022 13:21) (122/61 - 131/58)  BP(mean): --  RR: 16 (27 Jun 2022 13:21) (16 - 16)  SpO2: 98% (27 Jun 2022 13:21) (95% - 98%)        PHYSICAL EXAM:    General:  in no acute distress  HEENT: MMM, conjunctiva and sclera clear  Gastrointestinal: Soft, non-tender non-distended; Normal bowel sounds; No rebound or guarding  Extremities: Normal range of motion, No clubbing, cyanosis or edema  Neurological: Alert and oriented x3  Skin: Warm and dry. No obvious rash      LABS:                        11.4   5.83  )-----------( 261      ( 27 Jun 2022 09:22 )             34.5     06-27    138  |  99  |  18  ----------------------------<  84  4.8   |  26  |  1.29    Ca    8.9      27 Jun 2022 09:22    TPro  6.8  /  Alb  3.4  /  TBili  0.3  /  DBili  x   /  AST  50<H>  /  ALT  31  /  AlkPhos  78  06-27          RADIOLOGY & ADDITIONAL STUDIES:

## 2022-06-27 NOTE — ED PROVIDER NOTE - NSICDXPASTMEDICALHX_GEN_ALL_CORE_FT
PAST MEDICAL HISTORY:  Carpal tunnel syndrome     H/O prostate cancer     Hiatal hernia     JULIUS (iron deficiency anemia)     Obstructive sleep apnea on CPAP

## 2022-06-27 NOTE — ED ADULT NURSE NOTE - OBJECTIVE STATEMENT
.  87years male alert mental state (AOX3) received on foot.  -Allergy: N/A.  -complain of constipation.  pt last bowel movent was 7 days ago. pt tried using laxative meds and pt states that the outcome was little. pt also states that pt has on/off abdomen pain. pt presents no complain.  -denied dizziness, headache, chest pain, SOB, n/v/d.  Pt is in the bed comfortably at this time. Will continue to monitor and document any changes.

## 2022-10-10 ENCOUNTER — APPOINTMENT (OUTPATIENT)
Dept: RADIOLOGY | Facility: CLINIC | Age: 87
End: 2022-10-10

## 2022-10-10 PROCEDURE — 73610 X-RAY EXAM OF ANKLE: CPT | Mod: RT

## 2022-10-10 PROCEDURE — 73521 X-RAY EXAM HIPS BI 2 VIEWS: CPT

## 2023-06-27 NOTE — ED ADULT NURSE NOTE - NS ED NURSE LEVEL OF CONSCIOUSNESS AFFECT
Calm Tazorac Counseling:  Patient advised that medication is irritating and drying.  Patient may need to apply sparingly and wash off after an hour before eventually leaving it on overnight.  The patient verbalized understanding of the proper use and possible adverse effects of tazorac.  All of the patient's questions and concerns were addressed.

## 2023-11-08 NOTE — ED PROVIDER NOTE - CONSTITUTIONAL NEGATIVE STATEMENT, MLM
Suj[facility]  [address]        [facility] [address]        11/10/2023    Dear Sujey:     We have made several attempts to contact you and have been unsuccessful in reaching you.  Please call our office at [phone] to follow up with Mal BECERRA to:Schedule a follow up visit.     Thank you for allowing us to partner in your care.  Any questions/concerns, please contact our office.  If you have chosen another physician/provider to follow with your care, please contact our office with that information as well.        Sincerely,       Appointment Desk  [facility]  Medical Office [address], [address]  [address] [place]  [address]  Phone:  [phone]       
no fever and no chills.

## 2024-01-01 ENCOUNTER — EMERGENCY (EMERGENCY)
Facility: HOSPITAL | Age: 89
LOS: 1 days | Discharge: NURSING, REHAB OR LONG-TERM CARE | End: 2024-01-01
Attending: STUDENT IN AN ORGANIZED HEALTH CARE EDUCATION/TRAINING PROGRAM
Payer: MEDICARE

## 2024-01-01 ENCOUNTER — INPATIENT (INPATIENT)
Facility: HOSPITAL | Age: 89
LOS: 6 days | DRG: 179 | End: 2024-09-24
Attending: INTERNAL MEDICINE | Admitting: HOSPITALIST
Payer: MEDICARE

## 2024-01-01 VITALS
RESPIRATION RATE: 17 BRPM | OXYGEN SATURATION: 95 % | WEIGHT: 130.07 LBS | TEMPERATURE: 100 F | HEIGHT: 67 IN | DIASTOLIC BLOOD PRESSURE: 82 MMHG | SYSTOLIC BLOOD PRESSURE: 152 MMHG | HEART RATE: 103 BPM

## 2024-01-01 VITALS
RESPIRATION RATE: 20 BRPM | TEMPERATURE: 98 F | HEIGHT: 67 IN | OXYGEN SATURATION: 99 % | HEART RATE: 66 BPM | WEIGHT: 179.9 LBS | DIASTOLIC BLOOD PRESSURE: 66 MMHG | SYSTOLIC BLOOD PRESSURE: 132 MMHG

## 2024-01-01 VITALS
OXYGEN SATURATION: 98 % | HEART RATE: 87 BPM | RESPIRATION RATE: 16 BRPM | SYSTOLIC BLOOD PRESSURE: 155 MMHG | TEMPERATURE: 98 F | DIASTOLIC BLOOD PRESSURE: 84 MMHG

## 2024-01-01 VITALS — OXYGEN SATURATION: 96 % | RESPIRATION RATE: 24 BRPM

## 2024-01-01 DIAGNOSIS — U07.1 COVID-19: ICD-10-CM

## 2024-01-01 DIAGNOSIS — Z51.5 ENCOUNTER FOR PALLIATIVE CARE: ICD-10-CM

## 2024-01-01 DIAGNOSIS — B02.9 ZOSTER WITHOUT COMPLICATIONS: ICD-10-CM

## 2024-01-01 DIAGNOSIS — I50.9 HEART FAILURE, UNSPECIFIED: ICD-10-CM

## 2024-01-01 DIAGNOSIS — Z71.89 OTHER SPECIFIED COUNSELING: ICD-10-CM

## 2024-01-01 DIAGNOSIS — R52 PAIN, UNSPECIFIED: ICD-10-CM

## 2024-01-01 DIAGNOSIS — Z98.890 OTHER SPECIFIED POSTPROCEDURAL STATES: Chronic | ICD-10-CM

## 2024-01-01 DIAGNOSIS — K11.20 SIALOADENITIS, UNSPECIFIED: ICD-10-CM

## 2024-01-01 DIAGNOSIS — J96.01 ACUTE RESPIRATORY FAILURE WITH HYPOXIA: ICD-10-CM

## 2024-01-01 DIAGNOSIS — R78.81 BACTEREMIA: ICD-10-CM

## 2024-01-01 DIAGNOSIS — J96.02 ACUTE RESPIRATORY FAILURE WITH HYPERCAPNIA: ICD-10-CM

## 2024-01-01 DIAGNOSIS — J90 PLEURAL EFFUSION, NOT ELSEWHERE CLASSIFIED: ICD-10-CM

## 2024-01-01 DIAGNOSIS — M75.92 SHOULDER LESION, UNSPECIFIED, LEFT SHOULDER: ICD-10-CM

## 2024-01-01 DIAGNOSIS — I48.91 UNSPECIFIED ATRIAL FIBRILLATION: ICD-10-CM

## 2024-01-01 DIAGNOSIS — A41.9 SEPSIS, UNSPECIFIED ORGANISM: ICD-10-CM

## 2024-01-01 LAB
-  CLINDAMYCIN: SIGNIFICANT CHANGE UP
-  DAPTOMYCIN: SIGNIFICANT CHANGE UP
-  ERYTHROMYCIN: SIGNIFICANT CHANGE UP
-  GENTAMICIN: SIGNIFICANT CHANGE UP
-  LINEZOLID: SIGNIFICANT CHANGE UP
-  OXACILLIN: SIGNIFICANT CHANGE UP
-  PENICILLIN: SIGNIFICANT CHANGE UP
-  RIFAMPIN: SIGNIFICANT CHANGE UP
-  TETRACYCLINE: SIGNIFICANT CHANGE UP
-  TRIMETHOPRIM/SULFAMETHOXAZOLE: SIGNIFICANT CHANGE UP
-  VANCOMYCIN: SIGNIFICANT CHANGE UP
A1C WITH ESTIMATED AVERAGE GLUCOSE RESULT: 6.4 % — HIGH (ref 4–5.6)
ACANTHOCYTES BLD QL SMEAR: SLIGHT — SIGNIFICANT CHANGE UP
ACANTHOCYTES BLD QL SMEAR: SLIGHT — SIGNIFICANT CHANGE UP
ADD ON TEST-SPECIMEN IN LAB: SIGNIFICANT CHANGE UP
ALBUMIN SERPL ELPH-MCNC: 2.6 G/DL — LOW (ref 3.3–5)
ALBUMIN SERPL ELPH-MCNC: 2.7 G/DL — LOW (ref 3.3–5)
ALBUMIN SERPL ELPH-MCNC: 2.8 G/DL — LOW (ref 3.3–5)
ALBUMIN SERPL ELPH-MCNC: 2.8 G/DL — LOW (ref 3.3–5)
ALBUMIN SERPL ELPH-MCNC: 2.9 G/DL — LOW (ref 3.3–5)
ALBUMIN SERPL ELPH-MCNC: 3 G/DL — LOW (ref 3.3–5)
ALP SERPL-CCNC: 110 U/L — SIGNIFICANT CHANGE UP (ref 40–120)
ALP SERPL-CCNC: 115 U/L — SIGNIFICANT CHANGE UP (ref 40–120)
ALP SERPL-CCNC: 116 U/L — SIGNIFICANT CHANGE UP (ref 40–120)
ALP SERPL-CCNC: 117 U/L — SIGNIFICANT CHANGE UP (ref 40–120)
ALP SERPL-CCNC: 117 U/L — SIGNIFICANT CHANGE UP (ref 40–120)
ALP SERPL-CCNC: 118 U/L — SIGNIFICANT CHANGE UP (ref 40–120)
ALP SERPL-CCNC: 119 U/L — SIGNIFICANT CHANGE UP (ref 40–120)
ALP SERPL-CCNC: 120 U/L — SIGNIFICANT CHANGE UP (ref 40–120)
ALP SERPL-CCNC: 123 U/L — HIGH (ref 40–120)
ALP SERPL-CCNC: 135 U/L — HIGH (ref 40–120)
ALP SERPL-CCNC: 94 U/L — SIGNIFICANT CHANGE UP (ref 40–120)
ALT FLD-CCNC: 18 U/L — SIGNIFICANT CHANGE UP (ref 10–45)
ALT FLD-CCNC: 20 U/L — SIGNIFICANT CHANGE UP (ref 10–45)
ALT FLD-CCNC: 29 U/L — SIGNIFICANT CHANGE UP (ref 10–45)
ALT FLD-CCNC: 319 U/L — HIGH (ref 10–45)
ALT FLD-CCNC: 323 U/L — HIGH (ref 10–45)
ALT FLD-CCNC: 344 U/L — HIGH (ref 10–45)
ALT FLD-CCNC: 37 U/L — SIGNIFICANT CHANGE UP (ref 10–45)
ALT FLD-CCNC: 380 U/L — HIGH (ref 10–45)
ALT FLD-CCNC: 452 U/L — HIGH (ref 10–45)
ALT FLD-CCNC: 525 U/L — HIGH (ref 10–45)
ALT FLD-CCNC: 653 U/L — HIGH (ref 10–45)
ANION GAP SERPL CALC-SCNC: 12 MMOL/L — SIGNIFICANT CHANGE UP (ref 5–17)
ANION GAP SERPL CALC-SCNC: 13 MMOL/L — SIGNIFICANT CHANGE UP (ref 5–17)
ANION GAP SERPL CALC-SCNC: 15 MMOL/L — SIGNIFICANT CHANGE UP (ref 5–17)
ANION GAP SERPL CALC-SCNC: 16 MMOL/L — SIGNIFICANT CHANGE UP (ref 5–17)
ANION GAP SERPL CALC-SCNC: 18 MMOL/L — HIGH (ref 5–17)
ANION GAP SERPL CALC-SCNC: 20 MMOL/L — HIGH (ref 5–17)
ANION GAP SERPL CALC-SCNC: 21 MMOL/L — HIGH (ref 5–17)
ANION GAP SERPL CALC-SCNC: 22 MMOL/L — HIGH (ref 5–17)
ANION GAP SERPL CALC-SCNC: 23 MMOL/L — HIGH (ref 5–17)
ANION GAP SERPL CALC-SCNC: 24 MMOL/L — HIGH (ref 5–17)
ANION GAP SERPL CALC-SCNC: 25 MMOL/L — HIGH (ref 5–17)
ANION GAP SERPL CALC-SCNC: 26 MMOL/L — HIGH (ref 5–17)
ANISOCYTOSIS BLD QL: SIGNIFICANT CHANGE UP
ANISOCYTOSIS BLD QL: SLIGHT — SIGNIFICANT CHANGE UP
ANISOCYTOSIS BLD QL: SLIGHT — SIGNIFICANT CHANGE UP
APPEARANCE UR: CLEAR — SIGNIFICANT CHANGE UP
APTT BLD: 104.3 SEC — HIGH (ref 24.5–35.6)
APTT BLD: 108.1 SEC — HIGH (ref 24.5–35.6)
APTT BLD: 140.7 SEC — CRITICAL HIGH (ref 24.5–35.6)
APTT BLD: 21.8 SEC — LOW (ref 24.5–35.6)
APTT BLD: 25.4 SEC — SIGNIFICANT CHANGE UP (ref 24.5–35.6)
APTT BLD: 31 SEC — SIGNIFICANT CHANGE UP (ref 24.5–35.6)
APTT BLD: 37.5 SEC — HIGH (ref 24.5–35.6)
APTT BLD: 40.7 SEC — HIGH (ref 24.5–35.6)
APTT BLD: 61.5 SEC — HIGH (ref 24.5–35.6)
APTT BLD: 72.9 SEC — HIGH (ref 24.5–35.6)
APTT BLD: 76 SEC — HIGH (ref 24.5–35.6)
APTT BLD: 91.1 SEC — HIGH (ref 24.5–35.6)
APTT BLD: 93.3 SEC — HIGH (ref 24.5–35.6)
APTT BLD: 96.8 SEC — HIGH (ref 24.5–35.6)
AST SERPL-CCNC: 136 U/L — HIGH (ref 10–40)
AST SERPL-CCNC: 136 U/L — HIGH (ref 10–40)
AST SERPL-CCNC: 15 U/L — SIGNIFICANT CHANGE UP (ref 10–40)
AST SERPL-CCNC: 16 U/L — SIGNIFICANT CHANGE UP (ref 10–40)
AST SERPL-CCNC: 316 U/L — HIGH (ref 10–40)
AST SERPL-CCNC: 35 U/L — SIGNIFICANT CHANGE UP (ref 10–40)
AST SERPL-CCNC: 35 U/L — SIGNIFICANT CHANGE UP (ref 10–40)
AST SERPL-CCNC: 409 U/L — HIGH (ref 10–40)
AST SERPL-CCNC: 413 U/L — HIGH (ref 10–40)
AST SERPL-CCNC: 532 U/L — HIGH (ref 10–40)
AST SERPL-CCNC: 689 U/L — HIGH (ref 10–40)
BACTERIA # UR AUTO: NEGATIVE /HPF — SIGNIFICANT CHANGE UP
BASE EXCESS BLDA CALC-SCNC: -1.3 MMOL/L — SIGNIFICANT CHANGE UP (ref -2–3)
BASE EXCESS BLDA CALC-SCNC: -1.8 MMOL/L — SIGNIFICANT CHANGE UP (ref -2–3)
BASE EXCESS BLDA CALC-SCNC: -3.6 MMOL/L — LOW (ref -2–3)
BASE EXCESS BLDA CALC-SCNC: -4.4 MMOL/L — LOW (ref -2–3)
BASOPHILS # BLD AUTO: 0 K/UL — SIGNIFICANT CHANGE UP (ref 0–0.2)
BASOPHILS # BLD AUTO: 0 K/UL — SIGNIFICANT CHANGE UP (ref 0–0.2)
BASOPHILS # BLD AUTO: 0.06 K/UL — SIGNIFICANT CHANGE UP (ref 0–0.2)
BASOPHILS # BLD AUTO: 0.07 K/UL — SIGNIFICANT CHANGE UP (ref 0–0.2)
BASOPHILS # BLD AUTO: 0.08 K/UL — SIGNIFICANT CHANGE UP (ref 0–0.2)
BASOPHILS NFR BLD AUTO: 0 % — SIGNIFICANT CHANGE UP (ref 0–2)
BASOPHILS NFR BLD AUTO: 0 % — SIGNIFICANT CHANGE UP (ref 0–2)
BASOPHILS NFR BLD AUTO: 0.3 % — SIGNIFICANT CHANGE UP (ref 0–2)
BASOPHILS NFR BLD AUTO: 0.6 % — SIGNIFICANT CHANGE UP (ref 0–2)
BASOPHILS NFR BLD AUTO: 0.9 % — SIGNIFICANT CHANGE UP (ref 0–2)
BILIRUB DIRECT SERPL-MCNC: 0.3 MG/DL — SIGNIFICANT CHANGE UP (ref 0–0.3)
BILIRUB DIRECT SERPL-MCNC: 0.3 MG/DL — SIGNIFICANT CHANGE UP (ref 0–0.3)
BILIRUB DIRECT SERPL-MCNC: 0.4 MG/DL — HIGH (ref 0–0.3)
BILIRUB DIRECT SERPL-MCNC: 0.4 MG/DL — HIGH (ref 0–0.3)
BILIRUB DIRECT SERPL-MCNC: 0.6 MG/DL — HIGH (ref 0–0.3)
BILIRUB INDIRECT FLD-MCNC: 0.1 MG/DL — LOW (ref 0.2–1)
BILIRUB INDIRECT FLD-MCNC: 0.2 MG/DL — SIGNIFICANT CHANGE UP (ref 0.2–1)
BILIRUB INDIRECT FLD-MCNC: 0.3 MG/DL — SIGNIFICANT CHANGE UP (ref 0.2–1)
BILIRUB SERPL-MCNC: 0.2 MG/DL — SIGNIFICANT CHANGE UP (ref 0.2–1.2)
BILIRUB SERPL-MCNC: 0.4 MG/DL — SIGNIFICANT CHANGE UP (ref 0.2–1.2)
BILIRUB SERPL-MCNC: 0.5 MG/DL — SIGNIFICANT CHANGE UP (ref 0.2–1.2)
BILIRUB SERPL-MCNC: 0.6 MG/DL — SIGNIFICANT CHANGE UP (ref 0.2–1.2)
BILIRUB SERPL-MCNC: 0.7 MG/DL — SIGNIFICANT CHANGE UP (ref 0.2–1.2)
BILIRUB SERPL-MCNC: 0.9 MG/DL — SIGNIFICANT CHANGE UP (ref 0.2–1.2)
BILIRUB UR-MCNC: NEGATIVE — SIGNIFICANT CHANGE UP
BLD GP AB SCN SERPL QL: NEGATIVE — SIGNIFICANT CHANGE UP
BUN SERPL-MCNC: 113 MG/DL — HIGH (ref 7–23)
BUN SERPL-MCNC: 128 MG/DL — HIGH (ref 7–23)
BUN SERPL-MCNC: 134 MG/DL — HIGH (ref 7–23)
BUN SERPL-MCNC: 135 MG/DL — HIGH (ref 7–23)
BUN SERPL-MCNC: 140 MG/DL — HIGH (ref 7–23)
BUN SERPL-MCNC: 143 MG/DL — HIGH (ref 7–23)
BUN SERPL-MCNC: 33 MG/DL — HIGH (ref 7–23)
BUN SERPL-MCNC: 44 MG/DL — HIGH (ref 7–23)
BUN SERPL-MCNC: 45 MG/DL — HIGH (ref 7–23)
BUN SERPL-MCNC: 48 MG/DL — HIGH (ref 7–23)
BUN SERPL-MCNC: 72 MG/DL — HIGH (ref 7–23)
BUN SERPL-MCNC: 97 MG/DL — HIGH (ref 7–23)
BURR CELLS BLD QL SMEAR: PRESENT — SIGNIFICANT CHANGE UP
CALCIUM SERPL-MCNC: 7.2 MG/DL — LOW (ref 8.4–10.5)
CALCIUM SERPL-MCNC: 7.4 MG/DL — LOW (ref 8.4–10.5)
CALCIUM SERPL-MCNC: 8.1 MG/DL — LOW (ref 8.4–10.5)
CALCIUM SERPL-MCNC: 8.5 MG/DL — SIGNIFICANT CHANGE UP (ref 8.4–10.5)
CALCIUM SERPL-MCNC: 8.7 MG/DL — SIGNIFICANT CHANGE UP (ref 8.4–10.5)
CALCIUM SERPL-MCNC: 8.8 MG/DL — SIGNIFICANT CHANGE UP (ref 8.4–10.5)
CALCIUM SERPL-MCNC: 8.9 MG/DL — SIGNIFICANT CHANGE UP (ref 8.4–10.5)
CALCIUM SERPL-MCNC: 9 MG/DL — SIGNIFICANT CHANGE UP (ref 8.4–10.5)
CAST: 8 /LPF — HIGH (ref 0–4)
CHLORIDE SERPL-SCNC: 102 MMOL/L — SIGNIFICANT CHANGE UP (ref 96–108)
CHLORIDE SERPL-SCNC: 102 MMOL/L — SIGNIFICANT CHANGE UP (ref 96–108)
CHLORIDE SERPL-SCNC: 103 MMOL/L — SIGNIFICANT CHANGE UP (ref 96–108)
CHLORIDE SERPL-SCNC: 104 MMOL/L — SIGNIFICANT CHANGE UP (ref 96–108)
CHLORIDE SERPL-SCNC: 105 MMOL/L — SIGNIFICANT CHANGE UP (ref 96–108)
CHLORIDE SERPL-SCNC: 106 MMOL/L — SIGNIFICANT CHANGE UP (ref 96–108)
CHLORIDE SERPL-SCNC: 107 MMOL/L — SIGNIFICANT CHANGE UP (ref 96–108)
CHLORIDE SERPL-SCNC: 108 MMOL/L — SIGNIFICANT CHANGE UP (ref 96–108)
CHLORIDE SERPL-SCNC: 110 MMOL/L — HIGH (ref 96–108)
CHLORIDE SERPL-SCNC: 111 MMOL/L — HIGH (ref 96–108)
CHOLEST SERPL-MCNC: 137 MG/DL — SIGNIFICANT CHANGE UP
CK SERPL-CCNC: 29 U/L — LOW (ref 30–200)
CO2 BLDA-SCNC: 21 MMOL/L — SIGNIFICANT CHANGE UP (ref 19–24)
CO2 BLDA-SCNC: 26 MMOL/L — HIGH (ref 19–24)
CO2 BLDA-SCNC: 26 MMOL/L — HIGH (ref 19–24)
CO2 BLDA-SCNC: 28 MMOL/L — HIGH (ref 19–24)
CO2 SERPL-SCNC: 16 MMOL/L — LOW (ref 22–31)
CO2 SERPL-SCNC: 17 MMOL/L — LOW (ref 22–31)
CO2 SERPL-SCNC: 18 MMOL/L — LOW (ref 22–31)
CO2 SERPL-SCNC: 18 MMOL/L — LOW (ref 22–31)
CO2 SERPL-SCNC: 20 MMOL/L — LOW (ref 22–31)
CO2 SERPL-SCNC: 22 MMOL/L — SIGNIFICANT CHANGE UP (ref 22–31)
CO2 SERPL-SCNC: 23 MMOL/L — SIGNIFICANT CHANGE UP (ref 22–31)
CO2 SERPL-SCNC: 23 MMOL/L — SIGNIFICANT CHANGE UP (ref 22–31)
CO2 SERPL-SCNC: 25 MMOL/L — SIGNIFICANT CHANGE UP (ref 22–31)
CO2 SERPL-SCNC: 26 MMOL/L — SIGNIFICANT CHANGE UP (ref 22–31)
COLOR SPEC: YELLOW — SIGNIFICANT CHANGE UP
CREAT SERPL-MCNC: 1.06 MG/DL — SIGNIFICANT CHANGE UP (ref 0.5–1.3)
CREAT SERPL-MCNC: 1.17 MG/DL — SIGNIFICANT CHANGE UP (ref 0.5–1.3)
CREAT SERPL-MCNC: 1.21 MG/DL — SIGNIFICANT CHANGE UP (ref 0.5–1.3)
CREAT SERPL-MCNC: 1.34 MG/DL — HIGH (ref 0.5–1.3)
CREAT SERPL-MCNC: 2.57 MG/DL — HIGH (ref 0.5–1.3)
CREAT SERPL-MCNC: 2.58 MG/DL — HIGH (ref 0.5–1.3)
CREAT SERPL-MCNC: 3.86 MG/DL — HIGH (ref 0.5–1.3)
CREAT SERPL-MCNC: 3.9 MG/DL — HIGH (ref 0.5–1.3)
CREAT SERPL-MCNC: 5.05 MG/DL — HIGH (ref 0.5–1.3)
CREAT SERPL-MCNC: 5.12 MG/DL — HIGH (ref 0.5–1.3)
CREAT SERPL-MCNC: 5.69 MG/DL — HIGH (ref 0.5–1.3)
CREAT SERPL-MCNC: 5.98 MG/DL — HIGH (ref 0.5–1.3)
CREAT SERPL-MCNC: 6.37 MG/DL — HIGH (ref 0.5–1.3)
CREAT SERPL-MCNC: 6.6 MG/DL — HIGH (ref 0.5–1.3)
CREAT SERPL-MCNC: 6.64 MG/DL — HIGH (ref 0.5–1.3)
CREAT SERPL-MCNC: 6.71 MG/DL — HIGH (ref 0.5–1.3)
CREAT SERPL-MCNC: 7.03 MG/DL — HIGH (ref 0.5–1.3)
CULTURE RESULTS: ABNORMAL
CULTURE RESULTS: ABNORMAL
CULTURE RESULTS: SIGNIFICANT CHANGE UP
DACRYOCYTES BLD QL SMEAR: SLIGHT — SIGNIFICANT CHANGE UP
DIFF PNL FLD: NEGATIVE — SIGNIFICANT CHANGE UP
EGFR: 10 ML/MIN/1.73M2 — LOW
EGFR: 10 ML/MIN/1.73M2 — LOW
EGFR: 14 ML/MIN/1.73M2 — LOW
EGFR: 14 ML/MIN/1.73M2 — LOW
EGFR: 23 ML/MIN/1.73M2 — LOW
EGFR: 23 ML/MIN/1.73M2 — LOW
EGFR: 51 ML/MIN/1.73M2 — LOW
EGFR: 57 ML/MIN/1.73M2 — LOW
EGFR: 60 ML/MIN/1.73M2 — SIGNIFICANT CHANGE UP
EGFR: 67 ML/MIN/1.73M2 — SIGNIFICANT CHANGE UP
EGFR: 67 ML/MIN/1.73M2 — SIGNIFICANT CHANGE UP
EGFR: 7 ML/MIN/1.73M2 — LOW
EGFR: 8 ML/MIN/1.73M2 — LOW
EGFR: 8 ML/MIN/1.73M2 — LOW
EGFR: 9 ML/MIN/1.73M2 — LOW
ELLIPTOCYTES BLD QL SMEAR: SIGNIFICANT CHANGE UP
ELLIPTOCYTES BLD QL SMEAR: SLIGHT — SIGNIFICANT CHANGE UP
EOSINOPHIL # BLD AUTO: 0 K/UL — SIGNIFICANT CHANGE UP (ref 0–0.5)
EOSINOPHIL # BLD AUTO: 0.04 K/UL — SIGNIFICANT CHANGE UP (ref 0–0.5)
EOSINOPHIL # BLD AUTO: 0.35 K/UL — SIGNIFICANT CHANGE UP (ref 0–0.5)
EOSINOPHIL NFR BLD AUTO: 0 % — SIGNIFICANT CHANGE UP (ref 0–6)
EOSINOPHIL NFR BLD AUTO: 0.2 % — SIGNIFICANT CHANGE UP (ref 0–6)
EOSINOPHIL NFR BLD AUTO: 4.7 % — SIGNIFICANT CHANGE UP (ref 0–6)
ESTIMATED AVERAGE GLUCOSE: 137 MG/DL — HIGH (ref 68–114)
FERRITIN SERPL-MCNC: 1490 NG/ML — HIGH (ref 30–400)
FLUAV AG NPH QL: SIGNIFICANT CHANGE UP
FLUBV AG NPH QL: SIGNIFICANT CHANGE UP
FOLATE SERPL-MCNC: 19.9 NG/ML — SIGNIFICANT CHANGE UP
GAS PNL BLDA: SIGNIFICANT CHANGE UP
GAS PNL BLDV: SIGNIFICANT CHANGE UP
GLUCOSE BLDC GLUCOMTR-MCNC: 119 MG/DL — HIGH (ref 70–99)
GLUCOSE BLDC GLUCOMTR-MCNC: 130 MG/DL — HIGH (ref 70–99)
GLUCOSE BLDC GLUCOMTR-MCNC: 140 MG/DL — HIGH (ref 70–99)
GLUCOSE BLDC GLUCOMTR-MCNC: 145 MG/DL — HIGH (ref 70–99)
GLUCOSE BLDC GLUCOMTR-MCNC: 147 MG/DL — HIGH (ref 70–99)
GLUCOSE BLDC GLUCOMTR-MCNC: 150 MG/DL — HIGH (ref 70–99)
GLUCOSE BLDC GLUCOMTR-MCNC: 151 MG/DL — HIGH (ref 70–99)
GLUCOSE BLDC GLUCOMTR-MCNC: 156 MG/DL — HIGH (ref 70–99)
GLUCOSE BLDC GLUCOMTR-MCNC: 179 MG/DL — HIGH (ref 70–99)
GLUCOSE BLDC GLUCOMTR-MCNC: 193 MG/DL — HIGH (ref 70–99)
GLUCOSE BLDC GLUCOMTR-MCNC: 197 MG/DL — HIGH (ref 70–99)
GLUCOSE BLDC GLUCOMTR-MCNC: 203 MG/DL — HIGH (ref 70–99)
GLUCOSE BLDC GLUCOMTR-MCNC: 208 MG/DL — HIGH (ref 70–99)
GLUCOSE BLDC GLUCOMTR-MCNC: 210 MG/DL — HIGH (ref 70–99)
GLUCOSE BLDC GLUCOMTR-MCNC: 216 MG/DL — HIGH (ref 70–99)
GLUCOSE BLDC GLUCOMTR-MCNC: 226 MG/DL — HIGH (ref 70–99)
GLUCOSE BLDC GLUCOMTR-MCNC: 239 MG/DL — HIGH (ref 70–99)
GLUCOSE BLDC GLUCOMTR-MCNC: 243 MG/DL — HIGH (ref 70–99)
GLUCOSE BLDC GLUCOMTR-MCNC: 253 MG/DL — HIGH (ref 70–99)
GLUCOSE BLDC GLUCOMTR-MCNC: 259 MG/DL — HIGH (ref 70–99)
GLUCOSE BLDC GLUCOMTR-MCNC: 263 MG/DL — HIGH (ref 70–99)
GLUCOSE BLDC GLUCOMTR-MCNC: 317 MG/DL — HIGH (ref 70–99)
GLUCOSE SERPL-MCNC: 115 MG/DL — HIGH (ref 70–99)
GLUCOSE SERPL-MCNC: 133 MG/DL — HIGH (ref 70–99)
GLUCOSE SERPL-MCNC: 137 MG/DL — HIGH (ref 70–99)
GLUCOSE SERPL-MCNC: 145 MG/DL — HIGH (ref 70–99)
GLUCOSE SERPL-MCNC: 158 MG/DL — HIGH (ref 70–99)
GLUCOSE SERPL-MCNC: 161 MG/DL — HIGH (ref 70–99)
GLUCOSE SERPL-MCNC: 167 MG/DL — HIGH (ref 70–99)
GLUCOSE SERPL-MCNC: 184 MG/DL — HIGH (ref 70–99)
GLUCOSE SERPL-MCNC: 195 MG/DL — HIGH (ref 70–99)
GLUCOSE SERPL-MCNC: 218 MG/DL — HIGH (ref 70–99)
GLUCOSE SERPL-MCNC: 231 MG/DL — HIGH (ref 70–99)
GLUCOSE SERPL-MCNC: 232 MG/DL — HIGH (ref 70–99)
GLUCOSE UR QL: NEGATIVE MG/DL — SIGNIFICANT CHANGE UP
GRAM STN FLD: ABNORMAL
HAPTOGLOB SERPL-MCNC: 371 MG/DL — HIGH (ref 34–200)
HCO3 BLDA-SCNC: 20 MMOL/L — LOW (ref 21–28)
HCO3 BLDA-SCNC: 24 MMOL/L — SIGNIFICANT CHANGE UP (ref 21–28)
HCO3 BLDA-SCNC: 25 MMOL/L — SIGNIFICANT CHANGE UP (ref 21–28)
HCO3 BLDA-SCNC: 26 MMOL/L — SIGNIFICANT CHANGE UP (ref 21–28)
HCT VFR BLD CALC: 23.3 % — LOW (ref 39–50)
HCT VFR BLD CALC: 25.7 % — LOW (ref 39–50)
HCT VFR BLD CALC: 26.6 % — LOW (ref 39–50)
HCT VFR BLD CALC: 26.7 % — LOW (ref 39–50)
HCT VFR BLD CALC: 27.1 % — LOW (ref 39–50)
HCT VFR BLD CALC: 28.1 % — LOW (ref 39–50)
HCT VFR BLD CALC: 29.1 % — LOW (ref 39–50)
HCT VFR BLD CALC: 29.6 % — LOW (ref 39–50)
HCT VFR BLD CALC: 30.5 % — LOW (ref 39–50)
HCT VFR BLD CALC: 36 % — LOW (ref 39–50)
HDLC SERPL-MCNC: 41 MG/DL — SIGNIFICANT CHANGE UP
HGB BLD-MCNC: 10.8 G/DL — LOW (ref 13–17)
HGB BLD-MCNC: 7.1 G/DL — LOW (ref 13–17)
HGB BLD-MCNC: 7.6 G/DL — LOW (ref 13–17)
HGB BLD-MCNC: 8.3 G/DL — LOW (ref 13–17)
HGB BLD-MCNC: 8.3 G/DL — LOW (ref 13–17)
HGB BLD-MCNC: 8.4 G/DL — LOW (ref 13–17)
HGB BLD-MCNC: 8.5 G/DL — LOW (ref 13–17)
HGB BLD-MCNC: 9 G/DL — LOW (ref 13–17)
HGB BLD-MCNC: 9 G/DL — LOW (ref 13–17)
HGB BLD-MCNC: 9.2 G/DL — LOW (ref 13–17)
HOROWITZ INDEX BLDA+IHG-RTO: 36 — SIGNIFICANT CHANGE UP
HOROWITZ INDEX BLDA+IHG-RTO: 50 — SIGNIFICANT CHANGE UP
HOROWITZ INDEX BLDA+IHG-RTO: 60 — SIGNIFICANT CHANGE UP
HOROWITZ INDEX BLDA+IHG-RTO: 80 — SIGNIFICANT CHANGE UP
HYALINE CASTS # UR AUTO: PRESENT
IMM GRANULOCYTES NFR BLD AUTO: 0.6 % — SIGNIFICANT CHANGE UP (ref 0–0.9)
IMM GRANULOCYTES NFR BLD AUTO: 1.1 % — HIGH (ref 0–0.9)
INR BLD: 1.07 RATIO — SIGNIFICANT CHANGE UP (ref 0.85–1.18)
INR BLD: 1.73 RATIO — HIGH (ref 0.85–1.18)
INR BLD: 1.74 RATIO — HIGH (ref 0.85–1.18)
INR BLD: 2.09 RATIO — HIGH (ref 0.85–1.18)
INR BLD: 2.11 RATIO — HIGH (ref 0.85–1.18)
INR BLD: 2.15 RATIO — HIGH (ref 0.85–1.18)
INR BLD: 2.21 RATIO — HIGH (ref 0.85–1.18)
INR BLD: 2.22 RATIO — HIGH (ref 0.85–1.18)
INR BLD: 2.22 RATIO — HIGH (ref 0.85–1.18)
INR BLD: 2.23 RATIO — HIGH (ref 0.85–1.18)
IRON SATN MFR SERPL: 10 UG/DL — LOW (ref 45–165)
IRON SATN MFR SERPL: 6 % — LOW (ref 16–55)
KETONES UR-MCNC: NEGATIVE MG/DL — SIGNIFICANT CHANGE UP
LACTATE SERPL-SCNC: 1.4 MMOL/L — SIGNIFICANT CHANGE UP (ref 0.5–2)
LACTATE SERPL-SCNC: 3.3 MMOL/L — HIGH (ref 0.5–2)
LDH SERPL L TO P-CCNC: 282 U/L — HIGH (ref 50–242)
LEUKOCYTE ESTERASE UR-ACNC: NEGATIVE — SIGNIFICANT CHANGE UP
LIPID PNL WITH DIRECT LDL SERPL: 80 MG/DL — SIGNIFICANT CHANGE UP
LYMPHOCYTES # BLD AUTO: 0 % — LOW (ref 13–44)
LYMPHOCYTES # BLD AUTO: 0 K/UL — LOW (ref 1–3.3)
LYMPHOCYTES # BLD AUTO: 0.16 K/UL — LOW (ref 1–3.3)
LYMPHOCYTES # BLD AUTO: 0.34 K/UL — LOW (ref 1–3.3)
LYMPHOCYTES # BLD AUTO: 0.42 K/UL — LOW (ref 1–3.3)
LYMPHOCYTES # BLD AUTO: 0.56 K/UL — LOW (ref 1–3.3)
LYMPHOCYTES # BLD AUTO: 1.2 % — LOW (ref 13–44)
LYMPHOCYTES # BLD AUTO: 2 % — LOW (ref 13–44)
LYMPHOCYTES # BLD AUTO: 2.6 % — LOW (ref 13–44)
LYMPHOCYTES # BLD AUTO: 7.5 % — LOW (ref 13–44)
MACROCYTES BLD QL: SIGNIFICANT CHANGE UP
MACROCYTES BLD QL: SLIGHT — SIGNIFICANT CHANGE UP
MACROCYTES BLD QL: SLIGHT — SIGNIFICANT CHANGE UP
MAGNESIUM SERPL-MCNC: 1.9 MG/DL — SIGNIFICANT CHANGE UP (ref 1.6–2.6)
MAGNESIUM SERPL-MCNC: 2.1 MG/DL — SIGNIFICANT CHANGE UP (ref 1.6–2.6)
MAGNESIUM SERPL-MCNC: 2.3 MG/DL — SIGNIFICANT CHANGE UP (ref 1.6–2.6)
MANUAL SMEAR VERIFICATION: SIGNIFICANT CHANGE UP
MCHC RBC-ENTMCNC: 28 PG — SIGNIFICANT CHANGE UP (ref 27–34)
MCHC RBC-ENTMCNC: 28.1 PG — SIGNIFICANT CHANGE UP (ref 27–34)
MCHC RBC-ENTMCNC: 28.4 PG — SIGNIFICANT CHANGE UP (ref 27–34)
MCHC RBC-ENTMCNC: 28.5 PG — SIGNIFICANT CHANGE UP (ref 27–34)
MCHC RBC-ENTMCNC: 28.6 PG — SIGNIFICANT CHANGE UP (ref 27–34)
MCHC RBC-ENTMCNC: 28.7 PG — SIGNIFICANT CHANGE UP (ref 27–34)
MCHC RBC-ENTMCNC: 28.9 PG — SIGNIFICANT CHANGE UP (ref 27–34)
MCHC RBC-ENTMCNC: 29.3 PG — SIGNIFICANT CHANGE UP (ref 27–34)
MCHC RBC-ENTMCNC: 29.5 GM/DL — LOW (ref 32–36)
MCHC RBC-ENTMCNC: 29.6 GM/DL — LOW (ref 32–36)
MCHC RBC-ENTMCNC: 30 GM/DL — LOW (ref 32–36)
MCHC RBC-ENTMCNC: 30.2 GM/DL — LOW (ref 32–36)
MCHC RBC-ENTMCNC: 30.4 GM/DL — LOW (ref 32–36)
MCHC RBC-ENTMCNC: 30.5 GM/DL — LOW (ref 32–36)
MCHC RBC-ENTMCNC: 30.9 GM/DL — LOW (ref 32–36)
MCHC RBC-ENTMCNC: 31 GM/DL — LOW (ref 32–36)
MCHC RBC-ENTMCNC: 31.1 GM/DL — LOW (ref 32–36)
MCHC RBC-ENTMCNC: 32 GM/DL — SIGNIFICANT CHANGE UP (ref 32–36)
MCV RBC AUTO: 89.3 FL — SIGNIFICANT CHANGE UP (ref 80–100)
MCV RBC AUTO: 90.5 FL — SIGNIFICANT CHANGE UP (ref 80–100)
MCV RBC AUTO: 92.5 FL — SIGNIFICANT CHANGE UP (ref 80–100)
MCV RBC AUTO: 92.7 FL — SIGNIFICANT CHANGE UP (ref 80–100)
MCV RBC AUTO: 94.3 FL — SIGNIFICANT CHANGE UP (ref 80–100)
MCV RBC AUTO: 94.9 FL — SIGNIFICANT CHANGE UP (ref 80–100)
MCV RBC AUTO: 95.5 FL — SIGNIFICANT CHANGE UP (ref 80–100)
MCV RBC AUTO: 95.9 FL — SIGNIFICANT CHANGE UP (ref 80–100)
MCV RBC AUTO: 95.9 FL — SIGNIFICANT CHANGE UP (ref 80–100)
MCV RBC AUTO: 96.3 FL — SIGNIFICANT CHANGE UP (ref 80–100)
METHOD TYPE: SIGNIFICANT CHANGE UP
METHOD TYPE: SIGNIFICANT CHANGE UP
MONOCYTES # BLD AUTO: 0.52 K/UL — SIGNIFICANT CHANGE UP (ref 0–0.9)
MONOCYTES # BLD AUTO: 0.6 K/UL — SIGNIFICANT CHANGE UP (ref 0–0.9)
MONOCYTES # BLD AUTO: 0.69 K/UL — SIGNIFICANT CHANGE UP (ref 0–0.9)
MONOCYTES # BLD AUTO: 0.76 K/UL — SIGNIFICANT CHANGE UP (ref 0–0.9)
MONOCYTES # BLD AUTO: 0.85 K/UL — SIGNIFICANT CHANGE UP (ref 0–0.9)
MONOCYTES NFR BLD AUTO: 2.6 % — SIGNIFICANT CHANGE UP (ref 2–14)
MONOCYTES NFR BLD AUTO: 3.9 % — SIGNIFICANT CHANGE UP (ref 2–14)
MONOCYTES NFR BLD AUTO: 4.4 % — SIGNIFICANT CHANGE UP (ref 2–14)
MONOCYTES NFR BLD AUTO: 5.3 % — SIGNIFICANT CHANGE UP (ref 2–14)
MONOCYTES NFR BLD AUTO: 9.3 % — SIGNIFICANT CHANGE UP (ref 2–14)
MRSA SPEC QL CULT: SIGNIFICANT CHANGE UP
NEUTROPHILS # BLD AUTO: 12.62 K/UL — HIGH (ref 1.8–7.4)
NEUTROPHILS # BLD AUTO: 14.75 K/UL — HIGH (ref 1.8–7.4)
NEUTROPHILS # BLD AUTO: 15.93 K/UL — HIGH (ref 1.8–7.4)
NEUTROPHILS # BLD AUTO: 22.47 K/UL — HIGH (ref 1.8–7.4)
NEUTROPHILS # BLD AUTO: 5.78 K/UL — SIGNIFICANT CHANGE UP (ref 1.8–7.4)
NEUTROPHILS NFR BLD AUTO: 77.6 % — HIGH (ref 43–77)
NEUTROPHILS NFR BLD AUTO: 91.2 % — HIGH (ref 43–77)
NEUTROPHILS NFR BLD AUTO: 92 % — HIGH (ref 43–77)
NEUTROPHILS NFR BLD AUTO: 93.7 % — HIGH (ref 43–77)
NEUTROPHILS NFR BLD AUTO: 97.4 % — HIGH (ref 43–77)
NEUTS BAND # BLD: 0.9 % — SIGNIFICANT CHANGE UP (ref 0–8)
NITRITE UR-MCNC: NEGATIVE — SIGNIFICANT CHANGE UP
NON HDL CHOLESTEROL: 95 MG/DL — SIGNIFICANT CHANGE UP
NRBC # BLD: 0 /100 WBCS — SIGNIFICANT CHANGE UP (ref 0–0)
NRBC # BLD: 1 /100 WBCS — HIGH (ref 0–0)
NRBC # BLD: 2 /100 WBCS — HIGH (ref 0–0)
NRBC # BLD: 2 /100 WBCS — HIGH (ref 0–0)
NT-PROBNP SERPL-SCNC: 3171 PG/ML — HIGH (ref 0–300)
NT-PROBNP SERPL-SCNC: HIGH PG/ML (ref 0–300)
ORGANISM # SPEC MICROSCOPIC CNT: ABNORMAL
OVALOCYTES BLD QL SMEAR: SIGNIFICANT CHANGE UP
OVALOCYTES BLD QL SMEAR: SLIGHT — SIGNIFICANT CHANGE UP
PCO2 BLDA: 35 MMHG — SIGNIFICANT CHANGE UP (ref 35–48)
PCO2 BLDA: 46 MMHG — SIGNIFICANT CHANGE UP (ref 35–48)
PCO2 BLDA: 52 MMHG — HIGH (ref 35–48)
PCO2 BLDA: 55 MMHG — HIGH (ref 35–48)
PH BLDA: 7.27 — LOW (ref 7.35–7.45)
PH BLDA: 7.28 — LOW (ref 7.35–7.45)
PH BLDA: 7.34 — LOW (ref 7.35–7.45)
PH BLDA: 7.37 — SIGNIFICANT CHANGE UP (ref 7.35–7.45)
PH UR: 5 — SIGNIFICANT CHANGE UP (ref 5–8)
PHOSPHATE SERPL-MCNC: 3.7 MG/DL — SIGNIFICANT CHANGE UP (ref 2.5–4.5)
PHOSPHATE SERPL-MCNC: 6.1 MG/DL — HIGH (ref 2.5–4.5)
PLAT MORPH BLD: ABNORMAL
PLAT MORPH BLD: NORMAL — SIGNIFICANT CHANGE UP
PLAT MORPH BLD: NORMAL — SIGNIFICANT CHANGE UP
PLATELET # BLD AUTO: 130 K/UL — LOW (ref 150–400)
PLATELET # BLD AUTO: 149 K/UL — LOW (ref 150–400)
PLATELET # BLD AUTO: 190 K/UL — SIGNIFICANT CHANGE UP (ref 150–400)
PLATELET # BLD AUTO: 256 K/UL — SIGNIFICANT CHANGE UP (ref 150–400)
PLATELET # BLD AUTO: 273 K/UL — SIGNIFICANT CHANGE UP (ref 150–400)
PLATELET # BLD AUTO: 307 K/UL — SIGNIFICANT CHANGE UP (ref 150–400)
PLATELET # BLD AUTO: 310 K/UL — SIGNIFICANT CHANGE UP (ref 150–400)
PLATELET # BLD AUTO: 322 K/UL — SIGNIFICANT CHANGE UP (ref 150–400)
PLATELET # BLD AUTO: 329 K/UL — SIGNIFICANT CHANGE UP (ref 150–400)
PLATELET # BLD AUTO: 89 K/UL — LOW (ref 150–400)
PO2 BLDA: 108 MMHG — SIGNIFICANT CHANGE UP (ref 83–108)
PO2 BLDA: 156 MMHG — HIGH (ref 83–108)
PO2 BLDA: 62 MMHG — LOW (ref 83–108)
PO2 BLDA: 93 MMHG — SIGNIFICANT CHANGE UP (ref 83–108)
POIKILOCYTOSIS BLD QL AUTO: SIGNIFICANT CHANGE UP
POIKILOCYTOSIS BLD QL AUTO: SIGNIFICANT CHANGE UP
POIKILOCYTOSIS BLD QL AUTO: SLIGHT — SIGNIFICANT CHANGE UP
POLYCHROMASIA BLD QL SMEAR: SLIGHT — SIGNIFICANT CHANGE UP
POLYCHROMASIA BLD QL SMEAR: SLIGHT — SIGNIFICANT CHANGE UP
POTASSIUM SERPL-MCNC: 3.8 MMOL/L — SIGNIFICANT CHANGE UP (ref 3.5–5.3)
POTASSIUM SERPL-MCNC: 4.3 MMOL/L — SIGNIFICANT CHANGE UP (ref 3.5–5.3)
POTASSIUM SERPL-MCNC: 4.5 MMOL/L — SIGNIFICANT CHANGE UP (ref 3.5–5.3)
POTASSIUM SERPL-MCNC: 4.9 MMOL/L — SIGNIFICANT CHANGE UP (ref 3.5–5.3)
POTASSIUM SERPL-MCNC: 5 MMOL/L — SIGNIFICANT CHANGE UP (ref 3.5–5.3)
POTASSIUM SERPL-MCNC: 5 MMOL/L — SIGNIFICANT CHANGE UP (ref 3.5–5.3)
POTASSIUM SERPL-MCNC: 5.4 MMOL/L — HIGH (ref 3.5–5.3)
POTASSIUM SERPL-MCNC: 5.9 MMOL/L — HIGH (ref 3.5–5.3)
POTASSIUM SERPL-MCNC: 6 MMOL/L — HIGH (ref 3.5–5.3)
POTASSIUM SERPL-MCNC: 6.1 MMOL/L — HIGH (ref 3.5–5.3)
POTASSIUM SERPL-SCNC: 3.8 MMOL/L — SIGNIFICANT CHANGE UP (ref 3.5–5.3)
POTASSIUM SERPL-SCNC: 4.3 MMOL/L — SIGNIFICANT CHANGE UP (ref 3.5–5.3)
POTASSIUM SERPL-SCNC: 4.5 MMOL/L — SIGNIFICANT CHANGE UP (ref 3.5–5.3)
POTASSIUM SERPL-SCNC: 4.9 MMOL/L — SIGNIFICANT CHANGE UP (ref 3.5–5.3)
POTASSIUM SERPL-SCNC: 5 MMOL/L — SIGNIFICANT CHANGE UP (ref 3.5–5.3)
POTASSIUM SERPL-SCNC: 5 MMOL/L — SIGNIFICANT CHANGE UP (ref 3.5–5.3)
POTASSIUM SERPL-SCNC: 5.4 MMOL/L — HIGH (ref 3.5–5.3)
POTASSIUM SERPL-SCNC: 5.9 MMOL/L — HIGH (ref 3.5–5.3)
POTASSIUM SERPL-SCNC: 6 MMOL/L — HIGH (ref 3.5–5.3)
POTASSIUM SERPL-SCNC: 6.1 MMOL/L — HIGH (ref 3.5–5.3)
PROT SERPL-MCNC: 6.6 G/DL — SIGNIFICANT CHANGE UP (ref 6–8.3)
PROT SERPL-MCNC: 6.8 G/DL — SIGNIFICANT CHANGE UP (ref 6–8.3)
PROT SERPL-MCNC: 7 G/DL — SIGNIFICANT CHANGE UP (ref 6–8.3)
PROT SERPL-MCNC: 7.2 G/DL — SIGNIFICANT CHANGE UP (ref 6–8.3)
PROT SERPL-MCNC: 7.3 G/DL — SIGNIFICANT CHANGE UP (ref 6–8.3)
PROT SERPL-MCNC: 7.4 G/DL — SIGNIFICANT CHANGE UP (ref 6–8.3)
PROT UR-MCNC: 30 MG/DL
PROTHROM AB SERPL-ACNC: 11.2 SEC — SIGNIFICANT CHANGE UP (ref 9.5–13)
PROTHROM AB SERPL-ACNC: 18.7 SEC — HIGH (ref 9.5–13)
PROTHROM AB SERPL-ACNC: 18.8 SEC — HIGH (ref 9.5–13)
PROTHROM AB SERPL-ACNC: 21.7 SEC — HIGH (ref 9.5–13)
PROTHROM AB SERPL-ACNC: 22.5 SEC — HIGH (ref 9.5–13)
PROTHROM AB SERPL-ACNC: 22.7 SEC — HIGH (ref 9.5–13)
PROTHROM AB SERPL-ACNC: 22.8 SEC — HIGH (ref 9.5–13)
PROTHROM AB SERPL-ACNC: 22.9 SEC — HIGH (ref 9.5–13)
PROTHROM AB SERPL-ACNC: 23.1 SEC — HIGH (ref 9.5–13)
PROTHROM AB SERPL-ACNC: 23.9 SEC — HIGH (ref 9.5–13)
RBC # BLD: 2.42 M/UL — LOW (ref 4.2–5.8)
RBC # BLD: 2.68 M/UL — LOW (ref 4.2–5.8)
RBC # BLD: 2.93 M/UL — LOW (ref 4.2–5.8)
RBC # BLD: 2.95 M/UL — LOW (ref 4.2–5.8)
RBC # BLD: 2.96 M/UL — LOW (ref 4.2–5.8)
RBC # BLD: 2.98 M/UL — LOW (ref 4.2–5.8)
RBC # BLD: 3.14 M/UL — LOW (ref 4.2–5.8)
RBC # BLD: 3.14 M/UL — LOW (ref 4.2–5.8)
RBC # BLD: 3.18 M/UL — LOW (ref 4.2–5.8)
RBC # BLD: 3.18 M/UL — LOW (ref 4.2–5.8)
RBC # BLD: 3.77 M/UL — LOW (ref 4.2–5.8)
RBC # FLD: 18.7 % — HIGH (ref 10.3–14.5)
RBC # FLD: 18.7 % — HIGH (ref 10.3–14.5)
RBC # FLD: 18.8 % — HIGH (ref 10.3–14.5)
RBC # FLD: 18.9 % — HIGH (ref 10.3–14.5)
RBC # FLD: 19 % — HIGH (ref 10.3–14.5)
RBC # FLD: 19.2 % — HIGH (ref 10.3–14.5)
RBC # FLD: 19.3 % — HIGH (ref 10.3–14.5)
RBC BLD AUTO: ABNORMAL
RBC CASTS # UR COMP ASSIST: 0 /HPF — SIGNIFICANT CHANGE UP (ref 0–4)
RETICS #: 41.7 K/UL — SIGNIFICANT CHANGE UP (ref 25–125)
RETICS/RBC NFR: 1.3 % — SIGNIFICANT CHANGE UP (ref 0.5–2.5)
REVIEW: SIGNIFICANT CHANGE UP
RH IG SCN BLD-IMP: POSITIVE — SIGNIFICANT CHANGE UP
RSV RNA NPH QL NAA+NON-PROBE: SIGNIFICANT CHANGE UP
SAO2 % BLDA: 90.3 % — LOW (ref 94–98)
SAO2 % BLDA: 98.4 % — HIGH (ref 94–98)
SAO2 % BLDA: 98.6 % — HIGH (ref 94–98)
SAO2 % BLDA: 98.8 % — HIGH (ref 94–98)
SARS-COV-2 RNA SPEC QL NAA+PROBE: DETECTED
SCHISTOCYTES BLD QL AUTO: SIGNIFICANT CHANGE UP
SODIUM SERPL-SCNC: 139 MMOL/L — SIGNIFICANT CHANGE UP (ref 135–145)
SODIUM SERPL-SCNC: 144 MMOL/L — SIGNIFICANT CHANGE UP (ref 135–145)
SODIUM SERPL-SCNC: 145 MMOL/L — SIGNIFICANT CHANGE UP (ref 135–145)
SODIUM SERPL-SCNC: 146 MMOL/L — HIGH (ref 135–145)
SODIUM SERPL-SCNC: 146 MMOL/L — HIGH (ref 135–145)
SODIUM SERPL-SCNC: 147 MMOL/L — HIGH (ref 135–145)
SODIUM SERPL-SCNC: 147 MMOL/L — HIGH (ref 135–145)
SODIUM SERPL-SCNC: 148 MMOL/L — HIGH (ref 135–145)
SODIUM SERPL-SCNC: 149 MMOL/L — HIGH (ref 135–145)
SODIUM SERPL-SCNC: 150 MMOL/L — HIGH (ref 135–145)
SP GR SPEC: 1.02 — SIGNIFICANT CHANGE UP (ref 1–1.03)
SPECIMEN SOURCE: SIGNIFICANT CHANGE UP
SQUAMOUS # UR AUTO: 0 /HPF — SIGNIFICANT CHANGE UP (ref 0–5)
TIBC SERPL-MCNC: 166 UG/DL — LOW (ref 220–430)
TRIGL SERPL-MCNC: 76 MG/DL — SIGNIFICANT CHANGE UP
TROPONIN T, HIGH SENSITIVITY RESULT: 82 NG/L — HIGH (ref 0–51)
TROPONIN T, HIGH SENSITIVITY RESULT: 89 NG/L — HIGH (ref 0–51)
TSH SERPL-MCNC: 0.17 UIU/ML — LOW (ref 0.27–4.2)
UIBC SERPL-MCNC: 157 UG/DL — SIGNIFICANT CHANGE UP (ref 110–370)
UROBILINOGEN FLD QL: 0.2 MG/DL — SIGNIFICANT CHANGE UP (ref 0.2–1)
VIT B12 SERPL-MCNC: 1094 PG/ML — SIGNIFICANT CHANGE UP (ref 232–1245)
WBC # BLD: 13.46 K/UL — HIGH (ref 3.8–10.5)
WBC # BLD: 15.36 K/UL — HIGH (ref 3.8–10.5)
WBC # BLD: 16.01 K/UL — HIGH (ref 3.8–10.5)
WBC # BLD: 17.32 K/UL — HIGH (ref 3.8–10.5)
WBC # BLD: 18.23 K/UL — HIGH (ref 3.8–10.5)
WBC # BLD: 21.16 K/UL — HIGH (ref 3.8–10.5)
WBC # BLD: 23.07 K/UL — HIGH (ref 3.8–10.5)
WBC # BLD: 26.07 K/UL — HIGH (ref 3.8–10.5)
WBC # BLD: 29.9 K/UL — HIGH (ref 3.8–10.5)
WBC # BLD: 7.45 K/UL — SIGNIFICANT CHANGE UP (ref 3.8–10.5)
WBC # FLD AUTO: 13.46 K/UL — HIGH (ref 3.8–10.5)
WBC # FLD AUTO: 15.36 K/UL — HIGH (ref 3.8–10.5)
WBC # FLD AUTO: 16.01 K/UL — HIGH (ref 3.8–10.5)
WBC # FLD AUTO: 17.32 K/UL — HIGH (ref 3.8–10.5)
WBC # FLD AUTO: 18.23 K/UL — HIGH (ref 3.8–10.5)
WBC # FLD AUTO: 21.16 K/UL — HIGH (ref 3.8–10.5)
WBC # FLD AUTO: 23.07 K/UL — HIGH (ref 3.8–10.5)
WBC # FLD AUTO: 26.07 K/UL — HIGH (ref 3.8–10.5)
WBC # FLD AUTO: 29.9 K/UL — HIGH (ref 3.8–10.5)
WBC # FLD AUTO: 7.45 K/UL — SIGNIFICANT CHANGE UP (ref 3.8–10.5)
WBC UR QL: 0 /HPF — SIGNIFICANT CHANGE UP (ref 0–5)

## 2024-01-01 PROCEDURE — 99223 1ST HOSP IP/OBS HIGH 75: CPT

## 2024-01-01 PROCEDURE — 99232 SBSQ HOSP IP/OBS MODERATE 35: CPT

## 2024-01-01 PROCEDURE — 99285 EMERGENCY DEPT VISIT HI MDM: CPT | Mod: 25

## 2024-01-01 PROCEDURE — 99233 SBSQ HOSP IP/OBS HIGH 50: CPT | Mod: GC

## 2024-01-01 PROCEDURE — 71275 CT ANGIOGRAPHY CHEST: CPT | Mod: 26,MC

## 2024-01-01 PROCEDURE — 86923 COMPATIBILITY TEST ELECTRIC: CPT

## 2024-01-01 PROCEDURE — 83880 ASSAY OF NATRIURETIC PEPTIDE: CPT

## 2024-01-01 PROCEDURE — 86900 BLOOD TYPING SEROLOGIC ABO: CPT

## 2024-01-01 PROCEDURE — 86901 BLOOD TYPING SEROLOGIC RH(D): CPT

## 2024-01-01 PROCEDURE — 85730 THROMBOPLASTIN TIME PARTIAL: CPT

## 2024-01-01 PROCEDURE — 80053 COMPREHEN METABOLIC PANEL: CPT

## 2024-01-01 PROCEDURE — 96374 THER/PROPH/DIAG INJ IV PUSH: CPT

## 2024-01-01 PROCEDURE — 71045 X-RAY EXAM CHEST 1 VIEW: CPT

## 2024-01-01 PROCEDURE — 99497 ADVNCD CARE PLAN 30 MIN: CPT | Mod: 25

## 2024-01-01 PROCEDURE — 99285 EMERGENCY DEPT VISIT HI MDM: CPT | Mod: GC

## 2024-01-01 PROCEDURE — 93005 ELECTROCARDIOGRAM TRACING: CPT

## 2024-01-01 PROCEDURE — 85025 COMPLETE CBC W/AUTO DIFF WBC: CPT

## 2024-01-01 PROCEDURE — P9016: CPT

## 2024-01-01 PROCEDURE — G0316 PROLONG INPT EVAL ADD15 M: CPT

## 2024-01-01 PROCEDURE — 85610 PROTHROMBIN TIME: CPT

## 2024-01-01 PROCEDURE — 71045 X-RAY EXAM CHEST 1 VIEW: CPT | Mod: 26

## 2024-01-01 PROCEDURE — 86850 RBC ANTIBODY SCREEN: CPT

## 2024-01-01 PROCEDURE — 70450 CT HEAD/BRAIN W/O DYE: CPT | Mod: 26,MC

## 2024-01-01 PROCEDURE — 83735 ASSAY OF MAGNESIUM: CPT

## 2024-01-01 PROCEDURE — 36430 TRANSFUSION BLD/BLD COMPNT: CPT

## 2024-01-01 PROCEDURE — 76770 US EXAM ABDO BACK WALL COMP: CPT | Mod: 26

## 2024-01-01 RX ORDER — SODIUM CHLORIDE IRRIG SOLUTION 0.9 %
1000 SOLUTION, IRRIGATION IRRIGATION
Refills: 0 | Status: DISCONTINUED | OUTPATIENT
Start: 2024-01-01 | End: 2024-01-01

## 2024-01-01 RX ORDER — ASPIRIN 325 MG
81 TABLET ORAL DAILY
Refills: 0 | Status: DISCONTINUED | OUTPATIENT
Start: 2024-01-01 | End: 2024-01-01

## 2024-01-01 RX ORDER — BENZONATATE 150 MG/1
100 CAPSULE ORAL EVERY 8 HOURS
Refills: 0 | Status: DISCONTINUED | OUTPATIENT
Start: 2024-01-01 | End: 2024-01-01

## 2024-01-01 RX ORDER — SODIUM CHLORIDE 0.9 % (FLUSH) 0.9 %
1000 SYRINGE (ML) INJECTION ONCE
Refills: 0 | Status: COMPLETED | OUTPATIENT
Start: 2024-01-01 | End: 2024-01-01

## 2024-01-01 RX ORDER — VANCOMYCIN HCL-SODIUM CHLORIDE IV SOLN 1.5 GM/250ML-0.9% 1.5-0.9/25 GM/ML-%
1000 SOLUTION INTRAVENOUS ONCE
Refills: 0 | Status: DISCONTINUED | OUTPATIENT
Start: 2024-01-01 | End: 2024-01-01

## 2024-01-01 RX ORDER — CEFAZOLIN SODIUM 1 G
VIAL (EA) INJECTION
Refills: 0 | Status: DISCONTINUED | OUTPATIENT
Start: 2024-01-01 | End: 2024-01-01

## 2024-01-01 RX ORDER — METOPROLOL TARTRATE 50 MG
5 TABLET ORAL ONCE
Refills: 0 | Status: COMPLETED | OUTPATIENT
Start: 2024-01-01 | End: 2024-01-01

## 2024-01-01 RX ORDER — FUROSEMIDE 10 MG/ML
20 INJECTION INTRAVENOUS ONCE
Refills: 0 | Status: COMPLETED | OUTPATIENT
Start: 2024-01-01 | End: 2024-01-01

## 2024-01-01 RX ORDER — INSULIN REGULAR, HUMAN 100/ML
5 VIAL (ML) INJECTION ONCE
Refills: 0 | Status: COMPLETED | OUTPATIENT
Start: 2024-01-01 | End: 2024-01-01

## 2024-01-01 RX ORDER — CEFEPIME 2 G/1
1000 INJECTION, POWDER, FOR SOLUTION INTRAVENOUS ONCE
Refills: 0 | Status: COMPLETED | OUTPATIENT
Start: 2024-01-01 | End: 2024-01-01

## 2024-01-01 RX ORDER — ACETAMINOPHEN 325 MG
650 TABLET ORAL EVERY 8 HOURS
Refills: 0 | Status: DISCONTINUED | OUTPATIENT
Start: 2024-01-01 | End: 2024-01-01

## 2024-01-01 RX ORDER — CEFEPIME 2 G/1
INJECTION, POWDER, FOR SOLUTION INTRAVENOUS
Refills: 0 | Status: DISCONTINUED | OUTPATIENT
Start: 2024-01-01 | End: 2024-01-01

## 2024-01-01 RX ORDER — REMDESIVIR 5 MG/ML
100 INJECTION INTRAVENOUS EVERY 24 HOURS
Refills: 0 | Status: DISCONTINUED | OUTPATIENT
Start: 2024-01-01 | End: 2024-01-01

## 2024-01-01 RX ORDER — HYDROMORPHONE HYDROCHLORIDE 1 MG/ML
0.3 INJECTION, SOLUTION INTRAMUSCULAR; INTRAVENOUS; SUBCUTANEOUS
Refills: 0 | Status: DISCONTINUED | OUTPATIENT
Start: 2024-01-01 | End: 2024-01-01

## 2024-01-01 RX ORDER — FUROSEMIDE 10 MG/ML
1 INJECTION INTRAMUSCULAR; INTRAVENOUS
Qty: 14 | Refills: 0
Start: 2024-01-01 | End: 2024-01-01

## 2024-01-01 RX ORDER — CEFTRIAXONE SODIUM 1 G
1000 VIAL (EA) INJECTION EVERY 24 HOURS
Refills: 0 | Status: DISCONTINUED | OUTPATIENT
Start: 2024-01-01 | End: 2024-01-01

## 2024-01-01 RX ORDER — 5-HYDROXYTRYPTOPHAN (5-HTP) 100 MG
3 TABLET,DISINTEGRATING ORAL AT BEDTIME
Refills: 0 | Status: DISCONTINUED | OUTPATIENT
Start: 2024-01-01 | End: 2024-01-01

## 2024-01-01 RX ORDER — METOPROLOL TARTRATE 50 MG
2.5 TABLET ORAL ONCE
Refills: 0 | Status: COMPLETED | OUTPATIENT
Start: 2024-01-01 | End: 2024-01-01

## 2024-01-01 RX ORDER — SODIUM CHLORIDE IRRIG SOLUTION 0.9 %
500 SOLUTION, IRRIGATION IRRIGATION ONCE
Refills: 0 | Status: COMPLETED | OUTPATIENT
Start: 2024-01-01 | End: 2024-01-01

## 2024-01-01 RX ORDER — DOXYCYCLINE HYCLATE 100 MG
100 CAPSULE ORAL EVERY 12 HOURS
Refills: 0 | Status: DISCONTINUED | OUTPATIENT
Start: 2024-01-01 | End: 2024-01-01

## 2024-01-01 RX ORDER — SODIUM CHLORIDE 0.9 % (FLUSH) 0.9 %
4 SYRINGE (ML) INJECTION EVERY 12 HOURS
Refills: 0 | Status: DISCONTINUED | OUTPATIENT
Start: 2024-01-01 | End: 2024-01-01

## 2024-01-01 RX ORDER — VANCOMYCIN HCL-SODIUM CHLORIDE IV SOLN 1.5 GM/250ML-0.9% 1.5-0.9/25 GM/ML-%
750 SOLUTION INTRAVENOUS EVERY 24 HOURS
Refills: 0 | Status: DISCONTINUED | OUTPATIENT
Start: 2024-01-01 | End: 2024-01-01

## 2024-01-01 RX ORDER — SODIUM POLYSTYRENE SULFONATE 15 G/60ML
30 SUSPENSION ORAL; RECTAL ONCE
Refills: 0 | Status: COMPLETED | OUTPATIENT
Start: 2024-01-01 | End: 2024-01-01

## 2024-01-01 RX ORDER — GLUCAGON INJECTION, SOLUTION 0.5 MG/.1ML
1 INJECTION, SOLUTION SUBCUTANEOUS ONCE
Refills: 0 | Status: DISCONTINUED | OUTPATIENT
Start: 2024-01-01 | End: 2024-01-01

## 2024-01-01 RX ORDER — CEFEPIME 2 G/1
1000 INJECTION, POWDER, FOR SOLUTION INTRAVENOUS EVERY 12 HOURS
Refills: 0 | Status: DISCONTINUED | OUTPATIENT
Start: 2024-01-01 | End: 2024-01-01

## 2024-01-01 RX ORDER — INSULIN REGULAR, HUMAN 100/ML
10 VIAL (ML) INJECTION ONCE
Refills: 0 | Status: COMPLETED | OUTPATIENT
Start: 2024-01-01 | End: 2024-01-01

## 2024-01-01 RX ORDER — REMDESIVIR 5 MG/ML
INJECTION INTRAVENOUS
Refills: 0 | Status: DISCONTINUED | OUTPATIENT
Start: 2024-01-01 | End: 2024-01-01

## 2024-01-01 RX ORDER — INSULIN LISPRO 100/ML
VIAL (ML) SUBCUTANEOUS EVERY 6 HOURS
Refills: 0 | Status: DISCONTINUED | OUTPATIENT
Start: 2024-01-01 | End: 2024-01-01

## 2024-01-01 RX ORDER — ALCOHOL ANTISEPTIC PADS
50 PADS, MEDICATED (EA) TOPICAL ONCE
Refills: 0 | Status: COMPLETED | OUTPATIENT
Start: 2024-01-01 | End: 2024-01-01

## 2024-01-01 RX ORDER — METOPROLOL TARTRATE 50 MG
2.5 TABLET ORAL EVERY 6 HOURS
Refills: 0 | Status: DISCONTINUED | OUTPATIENT
Start: 2024-01-01 | End: 2024-01-01

## 2024-01-01 RX ORDER — IPRATROPIUM BROMIDE AND ALBUTEROL SULFATE .5; 3 MG/3ML; MG/3ML
3 SOLUTION RESPIRATORY (INHALATION) EVERY 6 HOURS
Refills: 0 | Status: DISCONTINUED | OUTPATIENT
Start: 2024-01-01 | End: 2024-01-01

## 2024-01-01 RX ORDER — HALOPERIDOL LACTATE 2 MG/ML
2.5 CONCENTRATE, ORAL ORAL ONCE
Refills: 0 | Status: COMPLETED | OUTPATIENT
Start: 2024-01-01 | End: 2024-01-01

## 2024-01-01 RX ORDER — FUROSEMIDE 10 MG/ML
20 INJECTION INTRAMUSCULAR; INTRAVENOUS ONCE
Refills: 0 | Status: COMPLETED | OUTPATIENT
Start: 2024-01-01 | End: 2024-01-01

## 2024-01-01 RX ORDER — APIXABAN 5 MG/1
2.5 TABLET, FILM COATED ORAL EVERY 12 HOURS
Refills: 0 | Status: DISCONTINUED | OUTPATIENT
Start: 2024-01-01 | End: 2024-01-01

## 2024-01-01 RX ORDER — HYDROMORPHONE HYDROCHLORIDE 1 MG/ML
0.2 INJECTION, SOLUTION INTRAMUSCULAR; INTRAVENOUS; SUBCUTANEOUS
Refills: 0 | Status: DISCONTINUED | OUTPATIENT
Start: 2024-01-01 | End: 2024-01-01

## 2024-01-01 RX ORDER — DAPTOMYCIN 500 MG/10ML
500 INJECTION, POWDER, LYOPHILIZED, FOR SOLUTION INTRAVENOUS
Refills: 0 | Status: DISCONTINUED | OUTPATIENT
Start: 2024-09-25 | End: 2024-01-01

## 2024-01-01 RX ORDER — ALCOHOL ANTISEPTIC PADS
25 PADS, MEDICATED (EA) TOPICAL ONCE
Refills: 0 | Status: DISCONTINUED | OUTPATIENT
Start: 2024-01-01 | End: 2024-01-01

## 2024-01-01 RX ORDER — ERYTHROPOIETIN 10000 [IU]/ML
10000 INJECTION, SOLUTION INTRAVENOUS; SUBCUTANEOUS
Refills: 0 | Status: DISCONTINUED | OUTPATIENT
Start: 2024-01-01 | End: 2024-01-01

## 2024-01-01 RX ORDER — VANCOMYCIN HCL-SODIUM CHLORIDE IV SOLN 1.5 GM/250ML-0.9% 1.5-0.9/25 GM/ML-%
1000 SOLUTION INTRAVENOUS ONCE
Refills: 0 | Status: COMPLETED | OUTPATIENT
Start: 2024-01-01 | End: 2024-01-01

## 2024-01-01 RX ORDER — INSULIN REGULAR, HUMAN 100/ML
8 VIAL (ML) INJECTION ONCE
Refills: 0 | Status: COMPLETED | OUTPATIENT
Start: 2024-01-01 | End: 2024-01-01

## 2024-01-01 RX ORDER — DAPTOMYCIN 500 MG/10ML
500 INJECTION, POWDER, LYOPHILIZED, FOR SOLUTION INTRAVENOUS
Refills: 0 | Status: DISCONTINUED | OUTPATIENT
Start: 2024-01-01 | End: 2024-01-01

## 2024-01-01 RX ORDER — DAPTOMYCIN 500 MG/10ML
500 INJECTION, POWDER, LYOPHILIZED, FOR SOLUTION INTRAVENOUS ONCE
Refills: 0 | Status: COMPLETED | OUTPATIENT
Start: 2024-01-01 | End: 2024-01-01

## 2024-01-01 RX ORDER — FUROSEMIDE 10 MG/ML
20 INJECTION INTRAVENOUS
Refills: 0 | Status: DISCONTINUED | OUTPATIENT
Start: 2024-01-01 | End: 2024-01-01

## 2024-01-01 RX ORDER — ACETAMINOPHEN 325 MG
1000 TABLET ORAL ONCE
Refills: 0 | Status: COMPLETED | OUTPATIENT
Start: 2024-01-01 | End: 2024-01-01

## 2024-01-01 RX ORDER — METOPROLOL TARTRATE 50 MG
5 TABLET ORAL EVERY 6 HOURS
Refills: 0 | Status: DISCONTINUED | OUTPATIENT
Start: 2024-01-01 | End: 2024-01-01

## 2024-01-01 RX ORDER — DAPTOMYCIN 500 MG/10ML
INJECTION, POWDER, LYOPHILIZED, FOR SOLUTION INTRAVENOUS
Refills: 0 | Status: DISCONTINUED | OUTPATIENT
Start: 2024-01-01 | End: 2024-01-01

## 2024-01-01 RX ORDER — ONDANSETRON HCL/PF 4 MG/2 ML
4 VIAL (ML) INJECTION EVERY 8 HOURS
Refills: 0 | Status: DISCONTINUED | OUTPATIENT
Start: 2024-01-01 | End: 2024-01-01

## 2024-01-01 RX ORDER — PANTOPRAZOLE SODIUM 40 MG/1
40 TABLET, DELAYED RELEASE ORAL DAILY
Refills: 0 | Status: DISCONTINUED | OUTPATIENT
Start: 2024-01-01 | End: 2024-01-01

## 2024-01-01 RX ORDER — MAG HYDROX/ALUMINUM HYD/SIMETH 200-200-20
30 SUSPENSION, ORAL (FINAL DOSE FORM) ORAL EVERY 4 HOURS
Refills: 0 | Status: DISCONTINUED | OUTPATIENT
Start: 2024-01-01 | End: 2024-01-01

## 2024-01-01 RX ORDER — ASTEMIZOLE 10 MG
250 TABLET ORAL THREE TIMES A DAY
Refills: 0 | Status: DISCONTINUED | OUTPATIENT
Start: 2024-01-01 | End: 2024-01-01

## 2024-01-01 RX ORDER — ACETAMINOPHEN 325 MG
650 TABLET ORAL EVERY 6 HOURS
Refills: 0 | Status: DISCONTINUED | OUTPATIENT
Start: 2024-01-01 | End: 2024-01-01

## 2024-01-01 RX ORDER — ACETYLCYSTEINE
4 POWDER (GRAM) MISCELLANEOUS EVERY 12 HOURS
Refills: 0 | Status: DISCONTINUED | OUTPATIENT
Start: 2024-01-01 | End: 2024-01-01

## 2024-01-01 RX ORDER — METOPROLOL TARTRATE 50 MG
100 TABLET ORAL DAILY
Refills: 0 | Status: DISCONTINUED | OUTPATIENT
Start: 2024-01-01 | End: 2024-01-01

## 2024-01-01 RX ORDER — CHLORHEXIDINE GLUCONATE ORAL RINSE 1.2 MG/ML
1 SOLUTION DENTAL DAILY
Refills: 0 | Status: DISCONTINUED | OUTPATIENT
Start: 2024-01-01 | End: 2024-01-01

## 2024-01-01 RX ORDER — ALCOHOL ANTISEPTIC PADS
15 PADS, MEDICATED (EA) TOPICAL ONCE
Refills: 0 | Status: DISCONTINUED | OUTPATIENT
Start: 2024-01-01 | End: 2024-01-01

## 2024-01-01 RX ORDER — REMDESIVIR 5 MG/ML
200 INJECTION INTRAVENOUS EVERY 24 HOURS
Refills: 0 | Status: COMPLETED | OUTPATIENT
Start: 2024-01-01 | End: 2024-01-01

## 2024-01-01 RX ORDER — BISACODYL 5 MG/1
10 TABLET, COATED ORAL DAILY
Refills: 0 | Status: DISCONTINUED | OUTPATIENT
Start: 2024-01-01 | End: 2024-01-01

## 2024-01-01 RX ORDER — ALCOHOL ANTISEPTIC PADS
12.5 PADS, MEDICATED (EA) TOPICAL ONCE
Refills: 0 | Status: DISCONTINUED | OUTPATIENT
Start: 2024-01-01 | End: 2024-01-01

## 2024-01-01 RX ADMIN — Medication 30 MILLILITER(S): at 14:09

## 2024-01-01 RX ADMIN — Medication 50 MILLILITER(S): at 08:34

## 2024-01-01 RX ADMIN — FUROSEMIDE 20 MILLIGRAM(S): 10 INJECTION INTRAVENOUS at 03:19

## 2024-01-01 RX ADMIN — Medication 1100 UNIT(S)/HR: at 12:12

## 2024-01-01 RX ADMIN — Medication 6 MILLIGRAM(S): at 06:32

## 2024-01-01 RX ADMIN — Medication 4 MILLILITER(S): at 20:01

## 2024-01-01 RX ADMIN — CEFEPIME 100 MILLIGRAM(S): 2 INJECTION, POWDER, FOR SOLUTION INTRAVENOUS at 16:39

## 2024-01-01 RX ADMIN — Medication 4 MILLILITER(S): at 06:41

## 2024-01-01 RX ADMIN — Medication 1100 UNIT(S)/HR: at 01:30

## 2024-01-01 RX ADMIN — Medication 1200 UNIT(S)/HR: at 12:03

## 2024-01-01 RX ADMIN — Medication 1200 UNIT(S)/HR: at 11:10

## 2024-01-01 RX ADMIN — PANTOPRAZOLE SODIUM 40 MILLIGRAM(S): 40 TABLET, DELAYED RELEASE ORAL at 11:17

## 2024-01-01 RX ADMIN — Medication 400 MILLIGRAM(S): at 00:45

## 2024-01-01 RX ADMIN — SODIUM POLYSTYRENE SULFONATE 30 GRAM(S): 15 SUSPENSION ORAL; RECTAL at 08:41

## 2024-01-01 RX ADMIN — CHLORHEXIDINE GLUCONATE ORAL RINSE 1 APPLICATION(S): 1.2 SOLUTION DENTAL at 11:17

## 2024-01-01 RX ADMIN — FUROSEMIDE 20 MILLIGRAM(S): 10 INJECTION INTRAVENOUS at 05:07

## 2024-01-01 RX ADMIN — Medication 6 MILLIGRAM(S): at 05:07

## 2024-01-01 RX ADMIN — REMDESIVIR 200 MILLIGRAM(S): 5 INJECTION INTRAVENOUS at 13:58

## 2024-01-01 RX ADMIN — Medication 1200 UNIT(S)/HR: at 19:27

## 2024-01-01 RX ADMIN — IPRATROPIUM BROMIDE AND ALBUTEROL SULFATE 3 MILLILITER(S): .5; 3 SOLUTION RESPIRATORY (INHALATION) at 11:53

## 2024-01-01 RX ADMIN — REMDESIVIR 200 MILLIGRAM(S): 5 INJECTION INTRAVENOUS at 10:47

## 2024-01-01 RX ADMIN — Medication 100 GRAM(S): at 22:47

## 2024-01-01 RX ADMIN — Medication 1100 UNIT(S)/HR: at 03:49

## 2024-01-01 RX ADMIN — Medication 2.5 MILLIGRAM(S): at 19:31

## 2024-01-01 RX ADMIN — Medication 4 MILLILITER(S): at 06:27

## 2024-01-01 RX ADMIN — FUROSEMIDE 20 MILLIGRAM(S): 10 INJECTION INTRAVENOUS at 13:27

## 2024-01-01 RX ADMIN — Medication 1200 UNIT(S)/HR: at 08:16

## 2024-01-01 RX ADMIN — Medication 30 MILLILITER(S): at 15:45

## 2024-01-01 RX ADMIN — IPRATROPIUM BROMIDE AND ALBUTEROL SULFATE 3 MILLILITER(S): .5; 3 SOLUTION RESPIRATORY (INHALATION) at 23:25

## 2024-01-01 RX ADMIN — Medication 6 MILLIGRAM(S): at 05:41

## 2024-01-01 RX ADMIN — SODIUM POLYSTYRENE SULFONATE 30 GRAM(S): 15 SUSPENSION ORAL; RECTAL at 22:21

## 2024-01-01 RX ADMIN — Medication 1000 MILLILITER(S): at 17:54

## 2024-01-01 RX ADMIN — Medication 4 MILLILITER(S): at 05:03

## 2024-01-01 RX ADMIN — VANCOMYCIN HCL-SODIUM CHLORIDE IV SOLN 1.5 GM/250ML-0.9% 250 MILLIGRAM(S): 1.5-0.9/25 SOLUTION at 00:45

## 2024-01-01 RX ADMIN — Medication 2.5 MILLIGRAM(S): at 03:19

## 2024-01-01 RX ADMIN — Medication 50 MILLILITER(S): at 19:53

## 2024-01-01 RX ADMIN — Medication 4 MILLILITER(S): at 05:42

## 2024-01-01 RX ADMIN — FUROSEMIDE 20 MILLIGRAM(S): 10 INJECTION INTRAVENOUS at 05:54

## 2024-01-01 RX ADMIN — IPRATROPIUM BROMIDE AND ALBUTEROL SULFATE 3 MILLILITER(S): .5; 3 SOLUTION RESPIRATORY (INHALATION) at 06:27

## 2024-01-01 RX ADMIN — Medication 1100 UNIT(S)/HR: at 08:41

## 2024-01-01 RX ADMIN — Medication 2.5 MILLIGRAM(S): at 22:46

## 2024-01-01 RX ADMIN — Medication 4 MILLILITER(S): at 17:33

## 2024-01-01 RX ADMIN — Medication 4 MILLILITER(S): at 16:33

## 2024-01-01 RX ADMIN — Medication 2.5 MILLIGRAM(S): at 05:35

## 2024-01-01 RX ADMIN — Medication 1200 UNIT(S)/HR: at 21:51

## 2024-01-01 RX ADMIN — Medication 1200 UNIT(S)/HR: at 07:12

## 2024-01-01 RX ADMIN — CHLORHEXIDINE GLUCONATE ORAL RINSE 1 APPLICATION(S): 1.2 SOLUTION DENTAL at 11:31

## 2024-01-01 RX ADMIN — SODIUM POLYSTYRENE SULFONATE 30 GRAM(S): 15 SUSPENSION ORAL; RECTAL at 16:05

## 2024-01-01 RX ADMIN — DAPTOMYCIN 120 MILLIGRAM(S): 500 INJECTION, POWDER, LYOPHILIZED, FOR SOLUTION INTRAVENOUS at 15:44

## 2024-01-01 RX ADMIN — DAPTOMYCIN 120 MILLIGRAM(S): 500 INJECTION, POWDER, LYOPHILIZED, FOR SOLUTION INTRAVENOUS at 12:17

## 2024-01-01 RX ADMIN — IPRATROPIUM BROMIDE AND ALBUTEROL SULFATE 3 MILLILITER(S): .5; 3 SOLUTION RESPIRATORY (INHALATION) at 06:31

## 2024-01-01 RX ADMIN — PANTOPRAZOLE SODIUM 40 MILLIGRAM(S): 40 TABLET, DELAYED RELEASE ORAL at 10:48

## 2024-01-01 RX ADMIN — CEFEPIME 100 MILLIGRAM(S): 2 INJECTION, POWDER, FOR SOLUTION INTRAVENOUS at 22:32

## 2024-01-01 RX ADMIN — Medication 4 MILLILITER(S): at 05:59

## 2024-01-01 RX ADMIN — Medication 4 MILLILITER(S): at 06:33

## 2024-01-01 RX ADMIN — Medication 2: at 00:12

## 2024-01-01 RX ADMIN — FUROSEMIDE 20 MILLIGRAM(S): 10 INJECTION INTRAMUSCULAR; INTRAVENOUS at 02:14

## 2024-01-01 RX ADMIN — Medication 50 MILLILITER(S): at 03:00

## 2024-01-01 RX ADMIN — Medication 1200 UNIT(S)/HR: at 05:47

## 2024-01-01 RX ADMIN — FUROSEMIDE 20 MILLIGRAM(S): 10 INJECTION INTRAVENOUS at 13:25

## 2024-01-01 RX ADMIN — Medication 6 MILLIGRAM(S): at 06:45

## 2024-01-01 RX ADMIN — REMDESIVIR 200 MILLIGRAM(S): 5 INJECTION INTRAVENOUS at 12:37

## 2024-01-01 RX ADMIN — PANTOPRAZOLE SODIUM 40 MILLIGRAM(S): 40 TABLET, DELAYED RELEASE ORAL at 11:18

## 2024-01-01 RX ADMIN — Medication 4 MILLILITER(S): at 18:27

## 2024-01-01 RX ADMIN — Medication 2: at 06:48

## 2024-01-01 RX ADMIN — Medication 2.5 MILLIGRAM(S): at 12:08

## 2024-01-01 RX ADMIN — IPRATROPIUM BROMIDE AND ALBUTEROL SULFATE 3 MILLILITER(S): .5; 3 SOLUTION RESPIRATORY (INHALATION) at 10:48

## 2024-01-01 RX ADMIN — CHLORHEXIDINE GLUCONATE ORAL RINSE 1 APPLICATION(S): 1.2 SOLUTION DENTAL at 11:51

## 2024-01-01 RX ADMIN — Medication 1200 UNIT(S)/HR: at 18:19

## 2024-01-01 RX ADMIN — Medication 50 MILLILITER(S): at 21:42

## 2024-01-01 RX ADMIN — Medication 1100 UNIT(S)/HR: at 06:34

## 2024-01-01 RX ADMIN — PANTOPRAZOLE SODIUM 40 MILLIGRAM(S): 40 TABLET, DELAYED RELEASE ORAL at 12:11

## 2024-01-01 RX ADMIN — Medication 4 MILLILITER(S): at 06:00

## 2024-01-01 RX ADMIN — Medication 4 MILLILITER(S): at 05:44

## 2024-01-01 RX ADMIN — Medication 4 MILLILITER(S): at 06:50

## 2024-01-01 RX ADMIN — IPRATROPIUM BROMIDE AND ALBUTEROL SULFATE 3 MILLILITER(S): .5; 3 SOLUTION RESPIRATORY (INHALATION) at 00:46

## 2024-01-01 RX ADMIN — IPRATROPIUM BROMIDE AND ALBUTEROL SULFATE 3 MILLILITER(S): .5; 3 SOLUTION RESPIRATORY (INHALATION) at 18:24

## 2024-01-01 RX ADMIN — CHLORHEXIDINE GLUCONATE ORAL RINSE 1 APPLICATION(S): 1.2 SOLUTION DENTAL at 12:20

## 2024-01-01 RX ADMIN — Medication 5 UNIT(S): at 08:33

## 2024-01-01 RX ADMIN — Medication 4 MILLILITER(S): at 05:02

## 2024-01-01 RX ADMIN — Medication 50 MILLILITER(S): at 01:03

## 2024-01-01 RX ADMIN — Medication 1100 UNIT(S)/HR: at 18:25

## 2024-01-01 RX ADMIN — Medication 5 MILLIGRAM(S): at 11:20

## 2024-01-01 RX ADMIN — Medication 6 MILLIGRAM(S): at 06:26

## 2024-01-01 RX ADMIN — Medication 50 MILLILITER(S): at 18:20

## 2024-01-01 RX ADMIN — IPRATROPIUM BROMIDE AND ALBUTEROL SULFATE 3 MILLILITER(S): .5; 3 SOLUTION RESPIRATORY (INHALATION) at 11:29

## 2024-01-01 RX ADMIN — Medication 10 UNIT(S): at 01:03

## 2024-01-01 RX ADMIN — ERYTHROPOIETIN 10000 UNIT(S): 10000 INJECTION, SOLUTION INTRAVENOUS; SUBCUTANEOUS at 20:01

## 2024-01-01 RX ADMIN — IPRATROPIUM BROMIDE AND ALBUTEROL SULFATE 3 MILLILITER(S): .5; 3 SOLUTION RESPIRATORY (INHALATION) at 05:42

## 2024-01-01 RX ADMIN — PANTOPRAZOLE SODIUM 40 MILLIGRAM(S): 40 TABLET, DELAYED RELEASE ORAL at 11:58

## 2024-01-01 RX ADMIN — Medication 262 MILLIGRAM(S): at 00:45

## 2024-01-01 RX ADMIN — Medication 3: at 06:30

## 2024-01-01 RX ADMIN — Medication 50 MILLILITER(S): at 12:49

## 2024-01-01 RX ADMIN — Medication 6 MILLIGRAM(S): at 05:52

## 2024-01-01 RX ADMIN — Medication 1100 UNIT(S)/HR: at 18:46

## 2024-01-01 RX ADMIN — Medication 2.5 MILLIGRAM(S): at 12:43

## 2024-01-01 RX ADMIN — IPRATROPIUM BROMIDE AND ALBUTEROL SULFATE 3 MILLILITER(S): .5; 3 SOLUTION RESPIRATORY (INHALATION) at 18:27

## 2024-01-01 RX ADMIN — Medication 1: at 18:48

## 2024-01-01 RX ADMIN — VANCOMYCIN HCL-SODIUM CHLORIDE IV SOLN 1.5 GM/250ML-0.9% 250 MILLIGRAM(S): 1.5-0.9/25 SOLUTION at 22:04

## 2024-01-01 RX ADMIN — Medication 6 MILLIGRAM(S): at 06:03

## 2024-01-01 RX ADMIN — Medication 4 MILLILITER(S): at 18:45

## 2024-01-01 RX ADMIN — Medication 2000 MILLILITER(S): at 00:45

## 2024-01-01 RX ADMIN — FUROSEMIDE 20 MILLIGRAM(S): 10 INJECTION INTRAVENOUS at 14:46

## 2024-01-01 RX ADMIN — Medication 1100 UNIT(S)/HR: at 19:46

## 2024-01-01 RX ADMIN — CEFEPIME 100 MILLIGRAM(S): 2 INJECTION, POWDER, FOR SOLUTION INTRAVENOUS at 06:26

## 2024-01-01 RX ADMIN — Medication 3: at 12:49

## 2024-01-01 RX ADMIN — IPRATROPIUM BROMIDE AND ALBUTEROL SULFATE 3 MILLILITER(S): .5; 3 SOLUTION RESPIRATORY (INHALATION) at 01:24

## 2024-01-01 RX ADMIN — CHLORHEXIDINE GLUCONATE ORAL RINSE 1 APPLICATION(S): 1.2 SOLUTION DENTAL at 09:46

## 2024-01-01 RX ADMIN — FUROSEMIDE 20 MILLIGRAM(S): 10 INJECTION INTRAVENOUS at 06:26

## 2024-01-01 RX ADMIN — Medication 1100 UNIT(S)/HR: at 19:58

## 2024-01-01 RX ADMIN — Medication 2.5 MILLIGRAM(S): at 05:50

## 2024-01-01 RX ADMIN — Medication 5 MILLIGRAM(S): at 11:57

## 2024-01-01 RX ADMIN — Medication 5 MILLIGRAM(S): at 00:19

## 2024-01-01 RX ADMIN — Medication 400 MILLIGRAM(S): at 17:54

## 2024-01-01 RX ADMIN — DAPTOMYCIN 120 MILLIGRAM(S): 500 INJECTION, POWDER, LYOPHILIZED, FOR SOLUTION INTRAVENOUS at 14:04

## 2024-01-01 RX ADMIN — Medication 4 MILLILITER(S): at 18:24

## 2024-01-01 RX ADMIN — Medication 4 MILLILITER(S): at 06:26

## 2024-01-01 RX ADMIN — Medication 4 MILLILITER(S): at 06:32

## 2024-01-01 RX ADMIN — IPRATROPIUM BROMIDE AND ALBUTEROL SULFATE 3 MILLILITER(S): .5; 3 SOLUTION RESPIRATORY (INHALATION) at 12:10

## 2024-01-01 RX ADMIN — Medication 8 UNIT(S): at 21:41

## 2024-01-01 RX ADMIN — PANTOPRAZOLE SODIUM 40 MILLIGRAM(S): 40 TABLET, DELAYED RELEASE ORAL at 11:29

## 2024-01-01 RX ADMIN — IPRATROPIUM BROMIDE AND ALBUTEROL SULFATE 3 MILLILITER(S): .5; 3 SOLUTION RESPIRATORY (INHALATION) at 12:50

## 2024-01-01 RX ADMIN — Medication 4 MILLILITER(S): at 18:47

## 2024-01-01 RX ADMIN — Medication 1200 UNIT(S)/HR: at 11:26

## 2024-01-01 RX ADMIN — Medication 5 MILLIGRAM(S): at 06:31

## 2024-01-01 RX ADMIN — IPRATROPIUM BROMIDE AND ALBUTEROL SULFATE 3 MILLILITER(S): .5; 3 SOLUTION RESPIRATORY (INHALATION) at 17:32

## 2024-01-01 RX ADMIN — IPRATROPIUM BROMIDE AND ALBUTEROL SULFATE 3 MILLILITER(S): .5; 3 SOLUTION RESPIRATORY (INHALATION) at 23:07

## 2024-01-01 RX ADMIN — PANTOPRAZOLE SODIUM 40 MILLIGRAM(S): 40 TABLET, DELAYED RELEASE ORAL at 12:50

## 2024-01-01 RX ADMIN — Medication 100 MILLIGRAM(S): at 10:25

## 2024-01-01 RX ADMIN — Medication 1100 UNIT(S)/HR: at 07:58

## 2024-06-10 ENCOUNTER — TRANSCRIPTION ENCOUNTER (OUTPATIENT)
Age: 89
End: 2024-06-10

## 2024-06-11 ENCOUNTER — INPATIENT (INPATIENT)
Facility: HOSPITAL | Age: 89
LOS: 23 days | Discharge: NURSING, REHAB OR LONG-TERM CARE | DRG: 395 | End: 2024-07-05
Attending: INTERNAL MEDICINE | Admitting: STUDENT IN AN ORGANIZED HEALTH CARE EDUCATION/TRAINING PROGRAM
Payer: MEDICARE

## 2024-06-11 VITALS
HEART RATE: 74 BPM | OXYGEN SATURATION: 94 % | SYSTOLIC BLOOD PRESSURE: 141 MMHG | RESPIRATION RATE: 18 BRPM | DIASTOLIC BLOOD PRESSURE: 72 MMHG

## 2024-06-11 DIAGNOSIS — K66.8 OTHER SPECIFIED DISORDERS OF PERITONEUM: ICD-10-CM

## 2024-06-11 DIAGNOSIS — Z98.890 OTHER SPECIFIED POSTPROCEDURAL STATES: Chronic | ICD-10-CM

## 2024-06-11 LAB
ALBUMIN SERPL ELPH-MCNC: 3.3 G/DL — SIGNIFICANT CHANGE UP (ref 3.3–5)
ALP SERPL-CCNC: 67 U/L — SIGNIFICANT CHANGE UP (ref 40–120)
ALT FLD-CCNC: 14 U/L — SIGNIFICANT CHANGE UP (ref 10–45)
ANION GAP SERPL CALC-SCNC: 11 MMOL/L — SIGNIFICANT CHANGE UP (ref 5–17)
ANION GAP SERPL CALC-SCNC: 12 MMOL/L — SIGNIFICANT CHANGE UP (ref 5–17)
ANISOCYTOSIS BLD QL: SLIGHT — SIGNIFICANT CHANGE UP
APTT BLD: 22.2 SEC — LOW (ref 24.5–35.6)
APTT BLD: 24.8 SEC — SIGNIFICANT CHANGE UP (ref 24.5–35.6)
AST SERPL-CCNC: 19 U/L — SIGNIFICANT CHANGE UP (ref 10–40)
BASE EXCESS BLDA CALC-SCNC: -7 MMOL/L — LOW (ref -2–3)
BASE EXCESS BLDV CALC-SCNC: -4.5 MMOL/L — LOW (ref -2–3)
BASOPHILS # BLD AUTO: 0.05 K/UL — SIGNIFICANT CHANGE UP (ref 0–0.2)
BASOPHILS NFR BLD AUTO: 0.9 % — SIGNIFICANT CHANGE UP (ref 0–2)
BILIRUB SERPL-MCNC: 0.1 MG/DL — LOW (ref 0.2–1.2)
BLD GP AB SCN SERPL QL: NEGATIVE — SIGNIFICANT CHANGE UP
BUN SERPL-MCNC: 35 MG/DL — HIGH (ref 7–23)
BUN SERPL-MCNC: 39 MG/DL — HIGH (ref 7–23)
CA-I SERPL-SCNC: 1.28 MMOL/L — SIGNIFICANT CHANGE UP (ref 1.15–1.33)
CALCIUM SERPL-MCNC: 7.7 MG/DL — LOW (ref 8.4–10.5)
CALCIUM SERPL-MCNC: 8.7 MG/DL — SIGNIFICANT CHANGE UP (ref 8.4–10.5)
CHLORIDE BLDV-SCNC: 111 MMOL/L — HIGH (ref 96–108)
CHLORIDE SERPL-SCNC: 109 MMOL/L — HIGH (ref 96–108)
CHLORIDE SERPL-SCNC: 111 MMOL/L — HIGH (ref 96–108)
CO2 BLDA-SCNC: 19 MMOL/L — SIGNIFICANT CHANGE UP (ref 19–24)
CO2 BLDV-SCNC: 24 MMOL/L — SIGNIFICANT CHANGE UP (ref 22–26)
CO2 SERPL-SCNC: 18 MMOL/L — LOW (ref 22–31)
CO2 SERPL-SCNC: 19 MMOL/L — LOW (ref 22–31)
CREAT SERPL-MCNC: 1.29 MG/DL — SIGNIFICANT CHANGE UP (ref 0.5–1.3)
CREAT SERPL-MCNC: 1.33 MG/DL — HIGH (ref 0.5–1.3)
EGFR: 51 ML/MIN/1.73M2 — LOW
EGFR: 53 ML/MIN/1.73M2 — LOW
ELLIPTOCYTES BLD QL SMEAR: SLIGHT — SIGNIFICANT CHANGE UP
EOSINOPHIL # BLD AUTO: 0.05 K/UL — SIGNIFICANT CHANGE UP (ref 0–0.5)
EOSINOPHIL NFR BLD AUTO: 0.9 % — SIGNIFICANT CHANGE UP (ref 0–6)
GAS PNL BLDA: SIGNIFICANT CHANGE UP
GAS PNL BLDV: 140 MMOL/L — SIGNIFICANT CHANGE UP (ref 136–145)
GAS PNL BLDV: SIGNIFICANT CHANGE UP
GAS PNL BLDV: SIGNIFICANT CHANGE UP
GLUCOSE BLDV-MCNC: 155 MG/DL — HIGH (ref 70–99)
GLUCOSE SERPL-MCNC: 127 MG/DL — HIGH (ref 70–99)
GLUCOSE SERPL-MCNC: 151 MG/DL — HIGH (ref 70–99)
HCO3 BLDA-SCNC: 18 MMOL/L — LOW (ref 21–28)
HCO3 BLDV-SCNC: 22 MMOL/L — SIGNIFICANT CHANGE UP (ref 22–29)
HCT VFR BLD CALC: 21.7 % — LOW (ref 39–50)
HCT VFR BLD CALC: 29.1 % — LOW (ref 39–50)
HCT VFR BLDA CALC: 20 % — CRITICAL LOW (ref 39–51)
HGB BLD CALC-MCNC: 6.5 G/DL — CRITICAL LOW (ref 12.6–17.4)
HGB BLD-MCNC: 6.4 G/DL — CRITICAL LOW (ref 13–17)
HGB BLD-MCNC: 9 G/DL — LOW (ref 13–17)
INR BLD: 0.94 RATIO — SIGNIFICANT CHANGE UP (ref 0.85–1.18)
INR BLD: 0.95 RATIO — SIGNIFICANT CHANGE UP (ref 0.85–1.18)
LACTATE BLDV-MCNC: 1.9 MMOL/L — SIGNIFICANT CHANGE UP (ref 0.5–2)
LIDOCAIN IGE QN: 56 U/L — SIGNIFICANT CHANGE UP (ref 7–60)
LYMPHOCYTES # BLD AUTO: 1.12 K/UL — SIGNIFICANT CHANGE UP (ref 1–3.3)
LYMPHOCYTES # BLD AUTO: 18.4 % — SIGNIFICANT CHANGE UP (ref 13–44)
MACROCYTES BLD QL: SIGNIFICANT CHANGE UP
MAGNESIUM SERPL-MCNC: 1.8 MG/DL — SIGNIFICANT CHANGE UP (ref 1.6–2.6)
MANUAL SMEAR VERIFICATION: SIGNIFICANT CHANGE UP
MCHC RBC-ENTMCNC: 28.4 PG — SIGNIFICANT CHANGE UP (ref 27–34)
MCHC RBC-ENTMCNC: 29.5 GM/DL — LOW (ref 32–36)
MCHC RBC-ENTMCNC: 29.6 PG — SIGNIFICANT CHANGE UP (ref 27–34)
MCHC RBC-ENTMCNC: 30.9 GM/DL — LOW (ref 32–36)
MCV RBC AUTO: 100.5 FL — HIGH (ref 80–100)
MCV RBC AUTO: 91.8 FL — SIGNIFICANT CHANGE UP (ref 80–100)
MONOCYTES # BLD AUTO: 0.27 K/UL — SIGNIFICANT CHANGE UP (ref 0–0.9)
MONOCYTES NFR BLD AUTO: 4.4 % — SIGNIFICANT CHANGE UP (ref 2–14)
NEUTROPHILS # BLD AUTO: 4.61 K/UL — SIGNIFICANT CHANGE UP (ref 1.8–7.4)
NEUTROPHILS NFR BLD AUTO: 75.4 % — SIGNIFICANT CHANGE UP (ref 43–77)
NEUTS HYPERSEG # BLD: PRESENT — SIGNIFICANT CHANGE UP
NRBC # BLD: 0 /100 WBCS — SIGNIFICANT CHANGE UP (ref 0–0)
OB PNL STL: NEGATIVE — SIGNIFICANT CHANGE UP
OVALOCYTES BLD QL SMEAR: SLIGHT — SIGNIFICANT CHANGE UP
PCO2 BLDA: 33 MMHG — LOW (ref 35–48)
PCO2 BLDV: 51 MMHG — SIGNIFICANT CHANGE UP (ref 42–55)
PH BLDA: 7.34 — LOW (ref 7.35–7.45)
PH BLDV: 7.25 — LOW (ref 7.32–7.43)
PHOSPHATE SERPL-MCNC: 3.1 MG/DL — SIGNIFICANT CHANGE UP (ref 2.5–4.5)
PLAT MORPH BLD: NORMAL — SIGNIFICANT CHANGE UP
PLATELET # BLD AUTO: 198 K/UL — SIGNIFICANT CHANGE UP (ref 150–400)
PLATELET # BLD AUTO: 281 K/UL — SIGNIFICANT CHANGE UP (ref 150–400)
PO2 BLDA: 60 MMHG — LOW (ref 83–108)
PO2 BLDV: 26 MMHG — SIGNIFICANT CHANGE UP (ref 25–45)
POIKILOCYTOSIS BLD QL AUTO: SLIGHT — SIGNIFICANT CHANGE UP
POTASSIUM BLDV-SCNC: 4.1 MMOL/L — SIGNIFICANT CHANGE UP (ref 3.5–5.1)
POTASSIUM SERPL-MCNC: 4 MMOL/L — SIGNIFICANT CHANGE UP (ref 3.5–5.3)
POTASSIUM SERPL-MCNC: 4.9 MMOL/L — SIGNIFICANT CHANGE UP (ref 3.5–5.3)
POTASSIUM SERPL-SCNC: 4 MMOL/L — SIGNIFICANT CHANGE UP (ref 3.5–5.3)
POTASSIUM SERPL-SCNC: 4.9 MMOL/L — SIGNIFICANT CHANGE UP (ref 3.5–5.3)
PROT SERPL-MCNC: 6.5 G/DL — SIGNIFICANT CHANGE UP (ref 6–8.3)
PROTHROM AB SERPL-ACNC: 10.4 SEC — SIGNIFICANT CHANGE UP (ref 9.5–13)
PROTHROM AB SERPL-ACNC: 10.5 SEC — SIGNIFICANT CHANGE UP (ref 9.5–13)
RBC # BLD: 2.16 M/UL — LOW (ref 4.2–5.8)
RBC # BLD: 3.17 M/UL — LOW (ref 4.2–5.8)
RBC # FLD: 14.6 % — HIGH (ref 10.3–14.5)
RBC # FLD: 16.4 % — HIGH (ref 10.3–14.5)
RBC BLD AUTO: ABNORMAL
RH IG SCN BLD-IMP: POSITIVE — SIGNIFICANT CHANGE UP
SAO2 % BLDA: 93 % — LOW (ref 94–98)
SAO2 % BLDV: 25.2 % — LOW (ref 67–88)
SODIUM SERPL-SCNC: 140 MMOL/L — SIGNIFICANT CHANGE UP (ref 135–145)
SODIUM SERPL-SCNC: 140 MMOL/L — SIGNIFICANT CHANGE UP (ref 135–145)
TROPONIN T, HIGH SENSITIVITY RESULT: 43 NG/L — SIGNIFICANT CHANGE UP (ref 0–51)
TROPONIN T, HIGH SENSITIVITY RESULT: 52 NG/L — HIGH (ref 0–51)
WBC # BLD: 3.95 K/UL — SIGNIFICANT CHANGE UP (ref 3.8–10.5)
WBC # BLD: 6.11 K/UL — SIGNIFICANT CHANGE UP (ref 3.8–10.5)
WBC # FLD AUTO: 3.95 K/UL — SIGNIFICANT CHANGE UP (ref 3.8–10.5)
WBC # FLD AUTO: 6.11 K/UL — SIGNIFICANT CHANGE UP (ref 3.8–10.5)

## 2024-06-11 PROCEDURE — 71045 X-RAY EXAM CHEST 1 VIEW: CPT | Mod: 26

## 2024-06-11 PROCEDURE — 74177 CT ABD & PELVIS W/CONTRAST: CPT | Mod: 26,MC

## 2024-06-11 PROCEDURE — 71275 CT ANGIOGRAPHY CHEST: CPT | Mod: 26,MC

## 2024-06-11 PROCEDURE — 99291 CRITICAL CARE FIRST HOUR: CPT | Mod: GC

## 2024-06-11 RX ORDER — DEXTROSE MONOHYDRATE AND SODIUM CHLORIDE 5; .3 G/100ML; G/100ML
1000 INJECTION, SOLUTION INTRAVENOUS
Refills: 0 | Status: DISCONTINUED | OUTPATIENT
Start: 2024-06-11 | End: 2024-06-12

## 2024-06-11 RX ORDER — HEPARIN SODIUM 50 [USP'U]/ML
5000 INJECTION, SOLUTION INTRAVENOUS EVERY 8 HOURS
Refills: 0 | Status: DISCONTINUED | OUTPATIENT
Start: 2024-06-11 | End: 2024-06-11

## 2024-06-11 RX ORDER — PIPERACILLIN SODIUM AND TAZOBACTAM SODIUM 3; .375 G/15ML; G/15ML
3.38 INJECTION, POWDER, LYOPHILIZED, FOR SOLUTION INTRAVENOUS ONCE
Refills: 0 | Status: DISCONTINUED | OUTPATIENT
Start: 2024-06-11 | End: 2024-06-11

## 2024-06-11 RX ORDER — PIPERACILLIN SODIUM AND TAZOBACTAM SODIUM 3; .375 G/15ML; G/15ML
3.38 INJECTION, POWDER, LYOPHILIZED, FOR SOLUTION INTRAVENOUS ONCE
Refills: 0 | Status: COMPLETED | OUTPATIENT
Start: 2024-06-11 | End: 2024-06-11

## 2024-06-11 RX ORDER — HYDROMORPHONE HCL 0.2 MG/ML
0.2 INJECTION, SOLUTION INTRAVENOUS EVERY 4 HOURS
Refills: 0 | Status: DISCONTINUED | OUTPATIENT
Start: 2024-06-11 | End: 2024-06-15

## 2024-06-11 RX ORDER — SODIUM CHLORIDE 0.9 % (FLUSH) 0.9 %
1000 SYRINGE (ML) INJECTION ONCE
Refills: 0 | Status: COMPLETED | OUTPATIENT
Start: 2024-06-11 | End: 2024-06-11

## 2024-06-11 RX ORDER — PIPERACILLIN SODIUM AND TAZOBACTAM SODIUM 3; .375 G/15ML; G/15ML
3.38 INJECTION, POWDER, LYOPHILIZED, FOR SOLUTION INTRAVENOUS EVERY 8 HOURS
Refills: 0 | Status: COMPLETED | OUTPATIENT
Start: 2024-06-12 | End: 2024-06-18

## 2024-06-11 RX ORDER — ACETAMINOPHEN 325 MG
1000 TABLET ORAL EVERY 6 HOURS
Refills: 0 | Status: COMPLETED | OUTPATIENT
Start: 2024-06-11 | End: 2024-06-12

## 2024-06-11 RX ORDER — ENOXAPARIN SODIUM 100 MG/ML
40 INJECTION SUBCUTANEOUS EVERY 24 HOURS
Refills: 0 | Status: DISCONTINUED | OUTPATIENT
Start: 2024-06-11 | End: 2024-06-15

## 2024-06-11 RX ORDER — PANTOPRAZOLE SODIUM 40 MG/10ML
80 INJECTION, POWDER, FOR SOLUTION INTRAVENOUS ONCE
Refills: 0 | Status: COMPLETED | OUTPATIENT
Start: 2024-06-11 | End: 2024-06-11

## 2024-06-11 RX ORDER — DEXTROSE MONOHYDRATE AND SODIUM CHLORIDE 5; .3 G/100ML; G/100ML
1000 INJECTION, SOLUTION INTRAVENOUS
Refills: 0 | Status: DISCONTINUED | OUTPATIENT
Start: 2024-06-11 | End: 2024-06-11

## 2024-06-11 RX ORDER — VANCOMYCIN HYDROCHLORIDE 50 MG/ML
1000 KIT ORAL ONCE
Refills: 0 | Status: COMPLETED | OUTPATIENT
Start: 2024-06-11 | End: 2024-06-11

## 2024-06-11 RX ORDER — PANTOPRAZOLE SODIUM 40 MG/10ML
8 INJECTION, POWDER, FOR SOLUTION INTRAVENOUS
Qty: 80 | Refills: 0 | Status: DISCONTINUED | OUTPATIENT
Start: 2024-06-11 | End: 2024-06-11

## 2024-06-11 RX ORDER — MORPHINE SULFATE 100 MG/1
4 TABLET, EXTENDED RELEASE ORAL ONCE
Refills: 0 | Status: DISCONTINUED | OUTPATIENT
Start: 2024-06-11 | End: 2024-06-11

## 2024-06-11 RX ORDER — PANTOPRAZOLE SODIUM 40 MG/10ML
40 INJECTION, POWDER, FOR SOLUTION INTRAVENOUS EVERY 12 HOURS
Refills: 0 | Status: DISCONTINUED | OUTPATIENT
Start: 2024-06-11 | End: 2024-07-01

## 2024-06-11 RX ORDER — PIPERACILLIN SODIUM AND TAZOBACTAM SODIUM 3; .375 G/15ML; G/15ML
3.38 INJECTION, POWDER, LYOPHILIZED, FOR SOLUTION INTRAVENOUS EVERY 8 HOURS
Refills: 0 | Status: DISCONTINUED | OUTPATIENT
Start: 2024-06-11 | End: 2024-06-11

## 2024-06-11 RX ORDER — FLUCONAZOLE 200 MG
800 TABLET ORAL ONCE
Refills: 0 | Status: COMPLETED | OUTPATIENT
Start: 2024-06-11 | End: 2024-06-11

## 2024-06-11 RX ORDER — FERROUS SULFATE 325(65) MG
65 TABLET ORAL
Qty: 0 | Refills: 0 | DISCHARGE

## 2024-06-11 RX ORDER — ACETAMINOPHEN 325 MG
1000 TABLET ORAL ONCE
Refills: 0 | Status: COMPLETED | OUTPATIENT
Start: 2024-06-11 | End: 2024-06-11

## 2024-06-11 RX ORDER — ASPIRIN 325 MG/1
1 TABLET, FILM COATED ORAL
Refills: 0 | DISCHARGE

## 2024-06-11 RX ORDER — MULTIVIT-MIN/FERROUS GLUCONATE 9 MG/15 ML
0 LIQUID (ML) ORAL
Qty: 0 | Refills: 0 | DISCHARGE

## 2024-06-11 RX ADMIN — Medication 100 MILLIGRAM(S): at 18:56

## 2024-06-11 RX ADMIN — PANTOPRAZOLE SODIUM 80 MILLIGRAM(S): 40 INJECTION, POWDER, FOR SOLUTION INTRAVENOUS at 10:00

## 2024-06-11 RX ADMIN — VANCOMYCIN HYDROCHLORIDE 250 MILLIGRAM(S): KIT at 09:39

## 2024-06-11 RX ADMIN — DEXTROSE MONOHYDRATE AND SODIUM CHLORIDE 100 MILLILITER(S): 5; .3 INJECTION, SOLUTION INTRAVENOUS at 17:37

## 2024-06-11 RX ADMIN — Medication 1000 MILLILITER(S): at 07:39

## 2024-06-11 RX ADMIN — Medication 1000 MILLILITER(S): at 09:40

## 2024-06-11 RX ADMIN — PANTOPRAZOLE SODIUM 40 MILLIGRAM(S): 40 INJECTION, POWDER, FOR SOLUTION INTRAVENOUS at 18:56

## 2024-06-11 RX ADMIN — MORPHINE SULFATE 4 MILLIGRAM(S): 100 TABLET, EXTENDED RELEASE ORAL at 09:15

## 2024-06-11 RX ADMIN — MORPHINE SULFATE 4 MILLIGRAM(S): 100 TABLET, EXTENDED RELEASE ORAL at 08:29

## 2024-06-11 RX ADMIN — MORPHINE SULFATE 4 MILLIGRAM(S): 100 TABLET, EXTENDED RELEASE ORAL at 09:00

## 2024-06-11 RX ADMIN — Medication 1000 MILLIGRAM(S): at 08:40

## 2024-06-11 RX ADMIN — PIPERACILLIN SODIUM AND TAZOBACTAM SODIUM 25 GRAM(S): 3; .375 INJECTION, POWDER, LYOPHILIZED, FOR SOLUTION INTRAVENOUS at 21:46

## 2024-06-11 RX ADMIN — Medication 1000 MILLILITER(S): at 10:01

## 2024-06-11 RX ADMIN — PANTOPRAZOLE SODIUM 10 MG/HR: 40 INJECTION, POWDER, FOR SOLUTION INTRAVENOUS at 10:15

## 2024-06-11 RX ADMIN — PIPERACILLIN SODIUM AND TAZOBACTAM SODIUM 200 GRAM(S): 3; .375 INJECTION, POWDER, LYOPHILIZED, FOR SOLUTION INTRAVENOUS at 09:10

## 2024-06-11 RX ADMIN — Medication 1000 MILLIGRAM(S): at 09:15

## 2024-06-11 RX ADMIN — Medication 400 MILLIGRAM(S): at 07:39

## 2024-06-11 RX ADMIN — PIPERACILLIN SODIUM AND TAZOBACTAM SODIUM 200 GRAM(S): 3; .375 INJECTION, POWDER, LYOPHILIZED, FOR SOLUTION INTRAVENOUS at 18:19

## 2024-06-11 RX ADMIN — Medication 1000 MILLIGRAM(S): at 22:05

## 2024-06-11 RX ADMIN — PIPERACILLIN SODIUM AND TAZOBACTAM SODIUM 3.38 GRAM(S): 3; .375 INJECTION, POWDER, LYOPHILIZED, FOR SOLUTION INTRAVENOUS at 10:01

## 2024-06-11 RX ADMIN — Medication 400 MILLIGRAM(S): at 21:47

## 2024-06-12 LAB
ANION GAP SERPL CALC-SCNC: 10 MMOL/L — SIGNIFICANT CHANGE UP (ref 5–17)
BUN SERPL-MCNC: 35 MG/DL — HIGH (ref 7–23)
CALCIUM SERPL-MCNC: 7.5 MG/DL — LOW (ref 8.4–10.5)
CHLORIDE SERPL-SCNC: 111 MMOL/L — HIGH (ref 96–108)
CO2 SERPL-SCNC: 19 MMOL/L — LOW (ref 22–31)
CREAT SERPL-MCNC: 1.59 MG/DL — HIGH (ref 0.5–1.3)
EGFR: 41 ML/MIN/1.73M2 — LOW
GLUCOSE SERPL-MCNC: 80 MG/DL — SIGNIFICANT CHANGE UP (ref 70–99)
GRAM STN FLD: SIGNIFICANT CHANGE UP
HCT VFR BLD CALC: 23.4 % — LOW (ref 39–50)
HCT VFR BLD CALC: 24.9 % — LOW (ref 39–50)
HGB BLD-MCNC: 7.4 G/DL — LOW (ref 13–17)
HGB BLD-MCNC: 7.9 G/DL — LOW (ref 13–17)
MAGNESIUM SERPL-MCNC: 1.8 MG/DL — SIGNIFICANT CHANGE UP (ref 1.6–2.6)
MCHC RBC-ENTMCNC: 28.7 PG — SIGNIFICANT CHANGE UP (ref 27–34)
MCHC RBC-ENTMCNC: 29.4 PG — SIGNIFICANT CHANGE UP (ref 27–34)
MCHC RBC-ENTMCNC: 31.6 GM/DL — LOW (ref 32–36)
MCHC RBC-ENTMCNC: 31.7 GM/DL — LOW (ref 32–36)
MCV RBC AUTO: 90.7 FL — SIGNIFICANT CHANGE UP (ref 80–100)
MCV RBC AUTO: 92.6 FL — SIGNIFICANT CHANGE UP (ref 80–100)
NIGHT BLUE STAIN TISS: SIGNIFICANT CHANGE UP
NRBC # BLD: 0 /100 WBCS — SIGNIFICANT CHANGE UP (ref 0–0)
NRBC # BLD: 0 /100 WBCS — SIGNIFICANT CHANGE UP (ref 0–0)
PHOSPHATE SERPL-MCNC: 4 MG/DL — SIGNIFICANT CHANGE UP (ref 2.5–4.5)
PLATELET # BLD AUTO: 182 K/UL — SIGNIFICANT CHANGE UP (ref 150–400)
PLATELET # BLD AUTO: 183 K/UL — SIGNIFICANT CHANGE UP (ref 150–400)
POTASSIUM SERPL-MCNC: 4.8 MMOL/L — SIGNIFICANT CHANGE UP (ref 3.5–5.3)
POTASSIUM SERPL-SCNC: 4.8 MMOL/L — SIGNIFICANT CHANGE UP (ref 3.5–5.3)
RBC # BLD: 2.58 M/UL — LOW (ref 4.2–5.8)
RBC # BLD: 2.69 M/UL — LOW (ref 4.2–5.8)
RBC # FLD: 16.9 % — HIGH (ref 10.3–14.5)
RBC # FLD: 17.1 % — HIGH (ref 10.3–14.5)
SODIUM SERPL-SCNC: 140 MMOL/L — SIGNIFICANT CHANGE UP (ref 135–145)
SPECIMEN SOURCE: SIGNIFICANT CHANGE UP
SPECIMEN SOURCE: SIGNIFICANT CHANGE UP
WBC # BLD: 11.28 K/UL — HIGH (ref 3.8–10.5)
WBC # BLD: 8.43 K/UL — SIGNIFICANT CHANGE UP (ref 3.8–10.5)
WBC # FLD AUTO: 11.28 K/UL — HIGH (ref 3.8–10.5)
WBC # FLD AUTO: 8.43 K/UL — SIGNIFICANT CHANGE UP (ref 3.8–10.5)

## 2024-06-12 PROCEDURE — 86078 PHYS BLOOD BANK SERV REACTJ: CPT

## 2024-06-12 RX ORDER — DEXTROSE MONOHYDRATE, SODIUM CHLORIDE, AND POTASSIUM CHLORIDE 50; 4.5; 2.24 G/1000ML; G/1000ML; G/1000ML
1000 INJECTION, SOLUTION INTRAVENOUS
Refills: 0 | Status: DISCONTINUED | OUTPATIENT
Start: 2024-06-12 | End: 2024-06-15

## 2024-06-12 RX ORDER — MAGNESIUM SULFATE 100 %
2 POWDER (GRAM) MISCELLANEOUS ONCE
Refills: 0 | Status: COMPLETED | OUTPATIENT
Start: 2024-06-12 | End: 2024-06-12

## 2024-06-12 RX ADMIN — PIPERACILLIN SODIUM AND TAZOBACTAM SODIUM 25 GRAM(S): 3; .375 INJECTION, POWDER, LYOPHILIZED, FOR SOLUTION INTRAVENOUS at 05:31

## 2024-06-12 RX ADMIN — HYDROMORPHONE HCL 0.2 MILLIGRAM(S): 0.2 INJECTION, SOLUTION INTRAVENOUS at 00:25

## 2024-06-12 RX ADMIN — PANTOPRAZOLE SODIUM 40 MILLIGRAM(S): 40 INJECTION, POWDER, FOR SOLUTION INTRAVENOUS at 05:34

## 2024-06-12 RX ADMIN — Medication 400 MILLIGRAM(S): at 05:30

## 2024-06-12 RX ADMIN — Medication 25 GRAM(S): at 10:50

## 2024-06-12 RX ADMIN — PANTOPRAZOLE SODIUM 40 MILLIGRAM(S): 40 INJECTION, POWDER, FOR SOLUTION INTRAVENOUS at 18:18

## 2024-06-12 RX ADMIN — Medication 400 MILLIGRAM(S): at 18:17

## 2024-06-12 RX ADMIN — DEXTROSE MONOHYDRATE, SODIUM CHLORIDE, AND POTASSIUM CHLORIDE 100 MILLILITER(S): 50; 4.5; 2.24 INJECTION, SOLUTION INTRAVENOUS at 18:17

## 2024-06-12 RX ADMIN — Medication 400 MILLIGRAM(S): at 10:50

## 2024-06-12 RX ADMIN — HYDROMORPHONE HCL 0.2 MILLIGRAM(S): 0.2 INJECTION, SOLUTION INTRAVENOUS at 00:35

## 2024-06-12 RX ADMIN — Medication 1000 MILLIGRAM(S): at 05:45

## 2024-06-12 RX ADMIN — PIPERACILLIN SODIUM AND TAZOBACTAM SODIUM 25 GRAM(S): 3; .375 INJECTION, POWDER, LYOPHILIZED, FOR SOLUTION INTRAVENOUS at 21:13

## 2024-06-12 RX ADMIN — DEXTROSE MONOHYDRATE AND SODIUM CHLORIDE 100 MILLILITER(S): 5; .3 INJECTION, SOLUTION INTRAVENOUS at 14:34

## 2024-06-12 RX ADMIN — PIPERACILLIN SODIUM AND TAZOBACTAM SODIUM 25 GRAM(S): 3; .375 INJECTION, POWDER, LYOPHILIZED, FOR SOLUTION INTRAVENOUS at 14:34

## 2024-06-12 RX ADMIN — Medication 1000 MILLIGRAM(S): at 11:20

## 2024-06-12 RX ADMIN — Medication 1000 MILLIGRAM(S): at 18:47

## 2024-06-12 RX ADMIN — ENOXAPARIN SODIUM 40 MILLIGRAM(S): 100 INJECTION SUBCUTANEOUS at 05:30

## 2024-06-13 LAB
ANION GAP SERPL CALC-SCNC: 10 MMOL/L — SIGNIFICANT CHANGE UP (ref 5–17)
BUN SERPL-MCNC: 34 MG/DL — HIGH (ref 7–23)
CALCIUM SERPL-MCNC: 8.3 MG/DL — LOW (ref 8.4–10.5)
CHLORIDE SERPL-SCNC: 108 MMOL/L — SIGNIFICANT CHANGE UP (ref 96–108)
CO2 SERPL-SCNC: 20 MMOL/L — LOW (ref 22–31)
CREAT ?TM UR-MCNC: 97 MG/DL — SIGNIFICANT CHANGE UP
CREAT SERPL-MCNC: 1.67 MG/DL — HIGH (ref 0.5–1.3)
EGFR: 39 ML/MIN/1.73M2 — LOW
FERRITIN SERPL-MCNC: 84 NG/ML — SIGNIFICANT CHANGE UP (ref 30–400)
FLUAV AG NPH QL: SIGNIFICANT CHANGE UP
FLUBV AG NPH QL: SIGNIFICANT CHANGE UP
FOLATE SERPL-MCNC: 5.9 NG/ML — SIGNIFICANT CHANGE UP
GLUCOSE SERPL-MCNC: 98 MG/DL — SIGNIFICANT CHANGE UP (ref 70–99)
HCT VFR BLD CALC: 24.6 % — LOW (ref 39–50)
HGB BLD-MCNC: 7.5 G/DL — LOW (ref 13–17)
IRON SATN MFR SERPL: 2 % — LOW (ref 16–55)
IRON SATN MFR SERPL: 7 UG/DL — LOW (ref 45–165)
MAGNESIUM SERPL-MCNC: 2 MG/DL — SIGNIFICANT CHANGE UP (ref 1.6–2.6)
MCHC RBC-ENTMCNC: 28.3 PG — SIGNIFICANT CHANGE UP (ref 27–34)
MCHC RBC-ENTMCNC: 30.5 GM/DL — LOW (ref 32–36)
MCV RBC AUTO: 92.8 FL — SIGNIFICANT CHANGE UP (ref 80–100)
NRBC # BLD: 0 /100 WBCS — SIGNIFICANT CHANGE UP (ref 0–0)
OSMOLALITY UR: 613 MOS/KG — SIGNIFICANT CHANGE UP (ref 300–900)
PHOSPHATE SERPL-MCNC: 3.7 MG/DL — SIGNIFICANT CHANGE UP (ref 2.5–4.5)
PLATELET # BLD AUTO: 190 K/UL — SIGNIFICANT CHANGE UP (ref 150–400)
POTASSIUM SERPL-MCNC: 4.7 MMOL/L — SIGNIFICANT CHANGE UP (ref 3.5–5.3)
POTASSIUM SERPL-SCNC: 4.7 MMOL/L — SIGNIFICANT CHANGE UP (ref 3.5–5.3)
RBC # BLD: 2.65 M/UL — LOW (ref 4.2–5.8)
RBC # FLD: 16.7 % — HIGH (ref 10.3–14.5)
RSV RNA NPH QL NAA+NON-PROBE: SIGNIFICANT CHANGE UP
SARS-COV-2 RNA SPEC QL NAA+PROBE: SIGNIFICANT CHANGE UP
SODIUM SERPL-SCNC: 138 MMOL/L — SIGNIFICANT CHANGE UP (ref 135–145)
SODIUM UR-SCNC: 61 MMOL/L — SIGNIFICANT CHANGE UP
TIBC SERPL-MCNC: 283 UG/DL — SIGNIFICANT CHANGE UP (ref 220–430)
UIBC SERPL-MCNC: 277 UG/DL — SIGNIFICANT CHANGE UP (ref 110–370)
VIT B12 SERPL-MCNC: 1251 PG/ML — HIGH (ref 232–1245)
WBC # BLD: 12.08 K/UL — HIGH (ref 3.8–10.5)
WBC # FLD AUTO: 12.08 K/UL — HIGH (ref 3.8–10.5)

## 2024-06-13 PROCEDURE — 71045 X-RAY EXAM CHEST 1 VIEW: CPT | Mod: 26,76

## 2024-06-13 RX ORDER — IPRATROPIUM BROMIDE AND ALBUTEROL SULFATE .5; 3 MG/3ML; MG/3ML
3 SOLUTION RESPIRATORY (INHALATION) ONCE
Refills: 0 | Status: COMPLETED | OUTPATIENT
Start: 2024-06-13 | End: 2024-06-13

## 2024-06-13 RX ORDER — HALOPERIDOL DECANOATE 100 MG/ML
0.5 VIAL (ML) INTRAMUSCULAR ONCE
Refills: 0 | Status: DISCONTINUED | OUTPATIENT
Start: 2024-06-13 | End: 2024-06-13

## 2024-06-13 RX ORDER — SODIUM CHLORIDE 0.9 % (FLUSH) 0.9 %
500 SYRINGE (ML) INJECTION ONCE
Refills: 0 | Status: COMPLETED | OUTPATIENT
Start: 2024-06-13 | End: 2024-06-13

## 2024-06-13 RX ORDER — HALOPERIDOL DECANOATE 100 MG/ML
0.5 VIAL (ML) INTRAMUSCULAR ONCE
Refills: 0 | Status: COMPLETED | OUTPATIENT
Start: 2024-06-13 | End: 2024-06-13

## 2024-06-13 RX ORDER — ACETAMINOPHEN 325 MG
1000 TABLET ORAL EVERY 6 HOURS
Refills: 0 | Status: COMPLETED | OUTPATIENT
Start: 2024-06-13 | End: 2024-06-14

## 2024-06-13 RX ORDER — HALOPERIDOL DECANOATE 100 MG/ML
0.5 VIAL (ML) INTRAMUSCULAR AT BEDTIME
Refills: 0 | Status: COMPLETED | OUTPATIENT
Start: 2024-06-13 | End: 2024-06-14

## 2024-06-13 RX ADMIN — PANTOPRAZOLE SODIUM 40 MILLIGRAM(S): 40 INJECTION, POWDER, FOR SOLUTION INTRAVENOUS at 05:02

## 2024-06-13 RX ADMIN — Medication 0.5 MILLIGRAM(S): at 11:54

## 2024-06-13 RX ADMIN — PIPERACILLIN SODIUM AND TAZOBACTAM SODIUM 25 GRAM(S): 3; .375 INJECTION, POWDER, LYOPHILIZED, FOR SOLUTION INTRAVENOUS at 13:09

## 2024-06-13 RX ADMIN — Medication 400 MILLIGRAM(S): at 18:05

## 2024-06-13 RX ADMIN — Medication 1000 MILLIGRAM(S): at 13:08

## 2024-06-13 RX ADMIN — PIPERACILLIN SODIUM AND TAZOBACTAM SODIUM 25 GRAM(S): 3; .375 INJECTION, POWDER, LYOPHILIZED, FOR SOLUTION INTRAVENOUS at 21:05

## 2024-06-13 RX ADMIN — Medication 400 MILLIGRAM(S): at 12:38

## 2024-06-13 RX ADMIN — Medication 1000 MILLIGRAM(S): at 18:35

## 2024-06-13 RX ADMIN — Medication 1000 MILLIGRAM(S): at 23:37

## 2024-06-13 RX ADMIN — PIPERACILLIN SODIUM AND TAZOBACTAM SODIUM 25 GRAM(S): 3; .375 INJECTION, POWDER, LYOPHILIZED, FOR SOLUTION INTRAVENOUS at 05:01

## 2024-06-13 RX ADMIN — Medication 500 MILLILITER(S): at 16:28

## 2024-06-13 RX ADMIN — ENOXAPARIN SODIUM 40 MILLIGRAM(S): 100 INJECTION SUBCUTANEOUS at 05:01

## 2024-06-13 RX ADMIN — Medication 400 MILLIGRAM(S): at 23:07

## 2024-06-13 RX ADMIN — IPRATROPIUM BROMIDE AND ALBUTEROL SULFATE 3 MILLILITER(S): .5; 3 SOLUTION RESPIRATORY (INHALATION) at 03:35

## 2024-06-13 RX ADMIN — PANTOPRAZOLE SODIUM 40 MILLIGRAM(S): 40 INJECTION, POWDER, FOR SOLUTION INTRAVENOUS at 18:06

## 2024-06-14 LAB
ALBUMIN SERPL ELPH-MCNC: 2.7 G/DL — LOW (ref 3.3–5)
ALBUMIN SERPL ELPH-MCNC: 2.8 G/DL — LOW (ref 3.3–5)
ALP SERPL-CCNC: 61 U/L — SIGNIFICANT CHANGE UP (ref 40–120)
ALP SERPL-CCNC: 79 U/L — SIGNIFICANT CHANGE UP (ref 40–120)
ALT FLD-CCNC: 55 U/L — HIGH (ref 10–45)
ALT FLD-CCNC: 58 U/L — HIGH (ref 10–45)
ANION GAP SERPL CALC-SCNC: 11 MMOL/L — SIGNIFICANT CHANGE UP (ref 5–17)
ANION GAP SERPL CALC-SCNC: 5 MMOL/L — SIGNIFICANT CHANGE UP (ref 5–17)
ANION GAP SERPL CALC-SCNC: 8 MMOL/L — SIGNIFICANT CHANGE UP (ref 5–17)
AST SERPL-CCNC: 42 U/L — HIGH (ref 10–40)
AST SERPL-CCNC: 67 U/L — HIGH (ref 10–40)
BILIRUB SERPL-MCNC: 0.2 MG/DL — SIGNIFICANT CHANGE UP (ref 0.2–1.2)
BILIRUB SERPL-MCNC: 0.2 MG/DL — SIGNIFICANT CHANGE UP (ref 0.2–1.2)
BUN SERPL-MCNC: 30 MG/DL — HIGH (ref 7–23)
BUN SERPL-MCNC: 31 MG/DL — HIGH (ref 7–23)
BUN SERPL-MCNC: 32 MG/DL — HIGH (ref 7–23)
CALCIUM SERPL-MCNC: 7.8 MG/DL — LOW (ref 8.4–10.5)
CALCIUM SERPL-MCNC: 8.3 MG/DL — LOW (ref 8.4–10.5)
CALCIUM SERPL-MCNC: 8.3 MG/DL — LOW (ref 8.4–10.5)
CHLORIDE SERPL-SCNC: 109 MMOL/L — HIGH (ref 96–108)
CHLORIDE SERPL-SCNC: 109 MMOL/L — HIGH (ref 96–108)
CHLORIDE SERPL-SCNC: 110 MMOL/L — HIGH (ref 96–108)
CO2 SERPL-SCNC: 18 MMOL/L — LOW (ref 22–31)
CO2 SERPL-SCNC: 19 MMOL/L — LOW (ref 22–31)
CO2 SERPL-SCNC: 20 MMOL/L — LOW (ref 22–31)
CREAT SERPL-MCNC: 1.56 MG/DL — HIGH (ref 0.5–1.3)
CREAT SERPL-MCNC: 1.66 MG/DL — HIGH (ref 0.5–1.3)
CREAT SERPL-MCNC: 1.68 MG/DL — HIGH (ref 0.5–1.3)
EGFR: 39 ML/MIN/1.73M2 — LOW
EGFR: 39 ML/MIN/1.73M2 — LOW
EGFR: 42 ML/MIN/1.73M2 — LOW
GAS PNL BLDA: SIGNIFICANT CHANGE UP
GLUCOSE SERPL-MCNC: 110 MG/DL — HIGH (ref 70–99)
GLUCOSE SERPL-MCNC: 126 MG/DL — HIGH (ref 70–99)
GLUCOSE SERPL-MCNC: 131 MG/DL — HIGH (ref 70–99)
HCT VFR BLD CALC: 26.5 % — LOW (ref 39–50)
HCT VFR BLD CALC: 26.6 % — LOW (ref 39–50)
HGB BLD-MCNC: 7.6 G/DL — LOW (ref 13–17)
HGB BLD-MCNC: 7.9 G/DL — LOW (ref 13–17)
MAGNESIUM SERPL-MCNC: 2.2 MG/DL — SIGNIFICANT CHANGE UP (ref 1.6–2.6)
MAGNESIUM SERPL-MCNC: 2.3 MG/DL — SIGNIFICANT CHANGE UP (ref 1.6–2.6)
MCHC RBC-ENTMCNC: 28.1 PG — SIGNIFICANT CHANGE UP (ref 27–34)
MCHC RBC-ENTMCNC: 28.3 PG — SIGNIFICANT CHANGE UP (ref 27–34)
MCHC RBC-ENTMCNC: 28.7 GM/DL — LOW (ref 32–36)
MCHC RBC-ENTMCNC: 29.7 GM/DL — LOW (ref 32–36)
MCV RBC AUTO: 95.3 FL — SIGNIFICANT CHANGE UP (ref 80–100)
MCV RBC AUTO: 98.1 FL — SIGNIFICANT CHANGE UP (ref 80–100)
NRBC # BLD: 0 /100 WBCS — SIGNIFICANT CHANGE UP (ref 0–0)
NRBC # BLD: 0 /100 WBCS — SIGNIFICANT CHANGE UP (ref 0–0)
PHOSPHATE SERPL-MCNC: 3.7 MG/DL — SIGNIFICANT CHANGE UP (ref 2.5–4.5)
PHOSPHATE SERPL-MCNC: 3.9 MG/DL — SIGNIFICANT CHANGE UP (ref 2.5–4.5)
PLATELET # BLD AUTO: 153 K/UL — SIGNIFICANT CHANGE UP (ref 150–400)
PLATELET # BLD AUTO: 197 K/UL — SIGNIFICANT CHANGE UP (ref 150–400)
POTASSIUM SERPL-MCNC: 4.9 MMOL/L — SIGNIFICANT CHANGE UP (ref 3.5–5.3)
POTASSIUM SERPL-MCNC: 5.1 MMOL/L — SIGNIFICANT CHANGE UP (ref 3.5–5.3)
POTASSIUM SERPL-MCNC: 6.3 MMOL/L — CRITICAL HIGH (ref 3.5–5.3)
POTASSIUM SERPL-SCNC: 4.9 MMOL/L — SIGNIFICANT CHANGE UP (ref 3.5–5.3)
POTASSIUM SERPL-SCNC: 5.1 MMOL/L — SIGNIFICANT CHANGE UP (ref 3.5–5.3)
POTASSIUM SERPL-SCNC: 6.3 MMOL/L — CRITICAL HIGH (ref 3.5–5.3)
PROCALCITONIN SERPL-MCNC: 11.62 NG/ML — HIGH (ref 0.02–0.1)
PROT SERPL-MCNC: 5.8 G/DL — LOW (ref 6–8.3)
PROT SERPL-MCNC: 6.4 G/DL — SIGNIFICANT CHANGE UP (ref 6–8.3)
RBC # BLD: 2.7 M/UL — LOW (ref 4.2–5.8)
RBC # BLD: 2.79 M/UL — LOW (ref 4.2–5.8)
RBC # FLD: 16.3 % — HIGH (ref 10.3–14.5)
RBC # FLD: 16.4 % — HIGH (ref 10.3–14.5)
SODIUM SERPL-SCNC: 134 MMOL/L — LOW (ref 135–145)
SODIUM SERPL-SCNC: 137 MMOL/L — SIGNIFICANT CHANGE UP (ref 135–145)
SODIUM SERPL-SCNC: 138 MMOL/L — SIGNIFICANT CHANGE UP (ref 135–145)
WBC # BLD: 13.21 K/UL — HIGH (ref 3.8–10.5)
WBC # BLD: 9.78 K/UL — SIGNIFICANT CHANGE UP (ref 3.8–10.5)
WBC # FLD AUTO: 13.21 K/UL — HIGH (ref 3.8–10.5)
WBC # FLD AUTO: 9.78 K/UL — SIGNIFICANT CHANGE UP (ref 3.8–10.5)

## 2024-06-14 PROCEDURE — 93970 EXTREMITY STUDY: CPT | Mod: 26

## 2024-06-14 PROCEDURE — 71045 X-RAY EXAM CHEST 1 VIEW: CPT | Mod: 26

## 2024-06-14 PROCEDURE — 76770 US EXAM ABDO BACK WALL COMP: CPT | Mod: 26

## 2024-06-14 PROCEDURE — 71250 CT THORAX DX C-: CPT | Mod: 26

## 2024-06-14 PROCEDURE — 99222 1ST HOSP IP/OBS MODERATE 55: CPT

## 2024-06-14 RX ORDER — IPRATROPIUM BROMIDE AND ALBUTEROL SULFATE .5; 3 MG/3ML; MG/3ML
3 SOLUTION RESPIRATORY (INHALATION) EVERY 6 HOURS
Refills: 0 | Status: DISCONTINUED | OUTPATIENT
Start: 2024-06-14 | End: 2024-07-05

## 2024-06-14 RX ORDER — ACETAMINOPHEN 325 MG
1000 TABLET ORAL EVERY 6 HOURS
Refills: 0 | Status: COMPLETED | OUTPATIENT
Start: 2024-06-14 | End: 2024-06-15

## 2024-06-14 RX ADMIN — PIPERACILLIN SODIUM AND TAZOBACTAM SODIUM 25 GRAM(S): 3; .375 INJECTION, POWDER, LYOPHILIZED, FOR SOLUTION INTRAVENOUS at 05:01

## 2024-06-14 RX ADMIN — Medication 1000 MILLIGRAM(S): at 12:46

## 2024-06-14 RX ADMIN — PANTOPRAZOLE SODIUM 40 MILLIGRAM(S): 40 INJECTION, POWDER, FOR SOLUTION INTRAVENOUS at 17:50

## 2024-06-14 RX ADMIN — Medication 400 MILLIGRAM(S): at 17:49

## 2024-06-14 RX ADMIN — PIPERACILLIN SODIUM AND TAZOBACTAM SODIUM 25 GRAM(S): 3; .375 INJECTION, POWDER, LYOPHILIZED, FOR SOLUTION INTRAVENOUS at 22:54

## 2024-06-14 RX ADMIN — Medication 1000 MILLIGRAM(S): at 18:19

## 2024-06-14 RX ADMIN — Medication 400 MILLIGRAM(S): at 12:06

## 2024-06-14 RX ADMIN — Medication 0.5 MILLIGRAM(S): at 23:19

## 2024-06-14 RX ADMIN — PANTOPRAZOLE SODIUM 40 MILLIGRAM(S): 40 INJECTION, POWDER, FOR SOLUTION INTRAVENOUS at 05:01

## 2024-06-14 RX ADMIN — IPRATROPIUM BROMIDE AND ALBUTEROL SULFATE 3 MILLILITER(S): .5; 3 SOLUTION RESPIRATORY (INHALATION) at 17:50

## 2024-06-14 RX ADMIN — Medication 400 MILLIGRAM(S): at 05:59

## 2024-06-14 RX ADMIN — Medication 1000 MILLIGRAM(S): at 06:17

## 2024-06-14 RX ADMIN — PIPERACILLIN SODIUM AND TAZOBACTAM SODIUM 25 GRAM(S): 3; .375 INJECTION, POWDER, LYOPHILIZED, FOR SOLUTION INTRAVENOUS at 14:13

## 2024-06-14 RX ADMIN — IPRATROPIUM BROMIDE AND ALBUTEROL SULFATE 3 MILLILITER(S): .5; 3 SOLUTION RESPIRATORY (INHALATION) at 12:06

## 2024-06-14 RX ADMIN — ENOXAPARIN SODIUM 40 MILLIGRAM(S): 100 INJECTION SUBCUTANEOUS at 05:01

## 2024-06-15 ENCOUNTER — RESULT REVIEW (OUTPATIENT)
Age: 89
End: 2024-06-15

## 2024-06-15 LAB
ALBUMIN SERPL ELPH-MCNC: 2.4 G/DL — LOW (ref 3.3–5)
ALBUMIN SERPL ELPH-MCNC: 2.5 G/DL — LOW (ref 3.3–5)
ALP SERPL-CCNC: 55 U/L — SIGNIFICANT CHANGE UP (ref 40–120)
ALP SERPL-CCNC: 71 U/L — SIGNIFICANT CHANGE UP (ref 40–120)
ALT FLD-CCNC: 45 U/L — SIGNIFICANT CHANGE UP (ref 10–45)
ALT FLD-CCNC: 45 U/L — SIGNIFICANT CHANGE UP (ref 10–45)
ANION GAP SERPL CALC-SCNC: 7 MMOL/L — SIGNIFICANT CHANGE UP (ref 5–17)
ANION GAP SERPL CALC-SCNC: 9 MMOL/L — SIGNIFICANT CHANGE UP (ref 5–17)
APTT BLD: 30.1 SEC — SIGNIFICANT CHANGE UP (ref 24.5–35.6)
AST SERPL-CCNC: 26 U/L — SIGNIFICANT CHANGE UP (ref 10–40)
AST SERPL-CCNC: 30 U/L — SIGNIFICANT CHANGE UP (ref 10–40)
BASE EXCESS BLDV CALC-SCNC: -6.6 MMOL/L — LOW (ref -2–3)
BILIRUB SERPL-MCNC: 0.3 MG/DL — SIGNIFICANT CHANGE UP (ref 0.2–1.2)
BILIRUB SERPL-MCNC: 0.4 MG/DL — SIGNIFICANT CHANGE UP (ref 0.2–1.2)
BLD GP AB SCN SERPL QL: NEGATIVE — SIGNIFICANT CHANGE UP
BUN SERPL-MCNC: 29 MG/DL — HIGH (ref 7–23)
BUN SERPL-MCNC: 29 MG/DL — HIGH (ref 7–23)
CA-I SERPL-SCNC: 1.2 MMOL/L — SIGNIFICANT CHANGE UP (ref 1.15–1.33)
CALCIUM SERPL-MCNC: 7.7 MG/DL — LOW (ref 8.4–10.5)
CALCIUM SERPL-MCNC: 7.9 MG/DL — LOW (ref 8.4–10.5)
CHLORIDE BLDV-SCNC: 112 MMOL/L — HIGH (ref 96–108)
CHLORIDE SERPL-SCNC: 110 MMOL/L — HIGH (ref 96–108)
CHLORIDE SERPL-SCNC: 111 MMOL/L — HIGH (ref 96–108)
CO2 BLDV-SCNC: 22 MMOL/L — SIGNIFICANT CHANGE UP (ref 22–26)
CO2 SERPL-SCNC: 18 MMOL/L — LOW (ref 22–31)
CO2 SERPL-SCNC: 21 MMOL/L — LOW (ref 22–31)
CREAT SERPL-MCNC: 1.5 MG/DL — HIGH (ref 0.5–1.3)
CREAT SERPL-MCNC: 1.6 MG/DL — HIGH (ref 0.5–1.3)
EGFR: 41 ML/MIN/1.73M2 — LOW
EGFR: 44 ML/MIN/1.73M2 — LOW
GAS PNL BLDA: SIGNIFICANT CHANGE UP
GAS PNL BLDV: 140 MMOL/L — SIGNIFICANT CHANGE UP (ref 136–145)
GAS PNL BLDV: SIGNIFICANT CHANGE UP
GAS PNL BLDV: SIGNIFICANT CHANGE UP
GLUCOSE BLDC GLUCOMTR-MCNC: 110 MG/DL — HIGH (ref 70–99)
GLUCOSE BLDC GLUCOMTR-MCNC: 120 MG/DL — HIGH (ref 70–99)
GLUCOSE BLDV-MCNC: 129 MG/DL — HIGH (ref 70–99)
GLUCOSE SERPL-MCNC: 104 MG/DL — HIGH (ref 70–99)
GLUCOSE SERPL-MCNC: 119 MG/DL — HIGH (ref 70–99)
GRAM STN FLD: SIGNIFICANT CHANGE UP
HCO3 BLDV-SCNC: 21 MMOL/L — LOW (ref 22–29)
HCT VFR BLD CALC: 21.4 % — LOW (ref 39–50)
HCT VFR BLD CALC: 26.1 % — LOW (ref 39–50)
HCT VFR BLDA CALC: 20 % — CRITICAL LOW (ref 39–51)
HGB BLD CALC-MCNC: 6.8 G/DL — CRITICAL LOW (ref 12.6–17.4)
HGB BLD-MCNC: 6.4 G/DL — CRITICAL LOW (ref 13–17)
HGB BLD-MCNC: 8.3 G/DL — LOW (ref 13–17)
HOROWITZ INDEX BLDV+IHG-RTO: 100 — SIGNIFICANT CHANGE UP
INR BLD: 1.04 RATIO — SIGNIFICANT CHANGE UP (ref 0.85–1.18)
LACTATE BLDV-MCNC: 1.7 MMOL/L — SIGNIFICANT CHANGE UP (ref 0.5–2)
LDH SERPL L TO P-CCNC: 199 U/L — SIGNIFICANT CHANGE UP (ref 50–242)
MAGNESIUM SERPL-MCNC: 1.9 MG/DL — SIGNIFICANT CHANGE UP (ref 1.6–2.6)
MAGNESIUM SERPL-MCNC: 2.1 MG/DL — SIGNIFICANT CHANGE UP (ref 1.6–2.6)
MCHC RBC-ENTMCNC: 28.2 PG — SIGNIFICANT CHANGE UP (ref 27–34)
MCHC RBC-ENTMCNC: 29.2 PG — SIGNIFICANT CHANGE UP (ref 27–34)
MCHC RBC-ENTMCNC: 29.9 GM/DL — LOW (ref 32–36)
MCHC RBC-ENTMCNC: 31.8 GM/DL — LOW (ref 32–36)
MCV RBC AUTO: 91.9 FL — SIGNIFICANT CHANGE UP (ref 80–100)
MCV RBC AUTO: 94.3 FL — SIGNIFICANT CHANGE UP (ref 80–100)
NRBC # BLD: 0 /100 WBCS — SIGNIFICANT CHANGE UP (ref 0–0)
NRBC # BLD: 1 /100 WBCS — HIGH (ref 0–0)
PCO2 BLDV: 52 MMHG — SIGNIFICANT CHANGE UP (ref 42–55)
PH BLDV: 7.21 — LOW (ref 7.32–7.43)
PHOSPHATE SERPL-MCNC: 2.4 MG/DL — LOW (ref 2.5–4.5)
PHOSPHATE SERPL-MCNC: 3 MG/DL — SIGNIFICANT CHANGE UP (ref 2.5–4.5)
PLATELET # BLD AUTO: 141 K/UL — LOW (ref 150–400)
PLATELET # BLD AUTO: 165 K/UL — SIGNIFICANT CHANGE UP (ref 150–400)
PO2 BLDV: 44 MMHG — SIGNIFICANT CHANGE UP (ref 25–45)
POTASSIUM BLDV-SCNC: 4.3 MMOL/L — SIGNIFICANT CHANGE UP (ref 3.5–5.1)
POTASSIUM SERPL-MCNC: 4.1 MMOL/L — SIGNIFICANT CHANGE UP (ref 3.5–5.3)
POTASSIUM SERPL-MCNC: 4.4 MMOL/L — SIGNIFICANT CHANGE UP (ref 3.5–5.3)
POTASSIUM SERPL-SCNC: 4.1 MMOL/L — SIGNIFICANT CHANGE UP (ref 3.5–5.3)
POTASSIUM SERPL-SCNC: 4.4 MMOL/L — SIGNIFICANT CHANGE UP (ref 3.5–5.3)
PROT SERPL-MCNC: 5.4 G/DL — LOW (ref 6–8.3)
PROT SERPL-MCNC: 5.4 G/DL — LOW (ref 6–8.3)
PROTHROM AB SERPL-ACNC: 11.4 SEC — SIGNIFICANT CHANGE UP (ref 9.5–13)
RBC # BLD: 2.27 M/UL — LOW (ref 4.2–5.8)
RBC # BLD: 2.84 M/UL — LOW (ref 4.2–5.8)
RBC # FLD: 16 % — HIGH (ref 10.3–14.5)
RBC # FLD: 16.2 % — HIGH (ref 10.3–14.5)
RH IG SCN BLD-IMP: POSITIVE — SIGNIFICANT CHANGE UP
SAO2 % BLDV: 74.8 % — SIGNIFICANT CHANGE UP (ref 67–88)
SODIUM SERPL-SCNC: 138 MMOL/L — SIGNIFICANT CHANGE UP (ref 135–145)
SODIUM SERPL-SCNC: 138 MMOL/L — SIGNIFICANT CHANGE UP (ref 135–145)
SPECIMEN SOURCE: SIGNIFICANT CHANGE UP
WBC # BLD: 5.63 K/UL — SIGNIFICANT CHANGE UP (ref 3.8–10.5)
WBC # BLD: 6.11 K/UL — SIGNIFICANT CHANGE UP (ref 3.8–10.5)
WBC # FLD AUTO: 5.63 K/UL — SIGNIFICANT CHANGE UP (ref 3.8–10.5)
WBC # FLD AUTO: 6.11 K/UL — SIGNIFICANT CHANGE UP (ref 3.8–10.5)

## 2024-06-15 PROCEDURE — 71045 X-RAY EXAM CHEST 1 VIEW: CPT | Mod: 26

## 2024-06-15 PROCEDURE — 31645 BRNCHSC W/THER ASPIR 1ST: CPT

## 2024-06-15 PROCEDURE — 93306 TTE W/DOPPLER COMPLETE: CPT | Mod: 26

## 2024-06-15 PROCEDURE — 99233 SBSQ HOSP IP/OBS HIGH 50: CPT | Mod: 25

## 2024-06-15 PROCEDURE — 31500 INSERT EMERGENCY AIRWAY: CPT

## 2024-06-15 PROCEDURE — 99291 CRITICAL CARE FIRST HOUR: CPT

## 2024-06-15 PROCEDURE — 73030 X-RAY EXAM OF SHOULDER: CPT | Mod: 26,LT

## 2024-06-15 PROCEDURE — 73060 X-RAY EXAM OF HUMERUS: CPT | Mod: 26,LT

## 2024-06-15 RX ORDER — ACETAMINOPHEN 325 MG
1000 TABLET ORAL EVERY 6 HOURS
Refills: 0 | Status: COMPLETED | OUTPATIENT
Start: 2024-06-15 | End: 2024-06-16

## 2024-06-15 RX ORDER — METRONIDAZOLE 500 MG/1
500 TABLET ORAL ONCE
Refills: 0 | Status: COMPLETED | OUTPATIENT
Start: 2024-06-15 | End: 2024-06-15

## 2024-06-15 RX ORDER — HYDROMORPHONE HCL 0.2 MG/ML
0.25 INJECTION, SOLUTION INTRAVENOUS
Refills: 0 | Status: DISCONTINUED | OUTPATIENT
Start: 2024-06-15 | End: 2024-06-18

## 2024-06-15 RX ORDER — METRONIDAZOLE 500 MG/1
TABLET ORAL
Refills: 0 | Status: DISCONTINUED | OUTPATIENT
Start: 2024-06-15 | End: 2024-06-18

## 2024-06-15 RX ORDER — SODIUM CHLORIDE 0.9 % (FLUSH) 0.9 %
4 SYRINGE (ML) INJECTION EVERY 12 HOURS
Refills: 0 | Status: DISCONTINUED | OUTPATIENT
Start: 2024-06-15 | End: 2024-06-15

## 2024-06-15 RX ORDER — DEXMEDETOMIDINE HYDROCHLORIDE 4 UG/ML
0.5 INJECTION, SOLUTION INTRAVENOUS
Qty: 200 | Refills: 0 | Status: DISCONTINUED | OUTPATIENT
Start: 2024-06-15 | End: 2024-06-16

## 2024-06-15 RX ORDER — MIDAZOLAM HYDROCHLORIDE 1 MG/ML
2 INJECTION INTRAMUSCULAR; INTRAVENOUS ONCE
Refills: 0 | Status: DISCONTINUED | OUTPATIENT
Start: 2024-06-15 | End: 2024-06-15

## 2024-06-15 RX ORDER — DEXTROSE MONOHYDRATE AND SODIUM CHLORIDE 5; .3 G/100ML; G/100ML
500 INJECTION, SOLUTION INTRAVENOUS ONCE
Refills: 0 | Status: COMPLETED | OUTPATIENT
Start: 2024-06-15 | End: 2024-06-15

## 2024-06-15 RX ORDER — PHENYLEPHRINE HYDROCHLORIDE 10 MG/ML
0.5 INJECTION INTRAVENOUS
Qty: 40 | Refills: 0 | Status: ACTIVE | OUTPATIENT
Start: 2024-06-15 | End: 2025-05-14

## 2024-06-15 RX ORDER — DEXTROSE MONOHYDRATE AND SODIUM CHLORIDE 5; .3 G/100ML; G/100ML
1000 INJECTION, SOLUTION INTRAVENOUS
Refills: 0 | Status: DISCONTINUED | OUTPATIENT
Start: 2024-06-15 | End: 2024-06-19

## 2024-06-15 RX ORDER — MAGNESIUM SULFATE 100 %
2 POWDER (GRAM) MISCELLANEOUS ONCE
Refills: 0 | Status: COMPLETED | OUTPATIENT
Start: 2024-06-15 | End: 2024-06-15

## 2024-06-15 RX ORDER — FENTANYL CITRATE 50 UG/ML
50 INJECTION, SOLUTION INTRAMUSCULAR; INTRAVENOUS ONCE
Refills: 0 | Status: DISCONTINUED | OUTPATIENT
Start: 2024-06-15 | End: 2024-06-15

## 2024-06-15 RX ORDER — DEXTROSE MONOHYDRATE AND SODIUM CHLORIDE 5; .3 G/100ML; G/100ML
1000 INJECTION, SOLUTION INTRAVENOUS
Refills: 0 | Status: DISCONTINUED | OUTPATIENT
Start: 2024-06-15 | End: 2024-06-15

## 2024-06-15 RX ORDER — METRONIDAZOLE 500 MG/1
500 TABLET ORAL EVERY 8 HOURS
Refills: 0 | Status: DISCONTINUED | OUTPATIENT
Start: 2024-06-15 | End: 2024-06-18

## 2024-06-15 RX ORDER — ENOXAPARIN SODIUM 100 MG/ML
30 INJECTION SUBCUTANEOUS EVERY 24 HOURS
Refills: 0 | Status: DISCONTINUED | OUTPATIENT
Start: 2024-06-16 | End: 2024-06-20

## 2024-06-15 RX ORDER — SODIUM CHLORIDE 0.9 % (FLUSH) 0.9 %
4 SYRINGE (ML) INJECTION EVERY 6 HOURS
Refills: 0 | Status: COMPLETED | OUTPATIENT
Start: 2024-06-15 | End: 2024-06-18

## 2024-06-15 RX ORDER — FLUCONAZOLE 200 MG
200 TABLET ORAL ONCE
Refills: 0 | Status: COMPLETED | OUTPATIENT
Start: 2024-06-15 | End: 2024-06-15

## 2024-06-15 RX ORDER — FLUCONAZOLE 200 MG
200 TABLET ORAL EVERY 24 HOURS
Refills: 0 | Status: DISCONTINUED | OUTPATIENT
Start: 2024-06-16 | End: 2024-06-19

## 2024-06-15 RX ORDER — FLUCONAZOLE 200 MG
TABLET ORAL
Refills: 0 | Status: DISCONTINUED | OUTPATIENT
Start: 2024-06-15 | End: 2024-06-19

## 2024-06-15 RX ADMIN — METRONIDAZOLE 100 MILLIGRAM(S): 500 TABLET ORAL at 10:15

## 2024-06-15 RX ADMIN — Medication 400 MILLIGRAM(S): at 05:39

## 2024-06-15 RX ADMIN — METRONIDAZOLE 100 MILLIGRAM(S): 500 TABLET ORAL at 13:35

## 2024-06-15 RX ADMIN — DEXTROSE MONOHYDRATE AND SODIUM CHLORIDE 50 MILLILITER(S): 5; .3 INJECTION, SOLUTION INTRAVENOUS at 00:40

## 2024-06-15 RX ADMIN — PIPERACILLIN SODIUM AND TAZOBACTAM SODIUM 25 GRAM(S): 3; .375 INJECTION, POWDER, LYOPHILIZED, FOR SOLUTION INTRAVENOUS at 21:42

## 2024-06-15 RX ADMIN — Medication 4 MILLILITER(S): at 17:04

## 2024-06-15 RX ADMIN — PIPERACILLIN SODIUM AND TAZOBACTAM SODIUM 25 GRAM(S): 3; .375 INJECTION, POWDER, LYOPHILIZED, FOR SOLUTION INTRAVENOUS at 06:19

## 2024-06-15 RX ADMIN — IPRATROPIUM BROMIDE AND ALBUTEROL SULFATE 3 MILLILITER(S): .5; 3 SOLUTION RESPIRATORY (INHALATION) at 17:04

## 2024-06-15 RX ADMIN — Medication 400 MILLIGRAM(S): at 00:43

## 2024-06-15 RX ADMIN — Medication 25 GRAM(S): at 22:54

## 2024-06-15 RX ADMIN — PANTOPRAZOLE SODIUM 40 MILLIGRAM(S): 40 INJECTION, POWDER, FOR SOLUTION INTRAVENOUS at 17:12

## 2024-06-15 RX ADMIN — DEXTROSE MONOHYDRATE AND SODIUM CHLORIDE 75 MILLILITER(S): 5; .3 INJECTION, SOLUTION INTRAVENOUS at 23:17

## 2024-06-15 RX ADMIN — Medication 400 MILLIGRAM(S): at 11:59

## 2024-06-15 RX ADMIN — Medication 1 APPLICATION(S): at 05:39

## 2024-06-15 RX ADMIN — DEXMEDETOMIDINE HYDROCHLORIDE 8.23 MICROGRAM(S)/KG/HR: 4 INJECTION, SOLUTION INTRAVENOUS at 23:18

## 2024-06-15 RX ADMIN — Medication 15 MILLILITER(S): at 17:13

## 2024-06-15 RX ADMIN — HYDROMORPHONE HCL 0.25 MILLIGRAM(S): 0.2 INJECTION, SOLUTION INTRAVENOUS at 09:21

## 2024-06-15 RX ADMIN — IPRATROPIUM BROMIDE AND ALBUTEROL SULFATE 3 MILLILITER(S): .5; 3 SOLUTION RESPIRATORY (INHALATION) at 01:05

## 2024-06-15 RX ADMIN — Medication 1000 MILLIGRAM(S): at 06:09

## 2024-06-15 RX ADMIN — DEXTROSE MONOHYDRATE AND SODIUM CHLORIDE 3000 MILLILITER(S): 5; .3 INJECTION, SOLUTION INTRAVENOUS at 09:25

## 2024-06-15 RX ADMIN — METRONIDAZOLE 100 MILLIGRAM(S): 500 TABLET ORAL at 21:27

## 2024-06-15 RX ADMIN — MIDAZOLAM HYDROCHLORIDE 2 MILLIGRAM(S): 1 INJECTION INTRAMUSCULAR; INTRAVENOUS at 11:10

## 2024-06-15 RX ADMIN — DEXTROSE MONOHYDRATE AND SODIUM CHLORIDE 75 MILLILITER(S): 5; .3 INJECTION, SOLUTION INTRAVENOUS at 11:08

## 2024-06-15 RX ADMIN — DEXMEDETOMIDINE HYDROCHLORIDE 8.23 MICROGRAM(S)/KG/HR: 4 INJECTION, SOLUTION INTRAVENOUS at 11:08

## 2024-06-15 RX ADMIN — Medication 200 MILLIGRAM(S): at 14:40

## 2024-06-15 RX ADMIN — Medication 127.5 MILLIMOLE(S): at 20:44

## 2024-06-15 RX ADMIN — Medication 1000 MILLIGRAM(S): at 01:13

## 2024-06-15 RX ADMIN — Medication 4 MILLILITER(S): at 23:16

## 2024-06-15 RX ADMIN — PANTOPRAZOLE SODIUM 40 MILLIGRAM(S): 40 INJECTION, POWDER, FOR SOLUTION INTRAVENOUS at 05:41

## 2024-06-15 RX ADMIN — PIPERACILLIN SODIUM AND TAZOBACTAM SODIUM 25 GRAM(S): 3; .375 INJECTION, POWDER, LYOPHILIZED, FOR SOLUTION INTRAVENOUS at 13:36

## 2024-06-15 RX ADMIN — ENOXAPARIN SODIUM 40 MILLIGRAM(S): 100 INJECTION SUBCUTANEOUS at 05:40

## 2024-06-15 RX ADMIN — Medication 400 MILLIGRAM(S): at 17:13

## 2024-06-15 RX ADMIN — DEXTROSE MONOHYDRATE AND SODIUM CHLORIDE 75 MILLILITER(S): 5; .3 INJECTION, SOLUTION INTRAVENOUS at 09:07

## 2024-06-15 RX ADMIN — HYDROMORPHONE HCL 0.25 MILLIGRAM(S): 0.2 INJECTION, SOLUTION INTRAVENOUS at 14:58

## 2024-06-15 RX ADMIN — PHENYLEPHRINE HYDROCHLORIDE 12.3 MICROGRAM(S)/KG/MIN: 10 INJECTION INTRAVENOUS at 11:59

## 2024-06-15 RX ADMIN — HYDROMORPHONE HCL 0.25 MILLIGRAM(S): 0.2 INJECTION, SOLUTION INTRAVENOUS at 08:51

## 2024-06-15 RX ADMIN — DEXTROSE MONOHYDRATE AND SODIUM CHLORIDE 3000 MILLILITER(S): 5; .3 INJECTION, SOLUTION INTRAVENOUS at 11:07

## 2024-06-15 RX ADMIN — DEXTROSE MONOHYDRATE AND SODIUM CHLORIDE 50 MILLILITER(S): 5; .3 INJECTION, SOLUTION INTRAVENOUS at 07:08

## 2024-06-15 RX ADMIN — IPRATROPIUM BROMIDE AND ALBUTEROL SULFATE 3 MILLILITER(S): .5; 3 SOLUTION RESPIRATORY (INHALATION) at 23:16

## 2024-06-15 RX ADMIN — FENTANYL CITRATE 50 MICROGRAM(S): 50 INJECTION, SOLUTION INTRAMUSCULAR; INTRAVENOUS at 11:30

## 2024-06-15 RX ADMIN — FENTANYL CITRATE 50 MICROGRAM(S): 50 INJECTION, SOLUTION INTRAMUSCULAR; INTRAVENOUS at 11:10

## 2024-06-15 RX ADMIN — Medication 4 MILLILITER(S): at 06:15

## 2024-06-15 RX ADMIN — Medication 1000 MILLIGRAM(S): at 13:04

## 2024-06-15 RX ADMIN — DEXMEDETOMIDINE HYDROCHLORIDE 8.23 MICROGRAM(S)/KG/HR: 4 INJECTION, SOLUTION INTRAVENOUS at 08:21

## 2024-06-15 RX ADMIN — FENTANYL CITRATE 50 MICROGRAM(S): 50 INJECTION, SOLUTION INTRAMUSCULAR; INTRAVENOUS at 08:22

## 2024-06-15 RX ADMIN — IPRATROPIUM BROMIDE AND ALBUTEROL SULFATE 3 MILLILITER(S): .5; 3 SOLUTION RESPIRATORY (INHALATION) at 06:14

## 2024-06-15 RX ADMIN — HYDROMORPHONE HCL 0.25 MILLIGRAM(S): 0.2 INJECTION, SOLUTION INTRAVENOUS at 14:42

## 2024-06-16 LAB
ADD ON TEST-SPECIMEN IN LAB: SIGNIFICANT CHANGE UP
ALBUMIN SERPL ELPH-MCNC: 2.4 G/DL — LOW (ref 3.3–5)
ALBUMIN SERPL ELPH-MCNC: 2.8 G/DL — LOW (ref 3.3–5)
ALP SERPL-CCNC: 88 U/L — SIGNIFICANT CHANGE UP (ref 40–120)
ALP SERPL-CCNC: 88 U/L — SIGNIFICANT CHANGE UP (ref 40–120)
ALT FLD-CCNC: 41 U/L — SIGNIFICANT CHANGE UP (ref 10–45)
ALT FLD-CCNC: 49 U/L — HIGH (ref 10–45)
ANION GAP SERPL CALC-SCNC: 10 MMOL/L — SIGNIFICANT CHANGE UP (ref 5–17)
ANION GAP SERPL CALC-SCNC: 9 MMOL/L — SIGNIFICANT CHANGE UP (ref 5–17)
APTT BLD: 27.1 SEC — SIGNIFICANT CHANGE UP (ref 24.5–35.6)
AST SERPL-CCNC: 28 U/L — SIGNIFICANT CHANGE UP (ref 10–40)
AST SERPL-CCNC: 29 U/L — SIGNIFICANT CHANGE UP (ref 10–40)
B2 MICROGLOB SERPL-MCNC: 4 MG/L — HIGH (ref 0.8–2.2)
BILIRUB SERPL-MCNC: 0.3 MG/DL — SIGNIFICANT CHANGE UP (ref 0.2–1.2)
BILIRUB SERPL-MCNC: 0.4 MG/DL — SIGNIFICANT CHANGE UP (ref 0.2–1.2)
BUN SERPL-MCNC: 24 MG/DL — HIGH (ref 7–23)
BUN SERPL-MCNC: 28 MG/DL — HIGH (ref 7–23)
CALCIUM SERPL-MCNC: 8 MG/DL — LOW (ref 8.4–10.5)
CALCIUM SERPL-MCNC: 8.1 MG/DL — LOW (ref 8.4–10.5)
CHLORIDE SERPL-SCNC: 110 MMOL/L — HIGH (ref 96–108)
CHLORIDE SERPL-SCNC: 111 MMOL/L — HIGH (ref 96–108)
CK MB BLD-MCNC: 4 % — HIGH (ref 0–3.5)
CK MB CFR SERPL CALC: 6.7 NG/ML — SIGNIFICANT CHANGE UP (ref 0–6.7)
CK SERPL-CCNC: 167 U/L — SIGNIFICANT CHANGE UP (ref 30–200)
CO2 SERPL-SCNC: 19 MMOL/L — LOW (ref 22–31)
CO2 SERPL-SCNC: 19 MMOL/L — LOW (ref 22–31)
CREAT SERPL-MCNC: 1.45 MG/DL — HIGH (ref 0.5–1.3)
CREAT SERPL-MCNC: 1.49 MG/DL — HIGH (ref 0.5–1.3)
CULTURE RESULTS: SIGNIFICANT CHANGE UP
EGFR: 45 ML/MIN/1.73M2 — LOW
EGFR: 46 ML/MIN/1.73M2 — LOW
GAS PNL BLDA: SIGNIFICANT CHANGE UP
GAS PNL BLDA: SIGNIFICANT CHANGE UP
GLUCOSE SERPL-MCNC: 107 MG/DL — HIGH (ref 70–99)
GLUCOSE SERPL-MCNC: 134 MG/DL — HIGH (ref 70–99)
HCT VFR BLD CALC: 27.5 % — LOW (ref 39–50)
HCT VFR BLD CALC: 28.2 % — LOW (ref 39–50)
HGB BLD-MCNC: 8.4 G/DL — LOW (ref 13–17)
HGB BLD-MCNC: 9.1 G/DL — LOW (ref 13–17)
INR BLD: 0.97 RATIO — SIGNIFICANT CHANGE UP (ref 0.85–1.18)
MAGNESIUM SERPL-MCNC: 2.4 MG/DL — SIGNIFICANT CHANGE UP (ref 1.6–2.6)
MAGNESIUM SERPL-MCNC: 2.4 MG/DL — SIGNIFICANT CHANGE UP (ref 1.6–2.6)
MCHC RBC-ENTMCNC: 28.2 PG — SIGNIFICANT CHANGE UP (ref 27–34)
MCHC RBC-ENTMCNC: 29 PG — SIGNIFICANT CHANGE UP (ref 27–34)
MCHC RBC-ENTMCNC: 30.5 GM/DL — LOW (ref 32–36)
MCHC RBC-ENTMCNC: 32.3 GM/DL — SIGNIFICANT CHANGE UP (ref 32–36)
MCV RBC AUTO: 89.8 FL — SIGNIFICANT CHANGE UP (ref 80–100)
MCV RBC AUTO: 92.3 FL — SIGNIFICANT CHANGE UP (ref 80–100)
MRSA PCR RESULT.: SIGNIFICANT CHANGE UP
NRBC # BLD: 0 /100 WBCS — SIGNIFICANT CHANGE UP (ref 0–0)
NRBC # BLD: 0 /100 WBCS — SIGNIFICANT CHANGE UP (ref 0–0)
PHOSPHATE SERPL-MCNC: 3.2 MG/DL — SIGNIFICANT CHANGE UP (ref 2.5–4.5)
PHOSPHATE SERPL-MCNC: 3.3 MG/DL — SIGNIFICANT CHANGE UP (ref 2.5–4.5)
PLATELET # BLD AUTO: 156 K/UL — SIGNIFICANT CHANGE UP (ref 150–400)
PLATELET # BLD AUTO: 172 K/UL — SIGNIFICANT CHANGE UP (ref 150–400)
POTASSIUM SERPL-MCNC: 3.7 MMOL/L — SIGNIFICANT CHANGE UP (ref 3.5–5.3)
POTASSIUM SERPL-MCNC: 4.2 MMOL/L — SIGNIFICANT CHANGE UP (ref 3.5–5.3)
POTASSIUM SERPL-SCNC: 3.7 MMOL/L — SIGNIFICANT CHANGE UP (ref 3.5–5.3)
POTASSIUM SERPL-SCNC: 4.2 MMOL/L — SIGNIFICANT CHANGE UP (ref 3.5–5.3)
PROT SERPL-MCNC: 5.1 G/DL — LOW (ref 6–8.3)
PROT SERPL-MCNC: 5.9 G/DL — LOW (ref 6–8.3)
PROT SERPL-MCNC: 6.1 G/DL — SIGNIFICANT CHANGE UP (ref 6–8.3)
PROTHROM AB SERPL-ACNC: 10.7 SEC — SIGNIFICANT CHANGE UP (ref 9.5–13)
PSA SERPL-MCNC: 0.02 NG/ML — SIGNIFICANT CHANGE UP (ref 0–4)
RBC # BLD: 2.98 M/UL — LOW (ref 4.2–5.8)
RBC # BLD: 3.14 M/UL — LOW (ref 4.2–5.8)
RBC # FLD: 16.1 % — HIGH (ref 10.3–14.5)
RBC # FLD: 16.1 % — HIGH (ref 10.3–14.5)
S AUREUS DNA NOSE QL NAA+PROBE: SIGNIFICANT CHANGE UP
SODIUM SERPL-SCNC: 138 MMOL/L — SIGNIFICANT CHANGE UP (ref 135–145)
SODIUM SERPL-SCNC: 140 MMOL/L — SIGNIFICANT CHANGE UP (ref 135–145)
SPECIMEN SOURCE: SIGNIFICANT CHANGE UP
TROPONIN T, HIGH SENSITIVITY RESULT: 128 NG/L — HIGH (ref 0–51)
TROPONIN T, HIGH SENSITIVITY RESULT: 130 NG/L — HIGH (ref 0–51)
TROPONIN T, HIGH SENSITIVITY RESULT: 139 NG/L — HIGH (ref 0–51)
WBC # BLD: 9.71 K/UL — SIGNIFICANT CHANGE UP (ref 3.8–10.5)
WBC # BLD: 9.97 K/UL — SIGNIFICANT CHANGE UP (ref 3.8–10.5)
WBC # FLD AUTO: 9.71 K/UL — SIGNIFICANT CHANGE UP (ref 3.8–10.5)
WBC # FLD AUTO: 9.97 K/UL — SIGNIFICANT CHANGE UP (ref 3.8–10.5)

## 2024-06-16 PROCEDURE — 99233 SBSQ HOSP IP/OBS HIGH 50: CPT

## 2024-06-16 PROCEDURE — 71045 X-RAY EXAM CHEST 1 VIEW: CPT | Mod: 26

## 2024-06-16 PROCEDURE — 93010 ELECTROCARDIOGRAM REPORT: CPT

## 2024-06-16 RX ORDER — ACETAMINOPHEN 325 MG
1000 TABLET ORAL EVERY 6 HOURS
Refills: 0 | Status: COMPLETED | OUTPATIENT
Start: 2024-06-16 | End: 2024-06-17

## 2024-06-16 RX ORDER — MAGNESIUM SULFATE 100 %
2 POWDER (GRAM) MISCELLANEOUS ONCE
Refills: 0 | Status: COMPLETED | OUTPATIENT
Start: 2024-06-16 | End: 2024-06-16

## 2024-06-16 RX ORDER — POTASSIUM CHLORIDE 600 MG/1
10 TABLET, FILM COATED, EXTENDED RELEASE ORAL
Refills: 0 | Status: COMPLETED | OUTPATIENT
Start: 2024-06-16 | End: 2024-06-16

## 2024-06-16 RX ORDER — LIDOCAINE HYDROCHLORIDE 20 MG/ML
20 INJECTION, SOLUTION EPIDURAL; INFILTRATION; INTRACAUDAL; PERINEURAL EVERY 12 HOURS
Refills: 0 | Status: DISCONTINUED | OUTPATIENT
Start: 2024-06-16 | End: 2024-07-05

## 2024-06-16 RX ORDER — METOPROLOL TARTRATE 50 MG
5 TABLET ORAL ONCE
Refills: 0 | Status: COMPLETED | OUTPATIENT
Start: 2024-06-16 | End: 2024-06-16

## 2024-06-16 RX ORDER — DEXMEDETOMIDINE HYDROCHLORIDE 4 UG/ML
0.2 INJECTION, SOLUTION INTRAVENOUS
Qty: 200 | Refills: 0 | Status: DISCONTINUED | OUTPATIENT
Start: 2024-06-16 | End: 2024-06-16

## 2024-06-16 RX ADMIN — Medication 400 MILLIGRAM(S): at 05:33

## 2024-06-16 RX ADMIN — METRONIDAZOLE 100 MILLIGRAM(S): 500 TABLET ORAL at 21:20

## 2024-06-16 RX ADMIN — Medication 400 MILLIGRAM(S): at 23:38

## 2024-06-16 RX ADMIN — Medication 1000 MILLIGRAM(S): at 18:27

## 2024-06-16 RX ADMIN — IPRATROPIUM BROMIDE AND ALBUTEROL SULFATE 3 MILLILITER(S): .5; 3 SOLUTION RESPIRATORY (INHALATION) at 05:20

## 2024-06-16 RX ADMIN — POTASSIUM CHLORIDE 100 MILLIEQUIVALENT(S): 600 TABLET, FILM COATED, EXTENDED RELEASE ORAL at 05:33

## 2024-06-16 RX ADMIN — POTASSIUM CHLORIDE 100 MILLIEQUIVALENT(S): 600 TABLET, FILM COATED, EXTENDED RELEASE ORAL at 03:25

## 2024-06-16 RX ADMIN — Medication 100 MILLIGRAM(S): at 13:14

## 2024-06-16 RX ADMIN — IPRATROPIUM BROMIDE AND ALBUTEROL SULFATE 3 MILLILITER(S): .5; 3 SOLUTION RESPIRATORY (INHALATION) at 17:50

## 2024-06-16 RX ADMIN — PIPERACILLIN SODIUM AND TAZOBACTAM SODIUM 25 GRAM(S): 3; .375 INJECTION, POWDER, LYOPHILIZED, FOR SOLUTION INTRAVENOUS at 13:14

## 2024-06-16 RX ADMIN — Medication 25 GRAM(S): at 02:16

## 2024-06-16 RX ADMIN — Medication 400 MILLIGRAM(S): at 00:25

## 2024-06-16 RX ADMIN — PANTOPRAZOLE SODIUM 40 MILLIGRAM(S): 40 INJECTION, POWDER, FOR SOLUTION INTRAVENOUS at 05:34

## 2024-06-16 RX ADMIN — Medication 400 MILLIGRAM(S): at 12:41

## 2024-06-16 RX ADMIN — Medication 4 MILLILITER(S): at 11:14

## 2024-06-16 RX ADMIN — Medication 4 MILLILITER(S): at 17:50

## 2024-06-16 RX ADMIN — Medication 400 MILLIGRAM(S): at 17:27

## 2024-06-16 RX ADMIN — IPRATROPIUM BROMIDE AND ALBUTEROL SULFATE 3 MILLILITER(S): .5; 3 SOLUTION RESPIRATORY (INHALATION) at 11:13

## 2024-06-16 RX ADMIN — Medication 1000 MILLIGRAM(S): at 13:41

## 2024-06-16 RX ADMIN — IPRATROPIUM BROMIDE AND ALBUTEROL SULFATE 3 MILLILITER(S): .5; 3 SOLUTION RESPIRATORY (INHALATION) at 23:10

## 2024-06-16 RX ADMIN — Medication 4 MILLILITER(S): at 23:10

## 2024-06-16 RX ADMIN — PIPERACILLIN SODIUM AND TAZOBACTAM SODIUM 25 GRAM(S): 3; .375 INJECTION, POWDER, LYOPHILIZED, FOR SOLUTION INTRAVENOUS at 05:34

## 2024-06-16 RX ADMIN — DEXTROSE MONOHYDRATE AND SODIUM CHLORIDE 75 MILLILITER(S): 5; .3 INJECTION, SOLUTION INTRAVENOUS at 02:51

## 2024-06-16 RX ADMIN — PANTOPRAZOLE SODIUM 40 MILLIGRAM(S): 40 INJECTION, POWDER, FOR SOLUTION INTRAVENOUS at 17:27

## 2024-06-16 RX ADMIN — PIPERACILLIN SODIUM AND TAZOBACTAM SODIUM 25 GRAM(S): 3; .375 INJECTION, POWDER, LYOPHILIZED, FOR SOLUTION INTRAVENOUS at 21:20

## 2024-06-16 RX ADMIN — METRONIDAZOLE 100 MILLIGRAM(S): 500 TABLET ORAL at 05:33

## 2024-06-16 RX ADMIN — METRONIDAZOLE 100 MILLIGRAM(S): 500 TABLET ORAL at 13:13

## 2024-06-16 RX ADMIN — ENOXAPARIN SODIUM 30 MILLIGRAM(S): 100 INJECTION SUBCUTANEOUS at 05:34

## 2024-06-16 RX ADMIN — DEXTROSE MONOHYDRATE AND SODIUM CHLORIDE 75 MILLILITER(S): 5; .3 INJECTION, SOLUTION INTRAVENOUS at 08:32

## 2024-06-16 RX ADMIN — Medication 4 MILLILITER(S): at 05:20

## 2024-06-16 RX ADMIN — Medication 1 APPLICATION(S): at 05:34

## 2024-06-16 RX ADMIN — DEXMEDETOMIDINE HYDROCHLORIDE 3.29 MICROGRAM(S)/KG/HR: 4 INJECTION, SOLUTION INTRAVENOUS at 02:24

## 2024-06-16 RX ADMIN — POTASSIUM CHLORIDE 100 MILLIEQUIVALENT(S): 600 TABLET, FILM COATED, EXTENDED RELEASE ORAL at 04:30

## 2024-06-16 RX ADMIN — Medication 5 MILLIGRAM(S): at 02:16

## 2024-06-17 LAB
ALBUMIN SERPL ELPH-MCNC: 2.4 G/DL — LOW (ref 3.3–5)
ALBUMIN SERPL ELPH-MCNC: 2.5 G/DL — LOW (ref 3.3–5)
ALP SERPL-CCNC: 108 U/L — SIGNIFICANT CHANGE UP (ref 40–120)
ALP SERPL-CCNC: 96 U/L — SIGNIFICANT CHANGE UP (ref 40–120)
ALT FLD-CCNC: 39 U/L — SIGNIFICANT CHANGE UP (ref 10–45)
ALT FLD-CCNC: 42 U/L — SIGNIFICANT CHANGE UP (ref 10–45)
ANION GAP SERPL CALC-SCNC: 11 MMOL/L — SIGNIFICANT CHANGE UP (ref 5–17)
ANION GAP SERPL CALC-SCNC: 12 MMOL/L — SIGNIFICANT CHANGE UP (ref 5–17)
AST SERPL-CCNC: 28 U/L — SIGNIFICANT CHANGE UP (ref 10–40)
AST SERPL-CCNC: 29 U/L — SIGNIFICANT CHANGE UP (ref 10–40)
BILIRUB SERPL-MCNC: 0.3 MG/DL — SIGNIFICANT CHANGE UP (ref 0.2–1.2)
BILIRUB SERPL-MCNC: 0.3 MG/DL — SIGNIFICANT CHANGE UP (ref 0.2–1.2)
BUN SERPL-MCNC: 21 MG/DL — SIGNIFICANT CHANGE UP (ref 7–23)
BUN SERPL-MCNC: 22 MG/DL — SIGNIFICANT CHANGE UP (ref 7–23)
CALCIUM SERPL-MCNC: 8 MG/DL — LOW (ref 8.4–10.5)
CALCIUM SERPL-MCNC: 8.4 MG/DL — SIGNIFICANT CHANGE UP (ref 8.4–10.5)
CHLORIDE SERPL-SCNC: 111 MMOL/L — HIGH (ref 96–108)
CHLORIDE SERPL-SCNC: 112 MMOL/L — HIGH (ref 96–108)
CO2 SERPL-SCNC: 19 MMOL/L — LOW (ref 22–31)
CO2 SERPL-SCNC: 20 MMOL/L — LOW (ref 22–31)
CREAT SERPL-MCNC: 1.45 MG/DL — HIGH (ref 0.5–1.3)
CREAT SERPL-MCNC: 1.56 MG/DL — HIGH (ref 0.5–1.3)
EGFR: 42 ML/MIN/1.73M2 — LOW
EGFR: 46 ML/MIN/1.73M2 — LOW
GLUCOSE SERPL-MCNC: 107 MG/DL — HIGH (ref 70–99)
GLUCOSE SERPL-MCNC: 111 MG/DL — HIGH (ref 70–99)
HCT VFR BLD CALC: 26.1 % — LOW (ref 39–50)
HCT VFR BLD CALC: 27.4 % — LOW (ref 39–50)
HGB BLD-MCNC: 8.2 G/DL — LOW (ref 13–17)
HGB BLD-MCNC: 8.9 G/DL — LOW (ref 13–17)
KAPPA LC SER QL IFE: 4.03 MG/DL — HIGH (ref 0.33–1.94)
KAPPA/LAMBDA FREE LIGHT CHAIN RATIO, SERUM: 1.35 RATIO — SIGNIFICANT CHANGE UP (ref 0.26–1.65)
LAMBDA LC SER QL IFE: 2.99 MG/DL — HIGH (ref 0.57–2.63)
MAGNESIUM SERPL-MCNC: 2.1 MG/DL — SIGNIFICANT CHANGE UP (ref 1.6–2.6)
MAGNESIUM SERPL-MCNC: 2.3 MG/DL — SIGNIFICANT CHANGE UP (ref 1.6–2.6)
MCHC RBC-ENTMCNC: 28.4 PG — SIGNIFICANT CHANGE UP (ref 27–34)
MCHC RBC-ENTMCNC: 29.1 PG — SIGNIFICANT CHANGE UP (ref 27–34)
MCHC RBC-ENTMCNC: 31.4 GM/DL — LOW (ref 32–36)
MCHC RBC-ENTMCNC: 32.5 GM/DL — SIGNIFICANT CHANGE UP (ref 32–36)
MCV RBC AUTO: 89.5 FL — SIGNIFICANT CHANGE UP (ref 80–100)
MCV RBC AUTO: 90.3 FL — SIGNIFICANT CHANGE UP (ref 80–100)
NRBC # BLD: 0 /100 WBCS — SIGNIFICANT CHANGE UP (ref 0–0)
NRBC # BLD: 0 /100 WBCS — SIGNIFICANT CHANGE UP (ref 0–0)
PHOSPHATE SERPL-MCNC: 2.9 MG/DL — SIGNIFICANT CHANGE UP (ref 2.5–4.5)
PHOSPHATE SERPL-MCNC: 3.5 MG/DL — SIGNIFICANT CHANGE UP (ref 2.5–4.5)
PLATELET # BLD AUTO: 162 K/UL — SIGNIFICANT CHANGE UP (ref 150–400)
PLATELET # BLD AUTO: 174 K/UL — SIGNIFICANT CHANGE UP (ref 150–400)
POTASSIUM SERPL-MCNC: 3.8 MMOL/L — SIGNIFICANT CHANGE UP (ref 3.5–5.3)
POTASSIUM SERPL-MCNC: 3.9 MMOL/L — SIGNIFICANT CHANGE UP (ref 3.5–5.3)
POTASSIUM SERPL-SCNC: 3.8 MMOL/L — SIGNIFICANT CHANGE UP (ref 3.5–5.3)
POTASSIUM SERPL-SCNC: 3.9 MMOL/L — SIGNIFICANT CHANGE UP (ref 3.5–5.3)
PROT SERPL-MCNC: 5.7 G/DL — LOW (ref 6–8.3)
PROT SERPL-MCNC: 6.3 G/DL — SIGNIFICANT CHANGE UP (ref 6–8.3)
RBC # BLD: 2.89 M/UL — LOW (ref 4.2–5.8)
RBC # BLD: 3.06 M/UL — LOW (ref 4.2–5.8)
RBC # FLD: 16 % — HIGH (ref 10.3–14.5)
RBC # FLD: 16.1 % — HIGH (ref 10.3–14.5)
SODIUM SERPL-SCNC: 142 MMOL/L — SIGNIFICANT CHANGE UP (ref 135–145)
SODIUM SERPL-SCNC: 143 MMOL/L — SIGNIFICANT CHANGE UP (ref 135–145)
WBC # BLD: 10.77 K/UL — HIGH (ref 3.8–10.5)
WBC # BLD: 9.51 K/UL — SIGNIFICANT CHANGE UP (ref 3.8–10.5)
WBC # FLD AUTO: 10.77 K/UL — HIGH (ref 3.8–10.5)
WBC # FLD AUTO: 9.51 K/UL — SIGNIFICANT CHANGE UP (ref 3.8–10.5)

## 2024-06-17 PROCEDURE — 78306 BONE IMAGING WHOLE BODY: CPT | Mod: 26

## 2024-06-17 PROCEDURE — 93010 ELECTROCARDIOGRAM REPORT: CPT

## 2024-06-17 PROCEDURE — 71045 X-RAY EXAM CHEST 1 VIEW: CPT | Mod: 26

## 2024-06-17 PROCEDURE — 99232 SBSQ HOSP IP/OBS MODERATE 35: CPT

## 2024-06-17 PROCEDURE — 99233 SBSQ HOSP IP/OBS HIGH 50: CPT | Mod: GC

## 2024-06-17 PROCEDURE — 74240 X-RAY XM UPR GI TRC 1CNTRST: CPT | Mod: 26

## 2024-06-17 RX ORDER — POTASSIUM CHLORIDE 600 MG/1
10 TABLET, FILM COATED, EXTENDED RELEASE ORAL
Refills: 0 | Status: COMPLETED | OUTPATIENT
Start: 2024-06-17 | End: 2024-06-17

## 2024-06-17 RX ORDER — CALCIUM GLUCONATE 98 MG/ML
2 INJECTION, SOLUTION INTRAVENOUS ONCE
Refills: 0 | Status: COMPLETED | OUTPATIENT
Start: 2024-06-17 | End: 2024-06-17

## 2024-06-17 RX ORDER — FUROSEMIDE 10 MG/ML
20 INJECTION, SOLUTION INTRAMUSCULAR; INTRAVENOUS ONCE
Refills: 0 | Status: COMPLETED | OUTPATIENT
Start: 2024-06-17 | End: 2024-06-17

## 2024-06-17 RX ORDER — FUROSEMIDE 10 MG/ML
20 INJECTION, SOLUTION INTRAMUSCULAR; INTRAVENOUS DAILY
Refills: 0 | Status: DISCONTINUED | OUTPATIENT
Start: 2024-06-17 | End: 2024-06-17

## 2024-06-17 RX ORDER — ACETAMINOPHEN 325 MG
1000 TABLET ORAL EVERY 6 HOURS
Refills: 0 | Status: COMPLETED | OUTPATIENT
Start: 2024-06-17 | End: 2024-06-18

## 2024-06-17 RX ORDER — METOPROLOL TARTRATE 50 MG
5 TABLET ORAL EVERY 6 HOURS
Refills: 0 | Status: DISCONTINUED | OUTPATIENT
Start: 2024-06-17 | End: 2024-06-19

## 2024-06-17 RX ORDER — MAGNESIUM SULFATE 100 %
2 POWDER (GRAM) MISCELLANEOUS ONCE
Refills: 0 | Status: COMPLETED | OUTPATIENT
Start: 2024-06-17 | End: 2024-06-17

## 2024-06-17 RX ORDER — OLANZAPINE 2.5 MG/1
10 TABLET, FILM COATED ORAL AT BEDTIME
Refills: 0 | Status: DISCONTINUED | OUTPATIENT
Start: 2024-06-17 | End: 2024-07-05

## 2024-06-17 RX ORDER — OLANZAPINE 2.5 MG/1
5 TABLET, FILM COATED ORAL AT BEDTIME
Refills: 0 | Status: DISCONTINUED | OUTPATIENT
Start: 2024-06-17 | End: 2024-06-17

## 2024-06-17 RX ORDER — LIDOCAINE HCL 28 MG/G
1 GEL TOPICAL ONCE
Refills: 0 | Status: DISCONTINUED | OUTPATIENT
Start: 2024-06-17 | End: 2024-06-18

## 2024-06-17 RX ORDER — METOPROLOL TARTRATE 50 MG
5 TABLET ORAL ONCE
Refills: 0 | Status: COMPLETED | OUTPATIENT
Start: 2024-06-17 | End: 2024-06-17

## 2024-06-17 RX ADMIN — Medication 1000 MILLIGRAM(S): at 11:38

## 2024-06-17 RX ADMIN — PANTOPRAZOLE SODIUM 40 MILLIGRAM(S): 40 INJECTION, POWDER, FOR SOLUTION INTRAVENOUS at 17:21

## 2024-06-17 RX ADMIN — IPRATROPIUM BROMIDE AND ALBUTEROL SULFATE 3 MILLILITER(S): .5; 3 SOLUTION RESPIRATORY (INHALATION) at 05:06

## 2024-06-17 RX ADMIN — PIPERACILLIN SODIUM AND TAZOBACTAM SODIUM 25 GRAM(S): 3; .375 INJECTION, POWDER, LYOPHILIZED, FOR SOLUTION INTRAVENOUS at 13:08

## 2024-06-17 RX ADMIN — Medication 400 MILLIGRAM(S): at 05:59

## 2024-06-17 RX ADMIN — Medication 1000 MILLIGRAM(S): at 06:29

## 2024-06-17 RX ADMIN — METRONIDAZOLE 100 MILLIGRAM(S): 500 TABLET ORAL at 21:27

## 2024-06-17 RX ADMIN — IPRATROPIUM BROMIDE AND ALBUTEROL SULFATE 3 MILLILITER(S): .5; 3 SOLUTION RESPIRATORY (INHALATION) at 23:35

## 2024-06-17 RX ADMIN — Medication 4 MILLILITER(S): at 11:06

## 2024-06-17 RX ADMIN — PIPERACILLIN SODIUM AND TAZOBACTAM SODIUM 25 GRAM(S): 3; .375 INJECTION, POWDER, LYOPHILIZED, FOR SOLUTION INTRAVENOUS at 21:27

## 2024-06-17 RX ADMIN — Medication 5 MILLIGRAM(S): at 14:12

## 2024-06-17 RX ADMIN — FUROSEMIDE 20 MILLIGRAM(S): 10 INJECTION, SOLUTION INTRAMUSCULAR; INTRAVENOUS at 07:57

## 2024-06-17 RX ADMIN — Medication 5 MILLIGRAM(S): at 07:20

## 2024-06-17 RX ADMIN — Medication 4 MILLILITER(S): at 23:35

## 2024-06-17 RX ADMIN — PIPERACILLIN SODIUM AND TAZOBACTAM SODIUM 25 GRAM(S): 3; .375 INJECTION, POWDER, LYOPHILIZED, FOR SOLUTION INTRAVENOUS at 05:58

## 2024-06-17 RX ADMIN — Medication 1000 MILLIGRAM(S): at 17:51

## 2024-06-17 RX ADMIN — Medication 100 MILLIGRAM(S): at 13:08

## 2024-06-17 RX ADMIN — METRONIDAZOLE 100 MILLIGRAM(S): 500 TABLET ORAL at 06:35

## 2024-06-17 RX ADMIN — Medication 400 MILLIGRAM(S): at 23:38

## 2024-06-17 RX ADMIN — Medication 1000 MILLIGRAM(S): at 00:08

## 2024-06-17 RX ADMIN — IPRATROPIUM BROMIDE AND ALBUTEROL SULFATE 3 MILLILITER(S): .5; 3 SOLUTION RESPIRATORY (INHALATION) at 17:19

## 2024-06-17 RX ADMIN — Medication 400 MILLIGRAM(S): at 17:21

## 2024-06-17 RX ADMIN — POTASSIUM CHLORIDE 100 MILLIEQUIVALENT(S): 600 TABLET, FILM COATED, EXTENDED RELEASE ORAL at 14:46

## 2024-06-17 RX ADMIN — PANTOPRAZOLE SODIUM 40 MILLIGRAM(S): 40 INJECTION, POWDER, FOR SOLUTION INTRAVENOUS at 06:15

## 2024-06-17 RX ADMIN — Medication 4 MILLILITER(S): at 17:19

## 2024-06-17 RX ADMIN — DEXTROSE MONOHYDRATE AND SODIUM CHLORIDE 50 MILLILITER(S): 5; .3 INJECTION, SOLUTION INTRAVENOUS at 07:58

## 2024-06-17 RX ADMIN — FUROSEMIDE 20 MILLIGRAM(S): 10 INJECTION, SOLUTION INTRAMUSCULAR; INTRAVENOUS at 15:31

## 2024-06-17 RX ADMIN — Medication 5 MILLIGRAM(S): at 19:30

## 2024-06-17 RX ADMIN — DEXTROSE MONOHYDRATE AND SODIUM CHLORIDE 50 MILLILITER(S): 5; .3 INJECTION, SOLUTION INTRAVENOUS at 19:31

## 2024-06-17 RX ADMIN — Medication 4 MILLILITER(S): at 05:06

## 2024-06-17 RX ADMIN — Medication 400 MILLIGRAM(S): at 11:08

## 2024-06-17 RX ADMIN — IPRATROPIUM BROMIDE AND ALBUTEROL SULFATE 3 MILLILITER(S): .5; 3 SOLUTION RESPIRATORY (INHALATION) at 11:06

## 2024-06-17 RX ADMIN — Medication 1 APPLICATION(S): at 06:15

## 2024-06-17 RX ADMIN — POTASSIUM CHLORIDE 100 MILLIEQUIVALENT(S): 600 TABLET, FILM COATED, EXTENDED RELEASE ORAL at 15:32

## 2024-06-17 RX ADMIN — ENOXAPARIN SODIUM 30 MILLIGRAM(S): 100 INJECTION SUBCUTANEOUS at 05:59

## 2024-06-17 RX ADMIN — METRONIDAZOLE 100 MILLIGRAM(S): 500 TABLET ORAL at 13:09

## 2024-06-18 LAB
ALBUMIN SERPL ELPH-MCNC: 2.8 G/DL — LOW (ref 3.3–5)
ALP SERPL-CCNC: 121 U/L — HIGH (ref 40–120)
ALT FLD-CCNC: 45 U/L — SIGNIFICANT CHANGE UP (ref 10–45)
ANION GAP SERPL CALC-SCNC: 12 MMOL/L — SIGNIFICANT CHANGE UP (ref 5–17)
AST SERPL-CCNC: 30 U/L — SIGNIFICANT CHANGE UP (ref 10–40)
BILIRUB SERPL-MCNC: 0.4 MG/DL — SIGNIFICANT CHANGE UP (ref 0.2–1.2)
BUN SERPL-MCNC: 21 MG/DL — SIGNIFICANT CHANGE UP (ref 7–23)
CALCIUM SERPL-MCNC: 9.1 MG/DL — SIGNIFICANT CHANGE UP (ref 8.4–10.5)
CHLORIDE SERPL-SCNC: 105 MMOL/L — SIGNIFICANT CHANGE UP (ref 96–108)
CO2 SERPL-SCNC: 22 MMOL/L — SIGNIFICANT CHANGE UP (ref 22–31)
CREAT SERPL-MCNC: 1.7 MG/DL — HIGH (ref 0.5–1.3)
CREATININE, URINE RESULT: 75 MG/DL — SIGNIFICANT CHANGE UP
EGFR: 38 ML/MIN/1.73M2 — LOW
GLUCOSE BLDC GLUCOMTR-MCNC: 132 MG/DL — HIGH (ref 70–99)
GLUCOSE SERPL-MCNC: 126 MG/DL — HIGH (ref 70–99)
HCT VFR BLD CALC: 31.1 % — LOW (ref 39–50)
HGB BLD-MCNC: 9.3 G/DL — LOW (ref 13–17)
MAGNESIUM SERPL-MCNC: 8.3 MG/DL — HIGH (ref 1.6–2.6)
MCHC RBC-ENTMCNC: 28.1 PG — SIGNIFICANT CHANGE UP (ref 27–34)
MCHC RBC-ENTMCNC: 29.9 GM/DL — LOW (ref 32–36)
MCV RBC AUTO: 94 FL — SIGNIFICANT CHANGE UP (ref 80–100)
NRBC # BLD: 0 /100 WBCS — SIGNIFICANT CHANGE UP (ref 0–0)
PHOSPHATE SERPL-MCNC: 3.9 MG/DL — SIGNIFICANT CHANGE UP (ref 2.5–4.5)
PLATELET # BLD AUTO: 205 K/UL — SIGNIFICANT CHANGE UP (ref 150–400)
POTASSIUM SERPL-MCNC: 4 MMOL/L — SIGNIFICANT CHANGE UP (ref 3.5–5.3)
POTASSIUM SERPL-SCNC: 4 MMOL/L — SIGNIFICANT CHANGE UP (ref 3.5–5.3)
PROT ?TM UR-MCNC: 45 MG/DL — HIGH (ref 0–12)
PROT SERPL-MCNC: 6.5 G/DL — SIGNIFICANT CHANGE UP (ref 6–8.3)
RBC # BLD: 3.31 M/UL — LOW (ref 4.2–5.8)
RBC # FLD: 16.3 % — HIGH (ref 10.3–14.5)
SODIUM SERPL-SCNC: 139 MMOL/L — SIGNIFICANT CHANGE UP (ref 135–145)
WBC # BLD: 8.71 K/UL — SIGNIFICANT CHANGE UP (ref 3.8–10.5)
WBC # FLD AUTO: 8.71 K/UL — SIGNIFICANT CHANGE UP (ref 3.8–10.5)

## 2024-06-18 PROCEDURE — 71045 X-RAY EXAM CHEST 1 VIEW: CPT | Mod: 26,76

## 2024-06-18 PROCEDURE — 99232 SBSQ HOSP IP/OBS MODERATE 35: CPT

## 2024-06-18 PROCEDURE — 74018 RADEX ABDOMEN 1 VIEW: CPT | Mod: 26

## 2024-06-18 RX ORDER — ACETAMINOPHEN 325 MG
1000 TABLET ORAL EVERY 6 HOURS
Refills: 0 | Status: COMPLETED | OUTPATIENT
Start: 2024-06-18 | End: 2024-06-22

## 2024-06-18 RX ADMIN — PIPERACILLIN SODIUM AND TAZOBACTAM SODIUM 25 GRAM(S): 3; .375 INJECTION, POWDER, LYOPHILIZED, FOR SOLUTION INTRAVENOUS at 13:59

## 2024-06-18 RX ADMIN — Medication 4 MILLILITER(S): at 05:31

## 2024-06-18 RX ADMIN — PANTOPRAZOLE SODIUM 40 MILLIGRAM(S): 40 INJECTION, POWDER, FOR SOLUTION INTRAVENOUS at 17:16

## 2024-06-18 RX ADMIN — Medication 400 MILLIGRAM(S): at 05:20

## 2024-06-18 RX ADMIN — Medication 100 MILLIGRAM(S): at 13:59

## 2024-06-18 RX ADMIN — CALCIUM GLUCONATE 200 GRAM(S): 98 INJECTION, SOLUTION INTRAVENOUS at 00:51

## 2024-06-18 RX ADMIN — Medication 5 MILLIGRAM(S): at 13:59

## 2024-06-18 RX ADMIN — DEXTROSE MONOHYDRATE AND SODIUM CHLORIDE 50 MILLILITER(S): 5; .3 INJECTION, SOLUTION INTRAVENOUS at 20:21

## 2024-06-18 RX ADMIN — OLANZAPINE 10 MILLIGRAM(S): 2.5 TABLET, FILM COATED ORAL at 01:45

## 2024-06-18 RX ADMIN — IPRATROPIUM BROMIDE AND ALBUTEROL SULFATE 3 MILLILITER(S): .5; 3 SOLUTION RESPIRATORY (INHALATION) at 12:02

## 2024-06-18 RX ADMIN — ENOXAPARIN SODIUM 30 MILLIGRAM(S): 100 INJECTION SUBCUTANEOUS at 05:20

## 2024-06-18 RX ADMIN — PIPERACILLIN SODIUM AND TAZOBACTAM SODIUM 25 GRAM(S): 3; .375 INJECTION, POWDER, LYOPHILIZED, FOR SOLUTION INTRAVENOUS at 05:20

## 2024-06-18 RX ADMIN — PANTOPRAZOLE SODIUM 40 MILLIGRAM(S): 40 INJECTION, POWDER, FOR SOLUTION INTRAVENOUS at 05:21

## 2024-06-18 RX ADMIN — Medication 25 GRAM(S): at 00:51

## 2024-06-18 RX ADMIN — Medication 1000 MILLIGRAM(S): at 00:08

## 2024-06-18 RX ADMIN — Medication 5 MILLIGRAM(S): at 18:54

## 2024-06-18 RX ADMIN — Medication 1000 MILLIGRAM(S): at 05:50

## 2024-06-18 RX ADMIN — DEXTROSE MONOHYDRATE AND SODIUM CHLORIDE 50 MILLILITER(S): 5; .3 INJECTION, SOLUTION INTRAVENOUS at 07:02

## 2024-06-18 RX ADMIN — PIPERACILLIN SODIUM AND TAZOBACTAM SODIUM 25 GRAM(S): 3; .375 INJECTION, POWDER, LYOPHILIZED, FOR SOLUTION INTRAVENOUS at 22:27

## 2024-06-18 RX ADMIN — Medication 5 MILLIGRAM(S): at 00:52

## 2024-06-18 RX ADMIN — METRONIDAZOLE 100 MILLIGRAM(S): 500 TABLET ORAL at 05:39

## 2024-06-18 RX ADMIN — IPRATROPIUM BROMIDE AND ALBUTEROL SULFATE 3 MILLILITER(S): .5; 3 SOLUTION RESPIRATORY (INHALATION) at 17:33

## 2024-06-18 RX ADMIN — Medication 5 MILLIGRAM(S): at 06:04

## 2024-06-18 RX ADMIN — Medication 1 APPLICATION(S): at 05:21

## 2024-06-18 RX ADMIN — IPRATROPIUM BROMIDE AND ALBUTEROL SULFATE 3 MILLILITER(S): .5; 3 SOLUTION RESPIRATORY (INHALATION) at 05:31

## 2024-06-18 RX ADMIN — OLANZAPINE 10 MILLIGRAM(S): 2.5 TABLET, FILM COATED ORAL at 23:51

## 2024-06-19 LAB
% ALBUMIN: 44.8 % — SIGNIFICANT CHANGE UP
% ALPHA 1: 8.5 % — SIGNIFICANT CHANGE UP
% ALPHA 2: 13.2 % — SIGNIFICANT CHANGE UP
% BETA: 14.3 % — SIGNIFICANT CHANGE UP
% GAMMA, URINE: 10.1 % — SIGNIFICANT CHANGE UP
% GAMMA: 19.2 % — SIGNIFICANT CHANGE UP
% M SPIKE: SIGNIFICANT CHANGE UP
24R-OH-CALCIDIOL SERPL-MCNC: 24.3 NG/ML — LOW (ref 30–80)
A1C WITH ESTIMATED AVERAGE GLUCOSE RESULT: 5.9 % — HIGH (ref 4–5.6)
ALBUMIN 24H MFR UR ELPH: 22.6 % — SIGNIFICANT CHANGE UP
ALBUMIN SERPL ELPH-MCNC: 2.3 G/DL — LOW (ref 3.3–5)
ALBUMIN SERPL ELPH-MCNC: 2.3 G/DL — LOW (ref 3.6–5.5)
ALBUMIN/GLOB SERPL ELPH: 0.8 RATIO — SIGNIFICANT CHANGE UP
ALP SERPL-CCNC: 128 U/L — HIGH (ref 40–120)
ALPHA1 GLOB 24H MFR UR ELPH: 30.5 % — SIGNIFICANT CHANGE UP
ALPHA1 GLOB SERPL ELPH-MCNC: 0.4 G/DL — SIGNIFICANT CHANGE UP (ref 0.1–0.4)
ALPHA2 GLOB 24H MFR UR ELPH: 18.7 % — SIGNIFICANT CHANGE UP
ALPHA2 GLOB SERPL ELPH-MCNC: 0.7 G/DL — SIGNIFICANT CHANGE UP (ref 0.5–1)
ALT FLD-CCNC: 35 U/L — SIGNIFICANT CHANGE UP (ref 10–45)
ANION GAP SERPL CALC-SCNC: 10 MMOL/L — SIGNIFICANT CHANGE UP (ref 5–17)
AST SERPL-CCNC: 22 U/L — SIGNIFICANT CHANGE UP (ref 10–40)
B-GLOBULIN 24H MFR UR ELPH: 18.1 % — SIGNIFICANT CHANGE UP
B-GLOBULIN SERPL ELPH-MCNC: 0.7 G/DL — SIGNIFICANT CHANGE UP (ref 0.5–1)
BASE EXCESS BLDV CALC-SCNC: -0.2 MMOL/L — SIGNIFICANT CHANGE UP (ref -2–3)
BILIRUB SERPL-MCNC: 0.3 MG/DL — SIGNIFICANT CHANGE UP (ref 0.2–1.2)
BUN SERPL-MCNC: 19 MG/DL — SIGNIFICANT CHANGE UP (ref 7–23)
CA-I SERPL-SCNC: 1.26 MMOL/L — SIGNIFICANT CHANGE UP (ref 1.15–1.33)
CALCIUM SERPL-MCNC: 8.5 MG/DL — SIGNIFICANT CHANGE UP (ref 8.4–10.5)
CHLORIDE BLDV-SCNC: 110 MMOL/L — HIGH (ref 96–108)
CHLORIDE SERPL-SCNC: 109 MMOL/L — HIGH (ref 96–108)
CHOLEST SERPL-MCNC: 100 MG/DL — SIGNIFICANT CHANGE UP
CHOLEST SERPL-MCNC: 93 MG/DL — SIGNIFICANT CHANGE UP
CO2 BLDV-SCNC: 28 MMOL/L — HIGH (ref 22–26)
CO2 SERPL-SCNC: 23 MMOL/L — SIGNIFICANT CHANGE UP (ref 22–31)
COLLECT DURATION TIME UR: 24 HR — SIGNIFICANT CHANGE UP
CREAT SERPL-MCNC: 1.43 MG/DL — HIGH (ref 0.5–1.3)
EGFR: 47 ML/MIN/1.73M2 — LOW
ESTIMATED AVERAGE GLUCOSE: 123 MG/DL — HIGH (ref 68–114)
FERRITIN SERPL-MCNC: 136 NG/ML — SIGNIFICANT CHANGE UP (ref 30–400)
GAMMA GLOBULIN: 1 G/DL — SIGNIFICANT CHANGE UP (ref 0.6–1.6)
GAS PNL BLDV: 142 MMOL/L — SIGNIFICANT CHANGE UP (ref 136–145)
GAS PNL BLDV: SIGNIFICANT CHANGE UP
GAS PNL BLDV: SIGNIFICANT CHANGE UP
GLUCOSE BLDV-MCNC: 105 MG/DL — HIGH (ref 70–99)
GLUCOSE SERPL-MCNC: 112 MG/DL — HIGH (ref 70–99)
HCO3 BLDV-SCNC: 27 MMOL/L — SIGNIFICANT CHANGE UP (ref 22–29)
HCT VFR BLD CALC: 27.9 % — LOW (ref 39–50)
HCT VFR BLDA CALC: 27 % — LOW (ref 39–51)
HDLC SERPL-MCNC: 35 MG/DL — LOW
HDLC SERPL-MCNC: 36 MG/DL — LOW
HGB BLD CALC-MCNC: 9 G/DL — LOW (ref 12.6–17.4)
HGB BLD-MCNC: 8.6 G/DL — LOW (ref 13–17)
HOROWITZ INDEX BLDV+IHG-RTO: 31 — SIGNIFICANT CHANGE UP
INTERPRETATION 24H UR IFE-IMP: SIGNIFICANT CHANGE UP
INTERPRETATION SERPL IFE-IMP: SIGNIFICANT CHANGE UP
IRON SATN MFR SERPL: 11 UG/DL — LOW (ref 45–165)
IRON SATN MFR SERPL: 5 % — LOW (ref 16–55)
LACTATE BLDV-MCNC: 0.7 MMOL/L — SIGNIFICANT CHANGE UP (ref 0.5–2)
LIPID PNL WITH DIRECT LDL SERPL: 42 MG/DL — SIGNIFICANT CHANGE UP
LIPID PNL WITH DIRECT LDL SERPL: 49 MG/DL — SIGNIFICANT CHANGE UP
M PROTEIN 24H UR ELPH-MRATE: 0 % — SIGNIFICANT CHANGE UP
M PROTEIN 24H UR ELPH-MRATE: 0 MG/24HR — SIGNIFICANT CHANGE UP (ref 0–0)
M PROTEIN 24H UR ELPH-MRATE: 0 MG/DL — SIGNIFICANT CHANGE UP
M-SPIKE: SIGNIFICANT CHANGE UP (ref 0–0)
MAGNESIUM SERPL-MCNC: 2 MG/DL — SIGNIFICANT CHANGE UP (ref 1.6–2.6)
MCHC RBC-ENTMCNC: 28.5 PG — SIGNIFICANT CHANGE UP (ref 27–34)
MCHC RBC-ENTMCNC: 30.8 GM/DL — LOW (ref 32–36)
MCV RBC AUTO: 92.4 FL — SIGNIFICANT CHANGE UP (ref 80–100)
NON HDL CHOLESTEROL: 57 MG/DL — SIGNIFICANT CHANGE UP
NON HDL CHOLESTEROL: 65 MG/DL — SIGNIFICANT CHANGE UP
NRBC # BLD: 0 /100 WBCS — SIGNIFICANT CHANGE UP (ref 0–0)
PCO2 BLDV: 54 MMHG — SIGNIFICANT CHANGE UP (ref 42–55)
PH BLDV: 7.3 — LOW (ref 7.32–7.43)
PHOSPHATE SERPL-MCNC: 3.1 MG/DL — SIGNIFICANT CHANGE UP (ref 2.5–4.5)
PLATELET # BLD AUTO: 190 K/UL — SIGNIFICANT CHANGE UP (ref 150–400)
PO2 BLDV: 94 MMHG — HIGH (ref 25–45)
POTASSIUM BLDV-SCNC: 4 MMOL/L — SIGNIFICANT CHANGE UP (ref 3.5–5.1)
POTASSIUM SERPL-MCNC: 4 MMOL/L — SIGNIFICANT CHANGE UP (ref 3.5–5.3)
POTASSIUM SERPL-SCNC: 4 MMOL/L — SIGNIFICANT CHANGE UP (ref 3.5–5.3)
PREALB SERPL-MCNC: 10 MG/DL — LOW (ref 20–40)
PREALB SERPL-MCNC: 10 MG/DL — LOW (ref 20–40)
PROT ?TM UR-MCNC: 45 MG/DL — HIGH (ref 0–12)
PROT PATTERN 24H UR ELPH-IMP: SIGNIFICANT CHANGE UP
PROT PATTERN SERPL ELPH-IMP: SIGNIFICANT CHANGE UP
PROT SERPL-MCNC: 5.1 G/DL — LOW (ref 6–8.3)
PROT SERPL-MCNC: 5.9 G/DL — LOW (ref 6–8.3)
PROTEIN QUANT CALC, URINE: 788 MG/24 H — HIGH (ref 50–100)
RBC # BLD: 3.02 M/UL — LOW (ref 4.2–5.8)
RBC # BLD: 3.02 M/UL — LOW (ref 4.2–5.8)
RBC # FLD: 16.3 % — HIGH (ref 10.3–14.5)
RETICS #: 19 K/UL — LOW (ref 25–125)
RETICS/RBC NFR: 0.6 % — SIGNIFICANT CHANGE UP (ref 0.5–2.5)
SAO2 % BLDV: 98.8 % — HIGH (ref 67–88)
SODIUM SERPL-SCNC: 142 MMOL/L — SIGNIFICANT CHANGE UP (ref 135–145)
TIBC SERPL-MCNC: 216 UG/DL — LOW (ref 220–430)
TOTAL VOLUME - 24 HOUR: 1750 ML — SIGNIFICANT CHANGE UP
TRIGL SERPL-MCNC: 74 MG/DL — SIGNIFICANT CHANGE UP
TRIGL SERPL-MCNC: 74 MG/DL — SIGNIFICANT CHANGE UP
TSH SERPL-MCNC: 1.15 UIU/ML — SIGNIFICANT CHANGE UP (ref 0.27–4.2)
TSH SERPL-MCNC: 1.25 UIU/ML — SIGNIFICANT CHANGE UP (ref 0.27–4.2)
UIBC SERPL-MCNC: 205 UG/DL — SIGNIFICANT CHANGE UP (ref 110–370)
URINE CREATININE CALCULATION: 1.3 G/24 H — SIGNIFICANT CHANGE UP (ref 1–2)
WBC # BLD: 7.14 K/UL — SIGNIFICANT CHANGE UP (ref 3.8–10.5)
WBC # FLD AUTO: 7.14 K/UL — SIGNIFICANT CHANGE UP (ref 3.8–10.5)

## 2024-06-19 PROCEDURE — 99291 CRITICAL CARE FIRST HOUR: CPT

## 2024-06-19 PROCEDURE — 99223 1ST HOSP IP/OBS HIGH 75: CPT

## 2024-06-19 PROCEDURE — 71045 X-RAY EXAM CHEST 1 VIEW: CPT | Mod: 26

## 2024-06-19 RX ORDER — METOPROLOL TARTRATE 50 MG
5 TABLET ORAL EVERY 4 HOURS
Refills: 0 | Status: DISCONTINUED | OUTPATIENT
Start: 2024-06-19 | End: 2024-06-25

## 2024-06-19 RX ORDER — ELECTROLYTE SOLUTION,INJ
1 VIAL (ML) INTRAVENOUS
Refills: 0 | Status: DISCONTINUED | OUTPATIENT
Start: 2024-06-19 | End: 2024-06-20

## 2024-06-19 RX ORDER — MAGNESIUM SULFATE 100 %
2 POWDER (GRAM) MISCELLANEOUS ONCE
Refills: 0 | Status: COMPLETED | OUTPATIENT
Start: 2024-06-19 | End: 2024-06-19

## 2024-06-19 RX ORDER — FUROSEMIDE 10 MG/ML
40 INJECTION, SOLUTION INTRAMUSCULAR; INTRAVENOUS ONCE
Refills: 0 | Status: COMPLETED | OUTPATIENT
Start: 2024-06-19 | End: 2024-06-19

## 2024-06-19 RX ORDER — CALCIUM GLUCONATE 98 MG/ML
2 INJECTION, SOLUTION INTRAVENOUS ONCE
Refills: 0 | Status: COMPLETED | OUTPATIENT
Start: 2024-06-19 | End: 2024-06-19

## 2024-06-19 RX ORDER — SODIUM CHLORIDE 0.9 % (FLUSH) 0.9 %
4 SYRINGE (ML) INJECTION EVERY 6 HOURS
Refills: 0 | Status: COMPLETED | OUTPATIENT
Start: 2024-06-19 | End: 2024-06-22

## 2024-06-19 RX ORDER — BISACODYL 5 MG
10 TABLET, DELAYED RELEASE (ENTERIC COATED) ORAL ONCE
Refills: 0 | Status: COMPLETED | OUTPATIENT
Start: 2024-06-19 | End: 2024-06-19

## 2024-06-19 RX ORDER — BENZOCAINE/MENTHOL 6 MG-10 MG
1 LOZENGE MUCOUS MEMBRANE ONCE
Refills: 0 | Status: COMPLETED | OUTPATIENT
Start: 2024-06-19 | End: 2024-06-19

## 2024-06-19 RX ORDER — ELECTROLYTE SOLUTION,INJ
1 VIAL (ML) INTRAVENOUS
Refills: 0 | Status: DISCONTINUED | OUTPATIENT
Start: 2024-06-19 | End: 2024-06-19

## 2024-06-19 RX ADMIN — DEXTROSE MONOHYDRATE AND SODIUM CHLORIDE 50 MILLILITER(S): 5; .3 INJECTION, SOLUTION INTRAVENOUS at 07:34

## 2024-06-19 RX ADMIN — CALCIUM GLUCONATE 200 GRAM(S): 98 INJECTION, SOLUTION INTRAVENOUS at 05:35

## 2024-06-19 RX ADMIN — IPRATROPIUM BROMIDE AND ALBUTEROL SULFATE 3 MILLILITER(S): .5; 3 SOLUTION RESPIRATORY (INHALATION) at 11:27

## 2024-06-19 RX ADMIN — FUROSEMIDE 40 MILLIGRAM(S): 10 INJECTION, SOLUTION INTRAMUSCULAR; INTRAVENOUS at 09:05

## 2024-06-19 RX ADMIN — Medication 1 ENEMA: at 17:19

## 2024-06-19 RX ADMIN — Medication 4 MILLILITER(S): at 18:36

## 2024-06-19 RX ADMIN — IPRATROPIUM BROMIDE AND ALBUTEROL SULFATE 3 MILLILITER(S): .5; 3 SOLUTION RESPIRATORY (INHALATION) at 05:38

## 2024-06-19 RX ADMIN — Medication 1000 MILLIGRAM(S): at 23:30

## 2024-06-19 RX ADMIN — Medication 400 MILLIGRAM(S): at 22:47

## 2024-06-19 RX ADMIN — Medication 5 MILLIGRAM(S): at 00:37

## 2024-06-19 RX ADMIN — Medication 4 MILLILITER(S): at 23:18

## 2024-06-19 RX ADMIN — Medication 5 MILLIGRAM(S): at 18:05

## 2024-06-19 RX ADMIN — Medication 5 MILLIGRAM(S): at 22:01

## 2024-06-19 RX ADMIN — ENOXAPARIN SODIUM 30 MILLIGRAM(S): 100 INJECTION SUBCUTANEOUS at 05:39

## 2024-06-19 RX ADMIN — Medication 5 MILLIGRAM(S): at 11:41

## 2024-06-19 RX ADMIN — IPRATROPIUM BROMIDE AND ALBUTEROL SULFATE 3 MILLILITER(S): .5; 3 SOLUTION RESPIRATORY (INHALATION) at 23:19

## 2024-06-19 RX ADMIN — Medication 5 MILLIGRAM(S): at 15:08

## 2024-06-19 RX ADMIN — Medication 25 GRAM(S): at 05:35

## 2024-06-19 RX ADMIN — Medication 25 GRAM(S): at 20:30

## 2024-06-19 RX ADMIN — OLANZAPINE 10 MILLIGRAM(S): 2.5 TABLET, FILM COATED ORAL at 22:46

## 2024-06-19 RX ADMIN — IPRATROPIUM BROMIDE AND ALBUTEROL SULFATE 3 MILLILITER(S): .5; 3 SOLUTION RESPIRATORY (INHALATION) at 18:36

## 2024-06-19 RX ADMIN — PANTOPRAZOLE SODIUM 40 MILLIGRAM(S): 40 INJECTION, POWDER, FOR SOLUTION INTRAVENOUS at 05:38

## 2024-06-19 RX ADMIN — Medication 5 MILLIGRAM(S): at 06:02

## 2024-06-19 RX ADMIN — Medication 4 MILLILITER(S): at 11:31

## 2024-06-19 RX ADMIN — Medication 10 MILLIGRAM(S): at 10:29

## 2024-06-19 RX ADMIN — PANTOPRAZOLE SODIUM 40 MILLIGRAM(S): 40 INJECTION, POWDER, FOR SOLUTION INTRAVENOUS at 17:26

## 2024-06-19 RX ADMIN — Medication 1 APPLICATION(S): at 05:39

## 2024-06-20 LAB
ALBUMIN SERPL ELPH-MCNC: 2.4 G/DL — LOW (ref 3.3–5)
ALP SERPL-CCNC: 119 U/L — SIGNIFICANT CHANGE UP (ref 40–120)
ALT FLD-CCNC: 26 U/L — SIGNIFICANT CHANGE UP (ref 10–45)
ANION GAP SERPL CALC-SCNC: 9 MMOL/L — SIGNIFICANT CHANGE UP (ref 5–17)
AST SERPL-CCNC: 15 U/L — SIGNIFICANT CHANGE UP (ref 10–40)
BILIRUB SERPL-MCNC: 0.3 MG/DL — SIGNIFICANT CHANGE UP (ref 0.2–1.2)
BUN SERPL-MCNC: 23 MG/DL — SIGNIFICANT CHANGE UP (ref 7–23)
CA-I BLD-SCNC: 1.2 MMOL/L — SIGNIFICANT CHANGE UP (ref 1.15–1.33)
CALCIUM SERPL-MCNC: 8.6 MG/DL — SIGNIFICANT CHANGE UP (ref 8.4–10.5)
CHLORIDE SERPL-SCNC: 108 MMOL/L — SIGNIFICANT CHANGE UP (ref 96–108)
CO2 SERPL-SCNC: 28 MMOL/L — SIGNIFICANT CHANGE UP (ref 22–31)
CREAT SERPL-MCNC: 1.34 MG/DL — HIGH (ref 0.5–1.3)
EGFR: 51 ML/MIN/1.73M2 — LOW
GLUCOSE BLDC GLUCOMTR-MCNC: 148 MG/DL — HIGH (ref 70–99)
GLUCOSE BLDC GLUCOMTR-MCNC: 162 MG/DL — HIGH (ref 70–99)
GLUCOSE BLDC GLUCOMTR-MCNC: 168 MG/DL — HIGH (ref 70–99)
GLUCOSE SERPL-MCNC: 173 MG/DL — HIGH (ref 70–99)
HCT VFR BLD CALC: 28.3 % — LOW (ref 39–50)
HGB BLD-MCNC: 8.3 G/DL — LOW (ref 13–17)
MAGNESIUM SERPL-MCNC: 2.5 MG/DL — SIGNIFICANT CHANGE UP (ref 1.6–2.6)
MCHC RBC-ENTMCNC: 28.2 PG — SIGNIFICANT CHANGE UP (ref 27–34)
MCHC RBC-ENTMCNC: 29.3 GM/DL — LOW (ref 32–36)
MCV RBC AUTO: 96.3 FL — SIGNIFICANT CHANGE UP (ref 80–100)
NRBC # BLD: 0 /100 WBCS — SIGNIFICANT CHANGE UP (ref 0–0)
PHOSPHATE SERPL-MCNC: 4.2 MG/DL — SIGNIFICANT CHANGE UP (ref 2.5–4.5)
PLATELET # BLD AUTO: 194 K/UL — SIGNIFICANT CHANGE UP (ref 150–400)
POTASSIUM SERPL-MCNC: 4.2 MMOL/L — SIGNIFICANT CHANGE UP (ref 3.5–5.3)
POTASSIUM SERPL-SCNC: 4.2 MMOL/L — SIGNIFICANT CHANGE UP (ref 3.5–5.3)
PROT SERPL-MCNC: 5.8 G/DL — LOW (ref 6–8.3)
RBC # BLD: 2.94 M/UL — LOW (ref 4.2–5.8)
RBC # FLD: 16.3 % — HIGH (ref 10.3–14.5)
SODIUM SERPL-SCNC: 145 MMOL/L — SIGNIFICANT CHANGE UP (ref 135–145)
TRIGL SERPL-MCNC: 91 MG/DL — SIGNIFICANT CHANGE UP
WBC # BLD: 8.56 K/UL — SIGNIFICANT CHANGE UP (ref 3.8–10.5)
WBC # FLD AUTO: 8.56 K/UL — SIGNIFICANT CHANGE UP (ref 3.8–10.5)

## 2024-06-20 PROCEDURE — 71045 X-RAY EXAM CHEST 1 VIEW: CPT | Mod: 26

## 2024-06-20 PROCEDURE — 99233 SBSQ HOSP IP/OBS HIGH 50: CPT

## 2024-06-20 PROCEDURE — 99222 1ST HOSP IP/OBS MODERATE 55: CPT

## 2024-06-20 RX ORDER — DEXTROSE 30 % IN WATER 30 %
25 VIAL (ML) INTRAVENOUS ONCE
Refills: 0 | Status: DISCONTINUED | OUTPATIENT
Start: 2024-06-20 | End: 2024-06-21

## 2024-06-20 RX ORDER — FUROSEMIDE 10 MG/ML
40 INJECTION, SOLUTION INTRAMUSCULAR; INTRAVENOUS EVERY 6 HOURS
Refills: 0 | Status: DISCONTINUED | OUTPATIENT
Start: 2024-06-20 | End: 2024-06-20

## 2024-06-20 RX ORDER — DEXTROSE 30 % IN WATER 30 %
15 VIAL (ML) INTRAVENOUS ONCE
Refills: 0 | Status: DISCONTINUED | OUTPATIENT
Start: 2024-06-20 | End: 2024-06-21

## 2024-06-20 RX ORDER — IRON SUCROSE 20 MG/ML
200 INJECTION, SOLUTION INTRAVENOUS EVERY 24 HOURS
Refills: 0 | Status: DISCONTINUED | OUTPATIENT
Start: 2024-06-20 | End: 2024-06-20

## 2024-06-20 RX ORDER — GLUCAGON HYDROCHLORIDE 1 MG/ML
1 INJECTION, POWDER, FOR SOLUTION INTRAMUSCULAR; INTRAVENOUS; SUBCUTANEOUS ONCE
Refills: 0 | Status: DISCONTINUED | OUTPATIENT
Start: 2024-06-20 | End: 2024-06-21

## 2024-06-20 RX ORDER — ENOXAPARIN SODIUM 100 MG/ML
40 INJECTION SUBCUTANEOUS EVERY 24 HOURS
Refills: 0 | Status: DISCONTINUED | OUTPATIENT
Start: 2024-06-21 | End: 2024-07-05

## 2024-06-20 RX ORDER — DEXTROSE MONOHYDRATE AND SODIUM CHLORIDE 5; .3 G/100ML; G/100ML
1000 INJECTION, SOLUTION INTRAVENOUS
Refills: 0 | Status: DISCONTINUED | OUTPATIENT
Start: 2024-06-20 | End: 2024-06-20

## 2024-06-20 RX ORDER — FISH OIL 0.1 G/ML
8.3 INJECTION, EMULSION INTRAVENOUS
Qty: 20 | Refills: 0 | Status: DISCONTINUED | OUTPATIENT
Start: 2024-06-20 | End: 2024-06-21

## 2024-06-20 RX ORDER — IRON SUCROSE 20 MG/ML
200 INJECTION, SOLUTION INTRAVENOUS EVERY 24 HOURS
Refills: 0 | Status: COMPLETED | OUTPATIENT
Start: 2024-06-20 | End: 2024-06-24

## 2024-06-20 RX ORDER — DEXTROSE 30 % IN WATER 30 %
12.5 VIAL (ML) INTRAVENOUS ONCE
Refills: 0 | Status: DISCONTINUED | OUTPATIENT
Start: 2024-06-20 | End: 2024-06-21

## 2024-06-20 RX ORDER — FUROSEMIDE 10 MG/ML
40 INJECTION, SOLUTION INTRAMUSCULAR; INTRAVENOUS EVERY 12 HOURS
Refills: 0 | Status: COMPLETED | OUTPATIENT
Start: 2024-06-20 | End: 2024-06-21

## 2024-06-20 RX ORDER — DEXTROSE MONOHYDRATE 100 MG/ML
125 INJECTION, SOLUTION INTRAVENOUS ONCE
Refills: 0 | Status: DISCONTINUED | OUTPATIENT
Start: 2024-06-20 | End: 2024-06-20

## 2024-06-20 RX ORDER — IRON SUCROSE 20 MG/ML
500 INJECTION, SOLUTION INTRAVENOUS ONCE
Refills: 0 | Status: DISCONTINUED | OUTPATIENT
Start: 2024-06-20 | End: 2024-06-20

## 2024-06-20 RX ORDER — INSULIN LISPRO 100 [IU]/ML
INJECTION, SOLUTION SUBCUTANEOUS EVERY 6 HOURS
Refills: 0 | Status: DISCONTINUED | OUTPATIENT
Start: 2024-06-20 | End: 2024-06-28

## 2024-06-20 RX ORDER — ELECTROLYTE SOLUTION,INJ
1 VIAL (ML) INTRAVENOUS
Refills: 0 | Status: DISCONTINUED | OUTPATIENT
Start: 2024-06-20 | End: 2024-06-20

## 2024-06-20 RX ADMIN — IPRATROPIUM BROMIDE AND ALBUTEROL SULFATE 3 MILLILITER(S): .5; 3 SOLUTION RESPIRATORY (INHALATION) at 23:08

## 2024-06-20 RX ADMIN — Medication 1 EACH: at 17:00

## 2024-06-20 RX ADMIN — IPRATROPIUM BROMIDE AND ALBUTEROL SULFATE 3 MILLILITER(S): .5; 3 SOLUTION RESPIRATORY (INHALATION) at 11:42

## 2024-06-20 RX ADMIN — Medication 4 MILLILITER(S): at 17:19

## 2024-06-20 RX ADMIN — FISH OIL 8.3 ML/HR: 0.1 INJECTION, EMULSION INTRAVENOUS at 17:33

## 2024-06-20 RX ADMIN — IRON SUCROSE 110 MILLIGRAM(S): 20 INJECTION, SOLUTION INTRAVENOUS at 16:43

## 2024-06-20 RX ADMIN — Medication 4 MILLILITER(S): at 05:01

## 2024-06-20 RX ADMIN — Medication 5 MILLIGRAM(S): at 02:45

## 2024-06-20 RX ADMIN — INSULIN LISPRO 1: 100 INJECTION, SOLUTION SUBCUTANEOUS at 12:02

## 2024-06-20 RX ADMIN — Medication 1 EACH: at 17:32

## 2024-06-20 RX ADMIN — OLANZAPINE 10 MILLIGRAM(S): 2.5 TABLET, FILM COATED ORAL at 20:42

## 2024-06-20 RX ADMIN — IPRATROPIUM BROMIDE AND ALBUTEROL SULFATE 3 MILLILITER(S): .5; 3 SOLUTION RESPIRATORY (INHALATION) at 17:19

## 2024-06-20 RX ADMIN — Medication 5 MILLIGRAM(S): at 10:54

## 2024-06-20 RX ADMIN — Medication 1000 MILLIGRAM(S): at 21:13

## 2024-06-20 RX ADMIN — Medication 1 APPLICATION(S): at 06:02

## 2024-06-20 RX ADMIN — INSULIN LISPRO 1: 100 INJECTION, SOLUTION SUBCUTANEOUS at 23:22

## 2024-06-20 RX ADMIN — FISH OIL 8.3 ML/HR: 0.1 INJECTION, EMULSION INTRAVENOUS at 19:39

## 2024-06-20 RX ADMIN — Medication 4 MILLILITER(S): at 23:08

## 2024-06-20 RX ADMIN — FUROSEMIDE 40 MILLIGRAM(S): 10 INJECTION, SOLUTION INTRAMUSCULAR; INTRAVENOUS at 10:57

## 2024-06-20 RX ADMIN — FUROSEMIDE 40 MILLIGRAM(S): 10 INJECTION, SOLUTION INTRAMUSCULAR; INTRAVENOUS at 23:19

## 2024-06-20 RX ADMIN — Medication 5 MILLIGRAM(S): at 06:02

## 2024-06-20 RX ADMIN — Medication 400 MILLIGRAM(S): at 20:43

## 2024-06-20 RX ADMIN — PANTOPRAZOLE SODIUM 40 MILLIGRAM(S): 40 INJECTION, POWDER, FOR SOLUTION INTRAVENOUS at 17:00

## 2024-06-20 RX ADMIN — ENOXAPARIN SODIUM 30 MILLIGRAM(S): 100 INJECTION SUBCUTANEOUS at 06:01

## 2024-06-20 RX ADMIN — Medication 5 MILLIGRAM(S): at 18:03

## 2024-06-20 RX ADMIN — Medication 5 MILLIGRAM(S): at 16:04

## 2024-06-20 RX ADMIN — Medication 4 MILLILITER(S): at 11:43

## 2024-06-20 RX ADMIN — Medication 5 MILLIGRAM(S): at 22:01

## 2024-06-20 RX ADMIN — PANTOPRAZOLE SODIUM 40 MILLIGRAM(S): 40 INJECTION, POWDER, FOR SOLUTION INTRAVENOUS at 06:01

## 2024-06-20 RX ADMIN — Medication 1 EACH: at 19:39

## 2024-06-20 RX ADMIN — IPRATROPIUM BROMIDE AND ALBUTEROL SULFATE 3 MILLILITER(S): .5; 3 SOLUTION RESPIRATORY (INHALATION) at 05:01

## 2024-06-21 LAB
ALBUMIN SERPL ELPH-MCNC: 2.1 G/DL — LOW (ref 3.3–5)
ALBUMIN SERPL ELPH-MCNC: 2.4 G/DL — LOW (ref 3.3–5)
ALP SERPL-CCNC: 111 U/L — SIGNIFICANT CHANGE UP (ref 40–120)
ALP SERPL-CCNC: 93 U/L — SIGNIFICANT CHANGE UP (ref 40–120)
ALT FLD-CCNC: 21 U/L — SIGNIFICANT CHANGE UP (ref 10–45)
ALT FLD-CCNC: 23 U/L — SIGNIFICANT CHANGE UP (ref 10–45)
ANION GAP SERPL CALC-SCNC: 9 MMOL/L — SIGNIFICANT CHANGE UP (ref 5–17)
ANION GAP SERPL CALC-SCNC: 9 MMOL/L — SIGNIFICANT CHANGE UP (ref 5–17)
AST SERPL-CCNC: 15 U/L — SIGNIFICANT CHANGE UP (ref 10–40)
AST SERPL-CCNC: 15 U/L — SIGNIFICANT CHANGE UP (ref 10–40)
BILIRUB SERPL-MCNC: 0.2 MG/DL — SIGNIFICANT CHANGE UP (ref 0.2–1.2)
BILIRUB SERPL-MCNC: 0.3 MG/DL — SIGNIFICANT CHANGE UP (ref 0.2–1.2)
BUN SERPL-MCNC: 27 MG/DL — HIGH (ref 7–23)
BUN SERPL-MCNC: 29 MG/DL — HIGH (ref 7–23)
CA-I BLD-SCNC: 1.16 MMOL/L — SIGNIFICANT CHANGE UP (ref 1.15–1.33)
CA-I BLD-SCNC: 1.2 MMOL/L — SIGNIFICANT CHANGE UP (ref 1.15–1.33)
CALCIUM SERPL-MCNC: 8.7 MG/DL — SIGNIFICANT CHANGE UP (ref 8.4–10.5)
CALCIUM SERPL-MCNC: 8.8 MG/DL — SIGNIFICANT CHANGE UP (ref 8.4–10.5)
CHLORIDE SERPL-SCNC: 108 MMOL/L — SIGNIFICANT CHANGE UP (ref 96–108)
CHLORIDE SERPL-SCNC: 98 MMOL/L — SIGNIFICANT CHANGE UP (ref 96–108)
CO2 SERPL-SCNC: 24 MMOL/L — SIGNIFICANT CHANGE UP (ref 22–31)
CO2 SERPL-SCNC: 29 MMOL/L — SIGNIFICANT CHANGE UP (ref 22–31)
CREAT SERPL-MCNC: 1.3 MG/DL — SIGNIFICANT CHANGE UP (ref 0.5–1.3)
CREAT SERPL-MCNC: 1.32 MG/DL — HIGH (ref 0.5–1.3)
EGFR: 52 ML/MIN/1.73M2 — LOW
EGFR: 53 ML/MIN/1.73M2 — LOW
GLUCOSE BLDC GLUCOMTR-MCNC: 112 MG/DL — HIGH (ref 70–99)
GLUCOSE BLDC GLUCOMTR-MCNC: 142 MG/DL — HIGH (ref 70–99)
GLUCOSE BLDC GLUCOMTR-MCNC: 149 MG/DL — HIGH (ref 70–99)
GLUCOSE SERPL-MCNC: 149 MG/DL — HIGH (ref 70–99)
GLUCOSE SERPL-MCNC: 948 MG/DL — CRITICAL HIGH (ref 70–99)
H PYLORI AG STL QL: NEGATIVE — SIGNIFICANT CHANGE UP
HCT VFR BLD CALC: 25.3 % — LOW (ref 39–50)
HCT VFR BLD CALC: 26.1 % — LOW (ref 39–50)
HGB BLD-MCNC: 7.2 G/DL — LOW (ref 13–17)
HGB BLD-MCNC: 7.9 G/DL — LOW (ref 13–17)
MAGNESIUM SERPL-MCNC: 2.1 MG/DL — SIGNIFICANT CHANGE UP (ref 1.6–2.6)
MAGNESIUM SERPL-MCNC: 2.6 MG/DL — SIGNIFICANT CHANGE UP (ref 1.6–2.6)
MCHC RBC-ENTMCNC: 28.1 PG — SIGNIFICANT CHANGE UP (ref 27–34)
MCHC RBC-ENTMCNC: 28.5 GM/DL — LOW (ref 32–36)
MCHC RBC-ENTMCNC: 29 PG — SIGNIFICANT CHANGE UP (ref 27–34)
MCHC RBC-ENTMCNC: 30.3 GM/DL — LOW (ref 32–36)
MCV RBC AUTO: 102 FL — HIGH (ref 80–100)
MCV RBC AUTO: 92.9 FL — SIGNIFICANT CHANGE UP (ref 80–100)
NRBC # BLD: 0 /100 WBCS — SIGNIFICANT CHANGE UP (ref 0–0)
NRBC # BLD: 0 /100 WBCS — SIGNIFICANT CHANGE UP (ref 0–0)
NT-PROBNP SERPL-SCNC: HIGH PG/ML (ref 0–300)
PHOSPHATE SERPL-MCNC: 3.2 MG/DL — SIGNIFICANT CHANGE UP (ref 2.5–4.5)
PHOSPHATE SERPL-MCNC: 7.7 MG/DL — HIGH (ref 2.5–4.5)
PLATELET # BLD AUTO: 184 K/UL — SIGNIFICANT CHANGE UP (ref 150–400)
PLATELET # BLD AUTO: 212 K/UL — SIGNIFICANT CHANGE UP (ref 150–400)
POTASSIUM SERPL-MCNC: 3.8 MMOL/L — SIGNIFICANT CHANGE UP (ref 3.5–5.3)
POTASSIUM SERPL-MCNC: 7.7 MMOL/L — CRITICAL HIGH (ref 3.5–5.3)
POTASSIUM SERPL-SCNC: 3.8 MMOL/L — SIGNIFICANT CHANGE UP (ref 3.5–5.3)
POTASSIUM SERPL-SCNC: 7.7 MMOL/L — CRITICAL HIGH (ref 3.5–5.3)
PROT SERPL-MCNC: 5.2 G/DL — LOW (ref 6–8.3)
PROT SERPL-MCNC: 6 G/DL — SIGNIFICANT CHANGE UP (ref 6–8.3)
RBC # BLD: 2.48 M/UL — LOW (ref 4.2–5.8)
RBC # BLD: 2.81 M/UL — LOW (ref 4.2–5.8)
RBC # FLD: 16.1 % — HIGH (ref 10.3–14.5)
RBC # FLD: 16.7 % — HIGH (ref 10.3–14.5)
SODIUM SERPL-SCNC: 131 MMOL/L — LOW (ref 135–145)
SODIUM SERPL-SCNC: 146 MMOL/L — HIGH (ref 135–145)
TRIGL SERPL-MCNC: 126 MG/DL — SIGNIFICANT CHANGE UP
TRIGL SERPL-MCNC: 438 MG/DL — HIGH
WBC # BLD: 8.71 K/UL — SIGNIFICANT CHANGE UP (ref 3.8–10.5)
WBC # BLD: 9.5 K/UL — SIGNIFICANT CHANGE UP (ref 3.8–10.5)
WBC # FLD AUTO: 8.71 K/UL — SIGNIFICANT CHANGE UP (ref 3.8–10.5)
WBC # FLD AUTO: 9.5 K/UL — SIGNIFICANT CHANGE UP (ref 3.8–10.5)

## 2024-06-21 PROCEDURE — 71045 X-RAY EXAM CHEST 1 VIEW: CPT | Mod: 26

## 2024-06-21 PROCEDURE — 99232 SBSQ HOSP IP/OBS MODERATE 35: CPT

## 2024-06-21 PROCEDURE — 99233 SBSQ HOSP IP/OBS HIGH 50: CPT

## 2024-06-21 RX ORDER — ELECTROLYTE SOLUTION,INJ
1 VIAL (ML) INTRAVENOUS
Refills: 0 | Status: DISCONTINUED | OUTPATIENT
Start: 2024-06-21 | End: 2024-06-21

## 2024-06-21 RX ORDER — FISH OIL 0.1 G/ML
25 INJECTION, EMULSION INTRAVENOUS
Qty: 60 | Refills: 0 | Status: DISCONTINUED | OUTPATIENT
Start: 2024-06-21 | End: 2024-06-22

## 2024-06-21 RX ADMIN — Medication 400 MILLIGRAM(S): at 23:25

## 2024-06-21 RX ADMIN — Medication 5 MILLIGRAM(S): at 15:12

## 2024-06-21 RX ADMIN — IRON SUCROSE 110 MILLIGRAM(S): 20 INJECTION, SOLUTION INTRAVENOUS at 15:10

## 2024-06-21 RX ADMIN — FUROSEMIDE 40 MILLIGRAM(S): 10 INJECTION, SOLUTION INTRAMUSCULAR; INTRAVENOUS at 23:02

## 2024-06-21 RX ADMIN — FUROSEMIDE 40 MILLIGRAM(S): 10 INJECTION, SOLUTION INTRAMUSCULAR; INTRAVENOUS at 11:11

## 2024-06-21 RX ADMIN — IPRATROPIUM BROMIDE AND ALBUTEROL SULFATE 3 MILLILITER(S): .5; 3 SOLUTION RESPIRATORY (INHALATION) at 05:16

## 2024-06-21 RX ADMIN — Medication 5 MILLIGRAM(S): at 06:35

## 2024-06-21 RX ADMIN — Medication 4 MILLILITER(S): at 11:35

## 2024-06-21 RX ADMIN — Medication 5 MILLIGRAM(S): at 11:11

## 2024-06-21 RX ADMIN — Medication 1000 MILLIGRAM(S): at 23:55

## 2024-06-21 RX ADMIN — FISH OIL 25 ML/HR: 0.1 INJECTION, EMULSION INTRAVENOUS at 18:00

## 2024-06-21 RX ADMIN — Medication 5 MILLIGRAM(S): at 02:12

## 2024-06-21 RX ADMIN — Medication 1 EACH: at 18:01

## 2024-06-21 RX ADMIN — ENOXAPARIN SODIUM 40 MILLIGRAM(S): 100 INJECTION SUBCUTANEOUS at 00:24

## 2024-06-21 RX ADMIN — PANTOPRAZOLE SODIUM 40 MILLIGRAM(S): 40 INJECTION, POWDER, FOR SOLUTION INTRAVENOUS at 17:50

## 2024-06-21 RX ADMIN — Medication 4 MILLILITER(S): at 23:17

## 2024-06-21 RX ADMIN — PANTOPRAZOLE SODIUM 40 MILLIGRAM(S): 40 INJECTION, POWDER, FOR SOLUTION INTRAVENOUS at 05:03

## 2024-06-21 RX ADMIN — Medication 5 MILLIGRAM(S): at 18:49

## 2024-06-21 RX ADMIN — Medication 1 APPLICATION(S): at 05:15

## 2024-06-21 RX ADMIN — Medication 4 MILLILITER(S): at 17:26

## 2024-06-21 RX ADMIN — Medication 5 MILLIGRAM(S): at 23:02

## 2024-06-21 RX ADMIN — IPRATROPIUM BROMIDE AND ALBUTEROL SULFATE 3 MILLILITER(S): .5; 3 SOLUTION RESPIRATORY (INHALATION) at 23:17

## 2024-06-21 RX ADMIN — IPRATROPIUM BROMIDE AND ALBUTEROL SULFATE 3 MILLILITER(S): .5; 3 SOLUTION RESPIRATORY (INHALATION) at 17:25

## 2024-06-21 RX ADMIN — Medication 4 MILLILITER(S): at 05:16

## 2024-06-21 RX ADMIN — OLANZAPINE 10 MILLIGRAM(S): 2.5 TABLET, FILM COATED ORAL at 23:02

## 2024-06-21 RX ADMIN — IPRATROPIUM BROMIDE AND ALBUTEROL SULFATE 3 MILLILITER(S): .5; 3 SOLUTION RESPIRATORY (INHALATION) at 11:35

## 2024-06-22 LAB
ALBUMIN SERPL ELPH-MCNC: 2.7 G/DL — LOW (ref 3.3–5)
ALP SERPL-CCNC: 110 U/L — SIGNIFICANT CHANGE UP (ref 40–120)
ALT FLD-CCNC: 23 U/L — SIGNIFICANT CHANGE UP (ref 10–45)
ANION GAP SERPL CALC-SCNC: 9 MMOL/L — SIGNIFICANT CHANGE UP (ref 5–17)
AST SERPL-CCNC: 20 U/L — SIGNIFICANT CHANGE UP (ref 10–40)
BILIRUB SERPL-MCNC: 0.5 MG/DL — SIGNIFICANT CHANGE UP (ref 0.2–1.2)
BUN SERPL-MCNC: 39 MG/DL — HIGH (ref 7–23)
CA-I BLD-SCNC: 1.17 MMOL/L — SIGNIFICANT CHANGE UP (ref 1.15–1.33)
CALCIUM SERPL-MCNC: 8.9 MG/DL — SIGNIFICANT CHANGE UP (ref 8.4–10.5)
CHLORIDE SERPL-SCNC: 102 MMOL/L — SIGNIFICANT CHANGE UP (ref 96–108)
CO2 SERPL-SCNC: 33 MMOL/L — HIGH (ref 22–31)
CREAT SERPL-MCNC: 1.33 MG/DL — HIGH (ref 0.5–1.3)
EGFR: 51 ML/MIN/1.73M2 — LOW
GLUCOSE BLDC GLUCOMTR-MCNC: 131 MG/DL — HIGH (ref 70–99)
GLUCOSE BLDC GLUCOMTR-MCNC: 133 MG/DL — HIGH (ref 70–99)
GLUCOSE BLDC GLUCOMTR-MCNC: 141 MG/DL — HIGH (ref 70–99)
GLUCOSE BLDC GLUCOMTR-MCNC: 142 MG/DL — HIGH (ref 70–99)
GLUCOSE BLDC GLUCOMTR-MCNC: 143 MG/DL — HIGH (ref 70–99)
GLUCOSE SERPL-MCNC: 133 MG/DL — HIGH (ref 70–99)
HCT VFR BLD CALC: 26.7 % — LOW (ref 39–50)
HGB BLD-MCNC: 8.2 G/DL — LOW (ref 13–17)
MAGNESIUM SERPL-MCNC: 1.9 MG/DL — SIGNIFICANT CHANGE UP (ref 1.6–2.6)
MCHC RBC-ENTMCNC: 28.1 PG — SIGNIFICANT CHANGE UP (ref 27–34)
MCHC RBC-ENTMCNC: 30.7 GM/DL — LOW (ref 32–36)
MCV RBC AUTO: 91.4 FL — SIGNIFICANT CHANGE UP (ref 80–100)
NRBC # BLD: 0 /100 WBCS — SIGNIFICANT CHANGE UP (ref 0–0)
PHOSPHATE SERPL-MCNC: 3.1 MG/DL — SIGNIFICANT CHANGE UP (ref 2.5–4.5)
PLATELET # BLD AUTO: 248 K/UL — SIGNIFICANT CHANGE UP (ref 150–400)
POTASSIUM SERPL-MCNC: 3.6 MMOL/L — SIGNIFICANT CHANGE UP (ref 3.5–5.3)
POTASSIUM SERPL-SCNC: 3.6 MMOL/L — SIGNIFICANT CHANGE UP (ref 3.5–5.3)
PROT SERPL-MCNC: 6.2 G/DL — SIGNIFICANT CHANGE UP (ref 6–8.3)
RBC # BLD: 2.92 M/UL — LOW (ref 4.2–5.8)
RBC # FLD: 15.9 % — HIGH (ref 10.3–14.5)
SODIUM SERPL-SCNC: 144 MMOL/L — SIGNIFICANT CHANGE UP (ref 135–145)
TRIGL SERPL-MCNC: 155 MG/DL — HIGH
WBC # BLD: 11.71 K/UL — HIGH (ref 3.8–10.5)
WBC # FLD AUTO: 11.71 K/UL — HIGH (ref 3.8–10.5)

## 2024-06-22 PROCEDURE — 70450 CT HEAD/BRAIN W/O DYE: CPT | Mod: 26

## 2024-06-22 PROCEDURE — 99232 SBSQ HOSP IP/OBS MODERATE 35: CPT

## 2024-06-22 PROCEDURE — 71045 X-RAY EXAM CHEST 1 VIEW: CPT | Mod: 26

## 2024-06-22 PROCEDURE — 99233 SBSQ HOSP IP/OBS HIGH 50: CPT

## 2024-06-22 RX ORDER — ELECTROLYTE SOLUTION,INJ
1 VIAL (ML) INTRAVENOUS
Refills: 0 | Status: DISCONTINUED | OUTPATIENT
Start: 2024-06-22 | End: 2024-06-22

## 2024-06-22 RX ORDER — FISH OIL 0.1 G/ML
25 INJECTION, EMULSION INTRAVENOUS
Qty: 60 | Refills: 0 | Status: DISCONTINUED | OUTPATIENT
Start: 2024-06-22 | End: 2024-06-23

## 2024-06-22 RX ORDER — MAGNESIUM SULFATE 100 %
2 POWDER (GRAM) MISCELLANEOUS ONCE
Refills: 0 | Status: COMPLETED | OUTPATIENT
Start: 2024-06-22 | End: 2024-06-22

## 2024-06-22 RX ORDER — FUROSEMIDE 10 MG/ML
40 INJECTION, SOLUTION INTRAMUSCULAR; INTRAVENOUS EVERY 8 HOURS
Refills: 0 | Status: COMPLETED | OUTPATIENT
Start: 2024-06-22 | End: 2024-06-22

## 2024-06-22 RX ORDER — POTASSIUM CHLORIDE 600 MG/1
10 TABLET, FILM COATED, EXTENDED RELEASE ORAL
Refills: 0 | Status: COMPLETED | OUTPATIENT
Start: 2024-06-22 | End: 2024-06-22

## 2024-06-22 RX ADMIN — FISH OIL 25 ML/HR: 0.1 INJECTION, EMULSION INTRAVENOUS at 20:11

## 2024-06-22 RX ADMIN — OLANZAPINE 10 MILLIGRAM(S): 2.5 TABLET, FILM COATED ORAL at 21:09

## 2024-06-22 RX ADMIN — IPRATROPIUM BROMIDE AND ALBUTEROL SULFATE 3 MILLILITER(S): .5; 3 SOLUTION RESPIRATORY (INHALATION) at 23:43

## 2024-06-22 RX ADMIN — PANTOPRAZOLE SODIUM 40 MILLIGRAM(S): 40 INJECTION, POWDER, FOR SOLUTION INTRAVENOUS at 05:25

## 2024-06-22 RX ADMIN — Medication 1 APPLICATION(S): at 05:25

## 2024-06-22 RX ADMIN — ENOXAPARIN SODIUM 40 MILLIGRAM(S): 100 INJECTION SUBCUTANEOUS at 23:33

## 2024-06-22 RX ADMIN — POTASSIUM CHLORIDE 100 MILLIEQUIVALENT(S): 600 TABLET, FILM COATED, EXTENDED RELEASE ORAL at 02:58

## 2024-06-22 RX ADMIN — FUROSEMIDE 40 MILLIGRAM(S): 10 INJECTION, SOLUTION INTRAMUSCULAR; INTRAVENOUS at 20:10

## 2024-06-22 RX ADMIN — FISH OIL 25 ML/HR: 0.1 INJECTION, EMULSION INTRAVENOUS at 17:32

## 2024-06-22 RX ADMIN — Medication 1 EACH: at 20:11

## 2024-06-22 RX ADMIN — Medication 5 MILLIGRAM(S): at 15:19

## 2024-06-22 RX ADMIN — Medication 1000 MILLIGRAM(S): at 22:15

## 2024-06-22 RX ADMIN — Medication 5 MILLIGRAM(S): at 11:09

## 2024-06-22 RX ADMIN — IRON SUCROSE 110 MILLIGRAM(S): 20 INJECTION, SOLUTION INTRAVENOUS at 15:20

## 2024-06-22 RX ADMIN — Medication 25 GRAM(S): at 08:03

## 2024-06-22 RX ADMIN — IPRATROPIUM BROMIDE AND ALBUTEROL SULFATE 3 MILLILITER(S): .5; 3 SOLUTION RESPIRATORY (INHALATION) at 11:09

## 2024-06-22 RX ADMIN — Medication 5 MILLIGRAM(S): at 23:33

## 2024-06-22 RX ADMIN — Medication 4 MILLILITER(S): at 05:10

## 2024-06-22 RX ADMIN — Medication 5 MILLIGRAM(S): at 17:32

## 2024-06-22 RX ADMIN — PANTOPRAZOLE SODIUM 40 MILLIGRAM(S): 40 INJECTION, POWDER, FOR SOLUTION INTRAVENOUS at 17:32

## 2024-06-22 RX ADMIN — ENOXAPARIN SODIUM 40 MILLIGRAM(S): 100 INJECTION SUBCUTANEOUS at 00:05

## 2024-06-22 RX ADMIN — POTASSIUM CHLORIDE 100 MILLIEQUIVALENT(S): 600 TABLET, FILM COATED, EXTENDED RELEASE ORAL at 06:15

## 2024-06-22 RX ADMIN — IPRATROPIUM BROMIDE AND ALBUTEROL SULFATE 3 MILLILITER(S): .5; 3 SOLUTION RESPIRATORY (INHALATION) at 05:10

## 2024-06-22 RX ADMIN — FUROSEMIDE 40 MILLIGRAM(S): 10 INJECTION, SOLUTION INTRAMUSCULAR; INTRAVENOUS at 11:07

## 2024-06-22 RX ADMIN — Medication 400 MILLIGRAM(S): at 21:15

## 2024-06-22 RX ADMIN — POTASSIUM CHLORIDE 100 MILLIEQUIVALENT(S): 600 TABLET, FILM COATED, EXTENDED RELEASE ORAL at 05:03

## 2024-06-22 RX ADMIN — Medication 1 EACH: at 17:32

## 2024-06-22 RX ADMIN — Medication 5 MILLIGRAM(S): at 02:59

## 2024-06-23 LAB
ALBUMIN SERPL ELPH-MCNC: 2.7 G/DL — LOW (ref 3.3–5)
ALP SERPL-CCNC: 105 U/L — SIGNIFICANT CHANGE UP (ref 40–120)
ALT FLD-CCNC: 22 U/L — SIGNIFICANT CHANGE UP (ref 10–45)
ANION GAP SERPL CALC-SCNC: 11 MMOL/L — SIGNIFICANT CHANGE UP (ref 5–17)
AST SERPL-CCNC: 18 U/L — SIGNIFICANT CHANGE UP (ref 10–40)
BILIRUB SERPL-MCNC: 0.6 MG/DL — SIGNIFICANT CHANGE UP (ref 0.2–1.2)
BUN SERPL-MCNC: 49 MG/DL — HIGH (ref 7–23)
CA-I BLD-SCNC: 1.15 MMOL/L — SIGNIFICANT CHANGE UP (ref 1.15–1.33)
CALCIUM SERPL-MCNC: 8.7 MG/DL — SIGNIFICANT CHANGE UP (ref 8.4–10.5)
CHLORIDE SERPL-SCNC: 101 MMOL/L — SIGNIFICANT CHANGE UP (ref 96–108)
CO2 SERPL-SCNC: 31 MMOL/L — SIGNIFICANT CHANGE UP (ref 22–31)
CREAT SERPL-MCNC: 1.49 MG/DL — HIGH (ref 0.5–1.3)
CYSTATIN C SERPL-MCNC: 2 MG/L — SIGNIFICANT CHANGE UP
EGFR: 45 ML/MIN/1.73M2 — LOW
GFR/BSA.PRED SERPLBLD CYS-BASED-ARV: 28 ML/MIN/1.73M2 — LOW
GLUCOSE BLDC GLUCOMTR-MCNC: 115 MG/DL — HIGH (ref 70–99)
GLUCOSE BLDC GLUCOMTR-MCNC: 128 MG/DL — HIGH (ref 70–99)
GLUCOSE BLDC GLUCOMTR-MCNC: 142 MG/DL — HIGH (ref 70–99)
GLUCOSE BLDC GLUCOMTR-MCNC: 150 MG/DL — HIGH (ref 70–99)
GLUCOSE SERPL-MCNC: 129 MG/DL — HIGH (ref 70–99)
HCT VFR BLD CALC: 24.9 % — LOW (ref 39–50)
HGB BLD-MCNC: 7.9 G/DL — LOW (ref 13–17)
MAGNESIUM SERPL-MCNC: 2.3 MG/DL — SIGNIFICANT CHANGE UP (ref 1.6–2.6)
MCHC RBC-ENTMCNC: 28.2 PG — SIGNIFICANT CHANGE UP (ref 27–34)
MCHC RBC-ENTMCNC: 31.7 GM/DL — LOW (ref 32–36)
MCV RBC AUTO: 88.9 FL — SIGNIFICANT CHANGE UP (ref 80–100)
NRBC # BLD: 0 /100 WBCS — SIGNIFICANT CHANGE UP (ref 0–0)
PHOSPHATE SERPL-MCNC: 3.9 MG/DL — SIGNIFICANT CHANGE UP (ref 2.5–4.5)
PLATELET # BLD AUTO: 247 K/UL — SIGNIFICANT CHANGE UP (ref 150–400)
POTASSIUM SERPL-MCNC: 4.1 MMOL/L — SIGNIFICANT CHANGE UP (ref 3.5–5.3)
POTASSIUM SERPL-SCNC: 4.1 MMOL/L — SIGNIFICANT CHANGE UP (ref 3.5–5.3)
PROT SERPL-MCNC: 6.3 G/DL — SIGNIFICANT CHANGE UP (ref 6–8.3)
RBC # BLD: 2.8 M/UL — LOW (ref 4.2–5.8)
RBC # FLD: 16 % — HIGH (ref 10.3–14.5)
SODIUM SERPL-SCNC: 143 MMOL/L — SIGNIFICANT CHANGE UP (ref 135–145)
TRIGL SERPL-MCNC: 111 MG/DL — SIGNIFICANT CHANGE UP
WBC # BLD: 11.36 K/UL — HIGH (ref 3.8–10.5)
WBC # FLD AUTO: 11.36 K/UL — HIGH (ref 3.8–10.5)

## 2024-06-23 PROCEDURE — 99233 SBSQ HOSP IP/OBS HIGH 50: CPT

## 2024-06-23 PROCEDURE — 71045 X-RAY EXAM CHEST 1 VIEW: CPT | Mod: 26

## 2024-06-23 RX ORDER — FUROSEMIDE 10 MG/ML
40 INJECTION, SOLUTION INTRAMUSCULAR; INTRAVENOUS ONCE
Refills: 0 | Status: COMPLETED | OUTPATIENT
Start: 2024-06-23 | End: 2024-06-23

## 2024-06-23 RX ORDER — ELECTROLYTE SOLUTION,INJ
1 VIAL (ML) INTRAVENOUS
Refills: 0 | Status: DISCONTINUED | OUTPATIENT
Start: 2024-06-23 | End: 2024-06-23

## 2024-06-23 RX ORDER — FISH OIL 0.1 G/ML
25 INJECTION, EMULSION INTRAVENOUS
Qty: 60 | Refills: 0 | Status: DISCONTINUED | OUTPATIENT
Start: 2024-06-23 | End: 2024-06-24

## 2024-06-23 RX ADMIN — FUROSEMIDE 40 MILLIGRAM(S): 10 INJECTION, SOLUTION INTRAMUSCULAR; INTRAVENOUS at 17:33

## 2024-06-23 RX ADMIN — PANTOPRAZOLE SODIUM 40 MILLIGRAM(S): 40 INJECTION, POWDER, FOR SOLUTION INTRAVENOUS at 17:33

## 2024-06-23 RX ADMIN — IRON SUCROSE 110 MILLIGRAM(S): 20 INJECTION, SOLUTION INTRAVENOUS at 14:46

## 2024-06-23 RX ADMIN — Medication 1 EACH: at 17:33

## 2024-06-23 RX ADMIN — FISH OIL 25 ML/HR: 0.1 INJECTION, EMULSION INTRAVENOUS at 19:51

## 2024-06-23 RX ADMIN — IPRATROPIUM BROMIDE AND ALBUTEROL SULFATE 3 MILLILITER(S): .5; 3 SOLUTION RESPIRATORY (INHALATION) at 23:08

## 2024-06-23 RX ADMIN — Medication 5 MILLIGRAM(S): at 22:03

## 2024-06-23 RX ADMIN — Medication 5 MILLIGRAM(S): at 10:53

## 2024-06-23 RX ADMIN — PANTOPRAZOLE SODIUM 40 MILLIGRAM(S): 40 INJECTION, POWDER, FOR SOLUTION INTRAVENOUS at 06:05

## 2024-06-23 RX ADMIN — Medication 5 MILLIGRAM(S): at 14:46

## 2024-06-23 RX ADMIN — OLANZAPINE 10 MILLIGRAM(S): 2.5 TABLET, FILM COATED ORAL at 22:03

## 2024-06-23 RX ADMIN — FISH OIL 25 ML/HR: 0.1 INJECTION, EMULSION INTRAVENOUS at 17:33

## 2024-06-23 RX ADMIN — ENOXAPARIN SODIUM 40 MILLIGRAM(S): 100 INJECTION SUBCUTANEOUS at 23:32

## 2024-06-23 RX ADMIN — Medication 5 MILLIGRAM(S): at 02:06

## 2024-06-23 RX ADMIN — FUROSEMIDE 40 MILLIGRAM(S): 10 INJECTION, SOLUTION INTRAMUSCULAR; INTRAVENOUS at 10:53

## 2024-06-23 RX ADMIN — Medication 5 MILLIGRAM(S): at 06:05

## 2024-06-23 RX ADMIN — Medication 1 APPLICATION(S): at 06:23

## 2024-06-23 RX ADMIN — Medication 5 MILLIGRAM(S): at 18:08

## 2024-06-23 RX ADMIN — Medication 1 EACH: at 19:51

## 2024-06-23 RX ADMIN — IPRATROPIUM BROMIDE AND ALBUTEROL SULFATE 3 MILLILITER(S): .5; 3 SOLUTION RESPIRATORY (INHALATION) at 05:13

## 2024-06-24 LAB
ALBUMIN SERPL ELPH-MCNC: 2.6 G/DL — LOW (ref 3.3–5)
ALP SERPL-CCNC: 115 U/L — SIGNIFICANT CHANGE UP (ref 40–120)
ALT FLD-CCNC: 22 U/L — SIGNIFICANT CHANGE UP (ref 10–45)
ANION GAP SERPL CALC-SCNC: 14 MMOL/L — SIGNIFICANT CHANGE UP (ref 5–17)
AST SERPL-CCNC: 21 U/L — SIGNIFICANT CHANGE UP (ref 10–40)
BILIRUB SERPL-MCNC: 0.6 MG/DL — SIGNIFICANT CHANGE UP (ref 0.2–1.2)
BUN SERPL-MCNC: 65 MG/DL — HIGH (ref 7–23)
CA-I BLD-SCNC: 1.12 MMOL/L — LOW (ref 1.15–1.33)
CALCIUM SERPL-MCNC: 8.4 MG/DL — SIGNIFICANT CHANGE UP (ref 8.4–10.5)
CHLORIDE SERPL-SCNC: 101 MMOL/L — SIGNIFICANT CHANGE UP (ref 96–108)
CO2 SERPL-SCNC: 27 MMOL/L — SIGNIFICANT CHANGE UP (ref 22–31)
CREAT SERPL-MCNC: 1.56 MG/DL — HIGH (ref 0.5–1.3)
EGFR: 42 ML/MIN/1.73M2 — LOW
GLUCOSE BLDC GLUCOMTR-MCNC: 129 MG/DL — HIGH (ref 70–99)
GLUCOSE BLDC GLUCOMTR-MCNC: 136 MG/DL — HIGH (ref 70–99)
GLUCOSE BLDC GLUCOMTR-MCNC: 137 MG/DL — HIGH (ref 70–99)
GLUCOSE BLDC GLUCOMTR-MCNC: 142 MG/DL — HIGH (ref 70–99)
GLUCOSE SERPL-MCNC: 130 MG/DL — HIGH (ref 70–99)
HCT VFR BLD CALC: 24.6 % — LOW (ref 39–50)
HGB BLD-MCNC: 7.8 G/DL — LOW (ref 13–17)
MAGNESIUM SERPL-MCNC: 2.3 MG/DL — SIGNIFICANT CHANGE UP (ref 1.6–2.6)
MCHC RBC-ENTMCNC: 28.5 PG — SIGNIFICANT CHANGE UP (ref 27–34)
MCHC RBC-ENTMCNC: 31.7 GM/DL — LOW (ref 32–36)
MCV RBC AUTO: 89.8 FL — SIGNIFICANT CHANGE UP (ref 80–100)
NRBC # BLD: 0 /100 WBCS — SIGNIFICANT CHANGE UP (ref 0–0)
NT-PROBNP SERPL-SCNC: 4409 PG/ML — HIGH (ref 0–300)
PHOSPHATE SERPL-MCNC: 4.3 MG/DL — SIGNIFICANT CHANGE UP (ref 2.5–4.5)
PLATELET # BLD AUTO: 281 K/UL — SIGNIFICANT CHANGE UP (ref 150–400)
POTASSIUM SERPL-MCNC: 4.3 MMOL/L — SIGNIFICANT CHANGE UP (ref 3.5–5.3)
POTASSIUM SERPL-SCNC: 4.3 MMOL/L — SIGNIFICANT CHANGE UP (ref 3.5–5.3)
PROT SERPL-MCNC: 6.4 G/DL — SIGNIFICANT CHANGE UP (ref 6–8.3)
RBC # BLD: 2.74 M/UL — LOW (ref 4.2–5.8)
RBC # FLD: 16.2 % — HIGH (ref 10.3–14.5)
SODIUM SERPL-SCNC: 142 MMOL/L — SIGNIFICANT CHANGE UP (ref 135–145)
TRIGL SERPL-MCNC: 98 MG/DL — SIGNIFICANT CHANGE UP
WBC # BLD: 11.35 K/UL — HIGH (ref 3.8–10.5)
WBC # FLD AUTO: 11.35 K/UL — HIGH (ref 3.8–10.5)

## 2024-06-24 PROCEDURE — 99233 SBSQ HOSP IP/OBS HIGH 50: CPT

## 2024-06-24 PROCEDURE — 99232 SBSQ HOSP IP/OBS MODERATE 35: CPT | Mod: GC

## 2024-06-24 PROCEDURE — 71045 X-RAY EXAM CHEST 1 VIEW: CPT | Mod: 26

## 2024-06-24 RX ORDER — ELECTROLYTE SOLUTION,INJ
1 VIAL (ML) INTRAVENOUS
Refills: 0 | Status: DISCONTINUED | OUTPATIENT
Start: 2024-06-24 | End: 2024-06-25

## 2024-06-24 RX ORDER — FISH OIL 0.1 G/ML
25 INJECTION, EMULSION INTRAVENOUS
Qty: 60 | Refills: 0 | Status: DISCONTINUED | OUTPATIENT
Start: 2024-06-24 | End: 2024-06-25

## 2024-06-24 RX ADMIN — IPRATROPIUM BROMIDE AND ALBUTEROL SULFATE 3 MILLILITER(S): .5; 3 SOLUTION RESPIRATORY (INHALATION) at 23:53

## 2024-06-24 RX ADMIN — FISH OIL 25 ML/HR: 0.1 INJECTION, EMULSION INTRAVENOUS at 17:49

## 2024-06-24 RX ADMIN — Medication 5 MILLIGRAM(S): at 23:05

## 2024-06-24 RX ADMIN — IPRATROPIUM BROMIDE AND ALBUTEROL SULFATE 3 MILLILITER(S): .5; 3 SOLUTION RESPIRATORY (INHALATION) at 05:09

## 2024-06-24 RX ADMIN — Medication 5 MILLIGRAM(S): at 16:01

## 2024-06-24 RX ADMIN — PANTOPRAZOLE SODIUM 40 MILLIGRAM(S): 40 INJECTION, POWDER, FOR SOLUTION INTRAVENOUS at 05:54

## 2024-06-24 RX ADMIN — Medication 5 MILLIGRAM(S): at 18:02

## 2024-06-24 RX ADMIN — Medication 1 APPLICATION(S): at 05:54

## 2024-06-24 RX ADMIN — Medication 5 MILLIGRAM(S): at 12:10

## 2024-06-24 RX ADMIN — Medication 1 EACH: at 19:22

## 2024-06-24 RX ADMIN — FISH OIL 25 ML/HR: 0.1 INJECTION, EMULSION INTRAVENOUS at 19:22

## 2024-06-24 RX ADMIN — Medication 5 MILLIGRAM(S): at 03:01

## 2024-06-24 RX ADMIN — Medication 1 EACH: at 17:49

## 2024-06-24 RX ADMIN — PANTOPRAZOLE SODIUM 40 MILLIGRAM(S): 40 INJECTION, POWDER, FOR SOLUTION INTRAVENOUS at 17:49

## 2024-06-24 RX ADMIN — IRON SUCROSE 110 MILLIGRAM(S): 20 INJECTION, SOLUTION INTRAVENOUS at 16:02

## 2024-06-24 RX ADMIN — Medication 5 MILLIGRAM(S): at 07:39

## 2024-06-24 RX ADMIN — ENOXAPARIN SODIUM 40 MILLIGRAM(S): 100 INJECTION SUBCUTANEOUS at 23:48

## 2024-06-24 RX ADMIN — IPRATROPIUM BROMIDE AND ALBUTEROL SULFATE 3 MILLILITER(S): .5; 3 SOLUTION RESPIRATORY (INHALATION) at 11:14

## 2024-06-24 RX ADMIN — IPRATROPIUM BROMIDE AND ALBUTEROL SULFATE 3 MILLILITER(S): .5; 3 SOLUTION RESPIRATORY (INHALATION) at 17:19

## 2024-06-25 DIAGNOSIS — R41.0 DISORIENTATION, UNSPECIFIED: ICD-10-CM

## 2024-06-25 DIAGNOSIS — R53.2 FUNCTIONAL QUADRIPLEGIA: ICD-10-CM

## 2024-06-25 DIAGNOSIS — R13.10 DYSPHAGIA, UNSPECIFIED: ICD-10-CM

## 2024-06-25 DIAGNOSIS — Z71.89 OTHER SPECIFIED COUNSELING: ICD-10-CM

## 2024-06-25 LAB
ALBUMIN SERPL ELPH-MCNC: 2.6 G/DL — LOW (ref 3.3–5)
ALP SERPL-CCNC: 127 U/L — HIGH (ref 40–120)
ALT FLD-CCNC: 23 U/L — SIGNIFICANT CHANGE UP (ref 10–45)
ANION GAP SERPL CALC-SCNC: 12 MMOL/L — SIGNIFICANT CHANGE UP (ref 5–17)
AST SERPL-CCNC: 25 U/L — SIGNIFICANT CHANGE UP (ref 10–40)
BILIRUB SERPL-MCNC: 1 MG/DL — SIGNIFICANT CHANGE UP (ref 0.2–1.2)
BLD GP AB SCN SERPL QL: NEGATIVE — SIGNIFICANT CHANGE UP
BUN SERPL-MCNC: 66 MG/DL — HIGH (ref 7–23)
CA-I BLD-SCNC: 1.15 MMOL/L — SIGNIFICANT CHANGE UP (ref 1.15–1.33)
CALCIUM SERPL-MCNC: 9 MG/DL — SIGNIFICANT CHANGE UP (ref 8.4–10.5)
CHLORIDE SERPL-SCNC: 104 MMOL/L — SIGNIFICANT CHANGE UP (ref 96–108)
CO2 SERPL-SCNC: 23 MMOL/L — SIGNIFICANT CHANGE UP (ref 22–31)
CREAT SERPL-MCNC: 1.57 MG/DL — HIGH (ref 0.5–1.3)
EGFR: 42 ML/MIN/1.73M2 — LOW
GLUCOSE BLDC GLUCOMTR-MCNC: 113 MG/DL — HIGH (ref 70–99)
GLUCOSE BLDC GLUCOMTR-MCNC: 124 MG/DL — HIGH (ref 70–99)
GLUCOSE BLDC GLUCOMTR-MCNC: 151 MG/DL — HIGH (ref 70–99)
GLUCOSE SERPL-MCNC: 125 MG/DL — HIGH (ref 70–99)
HCT VFR BLD CALC: 26.3 % — LOW (ref 39–50)
HGB BLD-MCNC: 7.8 G/DL — LOW (ref 13–17)
MAGNESIUM SERPL-MCNC: 2.5 MG/DL — SIGNIFICANT CHANGE UP (ref 1.6–2.6)
MCHC RBC-ENTMCNC: 28 PG — SIGNIFICANT CHANGE UP (ref 27–34)
MCHC RBC-ENTMCNC: 29.7 GM/DL — LOW (ref 32–36)
MCV RBC AUTO: 94.3 FL — SIGNIFICANT CHANGE UP (ref 80–100)
NRBC # BLD: 0 /100 WBCS — SIGNIFICANT CHANGE UP (ref 0–0)
PHOSPHATE SERPL-MCNC: 3.7 MG/DL — SIGNIFICANT CHANGE UP (ref 2.5–4.5)
PLATELET # BLD AUTO: 318 K/UL — SIGNIFICANT CHANGE UP (ref 150–400)
POTASSIUM SERPL-MCNC: 4.6 MMOL/L — SIGNIFICANT CHANGE UP (ref 3.5–5.3)
POTASSIUM SERPL-SCNC: 4.6 MMOL/L — SIGNIFICANT CHANGE UP (ref 3.5–5.3)
PROT SERPL-MCNC: 7 G/DL — SIGNIFICANT CHANGE UP (ref 6–8.3)
RBC # BLD: 2.79 M/UL — LOW (ref 4.2–5.8)
RBC # FLD: 16.7 % — HIGH (ref 10.3–14.5)
RH IG SCN BLD-IMP: POSITIVE — SIGNIFICANT CHANGE UP
SODIUM SERPL-SCNC: 139 MMOL/L — SIGNIFICANT CHANGE UP (ref 135–145)
TRIGL SERPL-MCNC: 89 MG/DL — SIGNIFICANT CHANGE UP
WBC # BLD: 11.54 K/UL — HIGH (ref 3.8–10.5)
WBC # FLD AUTO: 11.54 K/UL — HIGH (ref 3.8–10.5)

## 2024-06-25 PROCEDURE — 99233 SBSQ HOSP IP/OBS HIGH 50: CPT

## 2024-06-25 PROCEDURE — 99222 1ST HOSP IP/OBS MODERATE 55: CPT

## 2024-06-25 PROCEDURE — 71045 X-RAY EXAM CHEST 1 VIEW: CPT | Mod: 26

## 2024-06-25 PROCEDURE — 99232 SBSQ HOSP IP/OBS MODERATE 35: CPT | Mod: GC

## 2024-06-25 RX ORDER — FUROSEMIDE 10 MG/ML
40 INJECTION, SOLUTION INTRAMUSCULAR; INTRAVENOUS ONCE
Refills: 0 | Status: COMPLETED | OUTPATIENT
Start: 2024-06-25 | End: 2024-06-25

## 2024-06-25 RX ORDER — ELECTROLYTE SOLUTION,INJ
1 VIAL (ML) INTRAVENOUS
Refills: 0 | Status: DISCONTINUED | OUTPATIENT
Start: 2024-06-25 | End: 2024-06-26

## 2024-06-25 RX ORDER — ACETAMINOPHEN 325 MG
1000 TABLET ORAL EVERY 6 HOURS
Refills: 0 | Status: DISCONTINUED | OUTPATIENT
Start: 2024-06-25 | End: 2024-07-05

## 2024-06-25 RX ORDER — METOPROLOL TARTRATE 50 MG
5 TABLET ORAL EVERY 4 HOURS
Refills: 0 | Status: DISCONTINUED | OUTPATIENT
Start: 2024-06-25 | End: 2024-07-01

## 2024-06-25 RX ORDER — FISH OIL 0.1 G/ML
25 INJECTION, EMULSION INTRAVENOUS
Qty: 60 | Refills: 0 | Status: DISCONTINUED | OUTPATIENT
Start: 2024-06-25 | End: 2024-06-26

## 2024-06-25 RX ADMIN — Medication 5 MILLIGRAM(S): at 17:46

## 2024-06-25 RX ADMIN — FISH OIL 25 ML/HR: 0.1 INJECTION, EMULSION INTRAVENOUS at 19:25

## 2024-06-25 RX ADMIN — Medication 1 EACH: at 19:24

## 2024-06-25 RX ADMIN — Medication 1 EACH: at 07:56

## 2024-06-25 RX ADMIN — INSULIN LISPRO 1: 100 INJECTION, SOLUTION SUBCUTANEOUS at 12:34

## 2024-06-25 RX ADMIN — IPRATROPIUM BROMIDE AND ALBUTEROL SULFATE 3 MILLILITER(S): .5; 3 SOLUTION RESPIRATORY (INHALATION) at 05:18

## 2024-06-25 RX ADMIN — Medication 1 APPLICATION(S): at 05:45

## 2024-06-25 RX ADMIN — IPRATROPIUM BROMIDE AND ALBUTEROL SULFATE 3 MILLILITER(S): .5; 3 SOLUTION RESPIRATORY (INHALATION) at 11:28

## 2024-06-25 RX ADMIN — Medication 5 MILLIGRAM(S): at 14:17

## 2024-06-25 RX ADMIN — FUROSEMIDE 40 MILLIGRAM(S): 10 INJECTION, SOLUTION INTRAMUSCULAR; INTRAVENOUS at 22:50

## 2024-06-25 RX ADMIN — Medication 5 MILLIGRAM(S): at 02:15

## 2024-06-25 RX ADMIN — PANTOPRAZOLE SODIUM 40 MILLIGRAM(S): 40 INJECTION, POWDER, FOR SOLUTION INTRAVENOUS at 05:45

## 2024-06-25 RX ADMIN — IPRATROPIUM BROMIDE AND ALBUTEROL SULFATE 3 MILLILITER(S): .5; 3 SOLUTION RESPIRATORY (INHALATION) at 17:23

## 2024-06-25 RX ADMIN — Medication 1 EACH: at 17:46

## 2024-06-25 RX ADMIN — FISH OIL 25 ML/HR: 0.1 INJECTION, EMULSION INTRAVENOUS at 17:47

## 2024-06-25 RX ADMIN — PANTOPRAZOLE SODIUM 40 MILLIGRAM(S): 40 INJECTION, POWDER, FOR SOLUTION INTRAVENOUS at 17:46

## 2024-06-25 RX ADMIN — Medication 5 MILLIGRAM(S): at 21:08

## 2024-06-25 RX ADMIN — OLANZAPINE 10 MILLIGRAM(S): 2.5 TABLET, FILM COATED ORAL at 21:08

## 2024-06-25 RX ADMIN — IPRATROPIUM BROMIDE AND ALBUTEROL SULFATE 3 MILLILITER(S): .5; 3 SOLUTION RESPIRATORY (INHALATION) at 23:05

## 2024-06-25 RX ADMIN — Medication 5 MILLIGRAM(S): at 10:23

## 2024-06-26 LAB
ALBUMIN SERPL ELPH-MCNC: 2.8 G/DL — LOW (ref 3.3–5)
ALP SERPL-CCNC: 135 U/L — HIGH (ref 40–120)
ALT FLD-CCNC: 25 U/L — SIGNIFICANT CHANGE UP (ref 10–45)
ANION GAP SERPL CALC-SCNC: 13 MMOL/L — SIGNIFICANT CHANGE UP (ref 5–17)
AST SERPL-CCNC: 24 U/L — SIGNIFICANT CHANGE UP (ref 10–40)
BILIRUB SERPL-MCNC: 0.8 MG/DL — SIGNIFICANT CHANGE UP (ref 0.2–1.2)
BUN SERPL-MCNC: 76 MG/DL — HIGH (ref 7–23)
CA-I BLD-SCNC: 1.19 MMOL/L — SIGNIFICANT CHANGE UP (ref 1.15–1.33)
CALCIUM SERPL-MCNC: 8.9 MG/DL — SIGNIFICANT CHANGE UP (ref 8.4–10.5)
CHLORIDE SERPL-SCNC: 104 MMOL/L — SIGNIFICANT CHANGE UP (ref 96–108)
CO2 SERPL-SCNC: 23 MMOL/L — SIGNIFICANT CHANGE UP (ref 22–31)
CREAT SERPL-MCNC: 1.56 MG/DL — HIGH (ref 0.5–1.3)
EGFR: 42 ML/MIN/1.73M2 — LOW
GLUCOSE BLDC GLUCOMTR-MCNC: 115 MG/DL — HIGH (ref 70–99)
GLUCOSE BLDC GLUCOMTR-MCNC: 121 MG/DL — HIGH (ref 70–99)
GLUCOSE BLDC GLUCOMTR-MCNC: 135 MG/DL — HIGH (ref 70–99)
GLUCOSE BLDC GLUCOMTR-MCNC: 143 MG/DL — HIGH (ref 70–99)
GLUCOSE BLDC GLUCOMTR-MCNC: 145 MG/DL — HIGH (ref 70–99)
GLUCOSE SERPL-MCNC: 125 MG/DL — HIGH (ref 70–99)
HCT VFR BLD CALC: 23.8 % — LOW (ref 39–50)
HGB BLD-MCNC: 7.6 G/DL — LOW (ref 13–17)
MAGNESIUM SERPL-MCNC: 2.5 MG/DL — SIGNIFICANT CHANGE UP (ref 1.6–2.6)
MCHC RBC-ENTMCNC: 29.1 PG — SIGNIFICANT CHANGE UP (ref 27–34)
MCHC RBC-ENTMCNC: 31.9 GM/DL — LOW (ref 32–36)
MCV RBC AUTO: 91.2 FL — SIGNIFICANT CHANGE UP (ref 80–100)
NRBC # BLD: 0 /100 WBCS — SIGNIFICANT CHANGE UP (ref 0–0)
PHOSPHATE SERPL-MCNC: 3.8 MG/DL — SIGNIFICANT CHANGE UP (ref 2.5–4.5)
PLATELET # BLD AUTO: 323 K/UL — SIGNIFICANT CHANGE UP (ref 150–400)
POTASSIUM SERPL-MCNC: 4.4 MMOL/L — SIGNIFICANT CHANGE UP (ref 3.5–5.3)
POTASSIUM SERPL-SCNC: 4.4 MMOL/L — SIGNIFICANT CHANGE UP (ref 3.5–5.3)
PROT SERPL-MCNC: 6.9 G/DL — SIGNIFICANT CHANGE UP (ref 6–8.3)
RBC # BLD: 2.61 M/UL — LOW (ref 4.2–5.8)
RBC # FLD: 17.2 % — HIGH (ref 10.3–14.5)
SODIUM SERPL-SCNC: 140 MMOL/L — SIGNIFICANT CHANGE UP (ref 135–145)
TRIGL SERPL-MCNC: 94 MG/DL — SIGNIFICANT CHANGE UP
WBC # BLD: 9.43 K/UL — SIGNIFICANT CHANGE UP (ref 3.8–10.5)
WBC # FLD AUTO: 9.43 K/UL — SIGNIFICANT CHANGE UP (ref 3.8–10.5)

## 2024-06-26 PROCEDURE — 71045 X-RAY EXAM CHEST 1 VIEW: CPT | Mod: 26

## 2024-06-26 PROCEDURE — 99232 SBSQ HOSP IP/OBS MODERATE 35: CPT | Mod: FS

## 2024-06-26 PROCEDURE — 74230 X-RAY XM SWLNG FUNCJ C+: CPT | Mod: 26

## 2024-06-26 RX ORDER — FISH OIL 0.1 G/ML
25 INJECTION, EMULSION INTRAVENOUS
Qty: 60 | Refills: 0 | Status: DISCONTINUED | OUTPATIENT
Start: 2024-06-26 | End: 2024-06-27

## 2024-06-26 RX ORDER — DEXTROSE MONOHYDRATE AND SODIUM CHLORIDE 5; .3 G/100ML; G/100ML
250 INJECTION, SOLUTION INTRAVENOUS ONCE
Refills: 0 | Status: COMPLETED | OUTPATIENT
Start: 2024-06-26 | End: 2024-06-26

## 2024-06-26 RX ORDER — ELECTROLYTE SOLUTION,INJ
1 VIAL (ML) INTRAVENOUS
Refills: 0 | Status: DISCONTINUED | OUTPATIENT
Start: 2024-06-26 | End: 2024-06-27

## 2024-06-26 RX ADMIN — IPRATROPIUM BROMIDE AND ALBUTEROL SULFATE 3 MILLILITER(S): .5; 3 SOLUTION RESPIRATORY (INHALATION) at 05:11

## 2024-06-26 RX ADMIN — Medication 1 APPLICATION(S): at 05:15

## 2024-06-26 RX ADMIN — Medication 1 EACH: at 07:32

## 2024-06-26 RX ADMIN — Medication 5 MILLIGRAM(S): at 21:47

## 2024-06-26 RX ADMIN — DEXTROSE MONOHYDRATE AND SODIUM CHLORIDE 750 MILLILITER(S): 5; .3 INJECTION, SOLUTION INTRAVENOUS at 08:39

## 2024-06-26 RX ADMIN — Medication 1 EACH: at 19:16

## 2024-06-26 RX ADMIN — PANTOPRAZOLE SODIUM 40 MILLIGRAM(S): 40 INJECTION, POWDER, FOR SOLUTION INTRAVENOUS at 05:15

## 2024-06-26 RX ADMIN — PANTOPRAZOLE SODIUM 40 MILLIGRAM(S): 40 INJECTION, POWDER, FOR SOLUTION INTRAVENOUS at 18:04

## 2024-06-26 RX ADMIN — Medication 5 MILLIGRAM(S): at 02:19

## 2024-06-26 RX ADMIN — Medication 5 MILLIGRAM(S): at 05:16

## 2024-06-26 RX ADMIN — FISH OIL 25 ML/HR: 0.1 INJECTION, EMULSION INTRAVENOUS at 19:15

## 2024-06-26 RX ADMIN — DEXTROSE MONOHYDRATE AND SODIUM CHLORIDE 1000 MILLILITER(S): 5; .3 INJECTION, SOLUTION INTRAVENOUS at 06:15

## 2024-06-26 RX ADMIN — Medication 5 MILLIGRAM(S): at 13:44

## 2024-06-26 RX ADMIN — ENOXAPARIN SODIUM 40 MILLIGRAM(S): 100 INJECTION SUBCUTANEOUS at 00:31

## 2024-06-26 RX ADMIN — IPRATROPIUM BROMIDE AND ALBUTEROL SULFATE 3 MILLILITER(S): .5; 3 SOLUTION RESPIRATORY (INHALATION) at 11:24

## 2024-06-26 RX ADMIN — Medication 1 EACH: at 18:43

## 2024-06-26 RX ADMIN — Medication 5 MILLIGRAM(S): at 18:04

## 2024-06-26 RX ADMIN — IPRATROPIUM BROMIDE AND ALBUTEROL SULFATE 3 MILLILITER(S): .5; 3 SOLUTION RESPIRATORY (INHALATION) at 18:04

## 2024-06-26 RX ADMIN — FISH OIL 25 ML/HR: 0.1 INJECTION, EMULSION INTRAVENOUS at 18:43

## 2024-06-27 LAB
ALBUMIN SERPL ELPH-MCNC: 2.8 G/DL — LOW (ref 3.3–5)
ALP SERPL-CCNC: 187 U/L — HIGH (ref 40–120)
ALT FLD-CCNC: 73 U/L — HIGH (ref 10–45)
ANION GAP SERPL CALC-SCNC: 12 MMOL/L — SIGNIFICANT CHANGE UP (ref 5–17)
AST SERPL-CCNC: 76 U/L — HIGH (ref 10–40)
BILIRUB SERPL-MCNC: 0.7 MG/DL — SIGNIFICANT CHANGE UP (ref 0.2–1.2)
BUN SERPL-MCNC: 81 MG/DL — HIGH (ref 7–23)
CA-I BLD-SCNC: 1.21 MMOL/L — SIGNIFICANT CHANGE UP (ref 1.15–1.33)
CALCIUM SERPL-MCNC: 8.6 MG/DL — SIGNIFICANT CHANGE UP (ref 8.4–10.5)
CHLORIDE SERPL-SCNC: 106 MMOL/L — SIGNIFICANT CHANGE UP (ref 96–108)
CO2 SERPL-SCNC: 22 MMOL/L — SIGNIFICANT CHANGE UP (ref 22–31)
CREAT SERPL-MCNC: 1.53 MG/DL — HIGH (ref 0.5–1.3)
EGFR: 43 ML/MIN/1.73M2 — LOW
GLUCOSE BLDC GLUCOMTR-MCNC: 135 MG/DL — HIGH (ref 70–99)
GLUCOSE BLDC GLUCOMTR-MCNC: 142 MG/DL — HIGH (ref 70–99)
GLUCOSE BLDC GLUCOMTR-MCNC: 143 MG/DL — HIGH (ref 70–99)
GLUCOSE BLDC GLUCOMTR-MCNC: 150 MG/DL — HIGH (ref 70–99)
GLUCOSE SERPL-MCNC: 130 MG/DL — HIGH (ref 70–99)
HCT VFR BLD CALC: 23.3 % — LOW (ref 39–50)
HGB BLD-MCNC: 7.2 G/DL — LOW (ref 13–17)
MAGNESIUM SERPL-MCNC: 2.3 MG/DL — SIGNIFICANT CHANGE UP (ref 1.6–2.6)
MCHC RBC-ENTMCNC: 29 PG — SIGNIFICANT CHANGE UP (ref 27–34)
MCHC RBC-ENTMCNC: 30.9 GM/DL — LOW (ref 32–36)
MCV RBC AUTO: 94 FL — SIGNIFICANT CHANGE UP (ref 80–100)
NRBC # BLD: 0 /100 WBCS — SIGNIFICANT CHANGE UP (ref 0–0)
PHOSPHATE SERPL-MCNC: 2.7 MG/DL — SIGNIFICANT CHANGE UP (ref 2.5–4.5)
PLATELET # BLD AUTO: 376 K/UL — SIGNIFICANT CHANGE UP (ref 150–400)
POTASSIUM SERPL-MCNC: 4.3 MMOL/L — SIGNIFICANT CHANGE UP (ref 3.5–5.3)
POTASSIUM SERPL-SCNC: 4.3 MMOL/L — SIGNIFICANT CHANGE UP (ref 3.5–5.3)
PROT SERPL-MCNC: 6.9 G/DL — SIGNIFICANT CHANGE UP (ref 6–8.3)
RBC # BLD: 2.48 M/UL — LOW (ref 4.2–5.8)
RBC # FLD: 17.7 % — HIGH (ref 10.3–14.5)
SODIUM SERPL-SCNC: 140 MMOL/L — SIGNIFICANT CHANGE UP (ref 135–145)
TRIGL SERPL-MCNC: 82 MG/DL — SIGNIFICANT CHANGE UP
WBC # BLD: 8.97 K/UL — SIGNIFICANT CHANGE UP (ref 3.8–10.5)
WBC # FLD AUTO: 8.97 K/UL — SIGNIFICANT CHANGE UP (ref 3.8–10.5)

## 2024-06-27 PROCEDURE — 99232 SBSQ HOSP IP/OBS MODERATE 35: CPT

## 2024-06-27 RX ORDER — ELECTROLYTE SOLUTION,INJ
1 VIAL (ML) INTRAVENOUS
Refills: 0 | Status: DISCONTINUED | OUTPATIENT
Start: 2024-06-27 | End: 2024-06-28

## 2024-06-27 RX ORDER — FISH OIL 0.1 G/ML
25 INJECTION, EMULSION INTRAVENOUS
Qty: 60 | Refills: 0 | Status: DISCONTINUED | OUTPATIENT
Start: 2024-06-27 | End: 2024-06-28

## 2024-06-27 RX ADMIN — Medication 5 MILLIGRAM(S): at 21:21

## 2024-06-27 RX ADMIN — Medication 1 EACH: at 19:02

## 2024-06-27 RX ADMIN — Medication 5 MILLIGRAM(S): at 01:38

## 2024-06-27 RX ADMIN — IPRATROPIUM BROMIDE AND ALBUTEROL SULFATE 3 MILLILITER(S): .5; 3 SOLUTION RESPIRATORY (INHALATION) at 12:23

## 2024-06-27 RX ADMIN — Medication 5 MILLIGRAM(S): at 17:37

## 2024-06-27 RX ADMIN — PANTOPRAZOLE SODIUM 40 MILLIGRAM(S): 40 INJECTION, POWDER, FOR SOLUTION INTRAVENOUS at 17:36

## 2024-06-27 RX ADMIN — IPRATROPIUM BROMIDE AND ALBUTEROL SULFATE 3 MILLILITER(S): .5; 3 SOLUTION RESPIRATORY (INHALATION) at 17:37

## 2024-06-27 RX ADMIN — FISH OIL 25 ML/HR: 0.1 INJECTION, EMULSION INTRAVENOUS at 19:01

## 2024-06-27 RX ADMIN — ENOXAPARIN SODIUM 40 MILLIGRAM(S): 100 INJECTION SUBCUTANEOUS at 00:22

## 2024-06-27 RX ADMIN — Medication 5 MILLIGRAM(S): at 12:22

## 2024-06-27 RX ADMIN — IPRATROPIUM BROMIDE AND ALBUTEROL SULFATE 3 MILLILITER(S): .5; 3 SOLUTION RESPIRATORY (INHALATION) at 05:55

## 2024-06-27 RX ADMIN — IPRATROPIUM BROMIDE AND ALBUTEROL SULFATE 3 MILLILITER(S): .5; 3 SOLUTION RESPIRATORY (INHALATION) at 00:22

## 2024-06-27 RX ADMIN — Medication 1 APPLICATION(S): at 09:39

## 2024-06-27 RX ADMIN — Medication 1 EACH: at 07:11

## 2024-06-27 RX ADMIN — FISH OIL 25 ML/HR: 0.1 INJECTION, EMULSION INTRAVENOUS at 18:05

## 2024-06-27 RX ADMIN — Medication 5 MILLIGRAM(S): at 09:34

## 2024-06-27 RX ADMIN — Medication 5 MILLIGRAM(S): at 05:54

## 2024-06-27 RX ADMIN — PANTOPRAZOLE SODIUM 40 MILLIGRAM(S): 40 INJECTION, POWDER, FOR SOLUTION INTRAVENOUS at 05:54

## 2024-06-27 RX ADMIN — Medication 1 EACH: at 18:04

## 2024-06-28 LAB
ALBUMIN SERPL ELPH-MCNC: 2.7 G/DL — LOW (ref 3.3–5)
ALP SERPL-CCNC: 195 U/L — HIGH (ref 40–120)
ALT FLD-CCNC: 64 U/L — HIGH (ref 10–45)
ANION GAP SERPL CALC-SCNC: 11 MMOL/L — SIGNIFICANT CHANGE UP (ref 5–17)
AST SERPL-CCNC: 47 U/L — HIGH (ref 10–40)
BASOPHILS # BLD AUTO: 0.05 K/UL — SIGNIFICANT CHANGE UP (ref 0–0.2)
BASOPHILS NFR BLD AUTO: 0.5 % — SIGNIFICANT CHANGE UP (ref 0–2)
BILIRUB SERPL-MCNC: 0.5 MG/DL — SIGNIFICANT CHANGE UP (ref 0.2–1.2)
BLD GP AB SCN SERPL QL: NEGATIVE — SIGNIFICANT CHANGE UP
BUN SERPL-MCNC: 79 MG/DL — HIGH (ref 7–23)
CA-I BLD-SCNC: 1.23 MMOL/L — SIGNIFICANT CHANGE UP (ref 1.15–1.33)
CALCIUM SERPL-MCNC: 9 MG/DL — SIGNIFICANT CHANGE UP (ref 8.4–10.5)
CHLORIDE SERPL-SCNC: 108 MMOL/L — SIGNIFICANT CHANGE UP (ref 96–108)
CO2 SERPL-SCNC: 20 MMOL/L — LOW (ref 22–31)
CREAT SERPL-MCNC: 1.48 MG/DL — HIGH (ref 0.5–1.3)
EGFR: 45 ML/MIN/1.73M2 — LOW
EOSINOPHIL # BLD AUTO: 0.19 K/UL — SIGNIFICANT CHANGE UP (ref 0–0.5)
EOSINOPHIL NFR BLD AUTO: 2 % — SIGNIFICANT CHANGE UP (ref 0–6)
GLUCOSE BLDC GLUCOMTR-MCNC: 101 MG/DL — HIGH (ref 70–99)
GLUCOSE BLDC GLUCOMTR-MCNC: 107 MG/DL — HIGH (ref 70–99)
GLUCOSE BLDC GLUCOMTR-MCNC: 123 MG/DL — HIGH (ref 70–99)
GLUCOSE BLDC GLUCOMTR-MCNC: 139 MG/DL — HIGH (ref 70–99)
GLUCOSE BLDC GLUCOMTR-MCNC: 149 MG/DL — HIGH (ref 70–99)
GLUCOSE SERPL-MCNC: 134 MG/DL — HIGH (ref 70–99)
HCT VFR BLD CALC: 23.7 % — LOW (ref 39–50)
HGB BLD-MCNC: 7.3 G/DL — LOW (ref 13–17)
IMM GRANULOCYTES NFR BLD AUTO: 1.1 % — HIGH (ref 0–0.9)
LYMPHOCYTES # BLD AUTO: 0.58 K/UL — LOW (ref 1–3.3)
LYMPHOCYTES # BLD AUTO: 6.1 % — LOW (ref 13–44)
MAGNESIUM SERPL-MCNC: 2.5 MG/DL — SIGNIFICANT CHANGE UP (ref 1.6–2.6)
MCHC RBC-ENTMCNC: 29.1 PG — SIGNIFICANT CHANGE UP (ref 27–34)
MCHC RBC-ENTMCNC: 30.8 GM/DL — LOW (ref 32–36)
MCV RBC AUTO: 94.4 FL — SIGNIFICANT CHANGE UP (ref 80–100)
MONOCYTES # BLD AUTO: 1.04 K/UL — HIGH (ref 0–0.9)
MONOCYTES NFR BLD AUTO: 10.9 % — SIGNIFICANT CHANGE UP (ref 2–14)
NEUTROPHILS # BLD AUTO: 7.61 K/UL — HIGH (ref 1.8–7.4)
NEUTROPHILS NFR BLD AUTO: 79.4 % — HIGH (ref 43–77)
NRBC # BLD: 0 /100 WBCS — SIGNIFICANT CHANGE UP (ref 0–0)
PHOSPHATE SERPL-MCNC: 2.9 MG/DL — SIGNIFICANT CHANGE UP (ref 2.5–4.5)
PLATELET # BLD AUTO: 431 K/UL — HIGH (ref 150–400)
POTASSIUM SERPL-MCNC: 4.4 MMOL/L — SIGNIFICANT CHANGE UP (ref 3.5–5.3)
POTASSIUM SERPL-SCNC: 4.4 MMOL/L — SIGNIFICANT CHANGE UP (ref 3.5–5.3)
PREALB SERPL-MCNC: 19 MG/DL — LOW (ref 20–40)
PROT SERPL-MCNC: 7.1 G/DL — SIGNIFICANT CHANGE UP (ref 6–8.3)
RBC # BLD: 2.51 M/UL — LOW (ref 4.2–5.8)
RBC # FLD: 17.9 % — HIGH (ref 10.3–14.5)
RH IG SCN BLD-IMP: POSITIVE — SIGNIFICANT CHANGE UP
SODIUM SERPL-SCNC: 139 MMOL/L — SIGNIFICANT CHANGE UP (ref 135–145)
WBC # BLD: 9.58 K/UL — SIGNIFICANT CHANGE UP (ref 3.8–10.5)
WBC # FLD AUTO: 9.58 K/UL — SIGNIFICANT CHANGE UP (ref 3.8–10.5)

## 2024-06-28 PROCEDURE — 99232 SBSQ HOSP IP/OBS MODERATE 35: CPT | Mod: FS

## 2024-06-28 RX ORDER — INSULIN LISPRO 100 [IU]/ML
INJECTION, SOLUTION SUBCUTANEOUS AT BEDTIME
Refills: 0 | Status: DISCONTINUED | OUTPATIENT
Start: 2024-06-28 | End: 2024-07-05

## 2024-06-28 RX ORDER — INSULIN LISPRO 100 [IU]/ML
INJECTION, SOLUTION SUBCUTANEOUS
Refills: 0 | Status: DISCONTINUED | OUTPATIENT
Start: 2024-06-28 | End: 2024-07-05

## 2024-06-28 RX ORDER — ELECTROLYTE SOLUTION,INJ
1 VIAL (ML) INTRAVENOUS
Refills: 0 | Status: DISCONTINUED | OUTPATIENT
Start: 2024-06-28 | End: 2024-06-29

## 2024-06-28 RX ORDER — FISH OIL 0.1 G/ML
8.3 INJECTION, EMULSION INTRAVENOUS
Qty: 20 | Refills: 0 | Status: DISCONTINUED | OUTPATIENT
Start: 2024-06-28 | End: 2024-06-29

## 2024-06-28 RX ORDER — IRON SUCROSE 20 MG/ML
500 INJECTION, SOLUTION INTRAVENOUS ONCE
Refills: 0 | Status: DISCONTINUED | OUTPATIENT
Start: 2024-06-28 | End: 2024-06-28

## 2024-06-28 RX ADMIN — Medication 5 MILLIGRAM(S): at 11:11

## 2024-06-28 RX ADMIN — ENOXAPARIN SODIUM 40 MILLIGRAM(S): 100 INJECTION SUBCUTANEOUS at 00:06

## 2024-06-28 RX ADMIN — Medication 5 MILLIGRAM(S): at 15:04

## 2024-06-28 RX ADMIN — Medication 1 EACH: at 19:14

## 2024-06-28 RX ADMIN — IPRATROPIUM BROMIDE AND ALBUTEROL SULFATE 3 MILLILITER(S): .5; 3 SOLUTION RESPIRATORY (INHALATION) at 11:13

## 2024-06-28 RX ADMIN — Medication 5 MILLIGRAM(S): at 17:13

## 2024-06-28 RX ADMIN — PANTOPRAZOLE SODIUM 40 MILLIGRAM(S): 40 INJECTION, POWDER, FOR SOLUTION INTRAVENOUS at 05:26

## 2024-06-28 RX ADMIN — PANTOPRAZOLE SODIUM 40 MILLIGRAM(S): 40 INJECTION, POWDER, FOR SOLUTION INTRAVENOUS at 17:13

## 2024-06-28 RX ADMIN — IPRATROPIUM BROMIDE AND ALBUTEROL SULFATE 3 MILLILITER(S): .5; 3 SOLUTION RESPIRATORY (INHALATION) at 00:06

## 2024-06-28 RX ADMIN — Medication 5 MILLIGRAM(S): at 05:26

## 2024-06-28 RX ADMIN — IPRATROPIUM BROMIDE AND ALBUTEROL SULFATE 3 MILLILITER(S): .5; 3 SOLUTION RESPIRATORY (INHALATION) at 23:06

## 2024-06-28 RX ADMIN — Medication 5 MILLIGRAM(S): at 21:04

## 2024-06-28 RX ADMIN — Medication 1 EACH: at 17:14

## 2024-06-28 RX ADMIN — OLANZAPINE 10 MILLIGRAM(S): 2.5 TABLET, FILM COATED ORAL at 21:03

## 2024-06-28 RX ADMIN — ENOXAPARIN SODIUM 40 MILLIGRAM(S): 100 INJECTION SUBCUTANEOUS at 23:07

## 2024-06-28 RX ADMIN — IPRATROPIUM BROMIDE AND ALBUTEROL SULFATE 3 MILLILITER(S): .5; 3 SOLUTION RESPIRATORY (INHALATION) at 05:26

## 2024-06-28 RX ADMIN — FISH OIL 8.3 ML/HR: 0.1 INJECTION, EMULSION INTRAVENOUS at 16:52

## 2024-06-28 RX ADMIN — IPRATROPIUM BROMIDE AND ALBUTEROL SULFATE 3 MILLILITER(S): .5; 3 SOLUTION RESPIRATORY (INHALATION) at 17:14

## 2024-06-28 RX ADMIN — FISH OIL 8.3 ML/HR: 0.1 INJECTION, EMULSION INTRAVENOUS at 19:15

## 2024-06-28 RX ADMIN — Medication 1 EACH: at 07:23

## 2024-06-28 RX ADMIN — Medication 5 MILLIGRAM(S): at 02:10

## 2024-06-29 LAB
ALBUMIN SERPL ELPH-MCNC: 2.5 G/DL — LOW (ref 3.3–5)
ALBUMIN SERPL ELPH-MCNC: 2.6 G/DL — LOW (ref 3.3–5)
ALP SERPL-CCNC: 215 U/L — HIGH (ref 40–120)
ALP SERPL-CCNC: 223 U/L — HIGH (ref 40–120)
ALT FLD-CCNC: 61 U/L — HIGH (ref 10–45)
ALT FLD-CCNC: 62 U/L — HIGH (ref 10–45)
ANION GAP SERPL CALC-SCNC: 14 MMOL/L — SIGNIFICANT CHANGE UP (ref 5–17)
ANION GAP SERPL CALC-SCNC: 16 MMOL/L — SIGNIFICANT CHANGE UP (ref 5–17)
AST SERPL-CCNC: 37 U/L — SIGNIFICANT CHANGE UP (ref 10–40)
AST SERPL-CCNC: 43 U/L — HIGH (ref 10–40)
BILIRUB SERPL-MCNC: 0.5 MG/DL — SIGNIFICANT CHANGE UP (ref 0.2–1.2)
BILIRUB SERPL-MCNC: 0.6 MG/DL — SIGNIFICANT CHANGE UP (ref 0.2–1.2)
BUN SERPL-MCNC: 64 MG/DL — HIGH (ref 7–23)
BUN SERPL-MCNC: 68 MG/DL — HIGH (ref 7–23)
CA-I BLD-SCNC: 1.22 MMOL/L — SIGNIFICANT CHANGE UP (ref 1.15–1.33)
CALCIUM SERPL-MCNC: 8.9 MG/DL — SIGNIFICANT CHANGE UP (ref 8.4–10.5)
CALCIUM SERPL-MCNC: 9 MG/DL — SIGNIFICANT CHANGE UP (ref 8.4–10.5)
CHLORIDE SERPL-SCNC: 100 MMOL/L — SIGNIFICANT CHANGE UP (ref 96–108)
CHLORIDE SERPL-SCNC: 108 MMOL/L — SIGNIFICANT CHANGE UP (ref 96–108)
CO2 SERPL-SCNC: 15 MMOL/L — LOW (ref 22–31)
CO2 SERPL-SCNC: 18 MMOL/L — LOW (ref 22–31)
CREAT SERPL-MCNC: 1.19 MG/DL — SIGNIFICANT CHANGE UP (ref 0.5–1.3)
CREAT SERPL-MCNC: 1.32 MG/DL — HIGH (ref 0.5–1.3)
EGFR: 52 ML/MIN/1.73M2 — LOW
EGFR: 58 ML/MIN/1.73M2 — LOW
GLUCOSE BLDC GLUCOMTR-MCNC: 109 MG/DL — HIGH (ref 70–99)
GLUCOSE BLDC GLUCOMTR-MCNC: 125 MG/DL — HIGH (ref 70–99)
GLUCOSE BLDC GLUCOMTR-MCNC: 141 MG/DL — HIGH (ref 70–99)
GLUCOSE BLDC GLUCOMTR-MCNC: 96 MG/DL — SIGNIFICANT CHANGE UP (ref 70–99)
GLUCOSE SERPL-MCNC: 115 MG/DL — HIGH (ref 70–99)
GLUCOSE SERPL-MCNC: 698 MG/DL — CRITICAL HIGH (ref 70–99)
HCT VFR BLD CALC: 22.8 % — LOW (ref 39–50)
HGB BLD-MCNC: 7.2 G/DL — LOW (ref 13–17)
MAGNESIUM SERPL-MCNC: 2.5 MG/DL — SIGNIFICANT CHANGE UP (ref 1.6–2.6)
MCHC RBC-ENTMCNC: 31 PG — SIGNIFICANT CHANGE UP (ref 27–34)
MCHC RBC-ENTMCNC: 31.6 GM/DL — LOW (ref 32–36)
MCV RBC AUTO: 98.3 FL — SIGNIFICANT CHANGE UP (ref 80–100)
NRBC # BLD: 0 /100 WBCS — SIGNIFICANT CHANGE UP (ref 0–0)
PHOSPHATE SERPL-MCNC: 5.6 MG/DL — HIGH (ref 2.5–4.5)
PLATELET # BLD AUTO: 436 K/UL — HIGH (ref 150–400)
POTASSIUM SERPL-MCNC: 4.3 MMOL/L — SIGNIFICANT CHANGE UP (ref 3.5–5.3)
POTASSIUM SERPL-MCNC: 5.1 MMOL/L — SIGNIFICANT CHANGE UP (ref 3.5–5.3)
POTASSIUM SERPL-SCNC: 4.3 MMOL/L — SIGNIFICANT CHANGE UP (ref 3.5–5.3)
POTASSIUM SERPL-SCNC: 5.1 MMOL/L — SIGNIFICANT CHANGE UP (ref 3.5–5.3)
PROT SERPL-MCNC: 6.9 G/DL — SIGNIFICANT CHANGE UP (ref 6–8.3)
PROT SERPL-MCNC: 7.4 G/DL — SIGNIFICANT CHANGE UP (ref 6–8.3)
RBC # BLD: 2.32 M/UL — LOW (ref 4.2–5.8)
RBC # FLD: 18.5 % — HIGH (ref 10.3–14.5)
SODIUM SERPL-SCNC: 131 MMOL/L — LOW (ref 135–145)
SODIUM SERPL-SCNC: 140 MMOL/L — SIGNIFICANT CHANGE UP (ref 135–145)
WBC # BLD: 8.13 K/UL — SIGNIFICANT CHANGE UP (ref 3.8–10.5)
WBC # FLD AUTO: 8.13 K/UL — SIGNIFICANT CHANGE UP (ref 3.8–10.5)

## 2024-06-29 PROCEDURE — 99232 SBSQ HOSP IP/OBS MODERATE 35: CPT | Mod: FS

## 2024-06-29 RX ADMIN — Medication 5 MILLIGRAM(S): at 15:00

## 2024-06-29 RX ADMIN — Medication 1 APPLICATION(S): at 05:19

## 2024-06-29 RX ADMIN — Medication 5 MILLIGRAM(S): at 17:14

## 2024-06-29 RX ADMIN — ENOXAPARIN SODIUM 40 MILLIGRAM(S): 100 INJECTION SUBCUTANEOUS at 23:08

## 2024-06-29 RX ADMIN — OLANZAPINE 10 MILLIGRAM(S): 2.5 TABLET, FILM COATED ORAL at 21:58

## 2024-06-29 RX ADMIN — Medication 5 MILLIGRAM(S): at 05:19

## 2024-06-29 RX ADMIN — Medication 5 MILLIGRAM(S): at 02:05

## 2024-06-29 RX ADMIN — Medication 5 MILLIGRAM(S): at 10:54

## 2024-06-29 RX ADMIN — IPRATROPIUM BROMIDE AND ALBUTEROL SULFATE 3 MILLILITER(S): .5; 3 SOLUTION RESPIRATORY (INHALATION) at 14:09

## 2024-06-29 RX ADMIN — Medication 5 MILLIGRAM(S): at 21:58

## 2024-06-29 RX ADMIN — PANTOPRAZOLE SODIUM 40 MILLIGRAM(S): 40 INJECTION, POWDER, FOR SOLUTION INTRAVENOUS at 17:13

## 2024-06-29 RX ADMIN — IPRATROPIUM BROMIDE AND ALBUTEROL SULFATE 3 MILLILITER(S): .5; 3 SOLUTION RESPIRATORY (INHALATION) at 23:07

## 2024-06-29 RX ADMIN — IPRATROPIUM BROMIDE AND ALBUTEROL SULFATE 3 MILLILITER(S): .5; 3 SOLUTION RESPIRATORY (INHALATION) at 17:14

## 2024-06-29 RX ADMIN — PANTOPRAZOLE SODIUM 40 MILLIGRAM(S): 40 INJECTION, POWDER, FOR SOLUTION INTRAVENOUS at 05:19

## 2024-06-29 RX ADMIN — IPRATROPIUM BROMIDE AND ALBUTEROL SULFATE 3 MILLILITER(S): .5; 3 SOLUTION RESPIRATORY (INHALATION) at 05:19

## 2024-06-29 RX ADMIN — Medication 1 EACH: at 07:22

## 2024-06-30 LAB
ANION GAP SERPL CALC-SCNC: 12 MMOL/L — SIGNIFICANT CHANGE UP (ref 5–17)
BUN SERPL-MCNC: 63 MG/DL — HIGH (ref 7–23)
CALCIUM SERPL-MCNC: 8.9 MG/DL — SIGNIFICANT CHANGE UP (ref 8.4–10.5)
CHLORIDE SERPL-SCNC: 111 MMOL/L — HIGH (ref 96–108)
CO2 SERPL-SCNC: 19 MMOL/L — LOW (ref 22–31)
CREAT SERPL-MCNC: 1.25 MG/DL — SIGNIFICANT CHANGE UP (ref 0.5–1.3)
EGFR: 55 ML/MIN/1.73M2 — LOW
GLUCOSE BLDC GLUCOMTR-MCNC: 118 MG/DL — HIGH (ref 70–99)
GLUCOSE BLDC GLUCOMTR-MCNC: 133 MG/DL — HIGH (ref 70–99)
GLUCOSE BLDC GLUCOMTR-MCNC: 146 MG/DL — HIGH (ref 70–99)
GLUCOSE BLDC GLUCOMTR-MCNC: 99 MG/DL — SIGNIFICANT CHANGE UP (ref 70–99)
GLUCOSE SERPL-MCNC: 105 MG/DL — HIGH (ref 70–99)
HCT VFR BLD CALC: 24 % — LOW (ref 39–50)
HGB BLD-MCNC: 7.2 G/DL — LOW (ref 13–17)
MAGNESIUM SERPL-MCNC: 2.3 MG/DL — SIGNIFICANT CHANGE UP (ref 1.6–2.6)
MCHC RBC-ENTMCNC: 28.6 PG — SIGNIFICANT CHANGE UP (ref 27–34)
MCHC RBC-ENTMCNC: 30 GM/DL — LOW (ref 32–36)
MCV RBC AUTO: 95.2 FL — SIGNIFICANT CHANGE UP (ref 80–100)
NRBC # BLD: 0 /100 WBCS — SIGNIFICANT CHANGE UP (ref 0–0)
PHOSPHATE SERPL-MCNC: 3.2 MG/DL — SIGNIFICANT CHANGE UP (ref 2.5–4.5)
PLATELET # BLD AUTO: 522 K/UL — HIGH (ref 150–400)
POTASSIUM SERPL-MCNC: 4.5 MMOL/L — SIGNIFICANT CHANGE UP (ref 3.5–5.3)
POTASSIUM SERPL-SCNC: 4.5 MMOL/L — SIGNIFICANT CHANGE UP (ref 3.5–5.3)
RBC # BLD: 2.52 M/UL — LOW (ref 4.2–5.8)
RBC # FLD: 18.2 % — HIGH (ref 10.3–14.5)
SODIUM SERPL-SCNC: 142 MMOL/L — SIGNIFICANT CHANGE UP (ref 135–145)
WBC # BLD: 9.71 K/UL — SIGNIFICANT CHANGE UP (ref 3.8–10.5)
WBC # FLD AUTO: 9.71 K/UL — SIGNIFICANT CHANGE UP (ref 3.8–10.5)

## 2024-06-30 PROCEDURE — 99232 SBSQ HOSP IP/OBS MODERATE 35: CPT | Mod: FS

## 2024-06-30 RX ADMIN — IPRATROPIUM BROMIDE AND ALBUTEROL SULFATE 3 MILLILITER(S): .5; 3 SOLUTION RESPIRATORY (INHALATION) at 05:21

## 2024-06-30 RX ADMIN — OLANZAPINE 10 MILLIGRAM(S): 2.5 TABLET, FILM COATED ORAL at 21:28

## 2024-06-30 RX ADMIN — IPRATROPIUM BROMIDE AND ALBUTEROL SULFATE 3 MILLILITER(S): .5; 3 SOLUTION RESPIRATORY (INHALATION) at 17:32

## 2024-06-30 RX ADMIN — Medication 5 MILLIGRAM(S): at 17:32

## 2024-06-30 RX ADMIN — PANTOPRAZOLE SODIUM 40 MILLIGRAM(S): 40 INJECTION, POWDER, FOR SOLUTION INTRAVENOUS at 05:21

## 2024-06-30 RX ADMIN — Medication 5 MILLIGRAM(S): at 12:51

## 2024-06-30 RX ADMIN — Medication 5 MILLIGRAM(S): at 02:11

## 2024-06-30 RX ADMIN — IPRATROPIUM BROMIDE AND ALBUTEROL SULFATE 3 MILLILITER(S): .5; 3 SOLUTION RESPIRATORY (INHALATION) at 12:51

## 2024-06-30 RX ADMIN — ENOXAPARIN SODIUM 40 MILLIGRAM(S): 100 INJECTION SUBCUTANEOUS at 23:18

## 2024-06-30 RX ADMIN — Medication 5 MILLIGRAM(S): at 09:53

## 2024-06-30 RX ADMIN — Medication 5 MILLIGRAM(S): at 05:22

## 2024-06-30 RX ADMIN — Medication 1 APPLICATION(S): at 05:21

## 2024-06-30 RX ADMIN — PANTOPRAZOLE SODIUM 40 MILLIGRAM(S): 40 INJECTION, POWDER, FOR SOLUTION INTRAVENOUS at 17:32

## 2024-06-30 RX ADMIN — Medication 5 MILLIGRAM(S): at 21:27

## 2024-06-30 RX ADMIN — IPRATROPIUM BROMIDE AND ALBUTEROL SULFATE 3 MILLILITER(S): .5; 3 SOLUTION RESPIRATORY (INHALATION) at 23:18

## 2024-07-01 LAB
ANION GAP SERPL CALC-SCNC: 15 MMOL/L — SIGNIFICANT CHANGE UP (ref 5–17)
BUN SERPL-MCNC: 69 MG/DL — HIGH (ref 7–23)
CALCIUM SERPL-MCNC: 8.8 MG/DL — SIGNIFICANT CHANGE UP (ref 8.4–10.5)
CHLORIDE SERPL-SCNC: 108 MMOL/L — SIGNIFICANT CHANGE UP (ref 96–108)
CO2 SERPL-SCNC: 17 MMOL/L — LOW (ref 22–31)
CREAT SERPL-MCNC: 1.44 MG/DL — HIGH (ref 0.5–1.3)
EGFR: 46 ML/MIN/1.73M2 — LOW
GAS PNL BLDA: SIGNIFICANT CHANGE UP
GLUCOSE BLDC GLUCOMTR-MCNC: 100 MG/DL — HIGH (ref 70–99)
GLUCOSE BLDC GLUCOMTR-MCNC: 125 MG/DL — HIGH (ref 70–99)
GLUCOSE BLDC GLUCOMTR-MCNC: 129 MG/DL — HIGH (ref 70–99)
GLUCOSE BLDC GLUCOMTR-MCNC: 160 MG/DL — HIGH (ref 70–99)
GLUCOSE SERPL-MCNC: 105 MG/DL — HIGH (ref 70–99)
HCT VFR BLD CALC: 24.3 % — LOW (ref 39–50)
HGB BLD-MCNC: 7.4 G/DL — LOW (ref 13–17)
MAGNESIUM SERPL-MCNC: 2.5 MG/DL — SIGNIFICANT CHANGE UP (ref 1.6–2.6)
MCHC RBC-ENTMCNC: 28.7 PG — SIGNIFICANT CHANGE UP (ref 27–34)
MCHC RBC-ENTMCNC: 30.5 GM/DL — LOW (ref 32–36)
MCV RBC AUTO: 94.2 FL — SIGNIFICANT CHANGE UP (ref 80–100)
NRBC # BLD: 0 /100 WBCS — SIGNIFICANT CHANGE UP (ref 0–0)
PHOSPHATE SERPL-MCNC: 3.7 MG/DL — SIGNIFICANT CHANGE UP (ref 2.5–4.5)
PLATELET # BLD AUTO: 528 K/UL — HIGH (ref 150–400)
POTASSIUM SERPL-MCNC: 4.4 MMOL/L — SIGNIFICANT CHANGE UP (ref 3.5–5.3)
POTASSIUM SERPL-SCNC: 4.4 MMOL/L — SIGNIFICANT CHANGE UP (ref 3.5–5.3)
RBC # BLD: 2.58 M/UL — LOW (ref 4.2–5.8)
RBC # FLD: 18.4 % — HIGH (ref 10.3–14.5)
SODIUM SERPL-SCNC: 140 MMOL/L — SIGNIFICANT CHANGE UP (ref 135–145)
WBC # BLD: 9.4 K/UL — SIGNIFICANT CHANGE UP (ref 3.8–10.5)
WBC # FLD AUTO: 9.4 K/UL — SIGNIFICANT CHANGE UP (ref 3.8–10.5)

## 2024-07-01 PROCEDURE — 99232 SBSQ HOSP IP/OBS MODERATE 35: CPT

## 2024-07-01 PROCEDURE — 73223 MRI JOINT UPR EXTR W/O&W/DYE: CPT | Mod: 26,LT

## 2024-07-01 PROCEDURE — 70450 CT HEAD/BRAIN W/O DYE: CPT | Mod: 26

## 2024-07-01 RX ORDER — METOPROLOL TARTRATE 50 MG
25 TABLET ORAL DAILY
Refills: 0 | Status: DISCONTINUED | OUTPATIENT
Start: 2024-07-01 | End: 2024-07-02

## 2024-07-01 RX ORDER — FOLIC ACID
1 POWDER (GRAM) MISCELLANEOUS DAILY
Refills: 0 | Status: DISCONTINUED | OUTPATIENT
Start: 2024-07-01 | End: 2024-07-05

## 2024-07-01 RX ORDER — PANTOPRAZOLE SODIUM 40 MG/10ML
40 INJECTION, POWDER, FOR SOLUTION INTRAVENOUS
Refills: 0 | Status: DISCONTINUED | OUTPATIENT
Start: 2024-07-01 | End: 2024-07-05

## 2024-07-01 RX ADMIN — IPRATROPIUM BROMIDE AND ALBUTEROL SULFATE 3 MILLILITER(S): .5; 3 SOLUTION RESPIRATORY (INHALATION) at 05:14

## 2024-07-01 RX ADMIN — PANTOPRAZOLE SODIUM 40 MILLIGRAM(S): 40 INJECTION, POWDER, FOR SOLUTION INTRAVENOUS at 05:14

## 2024-07-01 RX ADMIN — Medication 1000 MILLIGRAM(S): at 09:27

## 2024-07-01 RX ADMIN — Medication 1000 MILLIGRAM(S): at 21:46

## 2024-07-01 RX ADMIN — Medication 5 MILLIGRAM(S): at 05:14

## 2024-07-01 RX ADMIN — OLANZAPINE 10 MILLIGRAM(S): 2.5 TABLET, FILM COATED ORAL at 21:46

## 2024-07-01 RX ADMIN — ENOXAPARIN SODIUM 40 MILLIGRAM(S): 100 INJECTION SUBCUTANEOUS at 21:48

## 2024-07-01 RX ADMIN — IPRATROPIUM BROMIDE AND ALBUTEROL SULFATE 3 MILLILITER(S): .5; 3 SOLUTION RESPIRATORY (INHALATION) at 21:47

## 2024-07-01 RX ADMIN — Medication 5 MILLIGRAM(S): at 02:08

## 2024-07-01 RX ADMIN — IPRATROPIUM BROMIDE AND ALBUTEROL SULFATE 3 MILLILITER(S): .5; 3 SOLUTION RESPIRATORY (INHALATION) at 16:59

## 2024-07-01 RX ADMIN — Medication 1000 MILLIGRAM(S): at 10:15

## 2024-07-01 RX ADMIN — PANTOPRAZOLE SODIUM 40 MILLIGRAM(S): 40 INJECTION, POWDER, FOR SOLUTION INTRAVENOUS at 16:56

## 2024-07-01 RX ADMIN — IPRATROPIUM BROMIDE AND ALBUTEROL SULFATE 3 MILLILITER(S): .5; 3 SOLUTION RESPIRATORY (INHALATION) at 12:48

## 2024-07-01 RX ADMIN — INSULIN LISPRO 1: 100 INJECTION, SOLUTION SUBCUTANEOUS at 12:48

## 2024-07-01 RX ADMIN — Medication 5 MILLIGRAM(S): at 09:22

## 2024-07-02 LAB
ANION GAP SERPL CALC-SCNC: 14 MMOL/L — SIGNIFICANT CHANGE UP (ref 5–17)
BUN SERPL-MCNC: 63 MG/DL — HIGH (ref 7–23)
CALCIUM SERPL-MCNC: 8.9 MG/DL — SIGNIFICANT CHANGE UP (ref 8.4–10.5)
CHLORIDE SERPL-SCNC: 109 MMOL/L — HIGH (ref 96–108)
CO2 SERPL-SCNC: 17 MMOL/L — LOW (ref 22–31)
CREAT SERPL-MCNC: 1.28 MG/DL — SIGNIFICANT CHANGE UP (ref 0.5–1.3)
EGFR: 54 ML/MIN/1.73M2 — LOW
GLUCOSE BLDC GLUCOMTR-MCNC: 124 MG/DL — HIGH (ref 70–99)
GLUCOSE BLDC GLUCOMTR-MCNC: 124 MG/DL — HIGH (ref 70–99)
GLUCOSE BLDC GLUCOMTR-MCNC: 125 MG/DL — HIGH (ref 70–99)
GLUCOSE BLDC GLUCOMTR-MCNC: 98 MG/DL — SIGNIFICANT CHANGE UP (ref 70–99)
GLUCOSE SERPL-MCNC: 97 MG/DL — SIGNIFICANT CHANGE UP (ref 70–99)
HCT VFR BLD CALC: 24.3 % — LOW (ref 39–50)
HGB BLD-MCNC: 7.4 G/DL — LOW (ref 13–17)
MAGNESIUM SERPL-MCNC: 2.5 MG/DL — SIGNIFICANT CHANGE UP (ref 1.6–2.6)
MCHC RBC-ENTMCNC: 28.9 PG — SIGNIFICANT CHANGE UP (ref 27–34)
MCHC RBC-ENTMCNC: 30.5 GM/DL — LOW (ref 32–36)
MCV RBC AUTO: 94.9 FL — SIGNIFICANT CHANGE UP (ref 80–100)
NRBC # BLD: 0 /100 WBCS — SIGNIFICANT CHANGE UP (ref 0–0)
PHOSPHATE SERPL-MCNC: 2.9 MG/DL — SIGNIFICANT CHANGE UP (ref 2.5–4.5)
PLATELET # BLD AUTO: 509 K/UL — HIGH (ref 150–400)
POTASSIUM SERPL-MCNC: 4.3 MMOL/L — SIGNIFICANT CHANGE UP (ref 3.5–5.3)
POTASSIUM SERPL-SCNC: 4.3 MMOL/L — SIGNIFICANT CHANGE UP (ref 3.5–5.3)
RBC # BLD: 2.56 M/UL — LOW (ref 4.2–5.8)
RBC # FLD: 18.4 % — HIGH (ref 10.3–14.5)
SODIUM SERPL-SCNC: 140 MMOL/L — SIGNIFICANT CHANGE UP (ref 135–145)
WBC # BLD: 8.18 K/UL — SIGNIFICANT CHANGE UP (ref 3.8–10.5)
WBC # FLD AUTO: 8.18 K/UL — SIGNIFICANT CHANGE UP (ref 3.8–10.5)

## 2024-07-02 PROCEDURE — 99232 SBSQ HOSP IP/OBS MODERATE 35: CPT

## 2024-07-02 RX ORDER — ASPIRIN 325 MG/1
81 TABLET, FILM COATED ORAL DAILY
Refills: 0 | Status: DISCONTINUED | OUTPATIENT
Start: 2024-07-02 | End: 2024-07-05

## 2024-07-02 RX ORDER — METOPROLOL TARTRATE 50 MG
50 TABLET ORAL DAILY
Refills: 0 | Status: DISCONTINUED | OUTPATIENT
Start: 2024-07-02 | End: 2024-07-04

## 2024-07-02 RX ADMIN — IPRATROPIUM BROMIDE AND ALBUTEROL SULFATE 3 MILLILITER(S): .5; 3 SOLUTION RESPIRATORY (INHALATION) at 11:50

## 2024-07-02 RX ADMIN — ASPIRIN 81 MILLIGRAM(S): 325 TABLET, FILM COATED ORAL at 11:54

## 2024-07-02 RX ADMIN — PANTOPRAZOLE SODIUM 40 MILLIGRAM(S): 40 INJECTION, POWDER, FOR SOLUTION INTRAVENOUS at 17:45

## 2024-07-02 RX ADMIN — PANTOPRAZOLE SODIUM 40 MILLIGRAM(S): 40 INJECTION, POWDER, FOR SOLUTION INTRAVENOUS at 05:18

## 2024-07-02 RX ADMIN — IPRATROPIUM BROMIDE AND ALBUTEROL SULFATE 3 MILLILITER(S): .5; 3 SOLUTION RESPIRATORY (INHALATION) at 23:14

## 2024-07-02 RX ADMIN — Medication 1 MILLIGRAM(S): at 11:50

## 2024-07-02 RX ADMIN — IPRATROPIUM BROMIDE AND ALBUTEROL SULFATE 3 MILLILITER(S): .5; 3 SOLUTION RESPIRATORY (INHALATION) at 17:45

## 2024-07-02 RX ADMIN — ENOXAPARIN SODIUM 40 MILLIGRAM(S): 100 INJECTION SUBCUTANEOUS at 23:14

## 2024-07-02 RX ADMIN — IPRATROPIUM BROMIDE AND ALBUTEROL SULFATE 3 MILLILITER(S): .5; 3 SOLUTION RESPIRATORY (INHALATION) at 05:18

## 2024-07-02 RX ADMIN — Medication 50 MILLIGRAM(S): at 13:04

## 2024-07-02 RX ADMIN — Medication 25 MILLIGRAM(S): at 05:18

## 2024-07-02 RX ADMIN — OLANZAPINE 10 MILLIGRAM(S): 2.5 TABLET, FILM COATED ORAL at 21:14

## 2024-07-03 LAB
GLUCOSE BLDC GLUCOMTR-MCNC: 138 MG/DL — HIGH (ref 70–99)
GLUCOSE BLDC GLUCOMTR-MCNC: 173 MG/DL — HIGH (ref 70–99)
GLUCOSE BLDC GLUCOMTR-MCNC: 83 MG/DL — SIGNIFICANT CHANGE UP (ref 70–99)
GLUCOSE BLDC GLUCOMTR-MCNC: 98 MG/DL — SIGNIFICANT CHANGE UP (ref 70–99)

## 2024-07-03 PROCEDURE — 99232 SBSQ HOSP IP/OBS MODERATE 35: CPT

## 2024-07-03 PROCEDURE — 78306 BONE IMAGING WHOLE BODY: CPT | Mod: 26

## 2024-07-03 RX ORDER — ERGOCALCIFEROL 1.25 MG/1
50000 CAPSULE ORAL ONCE
Refills: 0 | Status: DISCONTINUED | OUTPATIENT
Start: 2024-07-03 | End: 2024-07-05

## 2024-07-03 RX ADMIN — Medication 50 MILLIGRAM(S): at 05:27

## 2024-07-03 RX ADMIN — IPRATROPIUM BROMIDE AND ALBUTEROL SULFATE 3 MILLILITER(S): .5; 3 SOLUTION RESPIRATORY (INHALATION) at 17:16

## 2024-07-03 RX ADMIN — ENOXAPARIN SODIUM 40 MILLIGRAM(S): 100 INJECTION SUBCUTANEOUS at 23:10

## 2024-07-03 RX ADMIN — PANTOPRAZOLE SODIUM 40 MILLIGRAM(S): 40 INJECTION, POWDER, FOR SOLUTION INTRAVENOUS at 17:16

## 2024-07-03 RX ADMIN — IPRATROPIUM BROMIDE AND ALBUTEROL SULFATE 3 MILLILITER(S): .5; 3 SOLUTION RESPIRATORY (INHALATION) at 05:26

## 2024-07-03 RX ADMIN — IPRATROPIUM BROMIDE AND ALBUTEROL SULFATE 3 MILLILITER(S): .5; 3 SOLUTION RESPIRATORY (INHALATION) at 11:44

## 2024-07-03 RX ADMIN — IPRATROPIUM BROMIDE AND ALBUTEROL SULFATE 3 MILLILITER(S): .5; 3 SOLUTION RESPIRATORY (INHALATION) at 23:10

## 2024-07-03 RX ADMIN — ASPIRIN 81 MILLIGRAM(S): 325 TABLET, FILM COATED ORAL at 11:42

## 2024-07-03 RX ADMIN — INSULIN LISPRO 1: 100 INJECTION, SOLUTION SUBCUTANEOUS at 12:44

## 2024-07-03 RX ADMIN — PANTOPRAZOLE SODIUM 40 MILLIGRAM(S): 40 INJECTION, POWDER, FOR SOLUTION INTRAVENOUS at 05:27

## 2024-07-03 RX ADMIN — Medication 1 MILLIGRAM(S): at 11:42

## 2024-07-03 RX ADMIN — Medication 1 APPLICATION(S): at 11:42

## 2024-07-04 LAB
ALBUMIN SERPL ELPH-MCNC: 2.6 G/DL — LOW (ref 3.3–5)
ALP SERPL-CCNC: 182 U/L — HIGH (ref 40–120)
ALT FLD-CCNC: 33 U/L — SIGNIFICANT CHANGE UP (ref 10–45)
ANION GAP SERPL CALC-SCNC: 13 MMOL/L — SIGNIFICANT CHANGE UP (ref 5–17)
AST SERPL-CCNC: 20 U/L — SIGNIFICANT CHANGE UP (ref 10–40)
BASE EXCESS BLDV CALC-SCNC: -3.1 MMOL/L — LOW (ref -2–3)
BILIRUB SERPL-MCNC: 0.4 MG/DL — SIGNIFICANT CHANGE UP (ref 0.2–1.2)
BUN SERPL-MCNC: 44 MG/DL — HIGH (ref 7–23)
CA-I SERPL-SCNC: 1.26 MMOL/L — SIGNIFICANT CHANGE UP (ref 1.15–1.33)
CALCIUM SERPL-MCNC: 8.6 MG/DL — SIGNIFICANT CHANGE UP (ref 8.4–10.5)
CHLORIDE BLDV-SCNC: 115 MMOL/L — HIGH (ref 96–108)
CHLORIDE SERPL-SCNC: 110 MMOL/L — HIGH (ref 96–108)
CO2 BLDV-SCNC: 24 MMOL/L — SIGNIFICANT CHANGE UP (ref 22–26)
CO2 SERPL-SCNC: 17 MMOL/L — LOW (ref 22–31)
CREAT SERPL-MCNC: 1.19 MG/DL — SIGNIFICANT CHANGE UP (ref 0.5–1.3)
EGFR: 58 ML/MIN/1.73M2 — LOW
GAS PNL BLDV: 142 MMOL/L — SIGNIFICANT CHANGE UP (ref 136–145)
GAS PNL BLDV: SIGNIFICANT CHANGE UP
GAS PNL BLDV: SIGNIFICANT CHANGE UP
GLUCOSE BLDC GLUCOMTR-MCNC: 125 MG/DL — HIGH (ref 70–99)
GLUCOSE BLDC GLUCOMTR-MCNC: 140 MG/DL — HIGH (ref 70–99)
GLUCOSE BLDC GLUCOMTR-MCNC: 84 MG/DL — SIGNIFICANT CHANGE UP (ref 70–99)
GLUCOSE BLDC GLUCOMTR-MCNC: 97 MG/DL — SIGNIFICANT CHANGE UP (ref 70–99)
GLUCOSE BLDV-MCNC: 91 MG/DL — SIGNIFICANT CHANGE UP (ref 70–99)
GLUCOSE SERPL-MCNC: 87 MG/DL — SIGNIFICANT CHANGE UP (ref 70–99)
HCO3 BLDV-SCNC: 23 MMOL/L — SIGNIFICANT CHANGE UP (ref 22–29)
HCT VFR BLDA CALC: 26 % — LOW (ref 39–51)
HGB BLD CALC-MCNC: 8.8 G/DL — LOW (ref 12.6–17.4)
LACTATE BLDV-MCNC: 1.6 MMOL/L — SIGNIFICANT CHANGE UP (ref 0.5–2)
MAGNESIUM SERPL-MCNC: 2.4 MG/DL — SIGNIFICANT CHANGE UP (ref 1.6–2.6)
PCO2 BLDV: 43 MMHG — SIGNIFICANT CHANGE UP (ref 42–55)
PH BLDV: 7.33 — SIGNIFICANT CHANGE UP (ref 7.32–7.43)
PHOSPHATE SERPL-MCNC: 2.7 MG/DL — SIGNIFICANT CHANGE UP (ref 2.5–4.5)
PO2 BLDV: 34 MMHG — SIGNIFICANT CHANGE UP (ref 25–45)
POTASSIUM BLDV-SCNC: 4.5 MMOL/L — SIGNIFICANT CHANGE UP (ref 3.5–5.1)
POTASSIUM SERPL-MCNC: 4.5 MMOL/L — SIGNIFICANT CHANGE UP (ref 3.5–5.3)
POTASSIUM SERPL-SCNC: 4.5 MMOL/L — SIGNIFICANT CHANGE UP (ref 3.5–5.3)
PROT SERPL-MCNC: 7.5 G/DL — SIGNIFICANT CHANGE UP (ref 6–8.3)
SAO2 % BLDV: 47.2 % — LOW (ref 67–88)
SODIUM SERPL-SCNC: 140 MMOL/L — SIGNIFICANT CHANGE UP (ref 135–145)

## 2024-07-04 PROCEDURE — 99232 SBSQ HOSP IP/OBS MODERATE 35: CPT | Mod: FS

## 2024-07-04 RX ORDER — METOPROLOL TARTRATE 50 MG
100 TABLET ORAL DAILY
Refills: 0 | Status: DISCONTINUED | OUTPATIENT
Start: 2024-07-05 | End: 2024-07-05

## 2024-07-04 RX ORDER — METOPROLOL TARTRATE 50 MG
50 TABLET ORAL ONCE
Refills: 0 | Status: COMPLETED | OUTPATIENT
Start: 2024-07-04 | End: 2024-07-04

## 2024-07-04 RX ORDER — METOPROLOL TARTRATE 50 MG
50 TABLET ORAL DAILY
Refills: 0 | Status: DISCONTINUED | OUTPATIENT
Start: 2024-07-04 | End: 2024-07-04

## 2024-07-04 RX ORDER — SODIUM BICARBONATE 650 MG/1
650 TABLET ORAL THREE TIMES A DAY
Refills: 0 | Status: DISCONTINUED | OUTPATIENT
Start: 2024-07-04 | End: 2024-07-05

## 2024-07-04 RX ADMIN — IPRATROPIUM BROMIDE AND ALBUTEROL SULFATE 3 MILLILITER(S): .5; 3 SOLUTION RESPIRATORY (INHALATION) at 05:31

## 2024-07-04 RX ADMIN — PANTOPRAZOLE SODIUM 40 MILLIGRAM(S): 40 INJECTION, POWDER, FOR SOLUTION INTRAVENOUS at 05:32

## 2024-07-04 RX ADMIN — Medication 1 MILLIGRAM(S): at 14:33

## 2024-07-04 RX ADMIN — SODIUM BICARBONATE 650 MILLIGRAM(S): 650 TABLET ORAL at 21:20

## 2024-07-04 RX ADMIN — PANTOPRAZOLE SODIUM 40 MILLIGRAM(S): 40 INJECTION, POWDER, FOR SOLUTION INTRAVENOUS at 18:35

## 2024-07-04 RX ADMIN — ASPIRIN 81 MILLIGRAM(S): 325 TABLET, FILM COATED ORAL at 14:33

## 2024-07-04 RX ADMIN — Medication 1 APPLICATION(S): at 10:32

## 2024-07-04 RX ADMIN — Medication 50 MILLIGRAM(S): at 05:31

## 2024-07-04 RX ADMIN — SODIUM BICARBONATE 650 MILLIGRAM(S): 650 TABLET ORAL at 14:35

## 2024-07-04 RX ADMIN — Medication 50 MILLIGRAM(S): at 18:35

## 2024-07-04 RX ADMIN — IPRATROPIUM BROMIDE AND ALBUTEROL SULFATE 3 MILLILITER(S): .5; 3 SOLUTION RESPIRATORY (INHALATION) at 14:32

## 2024-07-05 ENCOUNTER — TRANSCRIPTION ENCOUNTER (OUTPATIENT)
Age: 89
End: 2024-07-05

## 2024-07-05 VITALS
TEMPERATURE: 97 F | OXYGEN SATURATION: 95 % | RESPIRATION RATE: 18 BRPM | DIASTOLIC BLOOD PRESSURE: 68 MMHG | HEART RATE: 87 BPM | SYSTOLIC BLOOD PRESSURE: 111 MMHG

## 2024-07-05 LAB
GLUCOSE BLDC GLUCOMTR-MCNC: 116 MG/DL — HIGH (ref 70–99)
GLUCOSE BLDC GLUCOMTR-MCNC: 149 MG/DL — HIGH (ref 70–99)
HCT VFR BLD CALC: 28.1 % — LOW (ref 39–50)
HGB BLD-MCNC: 8.3 G/DL — LOW (ref 13–17)
MCHC RBC-ENTMCNC: 28.4 PG — SIGNIFICANT CHANGE UP (ref 27–34)
MCHC RBC-ENTMCNC: 29.5 GM/DL — LOW (ref 32–36)
MCV RBC AUTO: 96.2 FL — SIGNIFICANT CHANGE UP (ref 80–100)
NRBC # BLD: 0 /100 WBCS — SIGNIFICANT CHANGE UP (ref 0–0)
PLATELET # BLD AUTO: 530 K/UL — HIGH (ref 150–400)
RBC # BLD: 2.92 M/UL — LOW (ref 4.2–5.8)
RBC # FLD: 18.3 % — HIGH (ref 10.3–14.5)
WBC # BLD: 6.67 K/UL — SIGNIFICANT CHANGE UP (ref 3.8–10.5)
WBC # FLD AUTO: 6.67 K/UL — SIGNIFICANT CHANGE UP (ref 3.8–10.5)

## 2024-07-05 PROCEDURE — 82232 ASSAY OF BETA-2 PROTEIN: CPT

## 2024-07-05 PROCEDURE — 74240 X-RAY XM UPR GI TRC 1CNTRST: CPT

## 2024-07-05 PROCEDURE — 70450 CT HEAD/BRAIN W/O DYE: CPT | Mod: MC

## 2024-07-05 PROCEDURE — 82553 CREATINE MB FRACTION: CPT

## 2024-07-05 PROCEDURE — 85730 THROMBOPLASTIN TIME PARTIAL: CPT

## 2024-07-05 PROCEDURE — 85610 PROTHROMBIN TIME: CPT

## 2024-07-05 PROCEDURE — 86880 COOMBS TEST DIRECT: CPT

## 2024-07-05 PROCEDURE — 85025 COMPLETE CBC W/AUTO DIFF WBC: CPT

## 2024-07-05 PROCEDURE — 83540 ASSAY OF IRON: CPT

## 2024-07-05 PROCEDURE — 92526 ORAL FUNCTION THERAPY: CPT

## 2024-07-05 PROCEDURE — 82607 VITAMIN B-12: CPT

## 2024-07-05 PROCEDURE — 36415 COLL VENOUS BLD VENIPUNCTURE: CPT

## 2024-07-05 PROCEDURE — 97535 SELF CARE MNGMENT TRAINING: CPT

## 2024-07-05 PROCEDURE — 84300 ASSAY OF URINE SODIUM: CPT

## 2024-07-05 PROCEDURE — 84484 ASSAY OF TROPONIN QUANT: CPT

## 2024-07-05 PROCEDURE — 71250 CT THORAX DX C-: CPT | Mod: MC

## 2024-07-05 PROCEDURE — 83605 ASSAY OF LACTIC ACID: CPT

## 2024-07-05 PROCEDURE — 84165 PROTEIN E-PHORESIS SERUM: CPT

## 2024-07-05 PROCEDURE — 83521 IG LIGHT CHAINS FREE EACH: CPT

## 2024-07-05 PROCEDURE — 94660 CPAP INITIATION&MGMT: CPT

## 2024-07-05 PROCEDURE — 97110 THERAPEUTIC EXERCISES: CPT

## 2024-07-05 PROCEDURE — 87075 CULTR BACTERIA EXCEPT BLOOD: CPT

## 2024-07-05 PROCEDURE — 83550 IRON BINDING TEST: CPT

## 2024-07-05 PROCEDURE — 82550 ASSAY OF CK (CPK): CPT

## 2024-07-05 PROCEDURE — A9585: CPT

## 2024-07-05 PROCEDURE — 84443 ASSAY THYROID STIM HORMONE: CPT

## 2024-07-05 PROCEDURE — 36430 TRANSFUSION BLD/BLD COMPNT: CPT

## 2024-07-05 PROCEDURE — 85045 AUTOMATED RETICULOCYTE COUNT: CPT

## 2024-07-05 PROCEDURE — 84155 ASSAY OF PROTEIN SERUM: CPT

## 2024-07-05 PROCEDURE — 83735 ASSAY OF MAGNESIUM: CPT

## 2024-07-05 PROCEDURE — 87637 SARSCOV2&INF A&B&RSV AMP PRB: CPT

## 2024-07-05 PROCEDURE — 87070 CULTURE OTHR SPECIMN AEROBIC: CPT

## 2024-07-05 PROCEDURE — 97162 PT EVAL MOD COMPLEX 30 MIN: CPT

## 2024-07-05 PROCEDURE — 87040 BLOOD CULTURE FOR BACTERIA: CPT

## 2024-07-05 PROCEDURE — 93970 EXTREMITY STUDY: CPT

## 2024-07-05 PROCEDURE — 82272 OCCULT BLD FECES 1-3 TESTS: CPT

## 2024-07-05 PROCEDURE — 80061 LIPID PANEL: CPT

## 2024-07-05 PROCEDURE — P9040: CPT

## 2024-07-05 PROCEDURE — P9016: CPT

## 2024-07-05 PROCEDURE — 97116 GAIT TRAINING THERAPY: CPT

## 2024-07-05 PROCEDURE — 84295 ASSAY OF SERUM SODIUM: CPT

## 2024-07-05 PROCEDURE — 93306 TTE W/DOPPLER COMPLETE: CPT

## 2024-07-05 PROCEDURE — 92612 ENDOSCOPY SWALLOW (FEES) VID: CPT

## 2024-07-05 PROCEDURE — 94640 AIRWAY INHALATION TREATMENT: CPT

## 2024-07-05 PROCEDURE — 82330 ASSAY OF CALCIUM: CPT

## 2024-07-05 PROCEDURE — 96365 THER/PROPH/DIAG IV INF INIT: CPT

## 2024-07-05 PROCEDURE — 86923 COMPATIBILITY TEST ELECTRIC: CPT

## 2024-07-05 PROCEDURE — 74230 X-RAY XM SWLNG FUNCJ C+: CPT

## 2024-07-05 PROCEDURE — 71045 X-RAY EXAM CHEST 1 VIEW: CPT

## 2024-07-05 PROCEDURE — 83935 ASSAY OF URINE OSMOLALITY: CPT

## 2024-07-05 PROCEDURE — 36569 INSJ PICC 5 YR+ W/O IMAGING: CPT

## 2024-07-05 PROCEDURE — 87338 HPYLORI STOOL AG IA: CPT

## 2024-07-05 PROCEDURE — 94002 VENT MGMT INPAT INIT DAY: CPT

## 2024-07-05 PROCEDURE — 73060 X-RAY EXAM OF HUMERUS: CPT

## 2024-07-05 PROCEDURE — 86850 RBC ANTIBODY SCREEN: CPT

## 2024-07-05 PROCEDURE — 87641 MR-STAPH DNA AMP PROBE: CPT

## 2024-07-05 PROCEDURE — 83690 ASSAY OF LIPASE: CPT

## 2024-07-05 PROCEDURE — 92611 MOTION FLUOROSCOPY/SWALLOW: CPT

## 2024-07-05 PROCEDURE — 84145 PROCALCITONIN (PCT): CPT

## 2024-07-05 PROCEDURE — C9399: CPT

## 2024-07-05 PROCEDURE — 82435 ASSAY OF BLOOD CHLORIDE: CPT

## 2024-07-05 PROCEDURE — 92610 EVALUATE SWALLOWING FUNCTION: CPT

## 2024-07-05 PROCEDURE — 86901 BLOOD TYPING SEROLOGIC RH(D): CPT

## 2024-07-05 PROCEDURE — 87205 SMEAR GRAM STAIN: CPT

## 2024-07-05 PROCEDURE — 84132 ASSAY OF SERUM POTASSIUM: CPT

## 2024-07-05 PROCEDURE — 84153 ASSAY OF PSA TOTAL: CPT

## 2024-07-05 PROCEDURE — 93005 ELECTROCARDIOGRAM TRACING: CPT

## 2024-07-05 PROCEDURE — 84100 ASSAY OF PHOSPHORUS: CPT

## 2024-07-05 PROCEDURE — 82610 CYSTATIN C: CPT

## 2024-07-05 PROCEDURE — 82803 BLOOD GASES ANY COMBINATION: CPT

## 2024-07-05 PROCEDURE — 73030 X-RAY EXAM OF SHOULDER: CPT

## 2024-07-05 PROCEDURE — 74018 RADEX ABDOMEN 1 VIEW: CPT

## 2024-07-05 PROCEDURE — 97166 OT EVAL MOD COMPLEX 45 MIN: CPT

## 2024-07-05 PROCEDURE — 84166 PROTEIN E-PHORESIS/URINE/CSF: CPT

## 2024-07-05 PROCEDURE — 74177 CT ABD & PELVIS W/CONTRAST: CPT | Mod: MC

## 2024-07-05 PROCEDURE — A9561: CPT

## 2024-07-05 PROCEDURE — 87102 FUNGUS ISOLATION CULTURE: CPT

## 2024-07-05 PROCEDURE — 76770 US EXAM ABDO BACK WALL COMP: CPT

## 2024-07-05 PROCEDURE — 96375 TX/PRO/DX INJ NEW DRUG ADDON: CPT

## 2024-07-05 PROCEDURE — 86334 IMMUNOFIX E-PHORESIS SERUM: CPT

## 2024-07-05 PROCEDURE — 87116 MYCOBACTERIA CULTURE: CPT

## 2024-07-05 PROCEDURE — 86900 BLOOD TYPING SEROLOGIC ABO: CPT

## 2024-07-05 PROCEDURE — 85018 HEMOGLOBIN: CPT

## 2024-07-05 PROCEDURE — 85014 HEMATOCRIT: CPT

## 2024-07-05 PROCEDURE — 73223 MRI JOINT UPR EXTR W/O&W/DYE: CPT | Mod: MC

## 2024-07-05 PROCEDURE — 80048 BASIC METABOLIC PNL TOTAL CA: CPT

## 2024-07-05 PROCEDURE — 83036 HEMOGLOBIN GLYCOSYLATED A1C: CPT

## 2024-07-05 PROCEDURE — 84478 ASSAY OF TRIGLYCERIDES: CPT

## 2024-07-05 PROCEDURE — 87640 STAPH A DNA AMP PROBE: CPT

## 2024-07-05 PROCEDURE — 82947 ASSAY GLUCOSE BLOOD QUANT: CPT

## 2024-07-05 PROCEDURE — 87015 SPECIMEN INFECT AGNT CONCNTJ: CPT

## 2024-07-05 PROCEDURE — 82306 VITAMIN D 25 HYDROXY: CPT

## 2024-07-05 PROCEDURE — 84134 ASSAY OF PREALBUMIN: CPT

## 2024-07-05 PROCEDURE — 82570 ASSAY OF URINE CREATININE: CPT

## 2024-07-05 PROCEDURE — 87206 SMEAR FLUORESCENT/ACID STAI: CPT

## 2024-07-05 PROCEDURE — 97530 THERAPEUTIC ACTIVITIES: CPT

## 2024-07-05 PROCEDURE — 85027 COMPLETE CBC AUTOMATED: CPT

## 2024-07-05 PROCEDURE — 83615 LACTATE (LD) (LDH) ENZYME: CPT

## 2024-07-05 PROCEDURE — 82728 ASSAY OF FERRITIN: CPT

## 2024-07-05 PROCEDURE — 82962 GLUCOSE BLOOD TEST: CPT

## 2024-07-05 PROCEDURE — 82746 ASSAY OF FOLIC ACID SERUM: CPT

## 2024-07-05 PROCEDURE — 99291 CRITICAL CARE FIRST HOUR: CPT

## 2024-07-05 PROCEDURE — 78306 BONE IMAGING WHOLE BODY: CPT

## 2024-07-05 PROCEDURE — 71275 CT ANGIOGRAPHY CHEST: CPT | Mod: MC

## 2024-07-05 PROCEDURE — 96367 TX/PROPH/DG ADDL SEQ IV INF: CPT

## 2024-07-05 PROCEDURE — 80053 COMPREHEN METABOLIC PANEL: CPT

## 2024-07-05 PROCEDURE — 86922 COMPATIBILITY TEST ANTIGLOB: CPT

## 2024-07-05 PROCEDURE — 99232 SBSQ HOSP IP/OBS MODERATE 35: CPT | Mod: FS

## 2024-07-05 PROCEDURE — 83880 ASSAY OF NATRIURETIC PEPTIDE: CPT

## 2024-07-05 PROCEDURE — C1751: CPT

## 2024-07-05 RX ORDER — PANTOPRAZOLE SODIUM 40 MG/10ML
1 INJECTION, POWDER, FOR SOLUTION INTRAVENOUS
Qty: 0 | Refills: 0 | DISCHARGE
Start: 2024-07-05

## 2024-07-05 RX ORDER — OLANZAPINE 2.5 MG/1
1 TABLET, FILM COATED ORAL
Qty: 0 | Refills: 0 | DISCHARGE
Start: 2024-07-05

## 2024-07-05 RX ORDER — SODIUM BICARBONATE 650 MG/1
1 TABLET ORAL
Qty: 0 | Refills: 0 | DISCHARGE
Start: 2024-07-05

## 2024-07-05 RX ORDER — METOPROLOL TARTRATE 50 MG
1 TABLET ORAL
Qty: 0 | Refills: 0 | DISCHARGE
Start: 2024-07-05

## 2024-07-05 RX ORDER — FOLIC ACID
1 POWDER (GRAM) MISCELLANEOUS
Qty: 0 | Refills: 0 | DISCHARGE
Start: 2024-07-05

## 2024-07-05 RX ORDER — DONEPEZIL HYDROCHLORIDE 10 MG/1
1 TABLET, FILM COATED ORAL
Refills: 0 | DISCHARGE

## 2024-07-05 RX ORDER — ACETAMINOPHEN 325 MG
2 TABLET ORAL
Qty: 0 | Refills: 0 | DISCHARGE
Start: 2024-07-05

## 2024-07-05 RX ORDER — IPRATROPIUM BROMIDE AND ALBUTEROL SULFATE .5; 3 MG/3ML; MG/3ML
3 SOLUTION RESPIRATORY (INHALATION)
Qty: 0 | Refills: 0 | DISCHARGE
Start: 2024-07-05

## 2024-07-05 RX ADMIN — SODIUM BICARBONATE 650 MILLIGRAM(S): 650 TABLET ORAL at 06:22

## 2024-07-05 RX ADMIN — Medication 1 MILLIGRAM(S): at 12:27

## 2024-07-05 RX ADMIN — Medication 1 APPLICATION(S): at 06:22

## 2024-07-05 RX ADMIN — IPRATROPIUM BROMIDE AND ALBUTEROL SULFATE 3 MILLILITER(S): .5; 3 SOLUTION RESPIRATORY (INHALATION) at 12:27

## 2024-07-05 RX ADMIN — Medication 100 MILLIGRAM(S): at 06:23

## 2024-07-05 RX ADMIN — SODIUM BICARBONATE 650 MILLIGRAM(S): 650 TABLET ORAL at 13:39

## 2024-07-05 RX ADMIN — ASPIRIN 81 MILLIGRAM(S): 325 TABLET, FILM COATED ORAL at 12:27

## 2024-07-05 RX ADMIN — ENOXAPARIN SODIUM 40 MILLIGRAM(S): 100 INJECTION SUBCUTANEOUS at 00:02

## 2024-07-05 RX ADMIN — PANTOPRAZOLE SODIUM 40 MILLIGRAM(S): 40 INJECTION, POWDER, FOR SOLUTION INTRAVENOUS at 06:22

## 2024-07-06 ENCOUNTER — EMERGENCY (EMERGENCY)
Facility: HOSPITAL | Age: 89
LOS: 1 days | Discharge: HOME | End: 2024-07-06
Attending: EMERGENCY MEDICINE
Payer: MEDICARE

## 2024-07-06 VITALS
HEIGHT: 67 IN | SYSTOLIC BLOOD PRESSURE: 104 MMHG | TEMPERATURE: 99 F | RESPIRATION RATE: 16 BRPM | OXYGEN SATURATION: 96 % | HEART RATE: 84 BPM | DIASTOLIC BLOOD PRESSURE: 65 MMHG

## 2024-07-06 DIAGNOSIS — Z98.890 OTHER SPECIFIED POSTPROCEDURAL STATES: Chronic | ICD-10-CM

## 2024-07-06 LAB
ALBUMIN SERPL ELPH-MCNC: 2.6 G/DL — LOW (ref 3.3–5)
ALP SERPL-CCNC: 162 U/L — HIGH (ref 40–120)
ALT FLD-CCNC: 28 U/L — SIGNIFICANT CHANGE UP (ref 10–45)
ANION GAP SERPL CALC-SCNC: 11 MMOL/L — SIGNIFICANT CHANGE UP (ref 5–17)
AST SERPL-CCNC: 36 U/L — SIGNIFICANT CHANGE UP (ref 10–40)
BASOPHILS # BLD AUTO: 0.05 K/UL — SIGNIFICANT CHANGE UP (ref 0–0.2)
BASOPHILS NFR BLD AUTO: 0.6 % — SIGNIFICANT CHANGE UP (ref 0–2)
BILIRUB SERPL-MCNC: 0.3 MG/DL — SIGNIFICANT CHANGE UP (ref 0.2–1.2)
BUN SERPL-MCNC: 33 MG/DL — HIGH (ref 7–23)
CALCIUM SERPL-MCNC: 8.5 MG/DL — SIGNIFICANT CHANGE UP (ref 8.4–10.5)
CHLORIDE SERPL-SCNC: 109 MMOL/L — HIGH (ref 96–108)
CO2 SERPL-SCNC: 19 MMOL/L — LOW (ref 22–31)
CREAT SERPL-MCNC: 1.13 MG/DL — SIGNIFICANT CHANGE UP (ref 0.5–1.3)
EGFR: 62 ML/MIN/1.73M2 — SIGNIFICANT CHANGE UP
EOSINOPHIL # BLD AUTO: 0.32 K/UL — SIGNIFICANT CHANGE UP (ref 0–0.5)
EOSINOPHIL NFR BLD AUTO: 4.2 % — SIGNIFICANT CHANGE UP (ref 0–6)
GLUCOSE SERPL-MCNC: 125 MG/DL — HIGH (ref 70–99)
HCT VFR BLD CALC: 28.2 % — LOW (ref 39–50)
HGB BLD-MCNC: 8.6 G/DL — LOW (ref 13–17)
IMM GRANULOCYTES NFR BLD AUTO: 1 % — HIGH (ref 0–0.9)
LYMPHOCYTES # BLD AUTO: 0.79 K/UL — LOW (ref 1–3.3)
LYMPHOCYTES # BLD AUTO: 10.2 % — LOW (ref 13–44)
MCHC RBC-ENTMCNC: 29.4 PG — SIGNIFICANT CHANGE UP (ref 27–34)
MCHC RBC-ENTMCNC: 30.5 GM/DL — LOW (ref 32–36)
MCV RBC AUTO: 96.2 FL — SIGNIFICANT CHANGE UP (ref 80–100)
MONOCYTES # BLD AUTO: 0.59 K/UL — SIGNIFICANT CHANGE UP (ref 0–0.9)
MONOCYTES NFR BLD AUTO: 7.7 % — SIGNIFICANT CHANGE UP (ref 2–14)
NEUTROPHILS # BLD AUTO: 5.88 K/UL — SIGNIFICANT CHANGE UP (ref 1.8–7.4)
NEUTROPHILS NFR BLD AUTO: 76.3 % — SIGNIFICANT CHANGE UP (ref 43–77)
NRBC # BLD: 0 /100 WBCS — SIGNIFICANT CHANGE UP (ref 0–0)
PLATELET # BLD AUTO: 506 K/UL — HIGH (ref 150–400)
POTASSIUM SERPL-MCNC: 5.2 MMOL/L — SIGNIFICANT CHANGE UP (ref 3.5–5.3)
POTASSIUM SERPL-SCNC: 5.2 MMOL/L — SIGNIFICANT CHANGE UP (ref 3.5–5.3)
PROT SERPL-MCNC: 7.2 G/DL — SIGNIFICANT CHANGE UP (ref 6–8.3)
RBC # BLD: 2.93 M/UL — LOW (ref 4.2–5.8)
RBC # FLD: 19 % — HIGH (ref 10.3–14.5)
SODIUM SERPL-SCNC: 139 MMOL/L — SIGNIFICANT CHANGE UP (ref 135–145)
WBC # BLD: 7.71 K/UL — SIGNIFICANT CHANGE UP (ref 3.8–10.5)
WBC # FLD AUTO: 7.71 K/UL — SIGNIFICANT CHANGE UP (ref 3.8–10.5)

## 2024-07-06 PROCEDURE — 70450 CT HEAD/BRAIN W/O DYE: CPT | Mod: 26,MC

## 2024-07-06 PROCEDURE — 90715 TDAP VACCINE 7 YRS/> IM: CPT

## 2024-07-06 PROCEDURE — 12011 RPR F/E/E/N/L/M 2.5 CM/<: CPT

## 2024-07-06 PROCEDURE — 72125 CT NECK SPINE W/O DYE: CPT | Mod: MC

## 2024-07-06 PROCEDURE — 72125 CT NECK SPINE W/O DYE: CPT | Mod: 26,MC

## 2024-07-06 PROCEDURE — 70450 CT HEAD/BRAIN W/O DYE: CPT | Mod: MC

## 2024-07-06 PROCEDURE — 80053 COMPREHEN METABOLIC PANEL: CPT

## 2024-07-06 PROCEDURE — 73030 X-RAY EXAM OF SHOULDER: CPT

## 2024-07-06 PROCEDURE — 71045 X-RAY EXAM CHEST 1 VIEW: CPT

## 2024-07-06 PROCEDURE — 36415 COLL VENOUS BLD VENIPUNCTURE: CPT

## 2024-07-06 PROCEDURE — 12011 RPR F/E/E/N/L/M 2.5 CM/<: CPT | Mod: GC

## 2024-07-06 PROCEDURE — 72170 X-RAY EXAM OF PELVIS: CPT

## 2024-07-06 PROCEDURE — 72170 X-RAY EXAM OF PELVIS: CPT | Mod: 26

## 2024-07-06 PROCEDURE — 90471 IMMUNIZATION ADMIN: CPT

## 2024-07-06 PROCEDURE — 99285 EMERGENCY DEPT VISIT HI MDM: CPT | Mod: 25

## 2024-07-06 PROCEDURE — 99285 EMERGENCY DEPT VISIT HI MDM: CPT | Mod: 25,GC

## 2024-07-06 PROCEDURE — 73030 X-RAY EXAM OF SHOULDER: CPT | Mod: 26,LT

## 2024-07-06 PROCEDURE — 71045 X-RAY EXAM CHEST 1 VIEW: CPT | Mod: 26

## 2024-07-06 PROCEDURE — 85025 COMPLETE CBC W/AUTO DIFF WBC: CPT

## 2024-07-06 RX ORDER — LIDOCAINE HYDROCHLORIDE,EPINEPHRINE BITARTRATE 15; .005 MG/ML; MG/ML
10 INJECTION, SOLUTION EPIDURAL; INFILTRATION; INTRACAUDAL; PERINEURAL ONCE
Refills: 0 | Status: DISCONTINUED | OUTPATIENT
Start: 2024-07-06 | End: 2024-07-10

## 2024-07-06 RX ORDER — LIDOCAINE HYDROCHLORIDE 20 MG/ML
10 INJECTION, SOLUTION EPIDURAL; INFILTRATION; INTRACAUDAL; PERINEURAL ONCE
Refills: 0 | Status: DISCONTINUED | OUTPATIENT
Start: 2024-07-06 | End: 2024-07-10

## 2024-07-06 RX ORDER — TETANUS TOXOID, REDUCED DIPHTHERIA TOXOID AND ACELLULAR PERTUSSIS VACCINE, ADSORBED 5; 2.5; 8; 8; 2.5 [IU]/.5ML; [IU]/.5ML; UG/.5ML; UG/.5ML; UG/.5ML
0.5 SUSPENSION INTRAMUSCULAR ONCE
Refills: 0 | Status: COMPLETED | OUTPATIENT
Start: 2024-07-06 | End: 2024-07-06

## 2024-07-06 RX ADMIN — TETANUS TOXOID, REDUCED DIPHTHERIA TOXOID AND ACELLULAR PERTUSSIS VACCINE, ADSORBED 0.5 MILLILITER(S): 5; 2.5; 8; 8; 2.5 SUSPENSION INTRAMUSCULAR at 20:48

## 2024-07-07 VITALS
HEART RATE: 82 BPM | SYSTOLIC BLOOD PRESSURE: 112 MMHG | TEMPERATURE: 98 F | OXYGEN SATURATION: 99 % | DIASTOLIC BLOOD PRESSURE: 66 MMHG | RESPIRATION RATE: 16 BRPM

## 2024-07-09 ENCOUNTER — EMERGENCY (EMERGENCY)
Facility: HOSPITAL | Age: 89
LOS: 1 days | Discharge: HOME | End: 2024-07-09
Attending: EMERGENCY MEDICINE
Payer: MEDICARE

## 2024-07-09 VITALS
RESPIRATION RATE: 19 BRPM | SYSTOLIC BLOOD PRESSURE: 107 MMHG | TEMPERATURE: 98 F | DIASTOLIC BLOOD PRESSURE: 65 MMHG | HEART RATE: 99 BPM | OXYGEN SATURATION: 97 %

## 2024-07-09 VITALS
OXYGEN SATURATION: 100 % | HEART RATE: 105 BPM | DIASTOLIC BLOOD PRESSURE: 61 MMHG | HEIGHT: 67 IN | SYSTOLIC BLOOD PRESSURE: 103 MMHG

## 2024-07-09 DIAGNOSIS — Z98.890 OTHER SPECIFIED POSTPROCEDURAL STATES: Chronic | ICD-10-CM

## 2024-07-09 LAB
ALBUMIN SERPL ELPH-MCNC: 3.1 G/DL — LOW (ref 3.3–5)
ALP SERPL-CCNC: 164 U/L — HIGH (ref 40–120)
ALT FLD-CCNC: 26 U/L — SIGNIFICANT CHANGE UP (ref 10–45)
ANION GAP SERPL CALC-SCNC: 12 MMOL/L — SIGNIFICANT CHANGE UP (ref 5–17)
ANISOCYTOSIS BLD QL: SLIGHT — SIGNIFICANT CHANGE UP
AST SERPL-CCNC: 36 U/L — SIGNIFICANT CHANGE UP (ref 10–40)
BASE EXCESS BLDV CALC-SCNC: -1.4 MMOL/L — SIGNIFICANT CHANGE UP (ref -2–3)
BASOPHILS # BLD AUTO: 0 K/UL — SIGNIFICANT CHANGE UP (ref 0–0.2)
BASOPHILS NFR BLD AUTO: 0 % — SIGNIFICANT CHANGE UP (ref 0–2)
BILIRUB SERPL-MCNC: 0.4 MG/DL — SIGNIFICANT CHANGE UP (ref 0.2–1.2)
BUN SERPL-MCNC: 27 MG/DL — HIGH (ref 7–23)
BURR CELLS BLD QL SMEAR: PRESENT — SIGNIFICANT CHANGE UP
CA-I SERPL-SCNC: 1.15 MMOL/L — SIGNIFICANT CHANGE UP (ref 1.15–1.33)
CALCIUM SERPL-MCNC: 8.4 MG/DL — SIGNIFICANT CHANGE UP (ref 8.4–10.5)
CHLORIDE BLDV-SCNC: 106 MMOL/L — SIGNIFICANT CHANGE UP (ref 96–108)
CHLORIDE SERPL-SCNC: 104 MMOL/L — SIGNIFICANT CHANGE UP (ref 96–108)
CO2 BLDV-SCNC: 28 MMOL/L — HIGH (ref 22–26)
CO2 SERPL-SCNC: 22 MMOL/L — SIGNIFICANT CHANGE UP (ref 22–31)
CREAT SERPL-MCNC: 1.04 MG/DL — SIGNIFICANT CHANGE UP (ref 0.5–1.3)
DACRYOCYTES BLD QL SMEAR: SLIGHT — SIGNIFICANT CHANGE UP
EGFR: 69 ML/MIN/1.73M2 — SIGNIFICANT CHANGE UP
ELLIPTOCYTES BLD QL SMEAR: SLIGHT — SIGNIFICANT CHANGE UP
EOSINOPHIL # BLD AUTO: 0 K/UL — SIGNIFICANT CHANGE UP (ref 0–0.5)
EOSINOPHIL NFR BLD AUTO: 0 % — SIGNIFICANT CHANGE UP (ref 0–6)
FLUAV AG NPH QL: SIGNIFICANT CHANGE UP
FLUBV AG NPH QL: SIGNIFICANT CHANGE UP
GAS PNL BLDV: 136 MMOL/L — SIGNIFICANT CHANGE UP (ref 136–145)
GAS PNL BLDV: SIGNIFICANT CHANGE UP
GLUCOSE BLDV-MCNC: 104 MG/DL — HIGH (ref 70–99)
GLUCOSE SERPL-MCNC: 110 MG/DL — HIGH (ref 70–99)
HCO3 BLDV-SCNC: 26 MMOL/L — SIGNIFICANT CHANGE UP (ref 22–29)
HCT VFR BLD CALC: 31.7 % — LOW (ref 39–50)
HCT VFR BLDA CALC: 29 % — LOW (ref 39–51)
HGB BLD CALC-MCNC: 9.6 G/DL — LOW (ref 12.6–17.4)
HGB BLD-MCNC: 9.3 G/DL — LOW (ref 13–17)
LACTATE BLDV-MCNC: 2.3 MMOL/L — HIGH (ref 0.5–2)
LYMPHOCYTES # BLD AUTO: 0.36 K/UL — LOW (ref 1–3.3)
LYMPHOCYTES # BLD AUTO: 3.5 % — LOW (ref 13–44)
MACROCYTES BLD QL: SLIGHT — SIGNIFICANT CHANGE UP
MANUAL SMEAR VERIFICATION: SIGNIFICANT CHANGE UP
MCHC RBC-ENTMCNC: 29 PG — SIGNIFICANT CHANGE UP (ref 27–34)
MCHC RBC-ENTMCNC: 29.3 GM/DL — LOW (ref 32–36)
MCV RBC AUTO: 98.8 FL — SIGNIFICANT CHANGE UP (ref 80–100)
MONOCYTES # BLD AUTO: 0.18 K/UL — SIGNIFICANT CHANGE UP (ref 0–0.9)
MONOCYTES NFR BLD AUTO: 1.7 % — LOW (ref 2–14)
NEUTROPHILS # BLD AUTO: 9.82 K/UL — HIGH (ref 1.8–7.4)
NEUTROPHILS NFR BLD AUTO: 93.9 % — HIGH (ref 43–77)
NEUTS BAND # BLD: 0.9 % — SIGNIFICANT CHANGE UP (ref 0–8)
NT-PROBNP SERPL-SCNC: 2448 PG/ML — HIGH (ref 0–300)
OVALOCYTES BLD QL SMEAR: SLIGHT — SIGNIFICANT CHANGE UP
PCO2 BLDV: 55 MMHG — SIGNIFICANT CHANGE UP (ref 42–55)
PH BLDV: 7.28 — LOW (ref 7.32–7.43)
PLAT MORPH BLD: ABNORMAL
PLATELET # BLD AUTO: 474 K/UL — HIGH (ref 150–400)
PO2 BLDV: 22 MMHG — LOW (ref 25–45)
POIKILOCYTOSIS BLD QL AUTO: SLIGHT — SIGNIFICANT CHANGE UP
POLYCHROMASIA BLD QL SMEAR: SLIGHT — SIGNIFICANT CHANGE UP
POTASSIUM BLDV-SCNC: 5.6 MMOL/L — HIGH (ref 3.5–5.1)
POTASSIUM SERPL-MCNC: 5.4 MMOL/L — HIGH (ref 3.5–5.3)
POTASSIUM SERPL-SCNC: 5.4 MMOL/L — HIGH (ref 3.5–5.3)
PROT SERPL-MCNC: 7.9 G/DL — SIGNIFICANT CHANGE UP (ref 6–8.3)
RBC # BLD: 3.21 M/UL — LOW (ref 4.2–5.8)
RBC # FLD: 19.5 % — HIGH (ref 10.3–14.5)
RBC BLD AUTO: ABNORMAL
RSV RNA NPH QL NAA+NON-PROBE: SIGNIFICANT CHANGE UP
SAO2 % BLDV: 25.9 % — LOW (ref 67–88)
SARS-COV-2 RNA SPEC QL NAA+PROBE: SIGNIFICANT CHANGE UP
SODIUM SERPL-SCNC: 138 MMOL/L — SIGNIFICANT CHANGE UP (ref 135–145)
TROPONIN T, HIGH SENSITIVITY RESULT: 61 NG/L — HIGH (ref 0–51)
TROPONIN T, HIGH SENSITIVITY RESULT: 62 NG/L — HIGH (ref 0–51)
WBC # BLD: 10.36 K/UL — SIGNIFICANT CHANGE UP (ref 3.8–10.5)
WBC # FLD AUTO: 10.36 K/UL — SIGNIFICANT CHANGE UP (ref 3.8–10.5)

## 2024-07-09 PROCEDURE — 82947 ASSAY GLUCOSE BLOOD QUANT: CPT

## 2024-07-09 PROCEDURE — 71275 CT ANGIOGRAPHY CHEST: CPT | Mod: MC

## 2024-07-09 PROCEDURE — 74177 CT ABD & PELVIS W/CONTRAST: CPT | Mod: 26,MC

## 2024-07-09 PROCEDURE — 99285 EMERGENCY DEPT VISIT HI MDM: CPT | Mod: 25

## 2024-07-09 PROCEDURE — 74177 CT ABD & PELVIS W/CONTRAST: CPT | Mod: MC

## 2024-07-09 PROCEDURE — 84132 ASSAY OF SERUM POTASSIUM: CPT

## 2024-07-09 PROCEDURE — 84295 ASSAY OF SERUM SODIUM: CPT

## 2024-07-09 PROCEDURE — 85025 COMPLETE CBC W/AUTO DIFF WBC: CPT

## 2024-07-09 PROCEDURE — 83880 ASSAY OF NATRIURETIC PEPTIDE: CPT

## 2024-07-09 PROCEDURE — 96375 TX/PRO/DX INJ NEW DRUG ADDON: CPT | Mod: XU

## 2024-07-09 PROCEDURE — 93010 ELECTROCARDIOGRAM REPORT: CPT

## 2024-07-09 PROCEDURE — 82803 BLOOD GASES ANY COMBINATION: CPT

## 2024-07-09 PROCEDURE — 71045 X-RAY EXAM CHEST 1 VIEW: CPT

## 2024-07-09 PROCEDURE — 93005 ELECTROCARDIOGRAM TRACING: CPT

## 2024-07-09 PROCEDURE — 71275 CT ANGIOGRAPHY CHEST: CPT | Mod: 26,MC

## 2024-07-09 PROCEDURE — 82330 ASSAY OF CALCIUM: CPT

## 2024-07-09 PROCEDURE — 85018 HEMOGLOBIN: CPT

## 2024-07-09 PROCEDURE — 99284 EMERGENCY DEPT VISIT MOD MDM: CPT | Mod: 25

## 2024-07-09 PROCEDURE — 87040 BLOOD CULTURE FOR BACTERIA: CPT

## 2024-07-09 PROCEDURE — 82435 ASSAY OF BLOOD CHLORIDE: CPT

## 2024-07-09 PROCEDURE — 71045 X-RAY EXAM CHEST 1 VIEW: CPT | Mod: 26

## 2024-07-09 PROCEDURE — 96374 THER/PROPH/DIAG INJ IV PUSH: CPT | Mod: XU

## 2024-07-09 PROCEDURE — 85014 HEMATOCRIT: CPT

## 2024-07-09 PROCEDURE — 83605 ASSAY OF LACTIC ACID: CPT

## 2024-07-09 PROCEDURE — 99285 EMERGENCY DEPT VISIT HI MDM: CPT | Mod: GC

## 2024-07-09 PROCEDURE — 87637 SARSCOV2&INF A&B&RSV AMP PRB: CPT

## 2024-07-09 PROCEDURE — 80053 COMPREHEN METABOLIC PANEL: CPT

## 2024-07-09 PROCEDURE — 84484 ASSAY OF TROPONIN QUANT: CPT

## 2024-07-09 RX ORDER — ACETAMINOPHEN 325 MG
1000 TABLET ORAL ONCE
Refills: 0 | Status: COMPLETED | OUTPATIENT
Start: 2024-07-09 | End: 2024-07-09

## 2024-07-09 RX ORDER — AZITHROMYCIN 250 MG/1
500 TABLET, FILM COATED ORAL ONCE
Refills: 0 | Status: COMPLETED | OUTPATIENT
Start: 2024-07-09 | End: 2024-07-09

## 2024-07-09 RX ORDER — ACETAMINOPHEN 325 MG
1000 TABLET ORAL ONCE
Refills: 0 | Status: ACTIVE | OUTPATIENT
Start: 2024-07-09

## 2024-07-09 RX ORDER — CEFTRIAXONE SODIUM 500 MG
1000 VIAL (EA) INJECTION ONCE
Refills: 0 | Status: COMPLETED | OUTPATIENT
Start: 2024-07-09 | End: 2024-07-09

## 2024-07-09 RX ORDER — ACETAMINOPHEN 325 MG
TABLET ORAL
Refills: 0 | Status: ACTIVE | OUTPATIENT
Start: 2024-07-09

## 2024-07-09 RX ADMIN — AZITHROMYCIN 255 MILLIGRAM(S): 250 TABLET, FILM COATED ORAL at 18:34

## 2024-07-09 RX ADMIN — Medication 100 MILLIGRAM(S): at 18:33

## 2024-07-09 RX ADMIN — Medication 1000 MILLIGRAM(S): at 17:56

## 2024-07-10 LAB
CULTURE RESULTS: SIGNIFICANT CHANGE UP
SPECIMEN SOURCE: SIGNIFICANT CHANGE UP

## 2024-07-27 LAB
CULTURE RESULTS: SIGNIFICANT CHANGE UP
SPECIMEN SOURCE: SIGNIFICANT CHANGE UP

## 2024-08-14 ENCOUNTER — INPATIENT (INPATIENT)
Facility: HOSPITAL | Age: 89
LOS: 10 days | Discharge: HOME | DRG: 871 | End: 2024-08-25
Attending: HOSPITALIST | Admitting: HOSPITALIST
Payer: MEDICARE

## 2024-08-14 VITALS
DIASTOLIC BLOOD PRESSURE: 79 MMHG | HEART RATE: 82 BPM | RESPIRATION RATE: 20 BRPM | HEIGHT: 67 IN | SYSTOLIC BLOOD PRESSURE: 152 MMHG | OXYGEN SATURATION: 92 % | TEMPERATURE: 98 F | WEIGHT: 160.06 LBS

## 2024-08-14 DIAGNOSIS — Z98.890 OTHER SPECIFIED POSTPROCEDURAL STATES: Chronic | ICD-10-CM

## 2024-08-14 DIAGNOSIS — A41.9 SEPSIS, UNSPECIFIED ORGANISM: ICD-10-CM

## 2024-08-14 LAB
ADD ON TEST-SPECIMEN IN LAB: SIGNIFICANT CHANGE UP
ADD ON TEST-SPECIMEN IN LAB: SIGNIFICANT CHANGE UP
ALBUMIN SERPL ELPH-MCNC: 3.5 G/DL — SIGNIFICANT CHANGE UP (ref 3.3–5)
ALP SERPL-CCNC: 172 U/L — HIGH (ref 40–120)
ALT FLD-CCNC: 239 U/L — HIGH (ref 10–45)
ANION GAP SERPL CALC-SCNC: 13 MMOL/L — SIGNIFICANT CHANGE UP (ref 5–17)
ANISOCYTOSIS BLD QL: SIGNIFICANT CHANGE UP
APPEARANCE UR: CLEAR — SIGNIFICANT CHANGE UP
APTT BLD: 27.3 SEC — SIGNIFICANT CHANGE UP (ref 24.5–35.6)
AST SERPL-CCNC: 174 U/L — HIGH (ref 10–40)
BACTERIA # UR AUTO: NEGATIVE /HPF — SIGNIFICANT CHANGE UP
BASE EXCESS BLDA CALC-SCNC: 8.3 MMOL/L — HIGH (ref -2–3)
BASE EXCESS BLDV CALC-SCNC: 6.6 MMOL/L — HIGH (ref -2–3)
BASOPHILS # BLD AUTO: 0 K/UL — SIGNIFICANT CHANGE UP (ref 0–0.2)
BASOPHILS NFR BLD AUTO: 0 % — SIGNIFICANT CHANGE UP (ref 0–2)
BILIRUB SERPL-MCNC: 0.4 MG/DL — SIGNIFICANT CHANGE UP (ref 0.2–1.2)
BILIRUB UR-MCNC: NEGATIVE — SIGNIFICANT CHANGE UP
BUN SERPL-MCNC: 46 MG/DL — HIGH (ref 7–23)
CA-I SERPL-SCNC: 1.22 MMOL/L — SIGNIFICANT CHANGE UP (ref 1.15–1.33)
CALCIUM SERPL-MCNC: 8.9 MG/DL — SIGNIFICANT CHANGE UP (ref 8.4–10.5)
CAST: 22 /LPF — HIGH (ref 0–4)
CHLORIDE BLDV-SCNC: 106 MMOL/L — SIGNIFICANT CHANGE UP (ref 96–108)
CHLORIDE SERPL-SCNC: 103 MMOL/L — SIGNIFICANT CHANGE UP (ref 96–108)
CO2 BLDA-SCNC: 39 MMOL/L — HIGH (ref 19–24)
CO2 BLDV-SCNC: 39 MMOL/L — HIGH (ref 22–26)
CO2 SERPL-SCNC: 30 MMOL/L — SIGNIFICANT CHANGE UP (ref 22–31)
COLOR SPEC: YELLOW — SIGNIFICANT CHANGE UP
CREAT SERPL-MCNC: 1.18 MG/DL — SIGNIFICANT CHANGE UP (ref 0.5–1.3)
DIFF PNL FLD: NEGATIVE — SIGNIFICANT CHANGE UP
EGFR: 59 ML/MIN/1.73M2 — LOW
ELLIPTOCYTES BLD QL SMEAR: SLIGHT — SIGNIFICANT CHANGE UP
EOSINOPHIL # BLD AUTO: 0 K/UL — SIGNIFICANT CHANGE UP (ref 0–0.5)
EOSINOPHIL NFR BLD AUTO: 0 % — SIGNIFICANT CHANGE UP (ref 0–6)
FLUAV AG NPH QL: SIGNIFICANT CHANGE UP
FLUBV AG NPH QL: SIGNIFICANT CHANGE UP
GAS PNL BLDA: SIGNIFICANT CHANGE UP
GAS PNL BLDV: 142 MMOL/L — SIGNIFICANT CHANGE UP (ref 136–145)
GAS PNL BLDV: SIGNIFICANT CHANGE UP
GIANT PLATELETS BLD QL SMEAR: PRESENT — SIGNIFICANT CHANGE UP
GLUCOSE BLDV-MCNC: 127 MG/DL — HIGH (ref 70–99)
GLUCOSE SERPL-MCNC: 128 MG/DL — HIGH (ref 70–99)
GLUCOSE UR QL: NEGATIVE MG/DL — SIGNIFICANT CHANGE UP
HCO3 BLDA-SCNC: 37 MMOL/L — HIGH (ref 21–28)
HCO3 BLDV-SCNC: 36 MMOL/L — HIGH (ref 22–29)
HCT VFR BLD CALC: 34.1 % — LOW (ref 39–50)
HCT VFR BLDA CALC: 30 % — LOW (ref 39–51)
HGB BLD CALC-MCNC: 10.1 G/DL — LOW (ref 12.6–17.4)
HGB BLD-MCNC: 9.6 G/DL — LOW (ref 13–17)
HOROWITZ INDEX BLDA+IHG-RTO: 50 — SIGNIFICANT CHANGE UP
HYALINE CASTS # UR AUTO: PRESENT
INR BLD: 1.21 RATIO — HIGH (ref 0.85–1.18)
KETONES UR-MCNC: NEGATIVE MG/DL — SIGNIFICANT CHANGE UP
LACTATE BLDV-MCNC: 1.9 MMOL/L — SIGNIFICANT CHANGE UP (ref 0.5–2)
LEUKOCYTE ESTERASE UR-ACNC: NEGATIVE — SIGNIFICANT CHANGE UP
LYMPHOCYTES # BLD AUTO: 0.48 K/UL — LOW (ref 1–3.3)
LYMPHOCYTES # BLD AUTO: 4.4 % — LOW (ref 13–44)
MACROCYTES BLD QL: SIGNIFICANT CHANGE UP
MANUAL SMEAR VERIFICATION: SIGNIFICANT CHANGE UP
MCHC RBC-ENTMCNC: 28.2 GM/DL — LOW (ref 32–36)
MCHC RBC-ENTMCNC: 28.6 PG — SIGNIFICANT CHANGE UP (ref 27–34)
MCV RBC AUTO: 101.5 FL — HIGH (ref 80–100)
MONOCYTES # BLD AUTO: 0.19 K/UL — SIGNIFICANT CHANGE UP (ref 0–0.9)
MONOCYTES NFR BLD AUTO: 1.7 % — LOW (ref 2–14)
NEUTROPHILS # BLD AUTO: 10.31 K/UL — HIGH (ref 1.8–7.4)
NEUTROPHILS NFR BLD AUTO: 93.9 % — HIGH (ref 43–77)
NITRITE UR-MCNC: NEGATIVE — SIGNIFICANT CHANGE UP
PCO2 BLDA: 70 MMHG — CRITICAL HIGH (ref 35–48)
PCO2 BLDV: 84 MMHG — CRITICAL HIGH (ref 42–55)
PH BLDA: 7.33 — LOW (ref 7.35–7.45)
PH BLDV: 7.24 — LOW (ref 7.32–7.43)
PH UR: 5 — SIGNIFICANT CHANGE UP (ref 5–8)
PLAT MORPH BLD: NORMAL — SIGNIFICANT CHANGE UP
PLATELET # BLD AUTO: 285 K/UL — SIGNIFICANT CHANGE UP (ref 150–400)
PO2 BLDA: 112 MMHG — HIGH (ref 83–108)
PO2 BLDV: 28 MMHG — SIGNIFICANT CHANGE UP (ref 25–45)
POIKILOCYTOSIS BLD QL AUTO: SLIGHT — SIGNIFICANT CHANGE UP
POLYCHROMASIA BLD QL SMEAR: SLIGHT — SIGNIFICANT CHANGE UP
POTASSIUM BLDV-SCNC: 4.4 MMOL/L — SIGNIFICANT CHANGE UP (ref 3.5–5.1)
POTASSIUM SERPL-MCNC: 4.3 MMOL/L — SIGNIFICANT CHANGE UP (ref 3.5–5.3)
POTASSIUM SERPL-SCNC: 4.3 MMOL/L — SIGNIFICANT CHANGE UP (ref 3.5–5.3)
PROCALCITONIN SERPL-MCNC: 0.12 NG/ML — HIGH (ref 0.02–0.1)
PROT SERPL-MCNC: 7.3 G/DL — SIGNIFICANT CHANGE UP (ref 6–8.3)
PROT UR-MCNC: 100 MG/DL
PROTHROM AB SERPL-ACNC: 12.6 SEC — SIGNIFICANT CHANGE UP (ref 9.5–13)
RBC # BLD: 3.36 M/UL — LOW (ref 4.2–5.8)
RBC # FLD: 19.5 % — HIGH (ref 10.3–14.5)
RBC BLD AUTO: ABNORMAL
RBC CASTS # UR COMP ASSIST: 0 /HPF — SIGNIFICANT CHANGE UP (ref 0–4)
REVIEW: SIGNIFICANT CHANGE UP
RSV RNA NPH QL NAA+NON-PROBE: SIGNIFICANT CHANGE UP
SAO2 % BLDA: 99 % — HIGH (ref 94–98)
SAO2 % BLDV: 30.5 % — LOW (ref 67–88)
SARS-COV-2 RNA SPEC QL NAA+PROBE: SIGNIFICANT CHANGE UP
SODIUM SERPL-SCNC: 146 MMOL/L — HIGH (ref 135–145)
SP GR SPEC: 1.02 — SIGNIFICANT CHANGE UP (ref 1–1.03)
SQUAMOUS # UR AUTO: 1 /HPF — SIGNIFICANT CHANGE UP (ref 0–5)
TARGETS BLD QL SMEAR: SLIGHT — SIGNIFICANT CHANGE UP
TROPONIN T, HIGH SENSITIVITY RESULT: 70 NG/L — HIGH (ref 0–51)
UROBILINOGEN FLD QL: 1 MG/DL — SIGNIFICANT CHANGE UP (ref 0.2–1)
WBC # BLD: 10.98 K/UL — HIGH (ref 3.8–10.5)
WBC # FLD AUTO: 10.98 K/UL — HIGH (ref 3.8–10.5)
WBC UR QL: 2 /HPF — SIGNIFICANT CHANGE UP (ref 0–5)

## 2024-08-14 PROCEDURE — 70498 CT ANGIOGRAPHY NECK: CPT | Mod: 26,MC

## 2024-08-14 PROCEDURE — 93308 TTE F-UP OR LMTD: CPT | Mod: 26

## 2024-08-14 PROCEDURE — 99285 EMERGENCY DEPT VISIT HI MDM: CPT | Mod: FS

## 2024-08-14 PROCEDURE — 99291 CRITICAL CARE FIRST HOUR: CPT

## 2024-08-14 PROCEDURE — 74177 CT ABD & PELVIS W/CONTRAST: CPT | Mod: 26,MC

## 2024-08-14 PROCEDURE — 71045 X-RAY EXAM CHEST 1 VIEW: CPT | Mod: 26

## 2024-08-14 PROCEDURE — 70496 CT ANGIOGRAPHY HEAD: CPT | Mod: 26,MC

## 2024-08-14 PROCEDURE — 71275 CT ANGIOGRAPHY CHEST: CPT | Mod: 26,MC

## 2024-08-14 RX ORDER — VANCOMYCIN/0.9 % SOD CHLORIDE 1.75G/25
1000 PLASTIC BAG, INJECTION (ML) INTRAVENOUS ONCE
Refills: 0 | Status: COMPLETED | OUTPATIENT
Start: 2024-08-14 | End: 2024-08-14

## 2024-08-14 RX ORDER — SODIUM BICARBONATE 84 MG/ML
650 INJECTION, SOLUTION INTRAVENOUS THREE TIMES A DAY
Refills: 0 | Status: DISCONTINUED | OUTPATIENT
Start: 2024-08-14 | End: 2024-08-16

## 2024-08-14 RX ORDER — FOLIC ACID 1 MG
1 TABLET ORAL DAILY
Refills: 0 | Status: DISCONTINUED | OUTPATIENT
Start: 2024-08-14 | End: 2024-08-25

## 2024-08-14 RX ORDER — FUROSEMIDE 40 MG
80 TABLET ORAL ONCE
Refills: 0 | Status: COMPLETED | OUTPATIENT
Start: 2024-08-14 | End: 2024-08-14

## 2024-08-14 RX ORDER — ENOXAPARIN SODIUM 100 MG/ML
40 INJECTION SUBCUTANEOUS EVERY 24 HOURS
Refills: 0 | Status: DISCONTINUED | OUTPATIENT
Start: 2024-08-14 | End: 2024-08-16

## 2024-08-14 RX ORDER — METOPROLOL TARTRATE 100 MG/1
100 TABLET ORAL DAILY
Refills: 0 | Status: DISCONTINUED | OUTPATIENT
Start: 2024-08-14 | End: 2024-08-25

## 2024-08-14 RX ORDER — PANTOPRAZOLE SODIUM 40 MG
40 TABLET, DELAYED RELEASE (ENTERIC COATED) ORAL
Refills: 0 | Status: DISCONTINUED | OUTPATIENT
Start: 2024-08-14 | End: 2024-08-25

## 2024-08-14 RX ORDER — ASPIRIN 81 MG
81 TABLET, DELAYED RELEASE (ENTERIC COATED) ORAL DAILY
Refills: 0 | Status: DISCONTINUED | OUTPATIENT
Start: 2024-08-14 | End: 2024-08-25

## 2024-08-14 RX ORDER — IPRATROPIUM BROMIDE AND ALBUTEROL SULFATE .5; 3 MG/3ML; MG/3ML
3 SOLUTION RESPIRATORY (INHALATION) EVERY 6 HOURS
Refills: 0 | Status: DISCONTINUED | OUTPATIENT
Start: 2024-08-14 | End: 2024-08-25

## 2024-08-14 RX ORDER — OLANZAPINE 7.5 MG/1
10 TABLET ORAL AT BEDTIME
Refills: 0 | Status: DISCONTINUED | OUTPATIENT
Start: 2024-08-14 | End: 2024-08-17

## 2024-08-14 RX ORDER — VANCOMYCIN/0.9 % SOD CHLORIDE 1.75G/25
750 PLASTIC BAG, INJECTION (ML) INTRAVENOUS EVERY 24 HOURS
Refills: 0 | Status: DISCONTINUED | OUTPATIENT
Start: 2024-08-15 | End: 2024-08-16

## 2024-08-14 RX ADMIN — Medication 100 MILLIGRAM(S): at 15:24

## 2024-08-14 RX ADMIN — IPRATROPIUM BROMIDE AND ALBUTEROL SULFATE 3 MILLILITER(S): .5; 3 SOLUTION RESPIRATORY (INHALATION) at 19:44

## 2024-08-14 RX ADMIN — Medication 250 MILLIGRAM(S): at 18:43

## 2024-08-14 RX ADMIN — Medication 80 MILLIGRAM(S): at 11:41

## 2024-08-14 RX ADMIN — Medication 1000 MILLIGRAM(S): at 16:00

## 2024-08-14 RX ADMIN — Medication 1000 MILLIGRAM(S): at 19:45

## 2024-08-14 NOTE — ED ADULT NURSE NOTE - OBJECTIVE STATEMENT
89y/o Male presents to ED from [facility] via EMS with c/o AMS. PMH of HTN, HLD, COPD on 2 L O2 baseline, CAROL, CHF. Pt was recently admitted to hospital with perforated ulcer and DC to nursing facility. Per EMS pt has had altered mental status since 4pm yesterday. Pt is normally A&Ox2 and follows commands. Upon arrival pt is not speaking, Airway patent with spontaneous breathing. Pt straight catheterized using sterile technique with 2 RNs present. Sterile urine specimen collected. urine drained. Urine sent to lab. Pt changed and readjusted in bed. Pt placed on continuous cardiac monitor. IV inserted labs drawn and sent. Safety maintained bed is in the lowest position, locked.

## 2024-08-14 NOTE — CONSULT NOTE ADULT - ATTENDING COMMENTS
89-year-old male with paroxysmal A-fib not on anticoagulation, prostate cancer status post radiation seed implants, asthma, CAROL but noncompliant, GERD, recent admission in June for pneumoperitoneum found to be due to perforated gastric ulcer in the setting of a large hiatal hernia underwent surgery with reduction of the stomach from the hiatal hernia and Sandeep patch repair of the gastric ulcer perforation.  Hospital course was complicated at that time with acute hypoxic respiratory failure secondary to mucous plugging requiring aggressive airway clearance.  Patient was then transferred to subacute rehab.  Patient is now presenting from send subacute rehab with acute altered mental status.  Patient found to have acute hypercapnic respiratory failure secondary to pneumonia versus bilateral pleural effusions from CHF exacerbation.  Currently on noninvasive ventilation for respiratory support.    On evaluation patient is awake and responding to verbal and physical stimuli.  Able to follow simple commands, but is unable to verbalize wants or needs at this time.  Currently on noninvasive ventilation breathing comfortably with a respiratory rate between 18-20 and pulling and appropriate tidal volumes between 450 to 550 mL.    Plan  – At this time patient's acute hypercapnic respiratory failure could be due to CHF exacerbation versus pneumonia  – Would continue with aggressive IV diuresis with Lasix 40 mg IV twice daily  – Continue with community-acquired pneumonia coverage with ceftriaxone azithromycin  – Check for blood culture, sputum culture, MRSA swab, urine Legionella, strep antigen urine  – Continue with updated AVAPS setting of tidal volume of 500 minimum pressure of 10 maximum pressure of 25 PEEP of 5 respiratory rate of 16 and FiO2 50%  – Maintain n.p.o. status while on noninvasive ventilation  – Repeat blood gas in 1 to 2 hours to assess improvement in carbon dioxide  – Start aggressive airway clearance therapy  – Start DuoNebs every 6 hours standing with hypertonic saline 3% nebulized  – Start chest PT every 8 hours  – When patient is more awake start airway clearance device, Acapella to help with secretion mobilization  – Pulmonary team to evaluate patient in the a.m.    Patient does not require the medical ICU at this time.  Please call back with any questions. 89-year-old male with paroxysmal A-fib not on anticoagulation, prostate cancer status post radiation seed implants, asthma, CAROL but noncompliant, GERD, recent admission in June for pneumoperitoneum found to be due to perforated gastric ulcer in the setting of a large hiatal hernia underwent surgery with reduction of the stomach from the hiatal hernia and Sandeep patch repair of the gastric ulcer perforation.  Hospital course was complicated at that time with acute hypoxic respiratory failure secondary to mucous plugging requiring aggressive airway clearance.  Patient was then transferred to subacute rehab.  Patient is now presenting from send subacute rehab with acute altered mental status.  Patient found to have acute on chronic hypercapnic respiratory failure secondary to pneumonia versus bilateral pleural effusions from CHF exacerbation.  Currently on noninvasive ventilation for respiratory support.    On evaluation patient is awake and responding to verbal and physical stimuli.  Able to follow simple commands, but is unable to verbalize wants or needs at this time.  Currently on noninvasive ventilation breathing comfortably with a respiratory rate between 18-20 and pulling and appropriate tidal volumes between 450 to 550 mL.    Plan  – At this time patient's acute hypercapnic respiratory failure could be due to CHF exacerbation versus pneumonia  – Would continue with aggressive IV diuresis with Lasix 40 mg IV twice daily  – Continue with community-acquired pneumonia coverage with ceftriaxone azithromycin  – Check for blood culture, sputum culture, MRSA swab, urine Legionella, strep antigen urine  – Continue with updated AVAPS setting of tidal volume of 500 minimum pressure of 10 maximum pressure of 25 PEEP of 5 respiratory rate of 16 and FiO2 50%  – Maintain n.p.o. status while on noninvasive ventilation  – Repeat blood gas in 1 to 2 hours to assess improvement in carbon dioxide  – Start aggressive airway clearance therapy  – Start DuoNebs every 6 hours standing with hypertonic saline 3% nebulized  – Start chest PT every 8 hours  – When patient is more awake start airway clearance device, Acapella to help with secretion mobilization  – Pulmonary team to evaluate patient in the a.m.    Patient does not require the medical ICU at this time.  Please call back with any questions.

## 2024-08-14 NOTE — ED ADULT NURSE REASSESSMENT NOTE - NS ED NURSE REASSESS COMMENT FT1
CT called and asked if pt can be brought to CT. Told pt unable to go to CT at this time due to inadequate staffing and patient influx. MD Wood aware

## 2024-08-14 NOTE — H&P ADULT - HISTORY OF PRESENT ILLNESS
88 y/o male with PMHx of pAF (no longer on Eliquis as per chart review) prostate CA in remission s/p radiation seed implants (~2004), large hiatal hernia, iron deficiency anemia, asthma, sleep apnea (does not wear CPAP), GERD, osteoarthritis, carpal tunnel, recent admission discharge last month July 5 for abdominal pain found to have pneumoperitoneum, status post emergent diagnostic lap converted to open status post gastric repair for perforation with Sandeep patch on TPN which was weaned off and transition to p.o. diet, also with acute hypoxic respiratory failure due to mucous plugging status post bronc on 6/15 with suctioning of mucous plug, treated with abx for PNA, GEMA, lytic lesion of humerus evaluated by heme/onc and ortho, previously elevated LFTs i/s/o liver lesion, anemia with FOB negative presents to ED today BIBEMS for SOB/hypoxia and AMS sent in from facility. While in ED pt was seen by PCP from facility who referred pt to ED and reports pt currently is significantly altered from his baseline mental status. Pt baseline A&Ox2. On presentation to the ED, patient was noted to be hypercarbic and was also found to have moderate pleural effusions and was started on AVAPS.

## 2024-08-14 NOTE — ED PROVIDER NOTE - PHYSICAL EXAMINATION
GEN: Pt in NAD, somnolent and oriented x1. not contributed to hx, no answering questions, occasionally following commands.  EYES: Sclera white w/o injection.   ENT: Head NCAT. MMM. Neck supple FROM.  RESP: rales L base and throughout l lung field, SpO2 variable ranging from 70s - 90s%, pt with periods of apnea lasting several seconds.  CARDIAC: RRR, clear distinct S1, S2, no appreciable murmurs.  ABD: Abdomen soft, non-tender. No CVAT b/l.  VASC: 2+ radial and dorsalis pedis pulses b/l. chronic LE venous changes, distal extremities cool but with intact pulses, trace RLE edema.  SKIN: No rashes on the trunk.

## 2024-08-14 NOTE — ED ADULT NURSE REASSESSMENT NOTE - NS ED NURSE REASSESS COMMENT FT1
1350 ml of urine drained from fernandes bag. Fernandes secured and draining to gravity. Safety and comfort measures maintained.

## 2024-08-14 NOTE — H&P ADULT - ASSESSMENT
89 y/o male with PMHx of pAF (no longer on Eliquis as per chart review) prostate CA in remission s/p radiation seed implants (~2004), large hiatal hernia, iron deficiency anemia, asthma, sleep apnea (does not wear CPAP), GERD, osteoarthritis, carpal tunnel, recent admission discharge last month July 5 for abdominal pain found to have pneumoperitoneum, status post emergent diagnostic lap converted to open status post gastric repair for perforation with Sandeep patch on TPN which was weaned off and transition to p.o. diet, also with acute hypoxic respiratory failure due to mucous plugging status post bronc on 6/15 with suctioning of mucous plug, treated with abx for PNA, GEMA, lytic lesion of humerus evaluated by heme/onc and ortho, previously elevated LFTs i/s/o liver lesion, anemia with FOB negative presents to ED today BIBEMS for SOB/hypoxia and AMS sent in from facility. While in ED pt was seen by PCP from facility who referred pt to ED and reports pt currently is significantly altered from his baseline mental status. Pt baseline A&Ox2. On presentation to the ED, patient was noted to be hypercarbic and was also found to have moderate pleural effusions and was started on AVAPS.     Acute hypercapnic respiratory failure - due to CHF exacerbation, B/L pleural effusion, pulmonary edema, responded to AVAPS and IV Lasix. F/u with pulm / cardio.   Hx of recent hypoxic respiratory failure-  resolved, 2/2 mucus plugging as seen on CT. S/P Bronch on 6/15 with mucus plug in RMB/RLL, S/P therapeutic suctioning, CT Chest w/ Occlusion of bronchus intermedius, RML and RLL bronchi w/ postobstructive RLL collapse, Patchy B/L airspace opacities; S/P Bronchoscopy  GEMA - resolved, Likely 2/2 ARIANE; S/P Gill catheter due to urinary retention, Renal US neg for hydronephroses, Avoid nephrotoxic agents,   Large lytic lesion of L humerus - CT Chest w/ 3.7 cm with destructive changes within head of left humerus.  MR L shoulder/humerus w/ Large rim-enhancing lesion in proximal humerus, soft tissue extension, Moderate glenohumeral joint effusion, severe degenerative arthrosis  Hepatic lesion - outpatient GI eval for further evaluation and close monitoring  Elevated LFT - monitor   Paroxysmal A-fib - on Toprol XL 25, off AC due to risk of fall, he has not been on it since 2020  Hiatal Hernia /GERD - on PPI BID  Anemia - Occult negative; S/P 1 unit of PRBCs, S/P IV Iron and now on PO Folic Acid  Asthma / CAROL / RLL Atelectasis, stable oxygenation, Supplement to maintain > 90%, Incentive spirometer  Hemangioma- seen on CT scan.  Prostate CA - follow up urologist  DVT - on lovenox SC 40 mg daily.

## 2024-08-14 NOTE — ED ADULT NURSE NOTE - NSFALLHARMRISKINTERV_ED_ALL_ED
Assistance OOB with selected safe patient handling equipment if applicable/Assistance with ambulation/Communicate risk of Fall with Harm to all staff, patient, and family/Monitor gait and stability/Monitor for mental status changes and reorient to person, place, and time, as needed/Provide visual cue: red socks, yellow wristband, yellow gown, etc/Reinforce activity limits and safety measures with patient and family/Toileting schedule using arm’s reach rule for commode and bathroom/Use of alarms - bed, stretcher, chair and/or video monitoring/Bed in lowest position, wheels locked, appropriate side rails in place/Call bell, personal items and telephone in reach/Instruct patient to call for assistance before getting out of bed/chair/stretcher/Non-slip footwear applied when patient is off stretcher/[place] to call system/Physically safe environment - no spills, clutter or unnecessary equipment/Purposeful Proactive Rounding/Room/bathroom lighting operational, light cord in reach

## 2024-08-14 NOTE — ED PROVIDER NOTE - PROGRESS NOTE DETAILS
Attending Gaby Funk MD: Labs significant for hemoglobin of 9.6, similar to prior, troponin 71, stable with repeat troponin 70, patient with hyponatremia, creatinine 146, BUN 46 with creatinine of 1.18  Patient with alk phos, 172,  and   Bedside ultrasound demonstrates sludge in the gallbladder  Patient with increased BNP, 11,477, prior 3171  Transition to oncoming team pending CT abdomen pelvis, CT chest and CT angio head and final disposition  Patient remains on BiPAP Patient reassessed, feeling improved on BiPAP, more awake and alert at this time.  SpO2 within acceptable limits. Received signout on this elderly gentleman who has hypertension hyperlipidemia CHF who presents to the emergency department with difficulty breathing and altered mental status.  Patient found to have elevated proBNP at 80,000.  Patient is awaiting results of blood gas.  Patient is also awaiting CT imaging. Repeat ABG without significant change in pH or pCO2, not significantly hypoxemic.  Will switch to AVAPS.  Patient tolerating AVAPS well at this time, tolerated CT scan well.  Additionally started on antibiotics for possible pneumonia. Pt son was previously updated on the case. -Harvinder Wood PA-C repeat gas improved paco2,  spoke w/ dr. triplett recommenidng icu consult, pt w/ long standing hx of noncomplicance of BIPAP for CAROL, icu called; dr. Andrade updated Rosanna Pickering MD (PGY-3 EM): received sign out on patient, patient on AVAPS, VBG improving, awaiting MICU recs. Rosanna Pickering MD (PGY-3 EM): MICU has seen patients, recommend chest PT, nebs, changed AVAPS setting to min 10, max 25, . vbg improving. discussed w/ private hospitalist, will admit

## 2024-08-14 NOTE — ED PROVIDER NOTE - ATTENDING APP SHARED VISIT CONTRIBUTION OF CARE
88 yo M with PMHx of COPD on 2-3L home O2 as needed, CAROL, CHF on lasix presents from Nursing facility for altered mental status   During my evaluation patient with intermittent hypoxia and apneic spells    PE: Tachypnea, rales, tachycardia, 2+ pitting edema bilaterally, Skin intact.     MDM: Differential diagnosis includes but is not limited to hypoxic respiratory failure, viral syndrome, pulmonary edema, CHF exacerbation, pneumonia, UTI, sepsis   Will assess with labs, viral swab, CTs  Provided lasix 80 mg, bedside POCUS with bilateral B lines     I spoke with patient's son, Demarcus Andrade ([phone]) in regards to patient's care, advised patient is full code. Patient with prior hospital/ICU admission after perforated ulcer requiring intubation. Following stay, patient was placed in Nursing facility with deterioration in mobility. Acute change in mental status since last night prompting ER visit 89 yo M with PMHx of COPD on 2-3L home O2 as needed, CAROL, CHF on lasix presents from Nursing facility for altered mental status   During my evaluation patient with intermittent hypoxia and apneic spells    PE: Tachypnea, rales, tachycardia, 2+ pitting edema bilaterally, Skin intact.     MDM: Differential diagnosis includes but is not limited to hypoxic respiratory failure, viral syndrome, pulmonary edema, CHF exacerbation, pneumonia, UTI, sepsis   Will assess with labs, viral swab, CTs  Provided lasix 80 mg, bedside POCUS with bilateral B lines     I spoke with patient's son, Demarcus Andrade ([phone]) in regards to patient's care, advised patient is full code. Patient with prior hospital/ICU admission after perforated ulcer requiring intubation. Following stay, patient was placed in Nursing facility with deterioration in mobility. Acute change in mental status since last night prompting ER visit    Refer to progress notes for continued care

## 2024-08-14 NOTE — H&P ADULT - TIME BILLING
d/w ER attending about his respiratory failure     Discussed with the patient and the family regarding advance care planning for at least 30 min and reviewed MOLST and decided to keep is as full code.

## 2024-08-14 NOTE — ED ADULT NURSE REASSESSMENT NOTE - NS ED NURSE REASSESS COMMENT FT1
Report received from Meg WALKER. Pt on BiPAP  O2 96%. Pt awaiting CT and meds. Safety and comfort measures maintained. Bed in lowest position. Bed rails in appropriate positions. Call bell within reach.

## 2024-08-14 NOTE — ED PROVIDER NOTE - OBJECTIVE STATEMENT
89 y/o male with PMHx of pAF (no longer on Eliquis as per chart review) prostate CA in remission s/p radiation seed implants (~2004), large hiatal hernia, iron deficiency anemia, asthma, sleep apnea (does not wear CPAP), GERD, osteoarthritis, carpal tunnel, recent admission discharge last month July 5 for abdominal pain found to have pneumoperitoneum, status post emergent diagnostic lap converted to open status post gastric repair for perforation with Sandeep patch on TPN which was weaned off and transition to p.o. diet, also with acute hypoxic respiratory failure due to mucous plugging status post bronc on 6/15 with suctioning of mucous plug, treated with abx for PNA, GEMA, lytic lesion of humerus evaluated by heme/onc and ortho, previously elevated LFTs i/s/o liver lesion, anemia with FOB negative presents to ED today BIBEMS for SOB/hypoxia and AMS sent in from facility. While in ED pt was seen by PCP from facility who referred pt to ED and reports pt currently is significantly altered from his baseline mental status. Pt baseline A&Ox2

## 2024-08-14 NOTE — ED ADULT NURSE REASSESSMENT NOTE - NS ED NURSE REASSESS COMMENT FT1
Pt resting comfortable on BiPAP. No acute s/s of distress. Pt maintained on bedside cardiac monitor 83 HR afib and  100%. Safety and comfort measures maintained.

## 2024-08-15 DIAGNOSIS — R41.82 ALTERED MENTAL STATUS, UNSPECIFIED: ICD-10-CM

## 2024-08-15 DIAGNOSIS — J45.909 UNSPECIFIED ASTHMA, UNCOMPLICATED: ICD-10-CM

## 2024-08-15 DIAGNOSIS — J96.01 ACUTE RESPIRATORY FAILURE WITH HYPOXIA: ICD-10-CM

## 2024-08-15 DIAGNOSIS — J90 PLEURAL EFFUSION, NOT ELSEWHERE CLASSIFIED: ICD-10-CM

## 2024-08-15 LAB
ADD ON TEST-SPECIMEN IN LAB: SIGNIFICANT CHANGE UP
ALBUMIN SERPL ELPH-MCNC: 2.9 G/DL — LOW (ref 3.3–5)
ALP SERPL-CCNC: 133 U/L — HIGH (ref 40–120)
ALT FLD-CCNC: 155 U/L — HIGH (ref 10–45)
ANION GAP SERPL CALC-SCNC: 13 MMOL/L — SIGNIFICANT CHANGE UP (ref 5–17)
AST SERPL-CCNC: 57 U/L — HIGH (ref 10–40)
BASE EXCESS BLDA CALC-SCNC: 12.1 MMOL/L — HIGH (ref -2–3)
BASE EXCESS BLDA CALC-SCNC: 12.8 MMOL/L — HIGH (ref -2–3)
BASOPHILS # BLD AUTO: 0.01 K/UL — SIGNIFICANT CHANGE UP (ref 0–0.2)
BASOPHILS NFR BLD AUTO: 0.1 % — SIGNIFICANT CHANGE UP (ref 0–2)
BILIRUB SERPL-MCNC: 0.3 MG/DL — SIGNIFICANT CHANGE UP (ref 0.2–1.2)
BUN SERPL-MCNC: 42 MG/DL — HIGH (ref 7–23)
CALCIUM SERPL-MCNC: 8.9 MG/DL — SIGNIFICANT CHANGE UP (ref 8.4–10.5)
CHLORIDE SERPL-SCNC: 103 MMOL/L — SIGNIFICANT CHANGE UP (ref 96–108)
CO2 BLDA-SCNC: 42 MMOL/L — HIGH (ref 19–24)
CO2 BLDA-SCNC: 42 MMOL/L — HIGH (ref 19–24)
CO2 SERPL-SCNC: 28 MMOL/L — SIGNIFICANT CHANGE UP (ref 22–31)
CREAT SERPL-MCNC: 1.17 MG/DL — SIGNIFICANT CHANGE UP (ref 0.5–1.3)
CULTURE RESULTS: NO GROWTH — SIGNIFICANT CHANGE UP
EGFR: 60 ML/MIN/1.73M2 — SIGNIFICANT CHANGE UP
EOSINOPHIL # BLD AUTO: 0.05 K/UL — SIGNIFICANT CHANGE UP (ref 0–0.5)
EOSINOPHIL NFR BLD AUTO: 0.7 % — SIGNIFICANT CHANGE UP (ref 0–6)
GAS PNL BLDA: SIGNIFICANT CHANGE UP
GAS PNL BLDA: SIGNIFICANT CHANGE UP
GAS PNL BLDV: SIGNIFICANT CHANGE UP
GLUCOSE SERPL-MCNC: 96 MG/DL — SIGNIFICANT CHANGE UP (ref 70–99)
HCO3 BLDA-SCNC: 40 MMOL/L — HIGH (ref 21–28)
HCO3 BLDA-SCNC: 40 MMOL/L — HIGH (ref 21–28)
HCT VFR BLD CALC: 29.6 % — LOW (ref 39–50)
HGB BLD-MCNC: 8.6 G/DL — LOW (ref 13–17)
IMM GRANULOCYTES NFR BLD AUTO: 0.7 % — SIGNIFICANT CHANGE UP (ref 0–0.9)
LYMPHOCYTES # BLD AUTO: 0.32 K/UL — LOW (ref 1–3.3)
LYMPHOCYTES # BLD AUTO: 4.6 % — LOW (ref 13–44)
MCHC RBC-ENTMCNC: 29.1 GM/DL — LOW (ref 32–36)
MCHC RBC-ENTMCNC: 29.4 PG — SIGNIFICANT CHANGE UP (ref 27–34)
MCV RBC AUTO: 101 FL — HIGH (ref 80–100)
MONOCYTES # BLD AUTO: 0.57 K/UL — SIGNIFICANT CHANGE UP (ref 0–0.9)
MONOCYTES NFR BLD AUTO: 8.1 % — SIGNIFICANT CHANGE UP (ref 2–14)
MRSA PCR RESULT.: SIGNIFICANT CHANGE UP
NEUTROPHILS # BLD AUTO: 6.01 K/UL — SIGNIFICANT CHANGE UP (ref 1.8–7.4)
NEUTROPHILS NFR BLD AUTO: 85.8 % — HIGH (ref 43–77)
NRBC # BLD: 0 /100 WBCS — SIGNIFICANT CHANGE UP (ref 0–0)
PCO2 BLDA: 60 MMHG — HIGH (ref 35–48)
PCO2 BLDA: 70 MMHG — CRITICAL HIGH (ref 35–48)
PH BLDA: 7.36 — SIGNIFICANT CHANGE UP (ref 7.35–7.45)
PH BLDA: 7.43 — SIGNIFICANT CHANGE UP (ref 7.35–7.45)
PLATELET # BLD AUTO: 220 K/UL — SIGNIFICANT CHANGE UP (ref 150–400)
PO2 BLDA: 109 MMHG — HIGH (ref 83–108)
PO2 BLDA: 36 MMHG — CRITICAL LOW (ref 83–108)
POTASSIUM SERPL-MCNC: 4 MMOL/L — SIGNIFICANT CHANGE UP (ref 3.5–5.3)
POTASSIUM SERPL-SCNC: 4 MMOL/L — SIGNIFICANT CHANGE UP (ref 3.5–5.3)
PROCALCITONIN SERPL-MCNC: 0.1 NG/ML — SIGNIFICANT CHANGE UP (ref 0.02–0.1)
PROT SERPL-MCNC: 6.1 G/DL — SIGNIFICANT CHANGE UP (ref 6–8.3)
RBC # BLD: 2.93 M/UL — LOW (ref 4.2–5.8)
RBC # FLD: 19.6 % — HIGH (ref 10.3–14.5)
S AUREUS DNA NOSE QL NAA+PROBE: SIGNIFICANT CHANGE UP
SAO2 % BLDA: 55.8 % — LOW (ref 94–98)
SAO2 % BLDA: 98.9 % — HIGH (ref 94–98)
SODIUM SERPL-SCNC: 144 MMOL/L — SIGNIFICANT CHANGE UP (ref 135–145)
SPECIMEN SOURCE: SIGNIFICANT CHANGE UP
WBC # BLD: 7.01 K/UL — SIGNIFICANT CHANGE UP (ref 3.8–10.5)
WBC # FLD AUTO: 7.01 K/UL — SIGNIFICANT CHANGE UP (ref 3.8–10.5)

## 2024-08-15 RX ORDER — FUROSEMIDE 40 MG
10 TABLET ORAL
Qty: 500 | Refills: 0 | Status: DISCONTINUED | OUTPATIENT
Start: 2024-08-15 | End: 2024-08-17

## 2024-08-15 RX ADMIN — SODIUM BICARBONATE 650 MILLIGRAM(S): 84 INJECTION, SOLUTION INTRAVENOUS at 21:22

## 2024-08-15 RX ADMIN — Medication 81 MILLIGRAM(S): at 17:28

## 2024-08-15 RX ADMIN — METOPROLOL TARTRATE 100 MILLIGRAM(S): 100 TABLET ORAL at 17:29

## 2024-08-15 RX ADMIN — Medication 1 MILLIGRAM(S): at 17:28

## 2024-08-15 RX ADMIN — Medication 100 MILLIGRAM(S): at 00:55

## 2024-08-15 RX ADMIN — IPRATROPIUM BROMIDE AND ALBUTEROL SULFATE 3 MILLILITER(S): .5; 3 SOLUTION RESPIRATORY (INHALATION) at 05:17

## 2024-08-15 RX ADMIN — SODIUM BICARBONATE 650 MILLIGRAM(S): 84 INJECTION, SOLUTION INTRAVENOUS at 17:29

## 2024-08-15 RX ADMIN — IPRATROPIUM BROMIDE AND ALBUTEROL SULFATE 3 MILLILITER(S): .5; 3 SOLUTION RESPIRATORY (INHALATION) at 12:51

## 2024-08-15 RX ADMIN — Medication 250 MILLIGRAM(S): at 05:17

## 2024-08-15 RX ADMIN — IPRATROPIUM BROMIDE AND ALBUTEROL SULFATE 3 MILLILITER(S): .5; 3 SOLUTION RESPIRATORY (INHALATION) at 17:29

## 2024-08-15 RX ADMIN — IPRATROPIUM BROMIDE AND ALBUTEROL SULFATE 3 MILLILITER(S): .5; 3 SOLUTION RESPIRATORY (INHALATION) at 00:02

## 2024-08-15 RX ADMIN — Medication 40 MILLIGRAM(S): at 17:29

## 2024-08-15 RX ADMIN — Medication 5 MG/HR: at 17:29

## 2024-08-15 NOTE — CONSULT NOTE ADULT - NS ATTEND AMEND GEN_ALL_CORE FT
Continue AVAPS , min pressure 10, max pressure 25, ePAP 5, RR 16, FiO2 40%   Continue abx  keep sat>90% pH>7.2

## 2024-08-15 NOTE — CONSULT NOTE ADULT - ASSESSMENT
88 y/o male with PMHx of pAF (no longer on Eliquis as per chart review) prostate CA in remission s/p radiation seed implants (~2004), large hiatal hernia, iron deficiency anemia, asthma, sleep apnea (does not wear CPAP), GERD, osteoarthritis, carpal tunnel, recent admission discharge last month July 5 for abdominal pain found to have pneumoperitoneum, status post emergent diagnostic lap converted to open status post gastric repair for perforation with Sandeep patch on TPN which was weaned off and transition to p.o. diet, also with acute hypoxic respiratory failure due to mucous plugging status post bronc on 6/15 with suctioning of mucous plug, treated with abx for PNA, GEMA, lytic lesion of humerus evaluated by heme/onc and ortho, previously elevated LFTs i/s/o liver lesion, anemia with FOB negative presents to ED today BIBEMS for SOB/hypoxia and AMS sent in from facility. While in ED pt was seen by PCP from facility who referred pt to ED and reports pt currently is significantly altered from his baseline mental status. Pt baseline A&Ox2  pt with sig cardiac and medical hx with increasing sob and change of mental status  pt on BiPAP  echo noted with sig AI ans wall motion abnormality  tel , a.fib , hr control  continue AC  will adjust cardiac meds  IV lasix/ diuresis  check lytes  will consider ICU eval if becomes more hypoxemic    
89 y/o male with PMHx of pAF (no longer on Eliquis as per chart review) prostate CA in remission s/p radiation seed implants (~2004), large hiatal hernia, iron deficiency anemia, asthma, sleep apnea (does not wear CPAP), GERD, osteoarthritis, carpal tunnel, recent admission for pneumoperitoneum c/b AHRF 2/2 mucous plugging who presents to the ED with dyspnea and hypoxia. Patient also noted to be hypercarbic and with moderate pleural effusions on presentation started on AVAPs. MICU consulted for further management.     #Acute on Chronic Hypoxic and Hypercarbic Respiratory Failure  patient with pCO2 85 on presentation  suspect this is due to acute on chronic heart failure  TTE showing HFrEF 40% with LV global hypokinesis  may also be component of mucous plugging given history and possible pneumonia    RECOMMENDATIONS:  -continue with AVAPS with tidal volume 500, RR 16, FiO2 50%, maximum pressure 25, minimum pressure 10  - trial IV lasix 40 and diurese prn  - send infectious workup for pneumonia: MRSA swab, urine strep, urine legionella, can also expand to full RVP, and procalcitonin  - consider empiric CAP coverage with CTX and azithromycin  - airway clearance with hypertonic saline inhalation and suctioning  - also recommend duonebs   - can try acapella if patient can tolerate    Patient is not a MICU candidate at this time. Please re consult as needed    
89 y/o M with PMH of PAF (no longer on Eliquis as per chart review), prostate CA in remission s/p radiation seed implants (~2004), large hiatal hernia, iron deficiency anemia, asthma, sleep apnea (does not wear CPAP), GERD, osteoarthritis, carpal tunnel. Recent admission discharge last month July 5 for abdominal pain found to have pneumoperitoneum, status post emergent diagnostic lap converted to open status post gastric repair for perforation with Sandeep patch on TPN which was weaned off and transition to p.o. diet, also with acute hypoxic respiratory failure due to mucous plugging status post bron on 6/15 with suctioning of mucous plug, treated with abx for PNA. Presents from a facility with SOB, hypoxia and AMS.

## 2024-08-15 NOTE — CONSULT NOTE ADULT - PROBLEM SELECTOR RECOMMENDATION 9
likely 2nd to volume overload + possible PNA  -CT chest with moderate b/l pl effusions and compressive atelectasis   -Keep O>I as tolerated  -Continue abx   -AM ABG appears to be a VBG. ABG re-drawn (7.43/60/109/40).   -Continue AVAPS , min pressure 10, max pressure 25, ePAP 5, RR 16, FiO2 40%   -Hypercarbia improving, pt remains very lethargic, mostly non verbal at this time, following some commands. If pts mental status improves, can trial off AVAPS during the daytime and repeat ABG 1 hr off AVAPS.   -ABG in AM

## 2024-08-15 NOTE — CONSULT NOTE ADULT - PROBLEM SELECTOR RECOMMENDATION 2
CT chest with moderate b/l pl effusions and compressive atelectasis   -Suggest diuresis, keep O>I as tolerated  -Repeat CXR in a few days. If no improvement and patient remains hypercarbic, will consider thoracentesis.

## 2024-08-15 NOTE — CONSULT NOTE ADULT - PROBLEM SELECTOR RECOMMENDATION 5
CT chest with no clear PNA  -WBC not elevated  -Procalcitonin added to AM labs   -Pt with AMS  -Check MRSA/MSSA nasal PCR  -Suggest change Ceftriaxone to Cefepime or Zosyn  -Continue vanco for now pending MRSA swab   -Continue Duoneb q6h

## 2024-08-15 NOTE — CONSULT NOTE ADULT - SUBJECTIVE AND OBJECTIVE BOX
Date of Service, 24 @ 20:45  CHIEF COMPLAINT:Patient is a 89y old  Male who presents with a chief complaint of     HPI:  88 y/o male with PMHx of pAF (no longer on Eliquis as per chart review) prostate CA in remission s/p radiation seed implants (~), large hiatal hernia, iron deficiency anemia, asthma, sleep apnea (does not wear CPAP), GERD, osteoarthritis, carpal tunnel, recent admission discharge last month  for abdominal pain found to have pneumoperitoneum, status post emergent diagnostic lap converted to open status post gastric repair for perforation with Sandeep patch on TPN which was weaned off and transition to p.o. diet, also with acute hypoxic respiratory failure due to mucous plugging status post bronc on 6/15 with suctioning of mucous plug, treated with abx for PNA, GEMA, lytic lesion of humerus evaluated by heme/onc and ortho, previously elevated LFTs i/s/o liver lesion, anemia with FOB negative presents to ED today BIBEMS for SOB/hypoxia and AMS sent in from facility. While in ED pt was seen by PCP from facility who referred pt to ED and reports pt currently is significantly altered from his baseline mental status. Pt baseline A&Ox2    PAST MEDICAL & SURGICAL HISTORY:  Obstructive sleep apnea on CPAP  Carpal tunnel syndrome  H/O prostate cancer  Hiatal hernia  JULIUS (iron deficiency anemia)  H/O knee surgery      MEDICATIONS  (STANDING):  albuterol/ipratropium for Nebulization 3 milliLiter(s) Nebulizer every 6 hours  Lasix 20 mg oral tablet: 1 tab(s) orally once a day  · 	acetaminophen 500 mg oral tablet: 2 tab(s) orally every 6 hours As needed Mild Pain (1 - 3)  · 	pantoprazole 40 mg oral delayed release tablet: 1 tab(s) orally 2 times a day  · 	folic acid 1 mg oral tablet: 1 tab(s) orally once a day  · 	sodium bicarbonate 650 mg oral tablet: 1 tab(s) orally 3 times a day  · 	ipratropium-albuterol 0.5 mg-2.5 mg/3 mL inhalation solution: 3 milliliter(s) inhaled every 6 hours  · 	metoprolol succinate 100 mg oral tablet, extended release: 1 tab(s) orally once a day  · 	OLANZapine 10 mg oral tablet, disintegratin tab(s) orally once a day (at bedtime) As needed Insomnia  · 	aspirin 81 mg oral tablet: 1 tab(s) orally once a day  · 	Centrum oral tablet, chewable:     MEDICATIONS  (PRN):      FAMILY HISTORY:  FH: heart attack (Father)    SOCIAL HISTORY:    [x ] Non-smoker  [ ] Smoker  [ ] Alcohol    Allergies    No Known Allergies    Intolerances    	    REVIEW OF SYSTEMS:  CONSTITUTIONAL: No fever, weight loss, or fatigue  EYES: No eye pain, visual disturbances, or discharge  ENT:  No difficulty hearing, tinnitus, vertigo; No sinus or throat pain  NECK: No pain or stiffness  RESPIRATORY: No cough, wheezing, chills or hemoptysis; + Shortness of Breath  CARDIOVASCULAR: No chest pain, palpitations, passing out, dizziness, or leg swelling  GASTROINTESTINAL: No abdominal or epigastric pain. No nausea, vomiting, or hematemesis; No diarrhea or constipation. No melena or hematochezia.  GENITOURINARY: No dysuria, frequency, hematuria, or incontinence  NEUROLOGICAL: No headaches, memory loss, loss of strength, numbness, or tremors  SKIN: No itching, burning, rashes, or lesions   LYMPH Nodes: No enlarged glands  ENDOCRINE: No heat or cold intolerance; No hair loss  MUSCULOSKELETAL: No joint pain or swelling; No muscle, back, or extremity pain  PSYCHIATRIC: No depression, anxiety, mood swings, or difficulty sleeping  HEME/LYMPH: No easy bruising, or bleeding gums  ALLERGY AND IMMUNOLOGIC: No hives or eczema	    [x ] All others negative	  [ ] Unable to obtain    PHYSICAL EXAM:  T(C): 36.6 (24 @ 20:30), Max: 36.7 (24 @ 10:29)  HR: 81 (24 @ 20:30) (75 - 89)  BP: 122/60 (24 @ 20:30) (111/69 - 159/89)  RR: 18 (24 @ 20:30) (17 - 20)  SpO2: 100% (24 @ 20:30) (85% - 100%)  Wt(kg): --  I&O's Summary      Appearance: Normal/ on bipap	  HEENT:   Normal oral mucosa, PERRL, EOMI	  Lymphatic: No lymphadenopathy  Cardiovascular: Normal S1 S2, +JVD, + murmurs, No edema  Respiratory: rhonchi	  Gastrointestinal:  Soft, Non-tender, + BS	  Skin: No rashes, No ecchymoses, No cyanosis	  Neurologic: Non-focal  Extremities: Normal range of motion, No clubbing, cyanosis or edema      TELEMETRY: 	    ECG:  	  RADIOLOGY:  OTHER: 	  	  LABS:	 	    CARDIAC MARKERS:                          9.6    10.98 )-----------( 285      ( 14 Aug 2024 11:36 )             34.1         146<H>  |  103  |  46<H>  ----------------------------<  128<H>  4.3   |  30  |  1.18    Ca    8.9      14 Aug 2024 11:36  Mg     2.3         TPro  7.3  /  Alb  3.5  /  TBili  0.4  /  DBili  x   /  AST  174<H>  /  ALT  239<H>  /  AlkPhos  172<H>      proBNP:   Lipid Profile:   HgA1c:   TSH:   PT/INR - ( 14 Aug 2024 11:36 )   PT: 12.6 sec;   INR: 1.21 ratio         PTT - ( 14 Aug 2024 11:36 )  PTT:27.3 sec    PREVIOUS DIAGNOSTIC TESTING:  · EKG Date/Time: 14-Aug-2024 15:12  · Rate: 92  · Interpretation: abnormal  · Abnormal Rhythm: atrial fibrillation  · Acute or Evolving MI?: no  · Axis: Normal  · NH: *  · QRS: 146  · QTC: 514  · ST/T Wave: no overt ischcmic change  · Other Findings: LBBB  · Prior EKG Status: the EKG is unchanged from prior EKG  LBBB present since     < from: CT Angio Chest PE Protocol w/ IV Cont (24 @ 17:08) >  No pulmonary embolism to the level of the lobar pulmonary arteries.    Moderate bilateral pleural effusions and compressive atelectasis.    Small volume ascites and anasarca.    < from: TTE W or WO Ultrasound Enhancing Agent (06.15.24 @ 09:19) >   1. Left ventricular cavity is small. Left ventricular wall thickness is normal. Left ventricular systolic function is moderately decreased with an ejection fraction of 40 % by Sharma's method of disks. Global left ventricular hypokinesis.   2. Multiple segmental abnormalities exist. See findings.   3. There is mild (grade 1) left ventricular diastolic dysfunction, with elevated filling pressure.   4. Normal right ventricular cavity size, with normal wall thickness, and normal systolic function.   5. Moderate aortic regurgitation.   6. No pericardial effusion seen.   7. Compared to the transthoracic echocardiogram performed on 2019, there has been an interval decline in LV systolic function.   8. Technically difficult image quality.              
88 y/o male with PMHx of pAF (no longer on Eliquis as per chart review) prostate CA in remission s/p radiation seed implants (~), large hiatal hernia, iron deficiency anemia, asthma, sleep apnea (does not wear CPAP), GERD, osteoarthritis, carpal tunnel, recent admission discharge last month  for abdominal pain found to have pneumoperitoneum, status post emergent diagnostic lap converted to open status post gastric repair for perforation with Sandeep patch on TPN which was weaned off and transition to p.o. diet, also with acute hypoxic respiratory failure due to mucous plugging status post bronc on 6/15 with suctioning of mucous plug, treated with abx for PNA, GEMA, lytic lesion of humerus evaluated by heme/onc and ortho, previously elevated LFTs i/s/o liver lesion, anemia with FOB negative presents to ED today BIBEMS for SOB/hypoxia and AMS sent in from facility. While in ED pt was seen by PCP from facility who referred pt to ED and reports pt currently is significantly altered from his baseline mental status. Pt baseline A&Ox2. On presentation to the ED, patient was noted to be hypercarbic and was also found to have moderate pleural effusions and was started on AVAPS.     T(C): 36.3 (24 @ 19:10), Max: 36.7 (24 @ 10:29)  T(F): 97.4 (24 @ 19:10), Max: 98 (24 @ 10:29)  HR: 84 (24 @ 20:21) (75 - 89)  BP: 111/69 (24 @ 19:10) (111/69 - 159/89)  ABP: --  ABP(mean): --  RR: 18 (24 @ 19:10) (17 - 20)  SpO2: 99% (24 @ 20:21) (85% - 100%)    LOS:     VITALS:   T(C): 36.3 (24 @ 19:10), Max: 36.7 (24 @ 10:29)  HR: 84 (24 @ 20:21) (75 - 89)  BP: 111/69 (08-14-24 @ 19:10) (111/69 - 159/89)  RR: 18 (24 @ 19:10) (17 - 20)  SpO2: 99% (24 @ 20:21) (85% - 100%)    GENERAL: NAD, lying in bed comfortably, AVAPs mask on   CHEST/LUNG: wheezing  HEART: Regular rate and rhythm; No murmurs, rubs, or gallops  ABDOMEN: BSx4; Soft, nontender, nondistended  EXTREMITIES:  2+ Peripheral Pulses, brisk capillary refill. No clubbing, cyanosis, or edema  SKIN: No rashes or lesions    LABS: When present labs, imaging, and ECG were personally reviewed                          9.6    10.98 )-----------( 285      ( 14 Aug 2024 11:36 )             34.1           146<H>  |  103  |  46<H>  ----------------------------<  128<H>  4.3   |  30  |  1.18    Ca    8.9      14 Aug 2024 11:36  Mg     2.3         TPro  7.3  /  Alb  3.5  /  TBili  0.4  /  DBili  x   /  AST  174<H>  /  ALT  239<H>  /  AlkPhos  172<H>         LIVER FUNCTIONS - ( 14 Aug 2024 11:36 )  Alb: 3.5 g/dL / Pro: 7.3 g/dL / ALK PHOS: 172 U/L / ALT: 239 U/L / AST: 174 U/L / GGT: x                ABG - ( 14 Aug 2024 18:34 )  pH, Arterial: 7.33  pH, Blood: x     /  pCO2: 70    /  pO2: 112   / HCO3: 37    / Base Excess: 8.3   /  SaO2: 99.0                Urinalysis Basic - ( 14 Aug 2024 11:49 )    Color: Yellow / Appearance: Clear / S.019 / pH: x  Gluc: x / Ketone: Negative mg/dL  / Bili: Negative / Urobili: 1.0 mg/dL   Blood: x / Protein: 100 mg/dL / Nitrite: Negative   Leuk Esterase: Negative / RBC: 0 /HPF / WBC 2 /HPF   Sq Epi: x / Non Sq Epi: 1 /HPF / Bacteria: Negative /HPF        PT/INR - ( 14 Aug 2024 11:36 )   PT: 12.6 sec;   INR: 1.21 ratio         PTT - ( 14 Aug 2024 11:36 )  PTT:27.3 sec    Lactate Trend            CAPILLARY BLOOD GLUCOSE      POCT Blood Glucose.: 143 mg/dL (14 Aug 2024 10:39)            RADIOLOGY & ADDITIONAL TESTS:     CTA Chest  IMPRESSION:  No pulmonary embolism to the level of the lobar pulmonary arteries.    Moderate bilateral pleural effusions and compressive atelectasis.    Small volume ascites and anasarca.  
PULMONARY CONSULT    HPI: 89 y/o M with PMH of PAF (no longer on Eliquis as per chart review), prostate CA in remission s/p radiation seed implants (~2004), large hiatal hernia, iron deficiency anemia, asthma, sleep apnea (does not wear CPAP), GERD, osteoarthritis, carpal tunnel. Recent admission discharge last month July 5 for abdominal pain found to have pneumoperitoneum, status post emergent diagnostic lap converted to open status post gastric repair for perforation with Sandeep patch on TPN which was weaned off and transition to p.o. diet, also with acute hypoxic respiratory failure due to mucous plugging status post [facility] on 6/15 with suctioning of mucous plug, treated with abx for PNA, GEMA, lytic lesion of humerus evaluated by heme/onc and ortho, previously elevated LFTs i/s/o liver lesion, anemia with FOB negative. Presents from a facility with SOB, hypoxia and AMS. On presentation to the ED, patient was noted to be hypercarbic and was also found to have moderate pleural effusions and was started on AVAPS. CT chest with moderate b/l pl effusions and compressive atelectasis. Seen on AVAPS, ROS unable to be obtained 2nd to mental status.         PAST MEDICAL & SURGICAL HISTORY:  Obstructive sleep apnea  Carpal tunnel syndrome  H/O prostate cancer  Hiatal hernia  JULIUS (iron deficiency anemia)  H/O knee surgery      Allergies  No Known Allergies      FAMILY HISTORY:  FH: heart attack (Father)      Social history: unable to obtain     Review of Systems: unable to obtain       Medications:  MEDICATIONS  (STANDING):  albuterol/ipratropium for Nebulization 3 milliLiter(s) Nebulizer every 6 hours  aspirin  chewable 81 milliGRAM(s) Oral daily  cefTRIAXone   IVPB 1000 milliGRAM(s) IV Intermittent every 24 hours  enoxaparin Injectable 40 milliGRAM(s) SubCutaneous every 24 hours  folic acid 1 milliGRAM(s) Oral daily  furosemide Infusion 10 mG/Hr (5 mL/Hr) IV Continuous <Continuous>  metoprolol succinate  milliGRAM(s) Oral daily  pantoprazole    Tablet 40 milliGRAM(s) Oral before breakfast  sodium bicarbonate 650 milliGRAM(s) Oral three times a day  vancomycin  IVPB 750 milliGRAM(s) IV Intermittent every 24 hours    MEDICATIONS  (PRN):  OLANZapine Disintegrating Tablet 10 milliGRAM(s) Oral at bedtime PRN Insomnia            Vital Signs Last 24 Hrs  T(C): 36.4 (15 Aug 2024 04:12), Max: 36.7 (14 Aug 2024 10:29)  T(F): 97.5 (15 Aug 2024 04:12), Max: 98 (14 Aug 2024 10:29)  HR: 81 (15 Aug 2024 08:30) (75 - 95)  BP: 150/75 (15 Aug 2024 04:12) (111/65 - 159/89)  BP(mean): 81 (15 Aug 2024 00:05) (78 - 90)  RR: 18 (15 Aug 2024 04:12) (17 - 20)  SpO2: 100% (15 Aug 2024 08:30) (85% - 100%)    Parameters below as of 15 Aug 2024 04:12  Patient On (Oxygen Delivery Method): BiPAP/CPAP        ABG - ( 15 Aug 2024 06:50 )  pH, Arterial: 7.36  pH, Blood: x     /  pCO2: 70    /  pO2: 36    / HCO3: 40    / Base Excess: 12.1  /  SaO2: 55.8              VBG pH 7.24 08-14 @ 10:48  VBG pCO2 84 08-14 @ 10:48  VBG O2 sat 30.5 08-14 @ 10:48  VBG lactate 1.9 08-14 @ 10:48            LABS:                        8.6    7.01  )-----------( 220      ( 15 Aug 2024 07:01 )             29.6     08-15    144  |  103  |  42<H>  ----------------------------<  96  4.0   |  28  |  1.17    Ca    8.9      15 Aug 2024 07:01  Mg     2.3     08-14    TPro  6.1  /  Alb  2.9<L>  /  TBili  0.3  /  DBili  x   /  AST  57<H>  /  ALT  155<H>  /  AlkPhos  133<H>  08-15          CAPILLARY BLOOD GLUCOSE      POCT Blood Glucose.: 143 mg/dL (14 Aug 2024 10:39)    PT/INR - ( 14 Aug 2024 11:36 )   PT: 12.6 sec;   INR: 1.21 ratio         PTT - ( 14 Aug 2024 11:36 )  PTT:27.3 sec  Urinalysis Basic - ( 15 Aug 2024 07:01 )    Color: x / Appearance: x / SG: x / pH: x  Gluc: 96 mg/dL / Ketone: x  / Bili: x / Urobili: x   Blood: x / Protein: x / Nitrite: x   Leuk Esterase: x / RBC: x / WBC x   Sq Epi: x / Non Sq Epi: x / Bacteria: x      Procalcitonin: 0.12 ng/mL (08-14-24 @ 11:36)            Physical Examination:    General: No acute distress.      HEENT: Pupils equal, reactive to light.  Symmetric.    PULM: decreased BS at bases     CVS: S1, S2    ABD: Soft, nondistended, nontender, normoactive bowel sounds, no masses    EXT: trace b/l LE edema     SKIN: Warm and well perfused, no rashes noted.    NEURO: lethargic, A&O x0, following some commands     RADIOLOGY REVIEWED    CT chest: < from: CT Angio Chest PE Protocol w/ IV Cont (08.14.24 @ 17:08) >  FINDINGS:  CHEST:  LUNGS, PLEURA AND LARGE AIRWAYS: Tracheobronchial secretions.   Intralobular septal thickening. Bilateral upper lobe patchy and ground   glass opacities. Moderate bilateral pleural effusions and compressive   atelectasis.  VESSELS: Aortic calcifications. Coronary artery calcifications. Contrast   bolus is satisfactory. No main, right main, left main, or lobar pulmonary   embolism. Limited evaluation of many of the segmental and subsegmental   pulmonary arteries secondary to motion and mixing artifact.  HEART: Heart size is normal. No pericardial effusion.  MEDIASTINUM AND ZANE: No lymphadenopathy.  CHEST WALL AND LOWER NECK: Anasarca.    ABDOMEN ANDPELVIS:  LIVER: A 2.7 x 2.7 cm hemangioma in the right lobe is unchanged.   Additional subcentimeter hypodense focus, too small to characterize.  BILE DUCTS: Normal caliber.  GALLBLADDER: Within normal limits.  SPLEEN: Within normal limits.  PANCREAS: Within normal limits.  ADRENALS: Within normal limits.  KIDNEYS/URETERS: No hydronephrosis. Residual contrast in the collecting   system.    BLADDER: Within normal limits.  REPRODUCTIVE ORGANS: Brachytherapy seeds within the prostate.    BOWEL: No bowel obstruction. Moderate hiatal hernia. Appendix is not   visualized. No evidence of inflammation in the pericecal region. Colonic   diverticulosis, without diverticulitis.  PERITONEUM/RETROPERITONEUM: Small volume ascites.  VESSELS: Atheroscleroticchanges.  LYMPH NODES: No lymphadenopathy.  ABDOMINAL WALL: Anasarca. Small bilateral fat-containing inguinal hernias.  BONES: Degenerative changes and dextroscoliosis. L5 pars defects. Lytic   lesion in the left humeral head with cortical disruptionis unchanged   from 6/11/2024, however increased from 6/25/2019.    IMPRESSION:  No pulmonary embolism to the level of the lobar pulmonary arteries.    Moderate bilateral pleural effusions and compressive atelectasis.    Small volume ascites and anasarca.      < end of copied text >      TTE: < from: TTE W or WO Ultrasound Enhancing Agent (06.15.24 @ 09:19) >     CONCLUSIONS:      1. Left ventricular cavity is small. Left ventricular wall thickness is normal. Left ventricular systolic function is moderately decreased with an ejection fraction of 40 % by Sharma's method of disks. Global left ventricular hypokinesis.   2. Multiple segmental abnormalities exist. See findings.   3. There is mild (grade 1) left ventricular diastolic dysfunction, with elevated filling pressure.   4. Normal right ventricular cavity size, with normal wall thickness, and normal systolic function.   5. Moderate aortic regurgitation.   6. No pericardial effusion seen.   7. Compared to the transthoracic echocardiogram performed on 6/29/2019, there has been an interval decline in LV systolic function.   8. Technically difficult image quality.    ________________________________________________________________________________________  FINDINGS:     Left Ventricle:  The left ventricular cavity is small. Left ventricular wall thicknessis normal. Left ventricular systolic function is moderately decreased with a calculated ejection fraction of 40 % by the Sharma's biplane method of disks. There is global left ventricular hypokinesis. There is mild (grade 1) left ventricular diastolic dysfunction, with elevated filling pressure.  LV Wall Scoring: There is diffuse hypokinesis.          Right Ventricle:  The right ventricular cavity is normal in size, with normal wall thickness and normal systolic function. Tricuspid annular planesystolic excursion (TAPSE) is 1.8 cm (normal >=1.7 cm).     Left Atrium:  The left atrium is normal in size.     Right Atrium:  The right atrium is normal in size with an indexed volume of 16.49 ml/m².     Interatrial Septum:  The interatrial septum appears intact.     Aortic Valve:  The aortic valve was not well visualized. There is calcification of the aortic valve leaflets. There is fibrocalcific aortic valve sclerosis without stenosis. There is moderate aortic regurgitation.     Mitral Valve:  There is moderate posterior calcification and mild calcification of the mitral valve annulus. There is mild leaflet calcification. There is no mitral valve stenosis. There is mild mitral regurgitation.     Tricuspid Valve:  Structurally normal tricuspid valve with normal leaflet excursion. There is mild tricuspid regurgitation. Estimated pulmonary artery systolic pressure is 44 mmHg.     Pulmonic Valve:  Structurally normal pulmonic valve with normal leaflet excursion. There is trace pulmonic regurgitation. There is evidence of pulmonary hypertension.     Aorta:  The aortic root appears normal in size.     Pericardium:  No pericardial effusion seen.     Systemic Veins:  The inferior vena cava is dilated (dilated >2.1cm) with abnormal inspiratory collapse (abnormal <50%) consistent with elevated right atrial pressure (~15, range 10-20mmHg).  ____________________________________________________________________  QUANTITATIVE DATA:  Left Ventricle Measurements: (Indexed to BSA)     IVSd (2D):   1.0 cm  LVPWd (2D):  1.1 cm  LVIDd (2D):  3.0 cm  LVIDs (2D):  2.7 cm  LV Mass:     88 g   49.9 g/m²  LV Vol d, MOD A2C: 119.0 ml 67.67 ml/m²  LV Vol d, MOD A4C: 90.2 ml  51.29 ml/m²  LV Vol d, MOD BP:  104.9 ml 59.67 ml/m²  LV Vol s, MOD A2C: 65.6 ml  37.30 ml/m²  LV Vol s, MOD A4C: 59.4 ml  33.75 ml/m²  LV Vol s, MOD BP:  63.0 ml  35.82 ml/m²  LVOT SV MOD BP:    42.0 ml  LV EF% MOD BP:     40 %     MV E Vmax:    0.81 m/s  MV A Vmax:    0.78 m/s  MV E/A:       1.03  e' lateral:   5.43 cm/s  e' medial:    5.26 cm/s  E/e' lateral: 14.86  E/e' medial:  15.34  E/e' Average: 15.10    Aorta Measurements: (Normal range) (Indexed to BSA)     Sinuses of Valsalva: 2.60 cm (3.1 - 3.7 cm)  Ao Annulus:          1.9 cm (2.3 - 2.9 cm)       Left Atrium Measurements: (Indexed to BSA)  LA Diam 2D:        2.90 cm  LA Vol s, MOD A4C: 56.90 ml.  LA Vol s, MOD A2C: 80.00 ml.    Right Ventricle Measurements: Right Atrial Measurements:     TAPSE:           1.8 cm       RA Vol:       29.00 ml  RV S' Vmax:  10.10 cm/s   RA Vol Index: 16.49 ml/m²  RV Base (RVID1): 3.5 cm  RV Mid (RVID2):  2.1 cm       LVOT / RVOT/ Qp/Qs Data: (Indexed to BSA)  LVOT Diameter:  1.90 cm  LVOT Area:      2.84 cm²  LVOT Vmax:      0.92 m/s  LVOT Vmn:       0.603 m/s  LVOT VTI:       18.60 cm  LVOT peak grad: 3 mmHg  LVOT mean grad: 2.0 mmHg  LVOT SV:        52.7 ml   29.99 ml/m²    Mitral Valve Measurements:     MV E Vmax: 0.8 m/s  MV A Vmax: 0.8 m/s  MV E/A:    1.0       Tricuspid Valve Measurements:     TR Vmax:   2.7 m/s  TR Peak Gradient: 29.4 mmHg  RA Pressure:      15 mmHg  PASP:             44 mmHg      < end of copied text >

## 2024-08-16 LAB
ANION GAP SERPL CALC-SCNC: 11 MMOL/L — SIGNIFICANT CHANGE UP (ref 5–17)
ANION GAP SERPL CALC-SCNC: 12 MMOL/L — SIGNIFICANT CHANGE UP (ref 5–17)
BASE EXCESS BLDA CALC-SCNC: 15.9 MMOL/L — HIGH (ref -2–3)
BUN SERPL-MCNC: 32 MG/DL — HIGH (ref 7–23)
BUN SERPL-MCNC: 36 MG/DL — HIGH (ref 7–23)
CALCIUM SERPL-MCNC: 8.8 MG/DL — SIGNIFICANT CHANGE UP (ref 8.4–10.5)
CALCIUM SERPL-MCNC: 9.1 MG/DL — SIGNIFICANT CHANGE UP (ref 8.4–10.5)
CHLORIDE SERPL-SCNC: 100 MMOL/L — SIGNIFICANT CHANGE UP (ref 96–108)
CHLORIDE SERPL-SCNC: 95 MMOL/L — LOW (ref 96–108)
CO2 BLDA-SCNC: 45 MMOL/L — HIGH (ref 19–24)
CO2 SERPL-SCNC: 36 MMOL/L — HIGH (ref 22–31)
CO2 SERPL-SCNC: 40 MMOL/L — HIGH (ref 22–31)
CREAT SERPL-MCNC: 1.07 MG/DL — SIGNIFICANT CHANGE UP (ref 0.5–1.3)
CREAT SERPL-MCNC: 1.15 MG/DL — SIGNIFICANT CHANGE UP (ref 0.5–1.3)
EGFR: 61 ML/MIN/1.73M2 — SIGNIFICANT CHANGE UP
EGFR: 66 ML/MIN/1.73M2 — SIGNIFICANT CHANGE UP
GLUCOSE SERPL-MCNC: 90 MG/DL — SIGNIFICANT CHANGE UP (ref 70–99)
GLUCOSE SERPL-MCNC: 96 MG/DL — SIGNIFICANT CHANGE UP (ref 70–99)
HCO3 BLDA-SCNC: 43 MMOL/L — HIGH (ref 21–28)
HCT VFR BLD CALC: 31.3 % — LOW (ref 39–50)
HGB BLD-MCNC: 9.4 G/DL — LOW (ref 13–17)
HOROWITZ INDEX BLDA+IHG-RTO: 40 — SIGNIFICANT CHANGE UP
LACTATE SERPL-SCNC: 2.2 MMOL/L — HIGH (ref 0.5–2)
LDH SERPL L TO P-CCNC: 196 U/L — SIGNIFICANT CHANGE UP (ref 50–242)
MAGNESIUM SERPL-MCNC: 1.9 MG/DL — SIGNIFICANT CHANGE UP (ref 1.6–2.6)
MAGNESIUM SERPL-MCNC: 1.9 MG/DL — SIGNIFICANT CHANGE UP (ref 1.6–2.6)
MCHC RBC-ENTMCNC: 29.6 PG — SIGNIFICANT CHANGE UP (ref 27–34)
MCHC RBC-ENTMCNC: 30 GM/DL — LOW (ref 32–36)
MCV RBC AUTO: 98.4 FL — SIGNIFICANT CHANGE UP (ref 80–100)
NRBC # BLD: 0 /100 WBCS — SIGNIFICANT CHANGE UP (ref 0–0)
PCO2 BLDA: 62 MMHG — HIGH (ref 35–48)
PH BLDA: 7.45 — SIGNIFICANT CHANGE UP (ref 7.35–7.45)
PHOSPHATE SERPL-MCNC: 3 MG/DL — SIGNIFICANT CHANGE UP (ref 2.5–4.5)
PLATELET # BLD AUTO: 236 K/UL — SIGNIFICANT CHANGE UP (ref 150–400)
PO2 BLDA: 152 MMHG — HIGH (ref 83–108)
POTASSIUM SERPL-MCNC: 3.1 MMOL/L — LOW (ref 3.5–5.3)
POTASSIUM SERPL-MCNC: 3.7 MMOL/L — SIGNIFICANT CHANGE UP (ref 3.5–5.3)
POTASSIUM SERPL-SCNC: 3.1 MMOL/L — LOW (ref 3.5–5.3)
POTASSIUM SERPL-SCNC: 3.7 MMOL/L — SIGNIFICANT CHANGE UP (ref 3.5–5.3)
RBC # BLD: 3.18 M/UL — LOW (ref 4.2–5.8)
RBC # FLD: 19.8 % — HIGH (ref 10.3–14.5)
SAO2 % BLDA: 99 % — HIGH (ref 94–98)
SODIUM SERPL-SCNC: 147 MMOL/L — HIGH (ref 135–145)
SODIUM SERPL-SCNC: 147 MMOL/L — HIGH (ref 135–145)
WBC # BLD: 8.58 K/UL — SIGNIFICANT CHANGE UP (ref 3.8–10.5)
WBC # FLD AUTO: 8.58 K/UL — SIGNIFICANT CHANGE UP (ref 3.8–10.5)

## 2024-08-16 RX ORDER — POTASSIUM CHLORIDE 10 MEQ
20 TABLET, EXT RELEASE, PARTICLES/CRYSTALS ORAL
Refills: 0 | Status: COMPLETED | OUTPATIENT
Start: 2024-08-16 | End: 2024-08-16

## 2024-08-16 RX ORDER — PIPERACILLIN SODIUM AND TAZOBACTAM SODIUM 3; .375 G/15ML; G/15ML
3.38 INJECTION, POWDER, FOR SOLUTION INTRAVENOUS ONCE
Refills: 0 | Status: COMPLETED | OUTPATIENT
Start: 2024-08-16 | End: 2024-08-16

## 2024-08-16 RX ORDER — PIPERACILLIN SODIUM AND TAZOBACTAM SODIUM 3; .375 G/15ML; G/15ML
3.38 INJECTION, POWDER, FOR SOLUTION INTRAVENOUS ONCE
Refills: 0 | Status: COMPLETED | OUTPATIENT
Start: 2024-08-17 | End: 2024-08-17

## 2024-08-16 RX ORDER — SPIRONOLACTONE 25 MG/1
25 TABLET, FILM COATED ORAL DAILY
Refills: 0 | Status: DISCONTINUED | OUTPATIENT
Start: 2024-08-16 | End: 2024-08-25

## 2024-08-16 RX ORDER — APIXABAN 5 MG/1
2.5 TABLET, FILM COATED ORAL
Refills: 0 | Status: DISCONTINUED | OUTPATIENT
Start: 2024-08-16 | End: 2024-08-25

## 2024-08-16 RX ORDER — PIPERACILLIN SODIUM AND TAZOBACTAM SODIUM 3; .375 G/15ML; G/15ML
3.38 INJECTION, POWDER, FOR SOLUTION INTRAVENOUS EVERY 8 HOURS
Refills: 0 | Status: DISCONTINUED | OUTPATIENT
Start: 2024-08-17 | End: 2024-08-21

## 2024-08-16 RX ADMIN — Medication 40 MILLIGRAM(S): at 05:00

## 2024-08-16 RX ADMIN — OLANZAPINE 10 MILLIGRAM(S): 7.5 TABLET ORAL at 21:52

## 2024-08-16 RX ADMIN — IPRATROPIUM BROMIDE AND ALBUTEROL SULFATE 3 MILLILITER(S): .5; 3 SOLUTION RESPIRATORY (INHALATION) at 17:37

## 2024-08-16 RX ADMIN — PIPERACILLIN SODIUM AND TAZOBACTAM SODIUM 25 GRAM(S): 3; .375 INJECTION, POWDER, FOR SOLUTION INTRAVENOUS at 17:38

## 2024-08-16 RX ADMIN — METOPROLOL TARTRATE 100 MILLIGRAM(S): 100 TABLET ORAL at 05:00

## 2024-08-16 RX ADMIN — SODIUM BICARBONATE 650 MILLIGRAM(S): 84 INJECTION, SOLUTION INTRAVENOUS at 05:00

## 2024-08-16 RX ADMIN — Medication 1 MILLIGRAM(S): at 12:02

## 2024-08-16 RX ADMIN — Medication 100 MILLIGRAM(S): at 00:19

## 2024-08-16 RX ADMIN — IPRATROPIUM BROMIDE AND ALBUTEROL SULFATE 3 MILLILITER(S): .5; 3 SOLUTION RESPIRATORY (INHALATION) at 05:01

## 2024-08-16 RX ADMIN — Medication 250 MILLIGRAM(S): at 04:54

## 2024-08-16 RX ADMIN — PIPERACILLIN SODIUM AND TAZOBACTAM SODIUM 200 GRAM(S): 3; .375 INJECTION, POWDER, FOR SOLUTION INTRAVENOUS at 14:35

## 2024-08-16 RX ADMIN — IPRATROPIUM BROMIDE AND ALBUTEROL SULFATE 3 MILLILITER(S): .5; 3 SOLUTION RESPIRATORY (INHALATION) at 00:20

## 2024-08-16 RX ADMIN — APIXABAN 2.5 MILLIGRAM(S): 5 TABLET, FILM COATED ORAL at 17:36

## 2024-08-16 RX ADMIN — Medication 20 MILLIEQUIVALENT(S): at 12:07

## 2024-08-16 RX ADMIN — Medication 20 MILLIEQUIVALENT(S): at 12:03

## 2024-08-16 RX ADMIN — Medication 81 MILLIGRAM(S): at 12:02

## 2024-08-16 RX ADMIN — SPIRONOLACTONE 25 MILLIGRAM(S): 25 TABLET, FILM COATED ORAL at 12:03

## 2024-08-16 NOTE — PHARMACOTHERAPY INTERVENTION NOTE - COMMENTS
89 y/o male currently empirically on ceftriaxone 1 gram IV every 24 hours and vancomycin 750 mg IV every 24 hours for pneumonia. MRSA nasal swab from 8/15 resulted negative. Recommend discontinuing vancomycin.     Carlo Ramachandran, PharmD, BCPS  Clinical Pharmacy Specialist  Available on [employer] Teams (preferred)  Cell: [phone]

## 2024-08-16 NOTE — PHYSICAL THERAPY INITIAL EVALUATION ADULT - PERTINENT HX OF CURRENT PROBLEM, REHAB EVAL
89 y/o male with PMHx of pAF (no longer on Eliquis as per chart review) prostate CA in remission s/p radiation seed implants (~2004), large hiatal hernia, iron deficiency anemia, asthma, sleep apnea (does not wear CPAP), GERD, osteoarthritis, carpal tunnel, recent admission discharge last month July 5 for abdominal pain found to have pneumoperitoneum, status post emergent diagnostic lap converted to open status post gastric repair for perforation with Sandeep patch on TPN which was weaned off and transition to p.o. diet, also with acute hypoxic respiratory failure due to mucous plugging status post bronc on 6/15 with suctioning of mucous plug, treated with abx for PNA, GEMA, lytic lesion of humerus evaluated by heme/onc and ortho, previously elevated LFTs i/s/o liver lesion, anemia with FOB negative presents to ED today BIBEMS for SOB/hypoxia and AMS sent in from facility. While in ED pt was seen by PCP from facility who referred pt to ED and reports pt currently is significantly altered from his baseline mental status. Pt baseline A&Ox2.   -Hx of recent hypoxic respiratory failure-  resolved, 2/2 mucus plugging as seen on CT. S/P Bronch on 6/15 with mucus plug in RMB/RLL, S/P therapeutic suctioning, CT Chest w/ Occlusion of bronchus intermedius, RML and RLL bronchi w/ postobstructive RLL collapse, Patchy B/L airspace opacities; S/P Bronchoscopy 88 y/o male with PMHx of pAF (no longer on Eliquis as per chart review) prostate CA in remission s/p radiation seed implants (~2004), large hiatal hernia, iron deficiency anemia, asthma, sleep apnea (does not wear CPAP), GERD, osteoarthritis, carpal tunnel, recent admission discharge last month July 5 for abdominal pain found to have pneumoperitoneum, status post emergent diagnostic lap converted to open status post gastric repair for perforation with Sandeep patch on TPN which was weaned off and transition to p.o. diet, also with acute hypoxic respiratory failure due to mucous plugging status post bronc on 6/15 with suctioning of mucous plug, treated with abx for PNA, GEMA, lytic lesion of humerus evaluated by heme/onc and ortho, previously elevated LFTs i/s/o liver lesion, anemia with FOB negative presents to ED today BIBEMS for SOB/hypoxia and AMS sent in from facility. While in ED pt was seen by PCP from facility who referred pt to ED and reports pt currently is significantly altered from his baseline mental status.Pt baseline A&Ox2   -Hx of recent hypoxic respiratory failure-  resolved, 2/2 mucus plugging as seen on CT. S/P Bronch on 6/15 with mucus plug in RMB/RLL, S/P therapeutic suctioning, CT Chest w/ Occlusion of bronchus intermedius, RML and RLL bronchi w/ postobstructive RLL collapse, Patchy B/L airspace opacities; S/P Bronchoscopy  -Large lytic lesion of L humerus - CT Chest w/ 3.7 cm with destructive changes within head of left humerus.  -MR L shoulder/humerus w/ Large rim-enhancing lesion in proximal humerus, soft tissue extension, Moderate glenohumeral joint effusion, severe degenerative arthrosis -WBAT LUE per ortho on 7/4/24

## 2024-08-16 NOTE — PATIENT PROFILE ADULT - FALL HARM RISK - HARM RISK INTERVENTIONS

## 2024-08-16 NOTE — ED PROCEDURE NOTE - PROCEDURE ADDITIONAL DETAILS
Emergency Department Focused Ultrasound performed at patient's bedside for educational purposes. The study will have a follow up study performed or was performed in the direct supervision of an ultrasound trained attending.
POCUS: Emergency Department Focused Ultrasound performed at patient's bedside.  The complete report will be available in PACS.

## 2024-08-16 NOTE — PHYSICAL THERAPY INITIAL EVALUATION ADULT - ADDITIONAL COMMENTS
Patient is poor historian and confused. However per EMR he was admitted for JUAN. Prior he lived with spouse in a private house with 12 steps to enter and was independent,

## 2024-08-17 LAB
ANION GAP SERPL CALC-SCNC: 14 MMOL/L — SIGNIFICANT CHANGE UP (ref 5–17)
BUN SERPL-MCNC: 32 MG/DL — HIGH (ref 7–23)
CALCIUM SERPL-MCNC: 8.9 MG/DL — SIGNIFICANT CHANGE UP (ref 8.4–10.5)
CHLORIDE SERPL-SCNC: 93 MMOL/L — LOW (ref 96–108)
CO2 SERPL-SCNC: 40 MMOL/L — HIGH (ref 22–31)
CREAT SERPL-MCNC: 1.21 MG/DL — SIGNIFICANT CHANGE UP (ref 0.5–1.3)
EGFR: 57 ML/MIN/1.73M2 — LOW
GLUCOSE SERPL-MCNC: 102 MG/DL — HIGH (ref 70–99)
HCT VFR BLD CALC: 32.6 % — LOW (ref 39–50)
HGB BLD-MCNC: 10 G/DL — LOW (ref 13–17)
MAGNESIUM SERPL-MCNC: 1.8 MG/DL — SIGNIFICANT CHANGE UP (ref 1.6–2.6)
MCHC RBC-ENTMCNC: 30.3 PG — SIGNIFICANT CHANGE UP (ref 27–34)
MCHC RBC-ENTMCNC: 30.7 GM/DL — LOW (ref 32–36)
MCV RBC AUTO: 98.8 FL — SIGNIFICANT CHANGE UP (ref 80–100)
NRBC # BLD: 0 /100 WBCS — SIGNIFICANT CHANGE UP (ref 0–0)
PHOSPHATE SERPL-MCNC: 2.9 MG/DL — SIGNIFICANT CHANGE UP (ref 2.5–4.5)
PLATELET # BLD AUTO: 249 K/UL — SIGNIFICANT CHANGE UP (ref 150–400)
POTASSIUM SERPL-MCNC: 3.3 MMOL/L — LOW (ref 3.5–5.3)
POTASSIUM SERPL-SCNC: 3.3 MMOL/L — LOW (ref 3.5–5.3)
RBC # BLD: 3.3 M/UL — LOW (ref 4.2–5.8)
RBC # FLD: 19.9 % — HIGH (ref 10.3–14.5)
SODIUM SERPL-SCNC: 147 MMOL/L — HIGH (ref 135–145)
WBC # BLD: 10.11 K/UL — SIGNIFICANT CHANGE UP (ref 3.8–10.5)
WBC # FLD AUTO: 10.11 K/UL — SIGNIFICANT CHANGE UP (ref 3.8–10.5)

## 2024-08-17 PROCEDURE — 99221 1ST HOSP IP/OBS SF/LOW 40: CPT | Mod: GC

## 2024-08-17 PROCEDURE — 93010 ELECTROCARDIOGRAM REPORT: CPT

## 2024-08-17 RX ORDER — POTASSIUM CHLORIDE 10 MEQ
20 TABLET, EXT RELEASE, PARTICLES/CRYSTALS ORAL
Refills: 0 | Status: DISCONTINUED | OUTPATIENT
Start: 2024-08-17 | End: 2024-08-17

## 2024-08-17 RX ORDER — FUROSEMIDE 40 MG
5 TABLET ORAL
Qty: 500 | Refills: 0 | Status: DISCONTINUED | OUTPATIENT
Start: 2024-08-17 | End: 2024-08-18

## 2024-08-17 RX ORDER — ACETAMINOPHEN 325 MG/1
650 TABLET ORAL EVERY 6 HOURS
Refills: 0 | Status: DISCONTINUED | OUTPATIENT
Start: 2024-08-17 | End: 2024-08-25

## 2024-08-17 RX ORDER — LORAZEPAM 4 MG/ML
0.5 INJECTION INTRAMUSCULAR; INTRAVENOUS ONCE
Refills: 0 | Status: DISCONTINUED | OUTPATIENT
Start: 2024-08-17 | End: 2024-08-18

## 2024-08-17 RX ORDER — LORAZEPAM 4 MG/ML
0.5 INJECTION INTRAMUSCULAR; INTRAVENOUS ONCE
Refills: 0 | Status: DISCONTINUED | OUTPATIENT
Start: 2024-08-17 | End: 2024-08-17

## 2024-08-17 RX ORDER — POTASSIUM CHLORIDE 10 MEQ
20 TABLET, EXT RELEASE, PARTICLES/CRYSTALS ORAL
Refills: 0 | Status: COMPLETED | OUTPATIENT
Start: 2024-08-17 | End: 2024-08-17

## 2024-08-17 RX ADMIN — IPRATROPIUM BROMIDE AND ALBUTEROL SULFATE 3 MILLILITER(S): .5; 3 SOLUTION RESPIRATORY (INHALATION) at 12:13

## 2024-08-17 RX ADMIN — SPIRONOLACTONE 25 MILLIGRAM(S): 25 TABLET, FILM COATED ORAL at 05:39

## 2024-08-17 RX ADMIN — LORAZEPAM 0.5 MILLIGRAM(S): 4 INJECTION INTRAMUSCULAR; INTRAVENOUS at 22:13

## 2024-08-17 RX ADMIN — METOPROLOL TARTRATE 100 MILLIGRAM(S): 100 TABLET ORAL at 05:40

## 2024-08-17 RX ADMIN — IPRATROPIUM BROMIDE AND ALBUTEROL SULFATE 3 MILLILITER(S): .5; 3 SOLUTION RESPIRATORY (INHALATION) at 00:35

## 2024-08-17 RX ADMIN — Medication 1 MILLIGRAM(S): at 12:12

## 2024-08-17 RX ADMIN — Medication 20 MILLIEQUIVALENT(S): at 09:32

## 2024-08-17 RX ADMIN — PIPERACILLIN SODIUM AND TAZOBACTAM SODIUM 25 GRAM(S): 3; .375 INJECTION, POWDER, FOR SOLUTION INTRAVENOUS at 22:12

## 2024-08-17 RX ADMIN — Medication 40 MILLIGRAM(S): at 05:39

## 2024-08-17 RX ADMIN — Medication 81 MILLIGRAM(S): at 12:13

## 2024-08-17 RX ADMIN — IPRATROPIUM BROMIDE AND ALBUTEROL SULFATE 3 MILLILITER(S): .5; 3 SOLUTION RESPIRATORY (INHALATION) at 17:22

## 2024-08-17 RX ADMIN — PIPERACILLIN SODIUM AND TAZOBACTAM SODIUM 25 GRAM(S): 3; .375 INJECTION, POWDER, FOR SOLUTION INTRAVENOUS at 00:35

## 2024-08-17 RX ADMIN — APIXABAN 2.5 MILLIGRAM(S): 5 TABLET, FILM COATED ORAL at 17:23

## 2024-08-17 RX ADMIN — IPRATROPIUM BROMIDE AND ALBUTEROL SULFATE 3 MILLILITER(S): .5; 3 SOLUTION RESPIRATORY (INHALATION) at 05:39

## 2024-08-17 RX ADMIN — APIXABAN 2.5 MILLIGRAM(S): 5 TABLET, FILM COATED ORAL at 05:39

## 2024-08-17 RX ADMIN — PIPERACILLIN SODIUM AND TAZOBACTAM SODIUM 25 GRAM(S): 3; .375 INJECTION, POWDER, FOR SOLUTION INTRAVENOUS at 12:13

## 2024-08-17 RX ADMIN — IPRATROPIUM BROMIDE AND ALBUTEROL SULFATE 3 MILLILITER(S): .5; 3 SOLUTION RESPIRATORY (INHALATION) at 23:33

## 2024-08-17 RX ADMIN — Medication 20 MILLIEQUIVALENT(S): at 09:06

## 2024-08-17 RX ADMIN — Medication 2.5 MG/HR: at 11:08

## 2024-08-17 RX ADMIN — Medication 3 MILLIGRAM(S): at 21:40

## 2024-08-17 RX ADMIN — PIPERACILLIN SODIUM AND TAZOBACTAM SODIUM 25 GRAM(S): 3; .375 INJECTION, POWDER, FOR SOLUTION INTRAVENOUS at 05:39

## 2024-08-17 RX ADMIN — Medication 25 GRAM(S): at 22:13

## 2024-08-17 NOTE — BH CONSULTATION LIAISON ASSESSMENT NOTE - NSBHCONSULTWORKUPEKGFT_PSY_ALL_CORE
Please ask Cardiology for manual read of QTc for assessment of antipsychotic safety given cardiac comorbidities and automated read of 514 on 8/14.

## 2024-08-17 NOTE — BH CONSULTATION LIAISON ASSESSMENT NOTE - NSBHCHARTREVIEWLAB_PSY_A_CORE FT
10.0   10.11 )-----------( 249      ( 17 Aug 2024 06:48 )             32.6     08-17    147<H>  |  93<L>  |  32<H>  ----------------------------<  102<H>  3.3<L>   |  40<H>  |  1.21    Ca    8.9      17 Aug 2024 06:47  Phos  2.9     08-17  Mg     1.8     08-17      Urinalysis Basic - ( 17 Aug 2024 06:47 )    Color: x / Appearance: x / SG: x / pH: x  Gluc: 102 mg/dL / Ketone: x  / Bili: x / Urobili: x   Blood: x / Protein: x / Nitrite: x   Leuk Esterase: x / RBC: x / WBC x   Sq Epi: x / Non Sq Epi: x / Bacteria: x

## 2024-08-17 NOTE — BH CONSULTATION LIAISON ASSESSMENT NOTE - NSBHCHARTREVIEWVS_PSY_A_CORE FT
Vital Signs Last 24 Hrs  T(C): 36.3 (17 Aug 2024 11:46), Max: 36.7 (17 Aug 2024 04:44)  T(F): 97.4 (17 Aug 2024 11:46), Max: 98.1 (17 Aug 2024 04:44)  HR: 90 (17 Aug 2024 11:46) (73 - 93)  BP: 106/56 (17 Aug 2024 11:46) (106/56 - 127/65)  BP(mean): --  RR: 18 (17 Aug 2024 11:46) (18 - 18)  SpO2: 98% (17 Aug 2024 11:46) (97% - 100%)    Parameters below as of 17 Aug 2024 11:46  Patient On (Oxygen Delivery Method): nasal cannula

## 2024-08-17 NOTE — BH CONSULTATION LIAISON ASSESSMENT NOTE - RISK ASSESSMENT
Risk factors: Agitation, dementia, unable to care for self    Protective factors: no h/o SA/SIB, no h/o psych admissions, no access to weapons, no active substance abuse, domiciled, intact marriage, social supports      Overall, pt is at moderate acute risk of harm but does not meet criteria for psychiatric admission at this time.  Overall, pt is at chronically elevated risk of harm mitigated by multiple protective factors. elevated risk 2/2 acute/chronic cognitive impairments, no e/o suicidality or homicidality at this time, does not meet criteria for psychiatric admission

## 2024-08-17 NOTE — BH CONSULTATION LIAISON ASSESSMENT NOTE - SUMMARY
Patient is a 89yyear old, Male, domiciled with his wife in a nursing home, with a past psych hx of dementia, no prior psychiatric hospitalizations, no hx of suicide attempts, no psych meds on admit, with past medical hx of: pAF, prostate CA in remission s/p radiation seed implants (~2004), large hiatal hernia, iron deficiency anemia, asthma, sleep apnea (does not wear CPAP), GERD, osteoarthritis, carpal tunnel who was admitted to [facility] for AMS and respiratory failure. Psychiatry was consulted for agitation.    Patient somnolent and unable to participate with interview at 12:00 on 8/17 after receiving olanzapine 10 mg PO PRN on 8/16 at 21:52. Per primary team and RN, patient was agitated through the night, screaming, and attempting to remove oxygen and lines. Patient given PRN and placed in b/l mittens. Per collateral with patient's son, he is AAOx2 or 3 at baseline and able to attend to ADLs. Does not take psychiatric medications outside of the hospital. Has had issues with delirium during previous hospitalizations.

## 2024-08-17 NOTE — DIETITIAN INITIAL EVALUATION ADULT - ORAL INTAKE PTA/DIET HISTORY
pt not appropriate for interview/education  pt from nursing home, confused, disoriented and speech is garbled

## 2024-08-17 NOTE — BH CONSULTATION LIAISON ASSESSMENT NOTE - NSBHREFERDETAILS_PSY_A_CORE_FT
AMS, agitation and pulling at lines. Received olanzapine 10 mg last night and using b/l mittens, but still agitated. AMS, agitation and pulling at lines.

## 2024-08-17 NOTE — BH CONSULTATION LIAISON ASSESSMENT NOTE - NSBHCONSULTMEDAGITATION_PSY_A_CORE FT
Pending assessment by Cardiology. Olanzapine 10 mg likely too high of a dose given patient's age and somnolence morning after. Would avoid ativan due to concern for respiratory depression.

## 2024-08-17 NOTE — BH CONSULTATION LIAISON ASSESSMENT NOTE - DESCRIPTION
[No Acute Distress] : no acute distress [Well Nourished] : well nourished [Well Developed] : well developed [Well-Appearing] : well-appearing [Normal Sclera/Conjunctiva] : normal sclera/conjunctiva [PERRL] : pupils equal round and reactive to light [EOMI] : extraocular movements intact [Normal Outer Ear/Nose] : the outer ears and nose were normal in appearance [Normal Oropharynx] : the oropharynx was normal [No JVD] : no jugular venous distention [No Lymphadenopathy] : no lymphadenopathy [Supple] : supple Patient is domiciled in a nursing home with his spouse. Patient and spouse both with mild dementia. AAOx3 and able to attend to ADLs at baseline. Patient's son, Demarcus Andrade, is a physician and helps to care for the patient. [Thyroid Normal, No Nodules] : the thyroid was normal and there were no nodules present [No Respiratory Distress] : no respiratory distress  [No Accessory Muscle Use] : no accessory muscle use [Clear to Auscultation] : lungs were clear to auscultation bilaterally [Normal Rate] : normal rate  [Regular Rhythm] : with a regular rhythm [Normal S1, S2] : normal S1 and S2 [No Murmur] : no murmur heard [No Carotid Bruits] : no carotid bruits [No Abdominal Bruit] : a ~M bruit was not heard ~T in the abdomen [No Varicosities] : no varicosities [Pedal Pulses Present] : the pedal pulses are present [No Edema] : there was no peripheral edema [No Palpable Aorta] : no palpable aorta [No Extremity Clubbing/Cyanosis] : no extremity clubbing/cyanosis [Soft] : abdomen soft [Non Tender] : non-tender [Non-distended] : non-distended [No Masses] : no abdominal mass palpated [No HSM] : no HSM [Normal Bowel Sounds] : normal bowel sounds [Normal Posterior Cervical Nodes] : no posterior cervical lymphadenopathy [Normal Anterior Cervical Nodes] : no anterior cervical lymphadenopathy [No CVA Tenderness] : no CVA  tenderness [No Spinal Tenderness] : no spinal tenderness [No Joint Swelling] : no joint swelling [Grossly Normal Strength/Tone] : grossly normal strength/tone [No Rash] : no rash [Coordination Grossly Intact] : coordination grossly intact [No Focal Deficits] : no focal deficits [Normal Gait] : normal gait [Normal Affect] : the affect was normal [Normal Insight/Judgement] : insight and judgment were intact

## 2024-08-17 NOTE — DIETITIAN INITIAL EVALUATION ADULT - PERTINENT LABORATORY DATA
08-17    147<H>  |  93<L>  |  32<H>  ----------------------------<  102<H>  3.3<L>   |  40<H>  |  1.21    Ca    8.9      17 Aug 2024 06:47  Phos  2.9     08-17  Mg     1.8     08-17    A1C with Estimated Average Glucose Result: 5.9 % (06-19-24 @ 10:05)

## 2024-08-17 NOTE — BH CONSULTATION LIAISON ASSESSMENT NOTE - NSBHCONSULTRECOMMENDOTHER_PSY_A_CORE FT
Recommendations for standing medications pending assessment by Cardiology. Would avoid valproic acid given patient's elevated liver enzymes. d/c Zyprexa, QTc prolonged    f/u with cardiology re: manual QTc calc and whether antipsychotics may be safely used    son prefers avoidance of Ativan 2/2 risks of CO2 retention    Melatonin 3mg PO qhS    delirium precautions

## 2024-08-17 NOTE — DIETITIAN INITIAL EVALUATION ADULT - CALCULATED TO (CAL/KG)
What Is The Reason For Today's Visit?: History of Non-Melanoma Skin Cancer
How Many Skin Cancers Have You Had?: more than one
When Was Your Last Cancer Diagnosed?: 2015
219

## 2024-08-17 NOTE — BH CONSULTATION LIAISON ASSESSMENT NOTE - HPI (INCLUDE ILLNESS QUALITY, SEVERITY, DURATION, TIMING, CONTEXT, MODIFYING FACTORS, ASSOCIATED SIGNS AND SYMPTOMS)
On evaluation, patient is calm and sleeping in bed. Has b/l mittens. Opens eyes briefly to stimuli and mouths words, but then returns to sleep. Unable to participate in interview.    Per patient's RN, he was agitated, thrashing in bed, screaming, and attempting to remove oxygen and other lines throughout the night. Patient was placed in b/l mittens and received PRN PO olanzapine 10 mg at 21:52. This morning, patient has been drowsy; he briefly woke up to eat breakfast, but has otherwise been sleeping.    Per patient's son, Dr. Demarcus Andrade, patient has a hx of dementia and was at his baseline of AAOx3 until June, when he was hospitalized for a perforated GI ulcer and sepsis. Patient recovered and was discharged to a rehab facility with new baseline of AAOx2. During the current episode, patient was hospitalized for respiratory failure and altered mental status. Patient does not have behavioral concerns at home, can attend to ADLs. Does not take psychiatric medications. Has had instances in previous hospitalizations in which he became delirious when not visited by family. Patient's son is concerned about medications for agitation due to respiratory depression. Patient is a 88 y/o Male, domiciled with his wife in a nursing home, with a past psych hx of dementia, no prior psychiatric hospitalizations, no hx of suicide attempts, no psych meds on admit, with past medical hx of: pAF, prostate CA in remission s/p radiation seed implants (~2004), large hiatal hernia, iron deficiency anemia, asthma, sleep apnea (does not wear CPAP), GERD, osteoarthritis, carpal tunnel who was admitted to [facility] for AMS and respiratory failure. Psychiatry was consulted on 8/17 for agitation.    On evaluation, patient is calm and sleeping in bed. Has b/l mittens. Opens eyes briefly to stimuli and mouths words, but then returns to sleep. Unable to participate in interview.    Per patient's RN, he was agitated, thrashing in bed, screaming, and attempting to remove oxygen and other lines throughout the night. Patient was placed in b/l mittens and received PRN PO olanzapine 10 mg at 21:52. This morning, patient has been drowsy; he briefly woke up to eat breakfast, but has otherwise been sleeping.    Per patient's son, Dr. Demarcus Andrade, patient has a hx of dementia and was at his baseline of AAOx3 until June, when he was hospitalized for a perforated GI ulcer and sepsis. Patient recovered and was discharged to a rehab facility with new baseline of AAOx2. During the current episode, patient was hospitalized for respiratory failure and altered mental status. Patient does not have behavioral concerns at home, can attend to ADLs. Does not take psychiatric medications. Has had instances in previous hospitalizations in which he became delirious when not visited by family. Patient's son is concerned about medications for agitation due to respiratory depression.

## 2024-08-17 NOTE — BH CONSULTATION LIAISON ASSESSMENT NOTE - NSBHCHARTREVIEWINVESTIGATE_PSY_A_CORE FT
< from: 12 Lead ECG (08.14.24 @ 15:12) >      Ventricular Rate 92 BPM    QRS Duration 146 ms    Q-T Interval 416 ms    QTC Calculation(Bazett) 514 ms    R Axis 75 degrees    T Axis 218 degrees    Diagnosis Line ATRIAL FIBRILLATION  LEFT BUNDLE BRANCH BLOCK  ABNORMAL ECG  WHEN COMPARED WITH ECGOF 24-JUL-2024 20:20,  DECREASED VENTRICULAR RATE  Confirmed by CHRIS KAHN, JUAN PABLO SOTO ([id number]) on 8/15/2024 11:10:56 PM    < end of copied text >

## 2024-08-17 NOTE — BH CONSULTATION LIAISON ASSESSMENT NOTE - CURRENT MEDICATION
MEDICATIONS  (STANDING):  albuterol/ipratropium for Nebulization 3 milliLiter(s) Nebulizer every 6 hours  apixaban 2.5 milliGRAM(s) Oral two times a day  aspirin  chewable 81 milliGRAM(s) Oral daily  folic acid 1 milliGRAM(s) Oral daily  furosemide Infusion 5 mG/Hr (2.5 mL/Hr) IV Continuous <Continuous>  metoprolol succinate  milliGRAM(s) Oral daily  pantoprazole    Tablet 40 milliGRAM(s) Oral before breakfast  piperacillin/tazobactam IVPB.. 3.375 Gram(s) IV Intermittent every 8 hours  spironolactone 25 milliGRAM(s) Oral daily    MEDICATIONS  (PRN):  OLANZapine Disintegrating Tablet 10 milliGRAM(s) Oral at bedtime PRN Insomnia

## 2024-08-17 NOTE — DIETITIAN INITIAL EVALUATION ADULT - NUTRITIONGOAL OUTCOME1
pt will receive additional fluids to address elevated Na if medically appropriate  pt will receive Vit D3 supplementation if medically appropriate  potassium will be repleted if medically appropriate   pt will receive additional fluids to address elevated Na if medically appropriate  pt will receive Vit D3 supplementation if medically appropriate  potassium will be repleted if medically indicated

## 2024-08-17 NOTE — BH CONSULTATION LIAISON ASSESSMENT NOTE - NSBHATTESTCOMMENTATTENDFT_PSY_A_CORE
89M , living in NH, has adult son (pulmonologist) living 2h away, with PPHx dementia, no prior SA or psych admissions, no active substance abuse, with PMH significant for pAF, prostate CA in remission s/p radiation seed implants (~2004), large hiatal hernia, iron deficiency anemia, asthma, sleep apnea (does not wear CPAP), GERD, osteoarthritis, carpal tunnel, recent admission for abdominal pain found to have pneumoperitoneum, status post emergent diagnostic lap converted to open status post gastric repair for perforation with Sandeep patch on TPN which was weaned off and transition to p.o. diet, also with acute hypoxic respiratory failure due to mucous plugging status post bron on 6/15 with suctioning of mucous plug, treated with abx for PNA, GEMA, lytic lesion of humerus evaluated by heme/onc and ortho, previously elevated LFTs i/s/o liver lesion, anemia with FOB negative BIBEMS for SOB/hypoxia and AMS sent in from facility, psychiatry consulted for management of delirium with agitation.  Pt extremely lethargic on exam, pt received Zyprexa 10mg PO qHS last night, now difficult to arouse, able to whisper his name and falls back asleep. QTc >500. Son expressing concern for CO2 retention, requesting to avoid Ativan.  Pt in mitten restraints.  Recommend d/c Zyprexa.  F/u with cardiology vs. EP for manual QTc calc and if antipsychotics may be used.  Melatonin 3mg PO qhS.  Agree with resident’s assessment and plan as above.

## 2024-08-17 NOTE — CHART NOTE - NSCHARTNOTEFT_GEN_A_CORE
[facility]ISSA CHF education chart note    Patient was visited by ALISSA for CHF education. Heart failure education was not provided to the patient in detail. Pt is not a candidate for [facility] ed. RD CHF education chart note        Patient was visited by RD for CHF education. pt not a candidate for ed. Provided handouts on heart failure nutrition therapy, reading heart healthy nutrition labels, heart healthy shopping tips and low sodium food list for family at bedside. RD contact information left with patient for any future questioning.

## 2024-08-18 LAB
ANION GAP SERPL CALC-SCNC: 12 MMOL/L — SIGNIFICANT CHANGE UP (ref 5–17)
BUN SERPL-MCNC: 29 MG/DL — HIGH (ref 7–23)
CALCIUM SERPL-MCNC: 9 MG/DL — SIGNIFICANT CHANGE UP (ref 8.4–10.5)
CHLORIDE SERPL-SCNC: 91 MMOL/L — LOW (ref 96–108)
CO2 SERPL-SCNC: 43 MMOL/L — HIGH (ref 22–31)
CREAT SERPL-MCNC: 1.48 MG/DL — HIGH (ref 0.5–1.3)
EGFR: 45 ML/MIN/1.73M2 — LOW
GLUCOSE SERPL-MCNC: 108 MG/DL — HIGH (ref 70–99)
HCT VFR BLD CALC: 38 % — LOW (ref 39–50)
HGB BLD-MCNC: 11.4 G/DL — LOW (ref 13–17)
MAGNESIUM SERPL-MCNC: 2.4 MG/DL — SIGNIFICANT CHANGE UP (ref 1.6–2.6)
MCHC RBC-ENTMCNC: 29.2 PG — SIGNIFICANT CHANGE UP (ref 27–34)
MCHC RBC-ENTMCNC: 30 GM/DL — LOW (ref 32–36)
MCV RBC AUTO: 97.2 FL — SIGNIFICANT CHANGE UP (ref 80–100)
NRBC # BLD: 0 /100 WBCS — SIGNIFICANT CHANGE UP (ref 0–0)
PHOSPHATE SERPL-MCNC: 3.7 MG/DL — SIGNIFICANT CHANGE UP (ref 2.5–4.5)
PLATELET # BLD AUTO: 265 K/UL — SIGNIFICANT CHANGE UP (ref 150–400)
POTASSIUM SERPL-MCNC: 3.3 MMOL/L — LOW (ref 3.5–5.3)
POTASSIUM SERPL-SCNC: 3.3 MMOL/L — LOW (ref 3.5–5.3)
RBC # BLD: 3.91 M/UL — LOW (ref 4.2–5.8)
RBC # FLD: 19.9 % — HIGH (ref 10.3–14.5)
SODIUM SERPL-SCNC: 146 MMOL/L — HIGH (ref 135–145)
WBC # BLD: 11.78 K/UL — HIGH (ref 3.8–10.5)
WBC # FLD AUTO: 11.78 K/UL — HIGH (ref 3.8–10.5)

## 2024-08-18 RX ADMIN — IPRATROPIUM BROMIDE AND ALBUTEROL SULFATE 3 MILLILITER(S): .5; 3 SOLUTION RESPIRATORY (INHALATION) at 05:49

## 2024-08-18 RX ADMIN — APIXABAN 2.5 MILLIGRAM(S): 5 TABLET, FILM COATED ORAL at 16:59

## 2024-08-18 RX ADMIN — Medication 40 MILLIGRAM(S): at 05:48

## 2024-08-18 RX ADMIN — LORAZEPAM 0.5 MILLIGRAM(S): 4 INJECTION INTRAMUSCULAR; INTRAVENOUS at 23:17

## 2024-08-18 RX ADMIN — IPRATROPIUM BROMIDE AND ALBUTEROL SULFATE 3 MILLILITER(S): .5; 3 SOLUTION RESPIRATORY (INHALATION) at 13:25

## 2024-08-18 RX ADMIN — IPRATROPIUM BROMIDE AND ALBUTEROL SULFATE 3 MILLILITER(S): .5; 3 SOLUTION RESPIRATORY (INHALATION) at 16:59

## 2024-08-18 RX ADMIN — PIPERACILLIN SODIUM AND TAZOBACTAM SODIUM 25 GRAM(S): 3; .375 INJECTION, POWDER, FOR SOLUTION INTRAVENOUS at 21:16

## 2024-08-18 RX ADMIN — IPRATROPIUM BROMIDE AND ALBUTEROL SULFATE 3 MILLILITER(S): .5; 3 SOLUTION RESPIRATORY (INHALATION) at 23:42

## 2024-08-18 RX ADMIN — METOPROLOL TARTRATE 100 MILLIGRAM(S): 100 TABLET ORAL at 05:48

## 2024-08-18 RX ADMIN — Medication 3 MILLIGRAM(S): at 21:13

## 2024-08-18 RX ADMIN — PIPERACILLIN SODIUM AND TAZOBACTAM SODIUM 25 GRAM(S): 3; .375 INJECTION, POWDER, FOR SOLUTION INTRAVENOUS at 05:52

## 2024-08-18 RX ADMIN — Medication 81 MILLIGRAM(S): at 13:25

## 2024-08-18 RX ADMIN — PIPERACILLIN SODIUM AND TAZOBACTAM SODIUM 25 GRAM(S): 3; .375 INJECTION, POWDER, FOR SOLUTION INTRAVENOUS at 13:25

## 2024-08-18 RX ADMIN — Medication 1 MILLIGRAM(S): at 13:25

## 2024-08-18 RX ADMIN — APIXABAN 2.5 MILLIGRAM(S): 5 TABLET, FILM COATED ORAL at 05:48

## 2024-08-18 RX ADMIN — SPIRONOLACTONE 25 MILLIGRAM(S): 25 TABLET, FILM COATED ORAL at 05:48

## 2024-08-18 NOTE — SWALLOW BEDSIDE ASSESSMENT ADULT - SLP GENERAL OBSERVATIONS
Encountered Pt asleep in bed on 4L O2 via NC. Pt aroused to verbal stimuli and maintained alertness throughout this encounter. Pt is verbal and able to follow simple commands w/ encouragement.

## 2024-08-18 NOTE — SWALLOW BEDSIDE ASSESSMENT ADULT - H & P REVIEW
88 y/o male with PMHx of pAF (no longer on Eliquis as per chart review) prostate CA in remission s/p radiation seed implants (~2004), large hiatal hernia, iron deficiency anemia, asthma, sleep apnea (does not wear CPAP), GERD, osteoarthritis, carpal tunnel, recent admission discharge last month July 5 for abdominal pain found to have pneumoperitoneum, status post emergent diagnostic lap converted to open status post gastric repair for perforation with Sandeep patch on TPN which was weaned off and transition to p.o. diet, also with acute hypoxic respiratory failure due to mucous plugging status post bronc on 6/15 with suctioning of mucous plug, treated with abx for PNA, GEMA, lytic lesion of humerus evaluated by heme/onc and ortho, previously elevated LFTs i/s/o liver lesion, anemia with FOB negative presents to ED today BIBEMS for SOB/hypoxia and AMS sent in from facility. While in ED pt was seen by PCP from facility who referred pt to ED and reports pt currently is significantly altered from his baseline mental status. Pt baseline A&Ox2. On presentation to the ED, patient was noted to be hypercarbic and was also found to have moderate pleural effusions and was started on AVAPS/yes
89 y/o male with PMHx of pAF (no longer on Eliquis as per chart review) prostate CA in remission s/p radiation seed implants (~2004), large hiatal hernia, iron deficiency anemia, asthma, sleep apnea (does not wear CPAP), GERD, osteoarthritis, carpal tunnel, recent admission discharge last month July 5 for abdominal pain found to have pneumoperitoneum, status post emergent diagnostic lap converted to open status post gastric repair for perforation with Sandeep patch on TPN which was weaned off and transition to p.o. diet, also with acute hypoxic respiratory failure due to mucous plugging status post bronc on 6/15 with suctioning of mucous plug, treated with abx for PNA, GEMA, lytic lesion of humerus evaluated by heme/onc and ortho, previously elevated LFTs i/s/o liver lesion, anemia with FOB negative presents to ED today BIBEMS for SOB/hypoxia and AMS sent in from facility. While in ED pt was seen by PCP from facility who referred pt to ED and reports pt currently is significantly altered from his baseline mental status. Pt baseline A&Ox2. On presentation to the ED, patient was noted to be hypercarbic and was also found to have moderate pleural effusions and was started on AVAPS/yes

## 2024-08-18 NOTE — SWALLOW BEDSIDE ASSESSMENT ADULT - SPECIFY REASON(S)
Subjective assessment of swallow mechanism to determine safety of initiating PO diet.
Subjective assessment of swallow mechanism to determine safety of initiating PO diet.

## 2024-08-18 NOTE — SWALLOW BEDSIDE ASSESSMENT ADULT - COMMENTS
8/14 Patient found to have acute on chronic hypercapnic respiratory failure secondary to pneumonia versus bilateral pleural effusions from CHF exacerbation. Currently on noninvasive ventilation for respiratory support. MICU consulted for further management; no a candidate at this time. Recommendations for pt to continue with AVAPS, infectious w/u, empiric coverage w/ CTX and azithromycin, duonebs, and airway clearance w/ hypertonic saline inhalation and suctioning. Cardiology consulted; echo noted with sig AI ans wall motion abnormality, started on Lasix drip. 8/15 CT chest with moderate b/l pl effusions and compressive atelectasis; no clear PNA. Hypercarbia improving, pt remains very lethargic, mostly non verbal at this time, following some commands. If mentation improves, can trial off AVAPS during the daytime and repeat. GOC discussion w/ patient and son w/ pt to remain full-code. Pt passed RN administered Dysphagia screen. 8/16 pt back to baseline. Plan for repeat CXR Monday. If no improvement and patient remains hypercarbic, will consider thoracentesis. 8/17 pt noted with increased agitation; psych consulted as pt pulling at lines, AMS, and delirium w/ b/l mittens. Puree/moderately diet initiated.  Swallow hx: Pt known to this service. Bedside swallow evaluation 6/25/24 revealing an priscilla-pharyngeal dysphagia w/ recommendation for NPO with instrumental evaluation of swallow mechanism. FEES completed same day to instrumentally assess swallow function. Exam non-diagnostic at this time, as patient pulled scope from nares due to discomfort, only accepting x1 trial of puree (though no aspiration observed). Unable to make diet recommendation. Pt recommended for MBS (Modified Barium Swallow) test. MBS completed 6/26/24 revealing an priscilla-pharyngeal dysphagia with trace-shallow laryngeal penetration on thins. No aspiration events noted. Pt recommended for minced-moist and thin liquid diet w/ consideration for subjective upgrade of textures if dentures present at bedside.
8/14 Patient found to have acute on chronic hypercapnic respiratory failure secondary to pneumonia versus bilateral pleural effusions from CHF exacerbation. Currently on noninvasive ventilation for respiratory support. MICU consulted for further management; no a candidate at this time. Recommendations for pt to continue with AVAPS, infectious w/u, empiric coverage w/ CTX and azithromycin, duonebs, and airway clearance w/ hypertonic saline inhalation and suctioning. Cardiology consulted; echo noted with sig AI ans wall motion abnormality, started on Lasix drip. 8/15 CT chest with moderate b/l pl effusions and compressive atelectasis; no clear PNA. Hypercarbia improving, pt remains very lethargic, mostly non verbal at this time, following some commands. If mentation improves, can trial off AVAPS during the daytime and repeat. GOC discussion w/ patient and son w/ pt to remain full-code. Pt passed RN administered Dysphagia screen. 8/16 pt back to baseline. Plan for repeat CXR Monday. If no improvement and patient remains hypercarbic, will consider thoracentesis. 8/17 pt noted with increased agitation; psych consulted as pt pulling at lines, AMS, and delirium w/ b/l mittens. Puree/moderately diet initiated.  Swallow hx: Pt known to this service. Bedside swallow evaluation 6/25/24 revealing an priscilla-pharyngeal dysphagia w/ recommendation for NPO with instrumental evaluation of swallow mechanism. FEES completed same day to instrumentally assess swallow function. Exam non-diagnostic at this time, as patient pulled scope from nares due to discomfort, only accepting x1 trial of puree (though no aspiration observed). Unable to make diet recommendation. MBS completed 6/26/24 revealing an priscilla-pharyngeal dysphagia with trace-shallow laryngeal penetration on thins. No aspiration events noted. Pt recommended for minced-moist and thin liquid diet w/ consideration for subjective upgrade of textures if dentures present at bedside. SLP attempted evaluation 8/17, however, Pt lethargic.

## 2024-08-18 NOTE — SWALLOW BEDSIDE ASSESSMENT ADULT - SWALLOW EVAL: DIAGNOSIS
89y M admitted for acute on chronic hypercapnic respiratory failure secondary to pneumonia versus bilateral pleural effusions from CHF exacerbation requiring AVAPS; now tolerating NC. Pt presents w/ an oropharyngeal dysphagia characterized by prolonged/incomplete mastication of solid consistencies iso incomplete dentition and prolonged AP transit time vs. delayed onset of pharyngeal swallow. No overt s/s of laryngeal penetration/aspiration noted across PO trials. Upon palpation, hyolaryngeal excursion is WNL. Current swallow profile is consistent w/ MBS 6/26/24. Consider subjective upgrade of solid consistencies if dentures are present at the bedside. This service will follow-up to monitor diet tolerance while in-house.
Chart reviewed and order appreciated. This service attempted to see patient for bedside swallow evaluation. Upon encounter, pt on 1L NC, b/l mittens, asleep in bed, somnolent and unable to maintain alertness for purposes of evaluation. As per psych note from earlier today, pt given olanzapine 10 mg PO PRN on 8/16 at 21:52. Per primary team and RN, patient was agitated through the night, screaming, and attempting to remove oxygen and lines. Patient given PRN and placed in b/l mittens. This service to re-attempt at a later time this week.

## 2024-08-19 LAB
ANION GAP SERPL CALC-SCNC: 10 MMOL/L — SIGNIFICANT CHANGE UP (ref 5–17)
BASE EXCESS BLDA CALC-SCNC: 23.2 MMOL/L — HIGH (ref -2–3)
BUN SERPL-MCNC: 31 MG/DL — HIGH (ref 7–23)
CALCIUM SERPL-MCNC: 8.5 MG/DL — SIGNIFICANT CHANGE UP (ref 8.4–10.5)
CHLORIDE SERPL-SCNC: 89 MMOL/L — LOW (ref 96–108)
CO2 BLDA-SCNC: 53 MMOL/L — HIGH (ref 19–24)
CO2 SERPL-SCNC: 43 MMOL/L — HIGH (ref 22–31)
CREAT SERPL-MCNC: 1.54 MG/DL — HIGH (ref 0.5–1.3)
CULTURE RESULTS: SIGNIFICANT CHANGE UP
CULTURE RESULTS: SIGNIFICANT CHANGE UP
EGFR: 43 ML/MIN/1.73M2 — LOW
GLUCOSE SERPL-MCNC: 107 MG/DL — HIGH (ref 70–99)
HCO3 BLDA-SCNC: 51 MMOL/L — CRITICAL HIGH (ref 21–28)
HCT VFR BLD CALC: 35.8 % — LOW (ref 39–50)
HGB BLD-MCNC: 10.6 G/DL — LOW (ref 13–17)
HOROWITZ INDEX BLDA+IHG-RTO: 36 — SIGNIFICANT CHANGE UP
MCHC RBC-ENTMCNC: 28.6 PG — SIGNIFICANT CHANGE UP (ref 27–34)
MCHC RBC-ENTMCNC: 29.6 GM/DL — LOW (ref 32–36)
MCV RBC AUTO: 96.8 FL — SIGNIFICANT CHANGE UP (ref 80–100)
NRBC # BLD: 0 /100 WBCS — SIGNIFICANT CHANGE UP (ref 0–0)
PCO2 BLDA: 65 MMHG — HIGH (ref 35–48)
PH BLDA: 7.5 — HIGH (ref 7.35–7.45)
PLATELET # BLD AUTO: 238 K/UL — SIGNIFICANT CHANGE UP (ref 150–400)
PO2 BLDA: 147 MMHG — HIGH (ref 83–108)
POTASSIUM SERPL-MCNC: 3.3 MMOL/L — LOW (ref 3.5–5.3)
POTASSIUM SERPL-SCNC: 3.3 MMOL/L — LOW (ref 3.5–5.3)
RBC # BLD: 3.7 M/UL — LOW (ref 4.2–5.8)
RBC # FLD: 19.7 % — HIGH (ref 10.3–14.5)
SAO2 % BLDA: 100 % — HIGH (ref 94–98)
SODIUM SERPL-SCNC: 142 MMOL/L — SIGNIFICANT CHANGE UP (ref 135–145)
SPECIMEN SOURCE: SIGNIFICANT CHANGE UP
SPECIMEN SOURCE: SIGNIFICANT CHANGE UP
WBC # BLD: 11.16 K/UL — HIGH (ref 3.8–10.5)
WBC # FLD AUTO: 11.16 K/UL — HIGH (ref 3.8–10.5)

## 2024-08-19 PROCEDURE — 71045 X-RAY EXAM CHEST 1 VIEW: CPT | Mod: 26

## 2024-08-19 RX ORDER — POTASSIUM CHLORIDE 10 MEQ
40 TABLET, EXT RELEASE, PARTICLES/CRYSTALS ORAL EVERY 4 HOURS
Refills: 0 | Status: COMPLETED | OUTPATIENT
Start: 2024-08-19 | End: 2024-08-19

## 2024-08-19 RX ORDER — LORAZEPAM 4 MG/ML
0.5 INJECTION INTRAMUSCULAR; INTRAVENOUS ONCE
Refills: 0 | Status: DISCONTINUED | OUTPATIENT
Start: 2024-08-19 | End: 2024-08-20

## 2024-08-19 RX ADMIN — APIXABAN 2.5 MILLIGRAM(S): 5 TABLET, FILM COATED ORAL at 05:22

## 2024-08-19 RX ADMIN — IPRATROPIUM BROMIDE AND ALBUTEROL SULFATE 3 MILLILITER(S): .5; 3 SOLUTION RESPIRATORY (INHALATION) at 05:25

## 2024-08-19 RX ADMIN — PIPERACILLIN SODIUM AND TAZOBACTAM SODIUM 25 GRAM(S): 3; .375 INJECTION, POWDER, FOR SOLUTION INTRAVENOUS at 13:23

## 2024-08-19 RX ADMIN — Medication 3 MILLIGRAM(S): at 23:34

## 2024-08-19 RX ADMIN — METOPROLOL TARTRATE 100 MILLIGRAM(S): 100 TABLET ORAL at 05:26

## 2024-08-19 RX ADMIN — PIPERACILLIN SODIUM AND TAZOBACTAM SODIUM 25 GRAM(S): 3; .375 INJECTION, POWDER, FOR SOLUTION INTRAVENOUS at 05:19

## 2024-08-19 RX ADMIN — IPRATROPIUM BROMIDE AND ALBUTEROL SULFATE 3 MILLILITER(S): .5; 3 SOLUTION RESPIRATORY (INHALATION) at 16:46

## 2024-08-19 RX ADMIN — Medication 40 MILLIEQUIVALENT(S): at 10:52

## 2024-08-19 RX ADMIN — Medication 40 MILLIEQUIVALENT(S): at 13:23

## 2024-08-19 RX ADMIN — Medication 40 MILLIGRAM(S): at 05:22

## 2024-08-19 RX ADMIN — IPRATROPIUM BROMIDE AND ALBUTEROL SULFATE 3 MILLILITER(S): .5; 3 SOLUTION RESPIRATORY (INHALATION) at 10:53

## 2024-08-19 RX ADMIN — PIPERACILLIN SODIUM AND TAZOBACTAM SODIUM 25 GRAM(S): 3; .375 INJECTION, POWDER, FOR SOLUTION INTRAVENOUS at 23:34

## 2024-08-19 RX ADMIN — APIXABAN 2.5 MILLIGRAM(S): 5 TABLET, FILM COATED ORAL at 16:46

## 2024-08-19 RX ADMIN — Medication 81 MILLIGRAM(S): at 10:53

## 2024-08-19 RX ADMIN — SPIRONOLACTONE 25 MILLIGRAM(S): 25 TABLET, FILM COATED ORAL at 05:22

## 2024-08-19 RX ADMIN — Medication 1 MILLIGRAM(S): at 10:53

## 2024-08-20 LAB
ANION GAP SERPL CALC-SCNC: 11 MMOL/L — SIGNIFICANT CHANGE UP (ref 5–17)
BUN SERPL-MCNC: 30 MG/DL — HIGH (ref 7–23)
CALCIUM SERPL-MCNC: 8.7 MG/DL — SIGNIFICANT CHANGE UP (ref 8.4–10.5)
CHLORIDE SERPL-SCNC: 93 MMOL/L — LOW (ref 96–108)
CO2 SERPL-SCNC: 36 MMOL/L — HIGH (ref 22–31)
CREAT SERPL-MCNC: 1.38 MG/DL — HIGH (ref 0.5–1.3)
EGFR: 49 ML/MIN/1.73M2 — LOW
GLUCOSE SERPL-MCNC: 100 MG/DL — HIGH (ref 70–99)
MAGNESIUM SERPL-MCNC: 2.3 MG/DL — SIGNIFICANT CHANGE UP (ref 1.6–2.6)
PHOSPHATE SERPL-MCNC: 2.4 MG/DL — LOW (ref 2.5–4.5)
POTASSIUM SERPL-MCNC: 3.8 MMOL/L — SIGNIFICANT CHANGE UP (ref 3.5–5.3)
POTASSIUM SERPL-SCNC: 3.8 MMOL/L — SIGNIFICANT CHANGE UP (ref 3.5–5.3)
SODIUM SERPL-SCNC: 140 MMOL/L — SIGNIFICANT CHANGE UP (ref 135–145)

## 2024-08-20 RX ORDER — FUROSEMIDE 40 MG
40 TABLET ORAL DAILY
Refills: 0 | Status: DISCONTINUED | OUTPATIENT
Start: 2024-08-20 | End: 2024-08-24

## 2024-08-20 RX ORDER — SODIUM PHOSPHATE, DIBASIC, ANHYDROUS, POTASSIUM PHOSPHATE, MONOBASIC, AND SODIUM PHOSPHATE, MONOBASIC, MONOHYDRATE 852; 155; 130 MG/1; MG/1; MG/1
1 TABLET, COATED ORAL ONCE
Refills: 0 | Status: COMPLETED | OUTPATIENT
Start: 2024-08-20 | End: 2024-08-20

## 2024-08-20 RX ADMIN — APIXABAN 2.5 MILLIGRAM(S): 5 TABLET, FILM COATED ORAL at 06:03

## 2024-08-20 RX ADMIN — Medication 1 MILLIGRAM(S): at 11:04

## 2024-08-20 RX ADMIN — IPRATROPIUM BROMIDE AND ALBUTEROL SULFATE 3 MILLILITER(S): .5; 3 SOLUTION RESPIRATORY (INHALATION) at 17:00

## 2024-08-20 RX ADMIN — SPIRONOLACTONE 25 MILLIGRAM(S): 25 TABLET, FILM COATED ORAL at 05:59

## 2024-08-20 RX ADMIN — LORAZEPAM 0.5 MILLIGRAM(S): 4 INJECTION INTRAMUSCULAR; INTRAVENOUS at 00:03

## 2024-08-20 RX ADMIN — IPRATROPIUM BROMIDE AND ALBUTEROL SULFATE 3 MILLILITER(S): .5; 3 SOLUTION RESPIRATORY (INHALATION) at 11:05

## 2024-08-20 RX ADMIN — Medication 3 MILLIGRAM(S): at 22:00

## 2024-08-20 RX ADMIN — PIPERACILLIN SODIUM AND TAZOBACTAM SODIUM 25 GRAM(S): 3; .375 INJECTION, POWDER, FOR SOLUTION INTRAVENOUS at 06:03

## 2024-08-20 RX ADMIN — APIXABAN 2.5 MILLIGRAM(S): 5 TABLET, FILM COATED ORAL at 17:00

## 2024-08-20 RX ADMIN — Medication 81 MILLIGRAM(S): at 11:04

## 2024-08-20 RX ADMIN — PIPERACILLIN SODIUM AND TAZOBACTAM SODIUM 25 GRAM(S): 3; .375 INJECTION, POWDER, FOR SOLUTION INTRAVENOUS at 22:00

## 2024-08-20 RX ADMIN — Medication 40 MILLIGRAM(S): at 06:00

## 2024-08-20 RX ADMIN — SODIUM PHOSPHATE, DIBASIC, ANHYDROUS, POTASSIUM PHOSPHATE, MONOBASIC, AND SODIUM PHOSPHATE, MONOBASIC, MONOHYDRATE 1 PACKET(S): 852; 155; 130 TABLET, COATED ORAL at 08:22

## 2024-08-20 RX ADMIN — PIPERACILLIN SODIUM AND TAZOBACTAM SODIUM 25 GRAM(S): 3; .375 INJECTION, POWDER, FOR SOLUTION INTRAVENOUS at 13:00

## 2024-08-20 RX ADMIN — IPRATROPIUM BROMIDE AND ALBUTEROL SULFATE 3 MILLILITER(S): .5; 3 SOLUTION RESPIRATORY (INHALATION) at 06:13

## 2024-08-20 RX ADMIN — IPRATROPIUM BROMIDE AND ALBUTEROL SULFATE 3 MILLILITER(S): .5; 3 SOLUTION RESPIRATORY (INHALATION) at 00:11

## 2024-08-20 RX ADMIN — METOPROLOL TARTRATE 100 MILLIGRAM(S): 100 TABLET ORAL at 06:00

## 2024-08-21 LAB
ANION GAP SERPL CALC-SCNC: 11 MMOL/L — SIGNIFICANT CHANGE UP (ref 5–17)
BUN SERPL-MCNC: 27 MG/DL — HIGH (ref 7–23)
CALCIUM SERPL-MCNC: 8.8 MG/DL — SIGNIFICANT CHANGE UP (ref 8.4–10.5)
CHLORIDE SERPL-SCNC: 93 MMOL/L — LOW (ref 96–108)
CO2 SERPL-SCNC: 36 MMOL/L — HIGH (ref 22–31)
CREAT SERPL-MCNC: 1.38 MG/DL — HIGH (ref 0.5–1.3)
EGFR: 49 ML/MIN/1.73M2 — LOW
GLUCOSE SERPL-MCNC: 113 MG/DL — HIGH (ref 70–99)
HCT VFR BLD CALC: 38.5 % — LOW (ref 39–50)
HGB BLD-MCNC: 11.6 G/DL — LOW (ref 13–17)
MAGNESIUM SERPL-MCNC: 2.4 MG/DL — SIGNIFICANT CHANGE UP (ref 1.6–2.6)
MCHC RBC-ENTMCNC: 29.1 PG — SIGNIFICANT CHANGE UP (ref 27–34)
MCHC RBC-ENTMCNC: 30.1 GM/DL — LOW (ref 32–36)
MCV RBC AUTO: 96.5 FL — SIGNIFICANT CHANGE UP (ref 80–100)
NRBC # BLD: 0 /100 WBCS — SIGNIFICANT CHANGE UP (ref 0–0)
PHOSPHATE SERPL-MCNC: 2.2 MG/DL — LOW (ref 2.5–4.5)
PLATELET # BLD AUTO: 266 K/UL — SIGNIFICANT CHANGE UP (ref 150–400)
POTASSIUM SERPL-MCNC: 3.7 MMOL/L — SIGNIFICANT CHANGE UP (ref 3.5–5.3)
POTASSIUM SERPL-SCNC: 3.7 MMOL/L — SIGNIFICANT CHANGE UP (ref 3.5–5.3)
RBC # BLD: 3.99 M/UL — LOW (ref 4.2–5.8)
RBC # FLD: 19.2 % — HIGH (ref 10.3–14.5)
SODIUM SERPL-SCNC: 140 MMOL/L — SIGNIFICANT CHANGE UP (ref 135–145)
WBC # BLD: 8.41 K/UL — SIGNIFICANT CHANGE UP (ref 3.8–10.5)
WBC # FLD AUTO: 8.41 K/UL — SIGNIFICANT CHANGE UP (ref 3.8–10.5)

## 2024-08-21 PROCEDURE — 93010 ELECTROCARDIOGRAM REPORT: CPT

## 2024-08-21 RX ORDER — SODIUM PHOSPHATE, DIBASIC, ANHYDROUS, POTASSIUM PHOSPHATE, MONOBASIC, AND SODIUM PHOSPHATE, MONOBASIC, MONOHYDRATE 852; 155; 130 MG/1; MG/1; MG/1
1 TABLET, COATED ORAL
Refills: 0 | Status: COMPLETED | OUTPATIENT
Start: 2024-08-21 | End: 2024-08-21

## 2024-08-21 RX ADMIN — PIPERACILLIN SODIUM AND TAZOBACTAM SODIUM 25 GRAM(S): 3; .375 INJECTION, POWDER, FOR SOLUTION INTRAVENOUS at 05:28

## 2024-08-21 RX ADMIN — APIXABAN 2.5 MILLIGRAM(S): 5 TABLET, FILM COATED ORAL at 17:03

## 2024-08-21 RX ADMIN — Medication 81 MILLIGRAM(S): at 11:34

## 2024-08-21 RX ADMIN — SODIUM PHOSPHATE, DIBASIC, ANHYDROUS, POTASSIUM PHOSPHATE, MONOBASIC, AND SODIUM PHOSPHATE, MONOBASIC, MONOHYDRATE 1 PACKET(S): 852; 155; 130 TABLET, COATED ORAL at 09:46

## 2024-08-21 RX ADMIN — SPIRONOLACTONE 25 MILLIGRAM(S): 25 TABLET, FILM COATED ORAL at 05:29

## 2024-08-21 RX ADMIN — Medication 1 MILLIGRAM(S): at 11:34

## 2024-08-21 RX ADMIN — IPRATROPIUM BROMIDE AND ALBUTEROL SULFATE 3 MILLILITER(S): .5; 3 SOLUTION RESPIRATORY (INHALATION) at 11:35

## 2024-08-21 RX ADMIN — IPRATROPIUM BROMIDE AND ALBUTEROL SULFATE 3 MILLILITER(S): .5; 3 SOLUTION RESPIRATORY (INHALATION) at 00:54

## 2024-08-21 RX ADMIN — Medication 40 MILLIGRAM(S): at 05:29

## 2024-08-21 RX ADMIN — IPRATROPIUM BROMIDE AND ALBUTEROL SULFATE 3 MILLILITER(S): .5; 3 SOLUTION RESPIRATORY (INHALATION) at 21:27

## 2024-08-21 RX ADMIN — APIXABAN 2.5 MILLIGRAM(S): 5 TABLET, FILM COATED ORAL at 05:28

## 2024-08-21 RX ADMIN — Medication 3 MILLIGRAM(S): at 21:25

## 2024-08-21 RX ADMIN — IPRATROPIUM BROMIDE AND ALBUTEROL SULFATE 3 MILLILITER(S): .5; 3 SOLUTION RESPIRATORY (INHALATION) at 06:54

## 2024-08-21 RX ADMIN — SODIUM PHOSPHATE, DIBASIC, ANHYDROUS, POTASSIUM PHOSPHATE, MONOBASIC, AND SODIUM PHOSPHATE, MONOBASIC, MONOHYDRATE 1 PACKET(S): 852; 155; 130 TABLET, COATED ORAL at 11:35

## 2024-08-21 NOTE — PROGRESS NOTE ADULT - NS ATTEND AMEND GEN_ALL_CORE FT
Awake and comfortable on NC oxygen  NO evidence of wheezing  Agree with continued diuresis  Monitor off NIV tonight   Repeat ABG in AM
Continue AVAPS qHS , min pressure 10, max pressure 25, ePAP 5, RR 16, FiO2 40% qhs  keep sat>90%  continue abx

## 2024-08-22 LAB
ANION GAP SERPL CALC-SCNC: 10 MMOL/L — SIGNIFICANT CHANGE UP (ref 5–17)
BASE EXCESS BLDA CALC-SCNC: 14.8 MMOL/L — HIGH (ref -2–3)
BUN SERPL-MCNC: 29 MG/DL — HIGH (ref 7–23)
CALCIUM SERPL-MCNC: 8.8 MG/DL — SIGNIFICANT CHANGE UP (ref 8.4–10.5)
CHLORIDE SERPL-SCNC: 93 MMOL/L — LOW (ref 96–108)
CO2 BLDA-SCNC: 41 MMOL/L — HIGH (ref 19–24)
CO2 SERPL-SCNC: 35 MMOL/L — HIGH (ref 22–31)
CREAT SERPL-MCNC: 1.34 MG/DL — HIGH (ref 0.5–1.3)
EGFR: 51 ML/MIN/1.73M2 — LOW
GLUCOSE SERPL-MCNC: 105 MG/DL — HIGH (ref 70–99)
HCO3 BLDA-SCNC: 39 MMOL/L — HIGH (ref 21–28)
HOROWITZ INDEX BLDA+IHG-RTO: 21 — SIGNIFICANT CHANGE UP
MAGNESIUM SERPL-MCNC: 2.2 MG/DL — SIGNIFICANT CHANGE UP (ref 1.6–2.6)
PCO2 BLDA: 46 MMHG — SIGNIFICANT CHANGE UP (ref 35–48)
PH BLDA: 7.54 — HIGH (ref 7.35–7.45)
PHOSPHATE SERPL-MCNC: 2.2 MG/DL — LOW (ref 2.5–4.5)
PO2 BLDA: 74 MMHG — LOW (ref 83–108)
POTASSIUM SERPL-MCNC: 3.8 MMOL/L — SIGNIFICANT CHANGE UP (ref 3.5–5.3)
POTASSIUM SERPL-SCNC: 3.8 MMOL/L — SIGNIFICANT CHANGE UP (ref 3.5–5.3)
SAO2 % BLDA: 96.6 % — SIGNIFICANT CHANGE UP (ref 94–98)
SODIUM SERPL-SCNC: 138 MMOL/L — SIGNIFICANT CHANGE UP (ref 135–145)

## 2024-08-22 PROCEDURE — 93010 ELECTROCARDIOGRAM REPORT: CPT

## 2024-08-22 PROCEDURE — 99231 SBSQ HOSP IP/OBS SF/LOW 25: CPT

## 2024-08-22 RX ORDER — POTASSIUM PHOSPHATE 236; 224 MG/ML; MG/ML
30 INJECTION, SOLUTION INTRAVENOUS ONCE
Refills: 0 | Status: COMPLETED | OUTPATIENT
Start: 2024-08-22 | End: 2024-08-22

## 2024-08-22 RX ADMIN — Medication 40 MILLIGRAM(S): at 05:26

## 2024-08-22 RX ADMIN — Medication 1 MILLIGRAM(S): at 11:07

## 2024-08-22 RX ADMIN — Medication 3 MILLIGRAM(S): at 03:00

## 2024-08-22 RX ADMIN — SPIRONOLACTONE 25 MILLIGRAM(S): 25 TABLET, FILM COATED ORAL at 05:26

## 2024-08-22 RX ADMIN — APIXABAN 2.5 MILLIGRAM(S): 5 TABLET, FILM COATED ORAL at 16:19

## 2024-08-22 RX ADMIN — Medication 81 MILLIGRAM(S): at 11:07

## 2024-08-22 RX ADMIN — IPRATROPIUM BROMIDE AND ALBUTEROL SULFATE 3 MILLILITER(S): .5; 3 SOLUTION RESPIRATORY (INHALATION) at 05:26

## 2024-08-22 RX ADMIN — Medication 5 MILLIGRAM(S): at 22:26

## 2024-08-22 RX ADMIN — APIXABAN 2.5 MILLIGRAM(S): 5 TABLET, FILM COATED ORAL at 05:26

## 2024-08-22 RX ADMIN — METOPROLOL TARTRATE 100 MILLIGRAM(S): 100 TABLET ORAL at 05:26

## 2024-08-22 RX ADMIN — IPRATROPIUM BROMIDE AND ALBUTEROL SULFATE 3 MILLILITER(S): .5; 3 SOLUTION RESPIRATORY (INHALATION) at 22:27

## 2024-08-22 RX ADMIN — POTASSIUM PHOSPHATE 83.33 MILLIMOLE(S): 236; 224 INJECTION, SOLUTION INTRAVENOUS at 11:08

## 2024-08-22 NOTE — BH CONSULTATION LIAISON PROGRESS NOTE - NSBHCONSULTRECOMMENDOTHER_PSY_A_CORE FT
check LFTs, if improved can use Depakote 250mg PO or IV TID PRN agitation    f/u with cardiology re: manual QTc calc and whether antipsychotics may be safely used    son prefers avoidance of Ativan 2/2 risks of CO2 retention    Melatonin 3mg PO qhS    delirium precautions

## 2024-08-22 NOTE — BH CONSULTATION LIAISON PROGRESS NOTE - NSBHCHARTREVIEWVS_PSY_A_CORE FT
Vital Signs Last 24 Hrs  T(C): 36.8 (22 Aug 2024 11:01), Max: 36.8 (22 Aug 2024 11:01)  T(F): 98.2 (22 Aug 2024 11:01), Max: 98.2 (22 Aug 2024 11:01)  HR: 98 (22 Aug 2024 11:01) (98 - 107)  BP: 104/63 (22 Aug 2024 11:01) (104/63 - 128/69)  BP(mean): 77 (22 Aug 2024 11:01) (77 - 77)  RR: 18 (22 Aug 2024 11:01) (18 - 18)  SpO2: 92% (22 Aug 2024 11:01) (91% - 98%)    Parameters below as of 22 Aug 2024 11:01  Patient On (Oxygen Delivery Method): room air

## 2024-08-22 NOTE — BH CONSULTATION LIAISON PROGRESS NOTE - NSBHASSESSMENTFT_PSY_ALL_CORE
89M , living in NH, has adult son (pulmonologist) living 2h away, with PPHx dementia, no prior SA or psych admissions, no active substance abuse, with PMH significant for pAF, prostate CA in remission s/p radiation seed implants (~2004), large hiatal hernia, iron deficiency anemia, asthma, sleep apnea (does not wear CPAP), GERD, osteoarthritis, carpal tunnel, recent admission for abdominal pain found to have pneumoperitoneum, status post emergent diagnostic lap converted to open status post gastric repair for perforation with Sandeep patch on TPN which was weaned off and transition to p.o. diet, also with acute hypoxic respiratory failure due to mucous plugging status post bron on 6/15 with suctioning of mucous plug, treated with abx for PNA, GEMA, lytic lesion of humerus evaluated by heme/onc and ortho, previously elevated LFTs i/s/o liver lesion, anemia with FOB negative BIBEMS for SOB/hypoxia and AMS sent in from facility, psychiatry consulted for management of delirium with agitation.  Pt extremely lethargic on exam, pt received Zyprexa 10mg PO qHS last night, now difficult to arouse, able to whisper his name and falls back asleep. QTc >500. Son expressing concern for CO2 retention, requesting to avoid Ativan.  Pt in mitten restraints.

## 2024-08-22 NOTE — BH CONSULTATION LIAISON PROGRESS NOTE - NSBHFUPINTERVALHXFT_PSY_A_CORE
Psych f/u requested for management of agitation in s/o QTc prolongation.  Pt seen at bedside, calm and cooperative, oriented to self, being in a hospital, but not year or date, states he is 60 years old.  Denies AVH, paranoia, or SI/HI.  No PRNs given ON.

## 2024-08-22 NOTE — BH CONSULTATION LIAISON PROGRESS NOTE - NSBHCONSULTFOLLOWAFTERCARE_PSY_A_CORE FT
OP psych f/u at [facility]- [phone]  [facility] clinic- [phone]

## 2024-08-22 NOTE — BH CONSULTATION LIAISON PROGRESS NOTE - CURRENT MEDICATION
MEDICATIONS  (STANDING):  albuterol/ipratropium for Nebulization 3 milliLiter(s) Nebulizer every 6 hours  apixaban 2.5 milliGRAM(s) Oral two times a day  aspirin  chewable 81 milliGRAM(s) Oral daily  folic acid 1 milliGRAM(s) Oral daily  furosemide   Injectable 40 milliGRAM(s) IV Push daily  melatonin 3 milliGRAM(s) Oral at bedtime  metoprolol succinate  milliGRAM(s) Oral daily  pantoprazole    Tablet 40 milliGRAM(s) Oral before breakfast  spironolactone 25 milliGRAM(s) Oral daily    MEDICATIONS  (PRN):  acetaminophen   Oral Liquid .. 650 milliGRAM(s) Oral every 6 hours PRN Temp greater or equal to 38C (100.4F), Moderate Pain (4 - 6)

## 2024-08-22 NOTE — BH CONSULTATION LIAISON PROGRESS NOTE - NSBHCHARTREVIEWINVESTIGATE_PSY_A_CORE FT
< from: 12 Lead ECG (08.21.24 @ 08:50) >      Ventricular Rate 97 BPM    QRS Duration 146 ms    Q-T Interval 420 ms    QTC Calculation(Bazett) 533 ms    R Axis -15 degrees    T Axis 151 degrees    Diagnosis Line ATRIAL FIBRILLATION WITH PREMATURE VENTRICULAR OR ABERRANTLY CONDUCTED COMPLEXES  LEFT BUNDLE BRANCH BLOCK  ABNORMAL ECG  WHEN COMPARED WITH ECG OF  08-  PREMATURE VENTRICULAR COMPLEXES ARE NOW PRESENT  Confirmed by Yazan Carpio ([id number]) on 8/21/2024 4:19:21 PM    < end of copied text >

## 2024-08-22 NOTE — BH CONSULTATION LIAISON PROGRESS NOTE - NSBHCHARTREVIEWLAB_PSY_A_CORE FT
11.6   8.41  )-----------( 266      ( 21 Aug 2024 10:01 )             38.5     08-22    138  |  93<L>  |  29<H>  ----------------------------<  105<H>  3.8   |  35<H>  |  1.34<H>    Ca    8.8      22 Aug 2024 06:12  Phos  2.2     08-22  Mg     2.2     08-22      Urinalysis Basic - ( 22 Aug 2024 06:12 )    Color: x / Appearance: x / SG: x / pH: x  Gluc: 105 mg/dL / Ketone: x  / Bili: x / Urobili: x   Blood: x / Protein: x / Nitrite: x   Leuk Esterase: x / RBC: x / WBC x   Sq Epi: x / Non Sq Epi: x / Bacteria: x

## 2024-08-23 LAB
ALBUMIN SERPL ELPH-MCNC: 3.1 G/DL — LOW (ref 3.3–5)
ALP SERPL-CCNC: 109 U/L — SIGNIFICANT CHANGE UP (ref 40–120)
ALT FLD-CCNC: 27 U/L — SIGNIFICANT CHANGE UP (ref 10–45)
ANION GAP SERPL CALC-SCNC: 11 MMOL/L — SIGNIFICANT CHANGE UP (ref 5–17)
AST SERPL-CCNC: 18 U/L — SIGNIFICANT CHANGE UP (ref 10–40)
BILIRUB SERPL-MCNC: 0.5 MG/DL — SIGNIFICANT CHANGE UP (ref 0.2–1.2)
BUN SERPL-MCNC: 30 MG/DL — HIGH (ref 7–23)
CALCIUM SERPL-MCNC: 8.8 MG/DL — SIGNIFICANT CHANGE UP (ref 8.4–10.5)
CHLORIDE SERPL-SCNC: 99 MMOL/L — SIGNIFICANT CHANGE UP (ref 96–108)
CO2 SERPL-SCNC: 31 MMOL/L — SIGNIFICANT CHANGE UP (ref 22–31)
CREAT SERPL-MCNC: 1.28 MG/DL — SIGNIFICANT CHANGE UP (ref 0.5–1.3)
EGFR: 54 ML/MIN/1.73M2 — LOW
GLUCOSE SERPL-MCNC: 97 MG/DL — SIGNIFICANT CHANGE UP (ref 70–99)
MAGNESIUM SERPL-MCNC: 2.3 MG/DL — SIGNIFICANT CHANGE UP (ref 1.6–2.6)
PHOSPHATE SERPL-MCNC: 3.4 MG/DL — SIGNIFICANT CHANGE UP (ref 2.5–4.5)
POTASSIUM SERPL-MCNC: 4.3 MMOL/L — SIGNIFICANT CHANGE UP (ref 3.5–5.3)
POTASSIUM SERPL-SCNC: 4.3 MMOL/L — SIGNIFICANT CHANGE UP (ref 3.5–5.3)
PROT SERPL-MCNC: 6.9 G/DL — SIGNIFICANT CHANGE UP (ref 6–8.3)
SODIUM SERPL-SCNC: 141 MMOL/L — SIGNIFICANT CHANGE UP (ref 135–145)

## 2024-08-23 PROCEDURE — 71045 X-RAY EXAM CHEST 1 VIEW: CPT | Mod: 26

## 2024-08-23 RX ORDER — DIVALPROEX SODIUM 125 MG/1
250 CAPSULE, DELAYED RELEASE ORAL THREE TIMES A DAY
Refills: 0 | Status: DISCONTINUED | OUTPATIENT
Start: 2024-08-23 | End: 2024-08-25

## 2024-08-23 RX ADMIN — IPRATROPIUM BROMIDE AND ALBUTEROL SULFATE 3 MILLILITER(S): .5; 3 SOLUTION RESPIRATORY (INHALATION) at 22:53

## 2024-08-23 RX ADMIN — APIXABAN 2.5 MILLIGRAM(S): 5 TABLET, FILM COATED ORAL at 06:03

## 2024-08-23 RX ADMIN — DIVALPROEX SODIUM 250 MILLIGRAM(S): 125 CAPSULE, DELAYED RELEASE ORAL at 11:54

## 2024-08-23 RX ADMIN — APIXABAN 2.5 MILLIGRAM(S): 5 TABLET, FILM COATED ORAL at 16:08

## 2024-08-23 RX ADMIN — Medication 40 MILLIGRAM(S): at 06:03

## 2024-08-23 RX ADMIN — Medication 1 MILLIGRAM(S): at 11:54

## 2024-08-23 RX ADMIN — IPRATROPIUM BROMIDE AND ALBUTEROL SULFATE 3 MILLILITER(S): .5; 3 SOLUTION RESPIRATORY (INHALATION) at 11:54

## 2024-08-23 RX ADMIN — METOPROLOL TARTRATE 100 MILLIGRAM(S): 100 TABLET ORAL at 06:03

## 2024-08-23 RX ADMIN — Medication 40 MILLIGRAM(S): at 06:06

## 2024-08-23 RX ADMIN — Medication 81 MILLIGRAM(S): at 11:54

## 2024-08-23 RX ADMIN — Medication 5 MILLIGRAM(S): at 22:53

## 2024-08-23 RX ADMIN — IPRATROPIUM BROMIDE AND ALBUTEROL SULFATE 3 MILLILITER(S): .5; 3 SOLUTION RESPIRATORY (INHALATION) at 16:08

## 2024-08-23 RX ADMIN — DIVALPROEX SODIUM 250 MILLIGRAM(S): 125 CAPSULE, DELAYED RELEASE ORAL at 22:53

## 2024-08-23 RX ADMIN — SPIRONOLACTONE 25 MILLIGRAM(S): 25 TABLET, FILM COATED ORAL at 06:03

## 2024-08-23 RX ADMIN — IPRATROPIUM BROMIDE AND ALBUTEROL SULFATE 3 MILLILITER(S): .5; 3 SOLUTION RESPIRATORY (INHALATION) at 06:06

## 2024-08-23 RX ADMIN — DIVALPROEX SODIUM 250 MILLIGRAM(S): 125 CAPSULE, DELAYED RELEASE ORAL at 16:08

## 2024-08-23 NOTE — PROGRESS NOTE ADULT - PROBLEM SELECTOR PROBLEM 1
Acute respiratory failure with hypoxia and hypercapnia

## 2024-08-23 NOTE — PROGRESS NOTE ADULT - PROBLEM SELECTOR PLAN 4
by hx  -No wheezing on exam  -Continue Duoneb q6h.
by hx  -No wheezing on exam  -Continue Duoneb q6h, change to q6h PRN on discharge
by hx  -No wheezing on exam  -Continue Duoneb q6h, change to q6h PRN on discharge

## 2024-08-23 NOTE — PROGRESS NOTE ADULT - PROBLEM SELECTOR PLAN 2
CT chest with moderate b/l pl effusions and compressive atelectasis   -Suggest diuresis, keep O>I as tolerated  -Repeat CXR Monday. If no improvement and patient remains hypercarbic, will consider thoracentesis.
CT chest with moderate b/l pl effusions and compressive atelectasis   -CXR is improving with diuresis  -Defer thoracentesis for now given improvement  -Diuresis per cards
CT chest with moderate b/l pl effusions and compressive atelectasis   -CXR is improving with diuresis  -Defer thoracentesis for now given improvement  -Diuresis per cards
CT chest with moderate b/l pl effusions and compressive atelectasis   -Now developing alkalosis on exam with diuresis  -CXR is improving with diuresis  -Consider diamox for metabolic alkalosis
CT chest with moderate b/l pl effusions and compressive atelectasis   -CXR is improving with diuresis  -Defer thoracentesis for now given improvement  -Diuresis per cards
CT chest with moderate b/l pl effusions and compressive atelectasis   -CXR is improving with diuresis  -Lasix restarted per cardiology  -Defer thoracentesis for now given improvement

## 2024-08-23 NOTE — PROGRESS NOTE ADULT - PROBLEM SELECTOR PLAN 5
CT chest with no clear PNA  -WBC not elevated, procalcitonin not significant elevated  -MRSA/MSSA swab negative  -Pt with AMS, now improved   -S/p 5 day course of empiric abx   -Continue Duoneb q6h, change to q6h PRN on discharge.
CT chest with no clear PNA  -WBC not elevated, procalcitonin not significant elevated  -MRSA/MSSA swab negative  -Pt with AMS  -S/p 5 day course of empiric abx   -Continue Duoneb q6h.
CT chest with no clear PNA  -WBC not elevated, procalcitonin not significant elevated  -MRSA/MSSA swab negative  -Pt with AMS, now improved   -S/p 5 day course of empiric abx   -Continue Duoneb q6h, change to q6h PRN on discharge.
CT chest with no clear PNA  -WBC not elevated, procalcitonin not significant elevated  -MRSA/MSSA swab negative, d/c vanco   -Pt with AMS  -Suggest change Ceftriaxone to Cefepime or Zosyn  -Continue Duoneb q6h.
CT chest with no clear PNA  -WBC not elevated, procalcitonin not significant elevated  -MRSA/MSSA swab negative, d/c vanco   -Pt with AMS  -Empiric antibiotics for 5 days total  -Continue Duoneb q6h.
CT chest with no clear PNA  -WBC not elevated, procalcitonin not significant elevated  -MRSA/MSSA swab negative, d/c vanco   -Pt with AMS  -Empiric antibiotics   -Continue Duoneb q6h.

## 2024-08-23 NOTE — PROGRESS NOTE ADULT - PROBLEM SELECTOR PLAN 3
likely 2nd to infectious process and hypercarbia  -CT head with chronic ischemic changes.  -Avoid benzos as may worsening hypercapnia  -Improving
likely 2nd to infectious process and hypercarbia  -CT head with chronic ischemic changes.  -Avoid benzos as may worsening hypercapnia  -Improving
likely 2nd to infectious process and hypercarbia  -CT head with chronic ischemic changes.  -Should use NIV when sleeping overnight and during day time  -Avoid benzos as may worsening hypercapnia
likely 2nd to infectious process and hypercarbia  -CT head with chronic ischemic changes.  -Avoid benzos as may worsening hypercapnia  -Improving
likely 2nd to infectious process and hypercarbia  -CT head with chronic ischemic changes.  -Improving now
likely 2nd to infectious process and hypercarbia  -CT head with chronic ischemic changes.  -Should use NIV when sleeping overnight and during day time

## 2024-08-23 NOTE — PROGRESS NOTE ADULT - TIME BILLING
Discussed with PA regarding plan for diuresis
Discussed with PA
Discussed with PA about all above.
Discussed with PA and son.

## 2024-08-23 NOTE — PROGRESS NOTE ADULT - ASSESSMENT
89 y/o male with PMHx of pAF (no longer on Eliquis as per chart review) prostate CA in remission s/p radiation seed implants (~2004), large hiatal hernia, iron deficiency anemia, asthma, sleep apnea (does not wear CPAP), GERD, osteoarthritis, carpal tunnel, recent admission discharge last month July 5 for abdominal pain found to have pneumoperitoneum, status post emergent diagnostic lap converted to open status post gastric repair for perforation with Sandeep patch on TPN which was weaned off and transition to p.o. diet, also with acute hypoxic respiratory failure due to mucous plugging status post bronc on 6/15 with suctioning of mucous plug, treated with abx for PNA, GEMA, lytic lesion of humerus evaluated by heme/onc and ortho, previously elevated LFTs i/s/o liver lesion, anemia with FOB negative presents to ED today BIBEMS for SOB/hypoxia and AMS sent in from facility. While in ED pt was seen by PCP from facility who referred pt to ED and reports pt currently is significantly altered from his baseline mental status. Pt baseline A&Ox2. On presentation to the ED, patient was noted to be hypercarbic and was also found to have moderate pleural effusions and was started on AVAPS.     Acute hypoxic  and hypercapnic  respiratory  failure due to CHF /PNA- continue IV Lasix drip and IV abx. Off AVAPS just use PRN. Continue  oxygen 6L wean off oxygen as tolerated.  PCO2 down to normal. F/u pulmonologist  and cardiologist.  CHF- continue  Lasix drip and added Aldactone. F/u echocardiogram Monitor potassium and renal function.   PNA- change IV abx to Zosyn per pulmonologist  recommendations.   AMS due to metabolic encephalopathy  due to hypercapnia- resolving back to baseline.   Leukocytosis - resolved . Continue IV abx.  Hx of recent hypoxic respiratory failure-  resolved, 2/2 mucus plugging as seen on CT. S/P Bronch on 6/15 with mucus plug in RMB/RLL, S/P therapeutic suctioning, CT Chest w/ Occlusion of bronchus intermedius, RML and RLL bronchi w/ postobstructive RLL collapse, Patchy B/L airspace opacities; S/P Bronchoscopy  GEMA - resolved, Likely 2/2 ARIANE; S/P Gill catheter due to urinary retention, Renal US neg for hydronephroses, Avoid nephrotoxic agents,   Large lytic lesion of L humerus - CT Chest w/ 3.7 cm with destructive changes within head of left humerus.  MR L shoulder/humerus w/ Large rim-enhancing lesion in proximal humerus, soft tissue extension, Moderate glenohumeral joint effusion, severe degenerative arthrosis  Hepatic lesion - outpatient GI eval for further evaluation and close monitoring  Elevated LFT - monitor   Paroxysmal A-fib - on Toprol XL 25, off AC due to risk of fall, he has not been on it since 2020  Hiatal Hernia /GERD - on PPI BID  Anemia - Occult negative; S/P 1 unit of PRBCs, S/P IV Iron and now on PO Folic Acid  Asthma / CAROL / RLL Atelectasis, stable oxygenation, Supplement to maintain > 90%, Incentive spirometer  Hemangioma- seen on CT scan.  Prostate CA - follow up urologist  DVT - on lovenox SC 40 mg daily.     
89 y/o male with PMHx of pAF (no longer on Eliquis as per chart review) prostate CA in remission s/p radiation seed implants (~2004), large hiatal hernia, iron deficiency anemia, asthma, sleep apnea (does not wear CPAP), GERD, osteoarthritis, carpal tunnel, recent admission discharge last month July 5 for abdominal pain found to have pneumoperitoneum, status post emergent diagnostic lap converted to open status post gastric repair for perforation with Sandeep patch on TPN which was weaned off and transition to p.o. diet, also with acute hypoxic respiratory failure due to mucous plugging status post bronc on 6/15 with suctioning of mucous plug, treated with abx for PNA, GEMA, lytic lesion of humerus evaluated by heme/onc and ortho, previously elevated LFTs i/s/o liver lesion, anemia with FOB negative presents to ED today BIBEMS for SOB/hypoxia and AMS sent in from facility. While in ED pt was seen by PCP from facility who referred pt to ED and reports pt currently is significantly altered from his baseline mental status. Pt baseline A&Ox2. On presentation to the ED, patient was noted to be hypercarbic and was also found to have moderate pleural effusions and was started on AVAPS.     metabolic alkalosis-  improved . Resume Lasix IV injection daily. Monitor Bicarb closely   Pleural  effusion- resume  Lasix drip. No thoracentesis recommended  Acute hypoxic  and hypercapnic  respiratory  failure due to CHF /PNA- held  IV Lasix drip due to metabolic alkalosis   Continue IV abx. Off AVAPS just use PRN. Continue  oxygen 6L wean off oxygen as tolerated.  PCO2 down to normal. F/u pulmonologist  and cardiologist.  CHF- continue  Lasix drip and added Aldactone. F/u echocardiogram Monitor potassium and renal function.   PNA- change IV abx to Zosyn per pulmonologist  recommendations.   AMS due to metabolic encephalopathy  due to hypercapnia- resolving back to baseline.   Leukocytosis - resolved . Continue IV abx.  Hx of recent hypoxic respiratory failure-  resolved, 2/2 mucus plugging as seen on CT. S/P Bronch on 6/15 with mucus plug in RMB/RLL, S/P therapeutic suctioning, CT Chest w/ Occlusion of bronchus intermedius, RML and RLL bronchi w/ postobstructive RLL collapse, Patchy B/L airspace opacities; S/P Bronchoscopy  GEMA - resolved, Likely 2/2 ARIANE; S/P Gill catheter due to urinary retention, Renal US neg for hydronephroses, Avoid nephrotoxic agents,   Large lytic lesion of L humerus - CT Chest w/ 3.7 cm with destructive changes within head of left humerus.  MR L shoulder/humerus w/ Large rim-enhancing lesion in proximal humerus, soft tissue extension, Moderate glenohumeral joint effusion, severe degenerative arthrosis  Hepatic lesion - outpatient GI eval for further evaluation and close monitoring  Elevated LFT - monitor   Paroxysmal A-fib - on Toprol XL 25, off AC due to risk of fall, he has not been on it since 2020  Hiatal Hernia /GERD - on PPI BID  Anemia - Occult negative; S/P 1 unit of PRBCs, S/P IV Iron and now on PO Folic Acid  Asthma / CAROL / RLL Atelectasis, stable oxygenation, Supplement to maintain > 90%, Incentive spirometer  Hemangioma- seen on CT scan.  Prostate CA - follow up urologist  DVT - on lovenox SC 40 mg daily.     
88 y/o male with PMHx of pAF (no longer on Eliquis as per chart review) prostate CA in remission s/p radiation seed implants (~2004), large hiatal hernia, iron deficiency anemia, asthma, sleep apnea (does not wear CPAP), GERD, osteoarthritis, carpal tunnel, recent admission discharge last month July 5 for abdominal pain found to have pneumoperitoneum, status post emergent diagnostic lap converted to open status post gastric repair for perforation with Sandeep patch on TPN which was weaned off and transition to p.o. diet, also with acute hypoxic respiratory failure due to mucous plugging status post bronc on 6/15 with suctioning of mucous plug, treated with abx for PNA, GEMA, lytic lesion of humerus evaluated by heme/onc and ortho, previously elevated LFTs i/s/o liver lesion, anemia with FOB negative presents to ED today BIBEMS for SOB/hypoxia and AMS sent in from facility. While in ED pt was seen by PCP from facility who referred pt to ED and reports pt currently is significantly altered from his baseline mental status. Pt baseline A&Ox2. On presentation to the ED, patient was noted to be hypercarbic and was also found to have moderate pleural effusions and was started on AVAPS.     metabolic alkalosis-  Diuretic on hold.   Pleural  effusion- held Lasix drip due to metabolic alkalosis consider thoracentesis if not resolved.   Acute hypoxic  and hypercapnic  respiratory  failure due to CHF /PNA- held  IV Lasix drip due to metabolic alkalosis   Continue IV abx. Off AVAPS just use PRN. Continue  oxygen 6L wean off oxygen as tolerated.  PCO2 down to normal. F/u pulmonologist  and cardiologist.  CHF- continue  Lasix drip and added Aldactone. F/u echocardiogram Monitor potassium and renal function.   PNA- change IV abx to Zosyn per pulmonologist  recommendations.   AMS due to metabolic encephalopathy  due to hypercapnia- resolving back to baseline.   Leukocytosis - resolved . Continue IV abx.  Hx of recent hypoxic respiratory failure-  resolved, 2/2 mucus plugging as seen on CT. S/P Bronch on 6/15 with mucus plug in RMB/RLL, S/P therapeutic suctioning, CT Chest w/ Occlusion of bronchus intermedius, RML and RLL bronchi w/ postobstructive RLL collapse, Patchy B/L airspace opacities; S/P Bronchoscopy  GEMA - resolved, Likely 2/2 ARIANE; S/P Gill catheter due to urinary retention, Renal US neg for hydronephroses, Avoid nephrotoxic agents,   Large lytic lesion of L humerus - CT Chest w/ 3.7 cm with destructive changes within head of left humerus.  MR L shoulder/humerus w/ Large rim-enhancing lesion in proximal humerus, soft tissue extension, Moderate glenohumeral joint effusion, severe degenerative arthrosis  Hepatic lesion - outpatient GI eval for further evaluation and close monitoring  Elevated LFT - monitor   Paroxysmal A-fib - on Toprol XL 25, off AC due to risk of fall, he has not been on it since 2020  Hiatal Hernia /GERD - on PPI BID  Anemia - Occult negative; S/P 1 unit of PRBCs, S/P IV Iron and now on PO Folic Acid  Asthma / CAROL / RLL Atelectasis, stable oxygenation, Supplement to maintain > 90%, Incentive spirometer  Hemangioma- seen on CT scan.  Prostate CA - follow up urologist  DVT - on lovenox SC 40 mg daily.     
89 y/o M with PMH of PAF (no longer on Eliquis as per chart review), prostate CA in remission s/p radiation seed implants (~2004), large hiatal hernia, iron deficiency anemia, asthma, sleep apnea (does not wear CPAP), GERD, osteoarthritis, carpal tunnel. Recent admission discharge last month July 5 for abdominal pain found to have pneumoperitoneum, status post emergent diagnostic lap converted to open status post gastric repair for perforation with Sandeep patch on TPN which was weaned off and transition to p.o. diet, also with acute hypoxic respiratory failure due to mucous plugging status post bron on 6/15 with suctioning of mucous plug, treated with abx for PNA. Presents from a facility with SOB, hypoxia and AMS.      
89 y/o male with PMHx of pAF (no longer on Eliquis as per chart review) prostate CA in remission s/p radiation seed implants (~2004), large hiatal hernia, iron deficiency anemia, asthma, sleep apnea (does not wear CPAP), GERD, osteoarthritis, carpal tunnel, recent admission discharge last month July 5 for abdominal pain found to have pneumoperitoneum, status post emergent diagnostic lap converted to open status post gastric repair for perforation with Sandeep patch on TPN which was weaned off and transition to p.o. diet, also with acute hypoxic respiratory failure due to mucous plugging status post bronc on 6/15 with suctioning of mucous plug, treated with abx for PNA, GEMA, lytic lesion of humerus evaluated by heme/onc and ortho, previously elevated LFTs i/s/o liver lesion, anemia with FOB negative presents to ED today BIBEMS for SOB/hypoxia and AMS sent in from facility. While in ED pt was seen by PCP from facility who referred pt to ED and reports pt currently is significantly altered from his baseline mental status. Pt baseline A&Ox2. On presentation to the ED, patient was noted to be hypercarbic and was also found to have moderate pleural effusions and was started on AVAPS.     metabolic alkalosis-  improved . Resume Lasix IV injection daily. Monitor Bicarb closely   Pleural  effusion- resume  Lasix drip. No thoracentesis recommended  Acute hypoxic  and hypercapnic  respiratory  failure due to CHF /PNA- held  IV Lasix drip due to metabolic alkalosis   Continue IV abx. Off AVAPS just use PRN. Continue  oxygen 6L wean off oxygen as tolerated.  PCO2 down to normal. F/u pulmonologist  and cardiologist.  CHF- continue  Lasix drip and added Aldactone. F/u echocardiogram Monitor potassium and renal function.   PNA- change IV abx to Zosyn per pulmonologist  recommendations.   AMS due to metabolic encephalopathy  due to hypercapnia- resolving back to baseline.   Leukocytosis - resolved . Continue IV abx.  Hx of recent hypoxic respiratory failure-  resolved, 2/2 mucus plugging as seen on CT. S/P Bronch on 6/15 with mucus plug in RMB/RLL, S/P therapeutic suctioning, CT Chest w/ Occlusion of bronchus intermedius, RML and RLL bronchi w/ postobstructive RLL collapse, Patchy B/L airspace opacities; S/P Bronchoscopy  GEMA - resolved, Likely 2/2 ARIANE; S/P Gill catheter due to urinary retention, Renal US neg for hydronephroses, Avoid nephrotoxic agents,   Large lytic lesion of L humerus - CT Chest w/ 3.7 cm with destructive changes within head of left humerus.  MR L shoulder/humerus w/ Large rim-enhancing lesion in proximal humerus, soft tissue extension, Moderate glenohumeral joint effusion, severe degenerative arthrosis  Hepatic lesion - outpatient GI eval for further evaluation and close monitoring  Elevated LFT - monitor   Paroxysmal A-fib - on Toprol XL 25, off AC due to risk of fall, he has not been on it since 2020  Hiatal Hernia /GERD - on PPI BID  Anemia - Occult negative; S/P 1 unit of PRBCs, S/P IV Iron and now on PO Folic Acid  Asthma / CAROL / RLL Atelectasis, stable oxygenation, Supplement to maintain > 90%, Incentive spirometer  Hemangioma- seen on CT scan.  Prostate CA - follow up urologist  DVT - on lovenox SC 40 mg daily.     
88 y/o male with PMHx of pAF (no longer on Eliquis as per chart review) prostate CA in remission s/p radiation seed implants (~2004), large hiatal hernia, iron deficiency anemia, asthma, sleep apnea (does not wear CPAP), GERD, osteoarthritis, carpal tunnel, recent admission discharge last month July 5 for abdominal pain found to have pneumoperitoneum, status post emergent diagnostic lap converted to open status post gastric repair for perforation with Sandeep patch on TPN which was weaned off and transition to p.o. diet, also with acute hypoxic respiratory failure due to mucous plugging status post bronc on 6/15 with suctioning of mucous plug, treated with abx for PNA, GEMA, lytic lesion of humerus evaluated by heme/onc and ortho, previously elevated LFTs i/s/o liver lesion, anemia with FOB negative presents to ED today BIBEMS for SOB/hypoxia and AMS sent in from facility. While in ED pt was seen by PCP from facility who referred pt to ED and reports pt currently is significantly altered from his baseline mental status. Pt baseline A&Ox2. On presentation to the ED, patient was noted to be hypercarbic and was also found to have moderate pleural effusions and was started on AVAPS.     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 Liver enzymes improved. F/u  psych.   metabolic alkalosis-  improved . Resume Lasix IV injection daily. Monitor Bicarb closely   Pleural  effusion- resume  Lasix drip. No thoracentesis recommended  Acute hypoxic  and hypercapnic  respiratory  failure due to CHF /PNA- held  IV Lasix drip due to metabolic alkalosis   Continue IV abx. Off AVAPS just use PRN. Continue  oxygen 6L wean off oxygen as tolerated.  PCO2 down to normal. F/u pulmonologist  and cardiologist.  CHF- continue  Lasix drip and added Aldactone. F/u echocardiogram Monitor potassium and renal function.   PNA- change IV abx to Zosyn per pulmonologist  recommendations.   AMS due to metabolic encephalopathy  due to hypercapnia- resolving back to baseline.   Leukocytosis - resolved . Continue IV abx.  Hx of recent hypoxic respiratory failure-  resolved, 2/2 mucus plugging as seen on CT. S/P Bronch on 6/15 with mucus plug in RMB/RLL, S/P therapeutic suctioning, CT Chest w/ Occlusion of bronchus intermedius, RML and RLL bronchi w/ postobstructive RLL collapse, Patchy B/L airspace opacities; S/P Bronchoscopy  GEMA - resolved, Likely 2/2 ARIANE; S/P Gill catheter due to urinary retention, Renal US neg for hydronephroses, Avoid nephrotoxic agents,   Large lytic lesion of L humerus - CT Chest w/ 3.7 cm with destructive changes within head of left humerus.  MR L shoulder/humerus w/ Large rim-enhancing lesion in proximal humerus, soft tissue extension, Moderate glenohumeral joint effusion, severe degenerative arthrosis  Hepatic lesion - outpatient GI eval for further evaluation and close monitoring  Elevated LFT - monitor   Paroxysmal A-fib - on Toprol XL 25, off AC due to risk of fall, he has not been on it since 2020  Hiatal Hernia /GERD - on PPI BID  Anemia - Occult negative; S/P 1 unit of PRBCs, S/P IV Iron and now on PO Folic Acid  Asthma / CAROL / RLL Atelectasis, stable oxygenation, Supplement to maintain > 90%, Incentive spirometer  Hemangioma- seen on CT scan.  Prostate CA - follow up urologist  DVT - on lovenox SC 40 mg daily.     
88 y/o M with PMH of PAF (no longer on Eliquis as per chart review), prostate CA in remission s/p radiation seed implants (~2004), large hiatal hernia, iron deficiency anemia, asthma, sleep apnea (does not wear CPAP), GERD, osteoarthritis, carpal tunnel. Recent admission discharge last month July 5 for abdominal pain found to have pneumoperitoneum, status post emergent diagnostic lap converted to open status post gastric repair for perforation with Sandeep patch on TPN which was weaned off and transition to p.o. diet, also with acute hypoxic respiratory failure due to mucous plugging status post bron on 6/15 with suctioning of mucous plug, treated with abx for PNA. Presents from a facility with SOB, hypoxia and AMS.      
88 y/o M with PMH of PAF (no longer on Eliquis as per chart review), prostate CA in remission s/p radiation seed implants (~2004), large hiatal hernia, iron deficiency anemia, asthma, sleep apnea (does not wear CPAP), GERD, osteoarthritis, carpal tunnel. Recent admission discharge last month July 5 for abdominal pain found to have pneumoperitoneum, status post emergent diagnostic lap converted to open status post gastric repair for perforation with Sandeep patch on TPN which was weaned off and transition to p.o. diet, also with acute hypoxic respiratory failure due to mucous plugging status post bron on 6/15 with suctioning of mucous plug, treated with abx for PNA. Presents from a facility with SOB, hypoxia and AMS.      
89 y/o male with PMHx of pAF (no longer on Eliquis as per chart review) prostate CA in remission s/p radiation seed implants (~2004), large hiatal hernia, iron deficiency anemia, asthma, sleep apnea (does not wear CPAP), GERD, osteoarthritis, carpal tunnel, recent admission discharge last month July 5 for abdominal pain found to have pneumoperitoneum, status post emergent diagnostic lap converted to open status post gastric repair for perforation with Sandeep patch on TPN which was weaned off and transition to p.o. diet, also with acute hypoxic respiratory failure due to mucous plugging status post bronc on 6/15 with suctioning of mucous plug, treated with abx for PNA, GEMA, lytic lesion of humerus evaluated by heme/onc and ortho, previously elevated LFTs i/s/o liver lesion, anemia with FOB negative presents to ED today BIBEMS for SOB/hypoxia and AMS sent in from facility. While in ED pt was seen by PCP from facility who referred pt to ED and reports pt currently is significantly altered from his baseline mental status. Pt baseline A&Ox2. On presentation to the ED, patient was noted to be hypercarbic and was also found to have moderate pleural effusions and was started on AVAPS.     Acute hypoxic  and hypercapnic  respiratory  failure due to CHF /PNA- continue IV Lasix drip and IV abx. Off AVAPS just use PRN. Continue  oxygen 6L wean off oxygen as tolerated. F/u pulmonologist  and cardiologist.  AMS due to metabolic encephalopathy  due to hypercapnia- resolving.   Leukocytosis - resolved . Continue IV abx.  Hx of recent hypoxic respiratory failure-  resolved, 2/2 mucus plugging as seen on CT. S/P Bronch on 6/15 with mucus plug in RMB/RLL, S/P therapeutic suctioning, CT Chest w/ Occlusion of bronchus intermedius, RML and RLL bronchi w/ postobstructive RLL collapse, Patchy B/L airspace opacities; S/P Bronchoscopy  GEMA - resolved, Likely 2/2 ARIANE; S/P Gill catheter due to urinary retention, Renal US neg for hydronephroses, Avoid nephrotoxic agents,   Large lytic lesion of L humerus - CT Chest w/ 3.7 cm with destructive changes within head of left humerus.  MR L shoulder/humerus w/ Large rim-enhancing lesion in proximal humerus, soft tissue extension, Moderate glenohumeral joint effusion, severe degenerative arthrosis  Hepatic lesion - outpatient GI eval for further evaluation and close monitoring  Elevated LFT - monitor   Paroxysmal A-fib - on Toprol XL 25, off AC due to risk of fall, he has not been on it since 2020  Hiatal Hernia /GERD - on PPI BID  Anemia - Occult negative; S/P 1 unit of PRBCs, S/P IV Iron and now on PO Folic Acid  Asthma / CAROL / RLL Atelectasis, stable oxygenation, Supplement to maintain > 90%, Incentive spirometer  Hemangioma- seen on CT scan.  Prostate CA - follow up urologist  DVT - on lovenox SC 40 mg daily.     
89 y/o M with PMH of PAF (no longer on Eliquis as per chart review), prostate CA in remission s/p radiation seed implants (~2004), large hiatal hernia, iron deficiency anemia, asthma, sleep apnea (does not wear CPAP), GERD, osteoarthritis, carpal tunnel. Recent admission discharge last month July 5 for abdominal pain found to have pneumoperitoneum, status post emergent diagnostic lap converted to open status post gastric repair for perforation with Sandeep patch on TPN which was weaned off and transition to p.o. diet, also with acute hypoxic respiratory failure due to mucous plugging status post bron on 6/15 with suctioning of mucous plug, treated with abx for PNA. Presents from a facility with SOB, hypoxia and AMS.      
89 y/o M with PMH of PAF (no longer on Eliquis as per chart review), prostate CA in remission s/p radiation seed implants (~2004), large hiatal hernia, iron deficiency anemia, asthma, sleep apnea (does not wear CPAP), GERD, osteoarthritis, carpal tunnel. Recent admission discharge last month July 5 for abdominal pain found to have pneumoperitoneum, status post emergent diagnostic lap converted to open status post gastric repair for perforation with Sandeep patch on TPN which was weaned off and transition to p.o. diet, also with acute hypoxic respiratory failure due to mucous plugging status post bron on 6/15 with suctioning of mucous plug, treated with abx for PNA. Presents from a facility with SOB, hypoxia and AMS.      
88 y/o M with PMH of PAF (no longer on Eliquis as per chart review), prostate CA in remission s/p radiation seed implants (~2004), large hiatal hernia, iron deficiency anemia, asthma, sleep apnea (does not wear CPAP), GERD, osteoarthritis, carpal tunnel. Recent admission discharge last month July 5 for abdominal pain found to have pneumoperitoneum, status post emergent diagnostic lap converted to open status post gastric repair for perforation with Sandeep patch on TPN which was weaned off and transition to p.o. diet, also with acute hypoxic respiratory failure due to mucous plugging status post bron on 6/15 with suctioning of mucous plug, treated with abx for PNA. Presents from a facility with SOB, hypoxia and AMS.

## 2024-08-23 NOTE — PROGRESS NOTE ADULT - PROBLEM SELECTOR PLAN 1
likely 2nd to volume overload + possible PNA  On exam noted to have kyphoscoliosis which may be contributing to hypoventilation  -CT chest with moderate b/l pl effusions and compressive atelectasis   -Keep O>I as tolerated  -Empiric abx ? can complete 5 days of treatment given concern for possible pneumonia   -AM ABG 8/16 noted. Continue AVAPS qHS , min pressure 10, max pressure 25, ePAP 5, RR 16, FiO2 40%  -Titrate down NC oxygen to maintain saturation > 90%  -avoid benzos if possible as this may worsen hypercapnia
likely 2nd to volume overload + possible PNA  -CT chest with moderate b/l pl effusions and compressive atelectasis   -Keep O>I as tolerated  -Empiric abx ?  -AM ABG 8/16 noted. Continue AVAPS qHS , min pressure 10, max pressure 25, ePAP 5, RR 16, FiO2 40%  -Titrate down NC oxygen to maintain saturation > 90%
likely 2nd to volume overload + possible PNA  On exam noted to have kyphoscoliosis which may be contributing to hypoventilation  -CT chest with moderate b/l pl effusions and compressive atelectasis   -Keep O>I as tolerated  -S/p 5 day course of empiric abx   -Hypoxia resolved, keep sats >90% with o2 PRN.  -Monitored off AVAPS overnight 8/21-8/22. AM ABG reviewed from 8/22, no need for NIV at this time.
likely 2nd to volume overload + possible PNA  On exam noted to have kyphoscoliosis which may be contributing to hypoventilation  -CT chest with moderate b/l pl effusions and compressive atelectasis   -Keep O>I as tolerated  -S/p 5 day course of empiric abx   -Hypoxia resolved, keep sats >90% with o2 PRN.  -Monitored off AVAPS overnight 8/21-8/22. AM ABG reviewed, no need for NIV at this time.
likely 2nd to volume overload + possible PNA  -CT chest with moderate b/l pl effusions and compressive atelectasis   -Keep O>I as tolerated  -Continue abx   -AM ABG 8/16 noted. Continue AVAPS qHS , min pressure 10, max pressure 25, ePAP 5, RR 16, FiO2 40% through the weekend. Will continue to re-evaluate need.
likely 2nd to volume overload + possible PNA  On exam noted to have kyphoscoliosis which may be contributing to hypoventilation  -CT chest with moderate b/l pl effusions and compressive atelectasis   -Keep O>I as tolerated  -S/p 5 day course of empiric abx   -Seen on RA with sats 100%. Keep sats >90% with o2 PRN.  -avoid benzos if possible as this may worsen hypercapnia  -Monitor off AVAPS tonight and check ABG in AM. If change in respiratory status can re-instate AVAPS , min pressure 10, max pressure 25, ePAP 5, RR 16, FiO2 30%

## 2024-08-24 LAB
ANION GAP SERPL CALC-SCNC: 14 MMOL/L — SIGNIFICANT CHANGE UP (ref 5–17)
BUN SERPL-MCNC: 32 MG/DL — HIGH (ref 7–23)
CALCIUM SERPL-MCNC: 8.9 MG/DL — SIGNIFICANT CHANGE UP (ref 8.4–10.5)
CHLORIDE SERPL-SCNC: 98 MMOL/L — SIGNIFICANT CHANGE UP (ref 96–108)
CO2 SERPL-SCNC: 28 MMOL/L — SIGNIFICANT CHANGE UP (ref 22–31)
CREAT SERPL-MCNC: 1.32 MG/DL — HIGH (ref 0.5–1.3)
EGFR: 52 ML/MIN/1.73M2 — LOW
GLUCOSE SERPL-MCNC: 92 MG/DL — SIGNIFICANT CHANGE UP (ref 70–99)
MAGNESIUM SERPL-MCNC: 2.3 MG/DL — SIGNIFICANT CHANGE UP (ref 1.6–2.6)
PHOSPHATE SERPL-MCNC: 3.1 MG/DL — SIGNIFICANT CHANGE UP (ref 2.5–4.5)
POTASSIUM SERPL-MCNC: 4.3 MMOL/L — SIGNIFICANT CHANGE UP (ref 3.5–5.3)
POTASSIUM SERPL-SCNC: 4.3 MMOL/L — SIGNIFICANT CHANGE UP (ref 3.5–5.3)
SODIUM SERPL-SCNC: 140 MMOL/L — SIGNIFICANT CHANGE UP (ref 135–145)

## 2024-08-24 RX ORDER — FUROSEMIDE 40 MG
40 TABLET ORAL DAILY
Refills: 0 | Status: DISCONTINUED | OUTPATIENT
Start: 2024-08-24 | End: 2024-08-25

## 2024-08-24 RX ADMIN — Medication 1 MILLIGRAM(S): at 12:56

## 2024-08-24 RX ADMIN — Medication 40 MILLIGRAM(S): at 05:09

## 2024-08-24 RX ADMIN — APIXABAN 2.5 MILLIGRAM(S): 5 TABLET, FILM COATED ORAL at 16:46

## 2024-08-24 RX ADMIN — IPRATROPIUM BROMIDE AND ALBUTEROL SULFATE 3 MILLILITER(S): .5; 3 SOLUTION RESPIRATORY (INHALATION) at 12:56

## 2024-08-24 RX ADMIN — IPRATROPIUM BROMIDE AND ALBUTEROL SULFATE 3 MILLILITER(S): .5; 3 SOLUTION RESPIRATORY (INHALATION) at 05:09

## 2024-08-24 RX ADMIN — Medication 81 MILLIGRAM(S): at 12:56

## 2024-08-24 RX ADMIN — IPRATROPIUM BROMIDE AND ALBUTEROL SULFATE 3 MILLILITER(S): .5; 3 SOLUTION RESPIRATORY (INHALATION) at 22:58

## 2024-08-24 RX ADMIN — IPRATROPIUM BROMIDE AND ALBUTEROL SULFATE 3 MILLILITER(S): .5; 3 SOLUTION RESPIRATORY (INHALATION) at 16:46

## 2024-08-24 RX ADMIN — METOPROLOL TARTRATE 100 MILLIGRAM(S): 100 TABLET ORAL at 05:09

## 2024-08-24 RX ADMIN — SPIRONOLACTONE 25 MILLIGRAM(S): 25 TABLET, FILM COATED ORAL at 05:09

## 2024-08-24 RX ADMIN — Medication 5 MILLIGRAM(S): at 22:58

## 2024-08-24 RX ADMIN — APIXABAN 2.5 MILLIGRAM(S): 5 TABLET, FILM COATED ORAL at 05:09

## 2024-08-25 VITALS
SYSTOLIC BLOOD PRESSURE: 100 MMHG | OXYGEN SATURATION: 98 % | RESPIRATION RATE: 18 BRPM | HEART RATE: 93 BPM | TEMPERATURE: 98 F | DIASTOLIC BLOOD PRESSURE: 64 MMHG

## 2024-08-25 LAB
ANION GAP SERPL CALC-SCNC: 11 MMOL/L — SIGNIFICANT CHANGE UP (ref 5–17)
BUN SERPL-MCNC: 33 MG/DL — HIGH (ref 7–23)
CALCIUM SERPL-MCNC: 8.6 MG/DL — SIGNIFICANT CHANGE UP (ref 8.4–10.5)
CHLORIDE SERPL-SCNC: 97 MMOL/L — SIGNIFICANT CHANGE UP (ref 96–108)
CO2 SERPL-SCNC: 27 MMOL/L — SIGNIFICANT CHANGE UP (ref 22–31)
CREAT SERPL-MCNC: 1.34 MG/DL — HIGH (ref 0.5–1.3)
EGFR: 51 ML/MIN/1.73M2 — LOW
GLUCOSE SERPL-MCNC: 109 MG/DL — HIGH (ref 70–99)
HCT VFR BLD CALC: 32.4 % — LOW (ref 39–50)
HGB BLD-MCNC: 10.3 G/DL — LOW (ref 13–17)
MCHC RBC-ENTMCNC: 29.5 PG — SIGNIFICANT CHANGE UP (ref 27–34)
MCHC RBC-ENTMCNC: 31.8 GM/DL — LOW (ref 32–36)
MCV RBC AUTO: 92.8 FL — SIGNIFICANT CHANGE UP (ref 80–100)
NRBC # BLD: 0 /100 WBCS — SIGNIFICANT CHANGE UP (ref 0–0)
PLATELET # BLD AUTO: 330 K/UL — SIGNIFICANT CHANGE UP (ref 150–400)
POTASSIUM SERPL-MCNC: 4.4 MMOL/L — SIGNIFICANT CHANGE UP (ref 3.5–5.3)
POTASSIUM SERPL-SCNC: 4.4 MMOL/L — SIGNIFICANT CHANGE UP (ref 3.5–5.3)
RBC # BLD: 3.49 M/UL — LOW (ref 4.2–5.8)
RBC # FLD: 18.6 % — HIGH (ref 10.3–14.5)
SODIUM SERPL-SCNC: 135 MMOL/L — SIGNIFICANT CHANGE UP (ref 135–145)
WBC # BLD: 9.89 K/UL — SIGNIFICANT CHANGE UP (ref 3.8–10.5)
WBC # FLD AUTO: 9.89 K/UL — SIGNIFICANT CHANGE UP (ref 3.8–10.5)

## 2024-08-25 PROCEDURE — 94660 CPAP INITIATION&MGMT: CPT

## 2024-08-25 PROCEDURE — 93005 ELECTROCARDIOGRAM TRACING: CPT

## 2024-08-25 PROCEDURE — 83605 ASSAY OF LACTIC ACID: CPT

## 2024-08-25 PROCEDURE — 87086 URINE CULTURE/COLONY COUNT: CPT

## 2024-08-25 PROCEDURE — 94640 AIRWAY INHALATION TREATMENT: CPT

## 2024-08-25 PROCEDURE — 84132 ASSAY OF SERUM POTASSIUM: CPT

## 2024-08-25 PROCEDURE — 71045 X-RAY EXAM CHEST 1 VIEW: CPT

## 2024-08-25 PROCEDURE — 96365 THER/PROPH/DIAG IV INF INIT: CPT

## 2024-08-25 PROCEDURE — 85018 HEMOGLOBIN: CPT

## 2024-08-25 PROCEDURE — 36415 COLL VENOUS BLD VENIPUNCTURE: CPT

## 2024-08-25 PROCEDURE — 85610 PROTHROMBIN TIME: CPT

## 2024-08-25 PROCEDURE — 82947 ASSAY GLUCOSE BLOOD QUANT: CPT

## 2024-08-25 PROCEDURE — 99285 EMERGENCY DEPT VISIT HI MDM: CPT

## 2024-08-25 PROCEDURE — 97161 PT EVAL LOW COMPLEX 20 MIN: CPT

## 2024-08-25 PROCEDURE — 97530 THERAPEUTIC ACTIVITIES: CPT

## 2024-08-25 PROCEDURE — 81001 URINALYSIS AUTO W/SCOPE: CPT

## 2024-08-25 PROCEDURE — 80053 COMPREHEN METABOLIC PANEL: CPT

## 2024-08-25 PROCEDURE — 83735 ASSAY OF MAGNESIUM: CPT

## 2024-08-25 PROCEDURE — 84295 ASSAY OF SERUM SODIUM: CPT

## 2024-08-25 PROCEDURE — 84484 ASSAY OF TROPONIN QUANT: CPT

## 2024-08-25 PROCEDURE — 96367 TX/PROPH/DG ADDL SEQ IV INF: CPT

## 2024-08-25 PROCEDURE — 82330 ASSAY OF CALCIUM: CPT

## 2024-08-25 PROCEDURE — 85025 COMPLETE CBC W/AUTO DIFF WBC: CPT

## 2024-08-25 PROCEDURE — 87641 MR-STAPH DNA AMP PROBE: CPT

## 2024-08-25 PROCEDURE — 85014 HEMATOCRIT: CPT

## 2024-08-25 PROCEDURE — 87640 STAPH A DNA AMP PROBE: CPT

## 2024-08-25 PROCEDURE — 93308 TTE F-UP OR LMTD: CPT

## 2024-08-25 PROCEDURE — 87637 SARSCOV2&INF A&B&RSV AMP PRB: CPT

## 2024-08-25 PROCEDURE — 85027 COMPLETE CBC AUTOMATED: CPT

## 2024-08-25 PROCEDURE — 84145 PROCALCITONIN (PCT): CPT

## 2024-08-25 PROCEDURE — 80048 BASIC METABOLIC PNL TOTAL CA: CPT

## 2024-08-25 PROCEDURE — 92526 ORAL FUNCTION THERAPY: CPT

## 2024-08-25 PROCEDURE — 70496 CT ANGIOGRAPHY HEAD: CPT | Mod: MC

## 2024-08-25 PROCEDURE — 92610 EVALUATE SWALLOWING FUNCTION: CPT

## 2024-08-25 PROCEDURE — 97110 THERAPEUTIC EXERCISES: CPT

## 2024-08-25 PROCEDURE — 84100 ASSAY OF PHOSPHORUS: CPT

## 2024-08-25 PROCEDURE — 96375 TX/PRO/DX INJ NEW DRUG ADDON: CPT

## 2024-08-25 PROCEDURE — 70450 CT HEAD/BRAIN W/O DYE: CPT | Mod: MC

## 2024-08-25 PROCEDURE — 82803 BLOOD GASES ANY COMBINATION: CPT

## 2024-08-25 PROCEDURE — 71275 CT ANGIOGRAPHY CHEST: CPT | Mod: MC

## 2024-08-25 PROCEDURE — 70498 CT ANGIOGRAPHY NECK: CPT | Mod: MC

## 2024-08-25 PROCEDURE — 82962 GLUCOSE BLOOD TEST: CPT

## 2024-08-25 PROCEDURE — 85730 THROMBOPLASTIN TIME PARTIAL: CPT

## 2024-08-25 PROCEDURE — 82435 ASSAY OF BLOOD CHLORIDE: CPT

## 2024-08-25 PROCEDURE — 83615 LACTATE (LD) (LDH) ENZYME: CPT

## 2024-08-25 PROCEDURE — 74177 CT ABD & PELVIS W/CONTRAST: CPT | Mod: MC

## 2024-08-25 PROCEDURE — 87040 BLOOD CULTURE FOR BACTERIA: CPT

## 2024-08-25 PROCEDURE — 36600 WITHDRAWAL OF ARTERIAL BLOOD: CPT

## 2024-08-25 PROCEDURE — 83880 ASSAY OF NATRIURETIC PEPTIDE: CPT

## 2024-08-25 RX ORDER — SPIRONOLACTONE 25 MG/1
12.5 TABLET, FILM COATED ORAL DAILY
Refills: 0 | Status: DISCONTINUED | OUTPATIENT
Start: 2024-08-25 | End: 2024-08-25

## 2024-08-25 RX ORDER — FUROSEMIDE 40 MG
1 TABLET ORAL
Qty: 0 | Refills: 0 | DISCHARGE
Start: 2024-08-25

## 2024-08-25 RX ORDER — SPIRONOLACTONE 25 MG/1
0.5 TABLET, FILM COATED ORAL
Qty: 0 | Refills: 0 | DISCHARGE
Start: 2024-08-25

## 2024-08-25 RX ORDER — APIXABAN 5 MG/1
1 TABLET, FILM COATED ORAL
Qty: 0 | Refills: 0 | DISCHARGE
Start: 2024-08-25

## 2024-08-25 RX ORDER — LIDOCAINE/BENZALKONIUM/ALCOHOL
0 SOLUTION, NON-ORAL TOPICAL
Refills: 0 | DISCHARGE

## 2024-08-25 RX ORDER — DIVALPROEX SODIUM 125 MG/1
1 CAPSULE, DELAYED RELEASE ORAL
Qty: 0 | Refills: 0 | DISCHARGE
Start: 2024-08-25

## 2024-08-25 RX ORDER — METHYLPREDNISOLONE 4 MG
0 TABLET ORAL
Refills: 0 | DISCHARGE

## 2024-08-25 RX ORDER — SODIUM BICARBONATE 84 MG/ML
1 INJECTION, SOLUTION INTRAVENOUS
Refills: 0 | DISCHARGE

## 2024-08-25 RX ORDER — FUROSEMIDE 40 MG
1 TABLET ORAL
Refills: 0 | DISCHARGE

## 2024-08-25 RX ADMIN — Medication 40 MILLIGRAM(S): at 05:38

## 2024-08-25 RX ADMIN — IPRATROPIUM BROMIDE AND ALBUTEROL SULFATE 3 MILLILITER(S): .5; 3 SOLUTION RESPIRATORY (INHALATION) at 05:36

## 2024-08-25 RX ADMIN — IPRATROPIUM BROMIDE AND ALBUTEROL SULFATE 3 MILLILITER(S): .5; 3 SOLUTION RESPIRATORY (INHALATION) at 13:00

## 2024-08-25 RX ADMIN — IPRATROPIUM BROMIDE AND ALBUTEROL SULFATE 3 MILLILITER(S): .5; 3 SOLUTION RESPIRATORY (INHALATION) at 16:13

## 2024-08-25 RX ADMIN — Medication 81 MILLIGRAM(S): at 13:00

## 2024-08-25 RX ADMIN — Medication 1 MILLIGRAM(S): at 13:00

## 2024-08-25 RX ADMIN — Medication 40 MILLIGRAM(S): at 05:40

## 2024-08-25 RX ADMIN — APIXABAN 2.5 MILLIGRAM(S): 5 TABLET, FILM COATED ORAL at 16:13

## 2024-08-25 RX ADMIN — APIXABAN 2.5 MILLIGRAM(S): 5 TABLET, FILM COATED ORAL at 05:36

## 2024-08-25 RX ADMIN — DIVALPROEX SODIUM 250 MILLIGRAM(S): 125 CAPSULE, DELAYED RELEASE ORAL at 01:50

## 2024-08-25 RX ADMIN — SPIRONOLACTONE 25 MILLIGRAM(S): 25 TABLET, FILM COATED ORAL at 05:38

## 2024-08-25 NOTE — PROGRESS NOTE ADULT - PROVIDER SPECIALTY LIST ADULT
Cardiology
Cardiology
Internal Medicine
Internal Medicine
Pulmonology
Cardiology
Internal Medicine
Cardiology
Internal Medicine
Pulmonology
Internal Medicine
Internal Medicine
Pulmonology
Internal Medicine
Pulmonology

## 2024-08-25 NOTE — DISCHARGE NOTE NURSING/CASE MANAGEMENT/SOCIAL WORK - NSDCPEELIQUISFU_GEN_ALL_CORE
Go for blood tests as directed. Your doctor will do lab tests at regular visits to monitor the effects of this medicine. Please follow up with your doctor and keep your health care provider appointments.
1

## 2024-08-25 NOTE — DISCHARGE NOTE NURSING/CASE MANAGEMENT/SOCIAL WORK - NSDCPEFALRISK_GEN_ALL_CORE
For information on Fall & Injury Prevention, visit: [email or web address] OR  [email or web address] OR  https://www.cdc.gov/steadi/patient.html

## 2024-08-25 NOTE — DISCHARGE NOTE NURSING/CASE MANAGEMENT/SOCIAL WORK - NSDCVIVACCINE_GEN_ALL_CORE_FT
Tdap; 06-Jul-2024 20:48; Jyothi Alexandra (RN); [employer]; [id number] (Exp. Date: 30-Nov-2025); IntraMuscular; Deltoid Right.; 0.5 milliLiter(s); VIS (VIS Published: 09-May-2013, VIS Presented: 06-Jul-2024);

## 2024-08-25 NOTE — DISCHARGE NOTE NURSING/CASE MANAGEMENT/SOCIAL WORK - PATIENT PORTAL LINK FT
You can access the FollowMyHealth Patient Portal offered by [facility] by registering at the following website: [email or web address]. By joining [email or web address]’s FollowMyHealth portal, you will also be able to view your health information using other applications (apps) compatible with our system.

## 2024-08-25 NOTE — DISCHARGE NOTE PROVIDER - HOSPITAL COURSE
HPI:  88 y/o male with PMHx of pAF (no longer on Eliquis as per chart review) prostate CA in remission s/p radiation seed implants (~2004), large hiatal hernia, iron deficiency anemia, asthma, sleep apnea (does not wear CPAP), GERD, osteoarthritis, carpal tunnel, recent admission discharge last month July 5 for abdominal pain found to have pneumoperitoneum, status post emergent diagnostic lap converted to open status post gastric repair for perforation with Sandeep patch on TPN which was weaned off and transition to p.o. diet, also with acute hypoxic respiratory failure due to mucous plugging status post bronc on 6/15 with suctioning of mucous plug, treated with abx for PNA, GEMA, lytic lesion of humerus evaluated by heme/onc and ortho, previously elevated LFTs i/s/o liver lesion, anemia with FOB negative presents to ED today BIBEMS for SOB/hypoxia and AMS sent in from facility. While in ED pt was seen by PCP from facility who referred pt to ED and reports pt currently is significantly altered from his baseline mental status. Pt baseline A&Ox2. On presentation to the ED, patient was noted to be hypercarbic and was also found to have moderate pleural effusions and was started on AVAPS.      (14 Aug 2024 21:32)    Hospital Course:  chf systolic acute on chronic  pt with sig cardiac and medical hx with increasing sob and change of mental status  echo noted with sig AI ans wall motion abnormality  tel , a.fib , hr control  echo noted  start on lasix drip, strict i/o, good UO  increase co2, dc sodium bicarb, fu lyte, add aldactone 25 mg daily  echo with sig regional wall motion abnormality ?ischemia schulz will need to discuss GOC  repeat chestv x ray if sig fluid will consider thoracentesis if symptomatic  chest x ray noted improvement in pleural effusion and atlectasis  pulmonary noted  A.fib , hr is well controlled on AC  renal function and lytes are improving  re start on lasix daily, pulmonary noted continue to fu lytes and renal function  lytes noted  tele a.fib 90 to 120 bpm  renal function and lytes noted  Electrolytes and renal function noted, continue diuresis  bp and hr is well controlled  dc planning/ oob to the chair  could not tolerate ACRE or ARB sec to hypotension and CKD    Important Medication Changes and Reason: lasix increased to 40 mg, aldactone, and depakote prn    Active or Pending Issues Requiring Follow-up: cardio, pulm     Advanced Directives:   [ x] Full code  [ ] DNR  [ ] Hospice    Discharge Diagnoses:  chf         HPI:  88 y/o male with PMHx of pAF (no longer on Eliquis as per chart review) prostate CA in remission s/p radiation seed implants (~2004), large hiatal hernia, iron deficiency anemia, asthma, sleep apnea (does not wear CPAP), GERD, osteoarthritis, carpal tunnel, recent admission discharge last month July 5 for abdominal pain found to have pneumoperitoneum, status post emergent diagnostic lap converted to open status post gastric repair for perforation with Sandeep patch on TPN which was weaned off and transition to p.o. diet, also with acute hypoxic respiratory failure due to mucous plugging status post bronc on 6/15 with suctioning of mucous plug, treated with abx for PNA, GEMA, lytic lesion of humerus evaluated by heme/onc and ortho, previously elevated LFTs i/s/o liver lesion, anemia with FOB negative presents to ED today BIBEMS for SOB/hypoxia and AMS sent in from facility. While in ED pt was seen by PCP from facility who referred pt to ED and reports pt currently is significantly altered from his baseline mental status. Pt baseline A&Ox2. On presentation to the ED, patient was noted to be hypercarbic and was also found to have moderate pleural effusions and was started on AVAPS.      (14 Aug 2024 21:32)     Hospital Course:  chf systolic acute on chronic  pt with sig cardiac and medical hx with increasing sob and change of mental status  echo noted with sig AI ans wall motion abnormality  tel , a.fib , hr control  echo noted  start on lasix drip, strict i/o, good UO  increase co2, dc sodium bicarb, fu lyte, add aldactone 25 mg daily  echo with sig regional wall motion abnormality ?ischemia schulz will need to discuss GOC  repeat chestv x ray if sig fluid will consider thoracentesis if symptomatic  chest x ray noted improvement in pleural effusion and atlectasis  pulmonary noted  A.fib , hr is well controlled on AC  renal function and lytes are improving  re start on lasix daily, pulmonary noted continue to fu lytes and renal function  lytes noted  tele a.fib 90 to 120 bpm  renal function and lytes noted  Electrolytes and renal function noted, continue diuresis  bp and hr is well controlled  dc planning/ oob to the chair  could not tolerate ACRE or ARB sec to hypotension and CKD    Important Medication Changes and Reason: lasix increased to 40 mg, aldactone, and depakote prn    Active or Pending Issues Requiring Follow-up: cardio, pulm     Advanced Directives:   [ x] Full code  [ ] DNR  [ ] Hospice    Discharge Diagnoses:  chf

## 2024-08-25 NOTE — DISCHARGE NOTE PROVIDER - NSDCMRMEDTOKEN_GEN_ALL_CORE_FT
acetaminophen 325 mg oral tablet: 2 tab(s) orally every 6 hours as needed for pain  apixaban 2.5 mg oral tablet: 1 tab(s) orally 2 times a day  aspirin 81 mg oral tablet, chewable: 1 tab(s) chewed once a day  divalproex sodium 250 mg oral delayed release tablet: 1 tab(s) orally 3 times a day As needed agitation  DuoNeb 0.5 mg-2.5 mg/3 mL inhalation solution: 3 milliliter(s) by nebulizer 4 times a day  folic acid 1 mg oral tablet: 1 tab(s) orally once a day  furosemide 40 mg oral tablet: 1 tab(s) orally once a day  lidocaine 5% topical film: apply 1 patch topically to back area once a day  metoprolol succinate 100 mg oral tablet, extended release: 1 tab(s) orally once a day  pantoprazole 40 mg oral delayed release tablet: 1 tab(s) orally 2 times a day  spironolactone 25 mg oral tablet: 0.5 tab(s) orally once a day

## 2024-08-25 NOTE — DISCHARGE NOTE PROVIDER - CARE PROVIDER_API CALL
Nick Pulido  Internal Medicine  [address] [place] [id number]  Phone: [phone]  Fax: [phone]  Follow Up Time:

## 2024-08-25 NOTE — PROGRESS NOTE ADULT - SUBJECTIVE AND OBJECTIVE BOX
Date of Service: 08-15-24 @ 09:27           CARDIOLOGY     PROGRESS  NOTE   ________________________________________________    CHIEF COMPLAINT:Patient is a 89y old  Male who presents with a chief complaint of AMS (14 Aug 2024 21:32)  on Bipap  	  REVIEW OF SYSTEMS:  CONSTITUTIONAL: No fever, weight loss, or fatigue  EYES: No eye pain, visual disturbances, or discharge  ENT:  No difficulty hearing, tinnitus, vertigo; No sinus or throat pain  NECK: No pain or stiffness  RESPIRATORY: No cough, wheezing, chills or hemoptysis; No Shortness of Breath  CARDIOVASCULAR: No chest pain, palpitations, passing out, dizziness, or leg swelling  GASTROINTESTINAL: No abdominal or epigastric pain. No nausea, vomiting, or hematemesis; No diarrhea or constipation. No melena or hematochezia.  GENITOURINARY: No dysuria, frequency, hematuria, or incontinence  NEUROLOGICAL: No headaches, memory loss, loss of strength, numbness, or tremors  SKIN: No itching, burning, rashes, or lesions   LYMPH Nodes: No enlarged glands  ENDOCRINE: No heat or cold intolerance; No hair loss  MUSCULOSKELETAL: No joint pain or swelling; No muscle, back, or extremity pain  PSYCHIATRIC: No depression, anxiety, mood swings, or difficulty sleeping  HEME/LYMPH: No easy bruising, or bleeding gums  ALLERGY AND IMMUNOLOGIC: No hives or eczema	    [ ] All others negative	  [x ] Unable to obtain    PHYSICAL EXAM:  T(C): 36.4 (08-15-24 @ 04:12), Max: 36.7 (08-14-24 @ 10:29)  HR: 81 (08-15-24 @ 08:30) (75 - 95)  BP: 150/75 (08-15-24 @ 04:12) (111/65 - 159/89)  RR: 18 (08-15-24 @ 04:12) (17 - 20)  SpO2: 100% (08-15-24 @ 08:30) (85% - 100%)  Wt(kg): --  I&O's Summary      Appearance: lethargic  HEENT:   Normal oral mucosa, PERRL, EOMI	  Lymphatic: No lymphadenopathy  Cardiovascular: Normal S1 S2, No JVD, +murmurs, No edema  Respiratory:rhonchi  Gastrointestinal:  Soft, Non-tender, + BS	  Skin: No rashes, No ecchymoses, No cyanosis	  Neurologic: can not asses  Extremities:  No clubbing, cyanosis or edema  Vascular: Peripheral pulses palpable 2+ bilaterally    MEDICATIONS  (STANDING):  albuterol/ipratropium for Nebulization 3 milliLiter(s) Nebulizer every 6 hours  aspirin  chewable 81 milliGRAM(s) Oral daily  cefTRIAXone   IVPB 1000 milliGRAM(s) IV Intermittent every 24 hours  enoxaparin Injectable 40 milliGRAM(s) SubCutaneous every 24 hours  folic acid 1 milliGRAM(s) Oral daily  metoprolol succinate  milliGRAM(s) Oral daily  pantoprazole    Tablet 40 milliGRAM(s) Oral before breakfast  sodium bicarbonate 650 milliGRAM(s) Oral three times a day  vancomycin  IVPB 750 milliGRAM(s) IV Intermittent every 24 hours      TELEMETRY: 	    ECG:  	  RADIOLOGY:  OTHER: 	  	  LABS:	 	    CARDIAC MARKERS:                            8.6    7.01  )-----------( 220      ( 15 Aug 2024 07:01 )             29.6     08-15    144  |  103  |  42<H>  ----------------------------<  96  4.0   |  28  |  1.17    Ca    8.9      15 Aug 2024 07:01  Mg     2.3     08-14    TPro  6.1  /  Alb  2.9<L>  /  TBili  0.3  /  DBili  x   /  AST  57<H>  /  ALT  155<H>  /  AlkPhos  133<H>  08-15    proBNP:   Lipid Profile:   HgA1c:   TSH:   PT/INR - ( 14 Aug 2024 11:36 )   PT: 12.6 sec;   INR: 1.21 ratio         PTT - ( 14 Aug 2024 11:36 )  PTT:27.3 sec    < from: CT Angio Chest PE Protocol w/ IV Cont (08.14.24 @ 17:08) >  No pulmonary embolism to the level of the lobar pulmonary arteries.    Moderate bilateral pleural effusions and compressive atelectasis.    Small volume ascites and anasarca.    < from: TTE W or WO Ultrasound Enhancing Agent (06.15.24 @ 09:19) >   1. Left ventricular cavity is small. Left ventricular wall thickness is normal. Left ventricular systolic function is moderately decreased with an ejection fraction of 40 % by Sharma's method of disks. Global left ventricular hypokinesis.   2. Multiple segmental abnormalities exist. See findings.   3. There is mild (grade 1) left ventricular diastolic dysfunction, with elevated filling pressure.   4. Normal right ventricular cavity size, with normal wall thickness, and normal systolic function.   5. Moderate aortic regurgitation.   6. No pericardial effusion seen.   7. Compared to the transthoracic echocardiogram performed on 6/29/2019, there has been an interval decline in LV systolic function.   8. Technically difficult image quality.    < from: CT Abdomen and Pelvis w/ IV Cont (08.14.24 @ 17:09) >  LIVER: A 2.7 x 2.7 cm hemangioma in the right lobe is unchanged.   Additional subcentimeter hypodense focus, too small to characterize.  BILE DUCTS: Normal caliber.  GALLBLADDER: Within normal limits.  SPLEEN: Within normal limits.  PANCREAS: Within normal limits.  ADRENALS: Within normal limits.  KIDNEYS/URETERS: No hydronephrosis. Residual contrast in the collecting   system.      Assessment and plan  ---------------------------  88 y/o male with PMHx of pAF (no longer on Eliquis as per chart review) prostate CA in remission s/p radiation seed implants (~2004), large hiatal hernia, iron deficiency anemia, asthma, sleep apnea (does not wear CPAP), GERD, osteoarthritis, carpal tunnel, recent admission discharge last month July 5 for abdominal pain found to have pneumoperitoneum, status post emergent diagnostic lap converted to open status post gastric repair for perforation with Sandeep patch on TPN which was weaned off and transition to p.o. diet, also with acute hypoxic respiratory failure due to mucous plugging status post bronc on 6/15 with suctioning of mucous plug, treated with abx for PNA, GEMA, lytic lesion of humerus evaluated by heme/onc and ortho, previously elevated LFTs i/s/o liver lesion, anemia with FOB negative presents to ED today BIBEMS for SOB/hypoxia and AMS sent in from facility. While in ED pt was seen by PCP from facility who referred pt to ED and reports pt currently is significantly altered from his baseline mental status. Pt baseline A&Ox2  pt with sig cardiac and medical hx with increasing sob and change of mental status  pt on BiPAP  echo noted with sig AI ans wall motion abnormality  tel , a.fib , hr control  continue AC  will adjust cardiac meds  IV lasix/ diuresis  check lytes, noted  echo noted  start on lasix drip, strict i/o      	        
Date of Service: 08-22-24 @ 07:26           CARDIOLOGY     PROGRESS  NOTE   ________________________________________________    CHIEF COMPLAINT:Patient is a 89y old  Male who presents with a chief complaint of AMS (21 Aug 2024 20:47)  comfortable  	  REVIEW OF SYSTEMS:  CONSTITUTIONAL: No fever, weight loss, or fatigue  EYES: No eye pain, visual disturbances, or discharge  ENT:  No difficulty hearing, tinnitus, vertigo; No sinus or throat pain  NECK: No pain or stiffness  RESPIRATORY: No cough, wheezing, chills or hemoptysis; No Shortness of Breath  CARDIOVASCULAR: No chest pain, palpitations, passing out, dizziness, or leg swelling  GASTROINTESTINAL: No abdominal or epigastric pain. No nausea, vomiting, or hematemesis; No diarrhea or constipation. No melena or hematochezia.  GENITOURINARY: No dysuria, frequency, hematuria, or incontinence  NEUROLOGICAL: No headaches, memory loss, loss of strength, numbness, or tremors  SKIN: No itching, burning, rashes, or lesions   LYMPH Nodes: No enlarged glands  ENDOCRINE: No heat or cold intolerance; No hair loss  MUSCULOSKELETAL: No joint pain or swelling; No muscle, back, or extremity pain  PSYCHIATRIC: No depression, anxiety, mood swings, or difficulty sleeping  HEME/LYMPH: No easy bruising, or bleeding gums  ALLERGY AND IMMUNOLOGIC: No hives or eczema	    [ ] All others negative	  [x ] Unable to obtain    PHYSICAL EXAM:  T(C): 36.7 (08-22-24 @ 04:31), Max: 36.7 (08-22-24 @ 04:31)  HR: 107 (08-22-24 @ 04:31) (84 - 107)  BP: 114/56 (08-22-24 @ 04:31) (94/58 - 128/69)  RR: 18 (08-22-24 @ 04:31) (18 - 19)  SpO2: 91% (08-22-24 @ 04:31) (91% - 100%)  Wt(kg): --  I&O's Summary    21 Aug 2024 07:01  -  22 Aug 2024 07:00  --------------------------------------------------------  IN: 360 mL / OUT: 0 mL / NET: 360 mL        Appearance: comfortable  HEENT:   Normal oral mucosa, PERRL, EOMI	  Lymphatic: No lymphadenopathy  Cardiovascular: Normal S1 S2, No JVD, + murmurs, No edema  Respiratory:rhonchi	  Psychiatry dementia  Gastrointestinal:  Soft, Non-tender, + BS	  Skin: No rashes, No ecchymoses, No cyanosis	  Extremities: Normal range of motion, No clubbing, cyanosis or edema  Vascular: Peripheral pulses palpable 2+ bilaterally    MEDICATIONS  (STANDING):  albuterol/ipratropium for Nebulization 3 milliLiter(s) Nebulizer every 6 hours  apixaban 2.5 milliGRAM(s) Oral two times a day  aspirin  chewable 81 milliGRAM(s) Oral daily  folic acid 1 milliGRAM(s) Oral daily  furosemide   Injectable 40 milliGRAM(s) IV Push daily  melatonin 3 milliGRAM(s) Oral at bedtime  metoprolol succinate  milliGRAM(s) Oral daily  pantoprazole    Tablet 40 milliGRAM(s) Oral before breakfast  spironolactone 25 milliGRAM(s) Oral daily      TELEMETRY: 	    ECG:  	  RADIOLOGY:  OTHER: 	  	  LABS:	 	    CARDIAC MARKERS:                            11.6   8.41  )-----------( 266      ( 21 Aug 2024 10:01 )             38.5     08-22    138  |  93<L>  |  29<H>  ----------------------------<  105<H>  3.8   |  35<H>  |  1.34<H>    Ca    8.8      22 Aug 2024 06:12  Phos  2.2     08-22  Mg     2.2     08-22      proBNP:   Lipid Profile:   HgA1c:   TSH:       < from: 12 Lead ECG (08.21.24 @ 08:50) >  Diagnosis Line ATRIAL FIBRILLATION WITH PREMATURE VENTRICULAR OR ABERRANTLY CONDUCTED COMPLEXES  LEFT BUNDLE BRANCH BLOCK  ABNORMAL ECG  WHEN COMPARED WITH ECG OF  08-  PREMATURE VENTRICULAR COMPLEXES ARE NOW PRESENT      < from: TTE W or WO Ultrasound Enhancing Agent (06.15.24 @ 09:19) >   1. Left ventricular cavity is small. Left ventricular wall thickness is normal. Left ventricular systolic function is moderately decreased with an ejection fraction of 40 % by Sharma's method of disks. Global left ventricular hypokinesis.   2. Multiple segmental abnormalities exist. See findings.   3. There is mild (grade 1) left ventricular diastolic dysfunction, with elevated filling pressure.   4. Normal right ventricular cavity size, with normal wall thickness, and normal systolic function.   5. Moderate aortic regurgitation.   6. No pericardial effusion seen.   7. Compared to the transthoracic echocardiogram performed on 6/29/2019, there has been an interval decline in LV systolic function.   8. Technically difficult image quality.        < from: Xray Chest 1 View- PORTABLE-Urgent (Xray Chest 1 View- PORTABLE-Urgent .) (08.19.24 @ 09:10) >  Right pleural effusion and atelectasis is mildly improved from prior.    Stable dependent left pleural effusion and atelectasis          Assessment and plan  ---------------------------  88 y/o male with PMHx of pAF (no longer on Eliquis as per chart review) prostate CA in remission s/p radiation seed implants (~2004), large hiatal hernia, iron deficiency anemia, asthma, sleep apnea (does not wear CPAP), GERD, osteoarthritis, carpal tunnel, recent admission discharge last month July 5 for abdominal pain found to have pneumoperitoneum, status post emergent diagnostic lap converted to open status post gastric repair for perforation with Sandeep patch on TPN which was weaned off and transition to p.o. diet, also with acute hypoxic respiratory failure due to mucous plugging status post bron on 6/15 with suctioning of mucous plug, treated with abx for PNA, GEMA, lytic lesion of humerus evaluated by heme/onc and ortho, previously elevated LFTs i/s/o liver lesion, anemia with FOB negative presents to ED today BIBEMS for SOB/hypoxia and AMS sent in from facility. While in ED pt was seen by PCP from facility who referred pt to ED and reports pt currently is significantly altered from his baseline mental status. Pt baseline A&Ox2  chf systolic acute on chronic  pt with sig cardiac and medical hx with increasing sob and change of mental status  echo noted with sig AI ans wall motion abnormality  tel , a.fib , hr control  echo noted  start on lasix drip, strict i/o, good UO  increase co2, dc sodium bicarb, fu lyte, add aldactone 25 mg daily  echo with sig regional wall motion abnormality ?ischemia schulz will need to discuss GOC  repeat chestv x ray if sig fluid will consider thoracentesis if symptomatic  chest x ray noted improvement in pleural effusion and atlectasis  pulmonary noted  A.fib , hr is well controlled on AC  renal function and lytes are improving  re start on lasix daily, pulmonary noted continue to fy lytes and renal function  lytes noted  tele a.fib 90 to 120 bpm  renal function and lytes noted  may plan for repeat ct scan of chest in am will discuss with pulmonary  check lytes in am, continue beta Blocker    	        
Date of Service: 08-17-24 @ 10:37           CARDIOLOGY     PROGRESS  NOTE   ________________________________________________    CHIEF COMPLAINT:Patient is a 89y old  Male who presents with a chief complaint of AMS (16 Aug 2024 17:08)  intermittently agitated  	  REVIEW OF SYSTEMS:  CONSTITUTIONAL: No fever, weight loss, or fatigue  EYES: No eye pain, visual disturbances, or discharge  ENT:  No difficulty hearing, tinnitus, vertigo; No sinus or throat pain  NECK: No pain or stiffness  RESPIRATORY: No cough, wheezing, chills or hemoptysis; + mild  Shortness of Breath  CARDIOVASCULAR: No chest pain, palpitations, passing out, dizziness, or leg swelling  GASTROINTESTINAL: No abdominal or epigastric pain. No nausea, vomiting, or hematemesis; No diarrhea or constipation. No melena or hematochezia.  GENITOURINARY: No dysuria, frequency, hematuria, or incontinence  NEUROLOGICAL: No headaches, memory loss, loss of strength, numbness, or tremors  SKIN: No itching, burning, rashes, or lesions   LYMPH Nodes: No enlarged glands  ENDOCRINE: No heat or cold intolerance; No hair loss  MUSCULOSKELETAL: No joint pain or swelling; No muscle, back, or extremity pain  PSYCHIATRIC: No depression, anxiety, mood swings, or difficulty sleeping  HEME/LYMPH: No easy bruising, or bleeding gums  ALLERGY AND IMMUNOLOGIC: No hives or eczema	    [ ] All others negative	  [x ] Unable to obtain    PHYSICAL EXAM:  T(C): 36.7 (08-17-24 @ 04:44), Max: 36.7 (08-17-24 @ 04:44)  HR: 91 (08-17-24 @ 10:06) (73 - 93)  BP: 119/63 (08-17-24 @ 04:44) (119/63 - 137/75)  RR: 18 (08-17-24 @ 04:44) (18 - 18)  SpO2: 99% (08-17-24 @ 10:06) (97% - 100%)  Wt(kg): --  I&O's Summary    16 Aug 2024 07:01  -  17 Aug 2024 07:00  --------------------------------------------------------  IN: 750 mL / OUT: 2500 mL / NET: -1750 mL        Appearance: Normal	  HEENT:   Normal oral mucosa, PERRL, EOMI	  Lymphatic: No lymphadenopathy  Cardiovascular: Normal S1 S2, No JVD, + murmurs, No edema  Respiratory: rhonchi  Psychiatry: dementia  Gastrointestinal:  Soft, Non-tender, + BS	  Skin: No rashes, No ecchymoses, No cyanosis	  Extremities: Normal range of motion, No clubbing, cyanosis or edema  Vascular: Peripheral pulses palpable 2+ bilaterally    MEDICATIONS  (STANDING):  albuterol/ipratropium for Nebulization 3 milliLiter(s) Nebulizer every 6 hours  apixaban 2.5 milliGRAM(s) Oral two times a day  aspirin  chewable 81 milliGRAM(s) Oral daily  folic acid 1 milliGRAM(s) Oral daily  furosemide Infusion 10 mG/Hr (5 mL/Hr) IV Continuous <Continuous>  metoprolol succinate  milliGRAM(s) Oral daily  pantoprazole    Tablet 40 milliGRAM(s) Oral before breakfast  piperacillin/tazobactam IVPB.. 3.375 Gram(s) IV Intermittent every 8 hours  spironolactone 25 milliGRAM(s) Oral daily      TELEMETRY: 	    ECG:  	  RADIOLOGY:  OTHER: 	  	  LABS:	 	    CARDIAC MARKERS:                          10.0   10.11 )-----------( 249      ( 17 Aug 2024 06:48 )             32.6     08-17    147<H>  |  93<L>  |  32<H>  ----------------------------<  102<H>  3.3<L>   |  40<H>  |  1.21    Ca    8.9      17 Aug 2024 06:47  Phos  2.9     08-17  Mg     1.8     08-17      proBNP:   Lipid Profile:   HgA1c:   TSH:         Assessment and plan  ---------------------------  89 y/o male with PMHx of pAF (no longer on Eliquis as per chart review) prostate CA in remission s/p radiation seed implants (~2004), large hiatal hernia, iron deficiency anemia, asthma, sleep apnea (does not wear CPAP), GERD, osteoarthritis, carpal tunnel, recent admission discharge last month July 5 for abdominal pain found to have pneumoperitoneum, status post emergent diagnostic lap converted to open status post gastric repair for perforation with Sandeep patch on TPN which was weaned off and transition to p.o. diet, also with acute hypoxic respiratory failure due to mucous plugging status post bronc on 6/15 with suctioning of mucous plug, treated with abx for PNA, GEMA, lytic lesion of humerus evaluated by heme/onc and ortho, previously elevated LFTs i/s/o liver lesion, anemia with FOB negative presents to ED today BIBEMS for SOB/hypoxia and AMS sent in from facility. While in ED pt was seen by PCP from facility who referred pt to ED and reports pt currently is significantly altered from his baseline mental status. Pt baseline A&Ox2  pt with sig cardiac and medical hx with increasing sob and change of mental status  echo noted with sig AI ans wall motion abnormality  tel , a.fib , hr control  continue AC  will adjust cardiac meds  check lytes, noted  echo noted  start on lasix drip, strict i/o, good UO  increase co2, dc sodium bicarb, fu lyte, add aldactone 25 mg daily  ?dc abx, cultures are all negative  needs AC for A.Fib, unless is contraindicated, pt with hx of bleeding  decrease Lasix dose  repeat chest x ray in am  echo with sig regional wall motion abnormality ?ischemia schulz will need to discuss GOC    	        
Date of Service: 08-20-24 @ 07:49           CARDIOLOGY     PROGRESS  NOTE   ________________________________________________    CHIEF COMPLAINT:Patient is a 89y old  Male who presents with a chief complaint of AMS (19 Aug 2024 17:41)  comfiortable  	  REVIEW OF SYSTEMS:  CONSTITUTIONAL: No fever, weight loss, or fatigue  EYES: No eye pain, visual disturbances, or discharge  ENT:  No difficulty hearing, tinnitus, vertigo; No sinus or throat pain  NECK: No pain or stiffness  RESPIRATORY: No cough, wheezing, chills or hemoptysis; decrease Shortness of Breath  CARDIOVASCULAR: No chest pain, palpitations, passing out, dizziness, or leg swelling  GASTROINTESTINAL: No abdominal or epigastric pain. No nausea, vomiting, or hematemesis; No diarrhea or constipation. No melena or hematochezia.  GENITOURINARY: No dysuria, frequency, hematuria, or incontinence  NEUROLOGICAL: No headaches, memory loss, loss of strength, numbness, or tremors  SKIN: No itching, burning, rashes, or lesions   LYMPH Nodes: No enlarged glands  ENDOCRINE: No heat or cold intolerance; No hair loss  MUSCULOSKELETAL: No joint pain or swelling; No muscle, back, or extremity pain  PSYCHIATRIC: No depression, anxiety, mood swings, or difficulty sleeping  HEME/LYMPH: No easy bruising, or bleeding gums  ALLERGY AND IMMUNOLOGIC: No hives or eczema	    [ ] All others negative	  [ x] Unable to obtain    PHYSICAL EXAM:  T(C): 36.5 (08-20-24 @ 04:43), Max: 36.8 (08-19-24 @ 10:51)  HR: 86 (08-20-24 @ 06:26) (66 - 101)  BP: 103/60 (08-20-24 @ 04:43) (101/53 - 110/78)  RR: 17 (08-20-24 @ 04:43) (17 - 18)  SpO2: 98% (08-20-24 @ 06:26) (96% - 100%)  Wt(kg): --  I&O's Summary    19 Aug 2024 07:01  -  20 Aug 2024 07:00  --------------------------------------------------------  IN: 480 mL / OUT: 0 mL / NET: 480 mL        Appearance: Normal	  HEENT:   Normal oral mucosa, PERRL, EOMI	  Lymphatic: No lymphadenopathy  Cardiovascular: Normal S1 S2, No JVD, + murmurs, No edema  Respiratory: rhonchi  Psychiatry: demerntia  Gastrointestinal:  Soft, Non-tender, + BS	  Skin: No rashes, No ecchymoses, No cyanosis	  Extremities: Normal range of motion, No clubbing, cyanosis or edema  Vascular: Peripheral pulses palpable 2+ bilaterally    MEDICATIONS  (STANDING):  albuterol/ipratropium for Nebulization 3 milliLiter(s) Nebulizer every 6 hours  apixaban 2.5 milliGRAM(s) Oral two times a day  aspirin  chewable 81 milliGRAM(s) Oral daily  folic acid 1 milliGRAM(s) Oral daily  melatonin 3 milliGRAM(s) Oral at bedtime  metoprolol succinate  milliGRAM(s) Oral daily  pantoprazole    Tablet 40 milliGRAM(s) Oral before breakfast  piperacillin/tazobactam IVPB.. 3.375 Gram(s) IV Intermittent every 8 hours  spironolactone 25 milliGRAM(s) Oral daily      TELEMETRY: 	    ECG:  	  RADIOLOGY:  OTHER: 	  	  LABS:	 	    CARDIAC MARKERS:                            10.6   11.16 )-----------( 238      ( 19 Aug 2024 09:12 )             35.8     08-20    140  |  93<L>  |  30<H>  ----------------------------<  100<H>  3.8   |  36<H>  |  1.38<H>    Ca    8.7      20 Aug 2024 06:10  Phos  2.4     08-20  Mg     2.3     08-20      proBNP:   Lipid Profile:   HgA1c:   TSH:       < from: Xray Chest 1 View- PORTABLE-Urgent (Xray Chest 1 View- PORTABLE-Urgent .) (08.19.24 @ 09:10) >  Left anterior oblique orientation limits assessment.  The heart is magnified, and suboptimally assessed.  Right pleural effusion and atelectasis, decreased from prior. Left   basilar haziness suggests dependent pleural effusion and atelectasis.  No pneumothorax.  No acute bony abnormality. Right greater than left glenohumeral   arthropathy.    IMPRESSION:  Right pleural effusion and atelectasis is mildly improved from prior.    Stable dependent left pleural effusion and atelectasis        < from: 12 Lead ECG (08.17.24 @ 16:04) >  Ventricular Rate 95 BPM    QRS Duration 144 ms    Q-T Interval 440 ms    QTC Calculation(Bazett) 552 ms    R Axis -5 degrees    T Axis 170 degrees    Diagnosis Line ATRIAL FIBRILLATION  LEFT BUNDLE BRANCH BLOCK  ABNORMAL ECG  WHEN COMPARED WITH ECGOF 14-AUG-2024 15:12,  NO SIGNIFICANT CHANGE WAS FOUND  < from: TTE W or WO Ultrasound Enhancing Agent (06.15.24 @ 09:19) >   1. Left ventricular cavity is small. Left ventricular wall thickness is normal. Left ventricular systolic function is moderately decreased with an ejection fraction of 40 % by Sharma's method of disks. Global left ventricular hypokinesis.   2. Multiple segmental abnormalities exist. See findings.   3. There is mild (grade 1) left ventricular diastolic dysfunction, with elevated filling pressure.   4. Normal right ventricular cavity size, with normal wall thickness, and normal systolic function.   5. Moderate aortic regurgitation.   6. No pericardial effusion seen.   7. Compared to the transthoracic echocardiogram performed on 6/29/2019, there has been an interval decline in LV systolic function.   8. Technically difficult image quality.    < end of copied text >      Assessment and plan  ---------------------------  88 y/o male with PMHx of pAF (no longer on Eliquis as per chart review) prostate CA in remission s/p radiation seed implants (~2004), large hiatal hernia, iron deficiency anemia, asthma, sleep apnea (does not wear CPAP), GERD, osteoarthritis, carpal tunnel, recent admission discharge last month July 5 for abdominal pain found to have pneumoperitoneum, status post emergent diagnostic lap converted to open status post gastric repair for perforation with Sandeep patch on TPN which was weaned off and transition to p.o. diet, also with acute hypoxic respiratory failure due to mucous plugging status post bronc on 6/15 with suctioning of mucous plug, treated with abx for PNA, GEMA, lytic lesion of humerus evaluated by heme/onc and ortho, previously elevated LFTs i/s/o liver lesion, anemia with FOB negative presents to ED today BIBEMS for SOB/hypoxia and AMS sent in from facility. While in ED pt was seen by PCP from facility who referred pt to ED and reports pt currently is significantly altered from his baseline mental status. Pt baseline A&Ox2  pt with sig cardiac and medical hx with increasing sob and change of mental status  echo noted with sig AI ans wall motion abnormality  tel , a.fib , hr control  continue AC  will adjust cardiac meds  check lytes, noted  echo noted  start on lasix drip, strict i/o, good UO  increase co2, dc sodium bicarb, fu lyte, add aldactone 25 mg daily  ?dc abx, cultures are all negative  needs AC for A.Fib, unless is contraindicated, pt with hx of bleeding  decrease Lasix dose  repeat chest x ray in am  echo with sig regional wall motion abnormality ?ischemia schulz will need to discuss GOC  alkalosis and increase renal function, will hold on diuretics  repeat chestv x ray if sig fluid will consider thoracentesis if symptomatic  repeat lytes today continue to hold diuretics  chest x ray noted improvement in pleural effusion and atlectasis  pulmonary noted  A.fib , hr is well controlled on AC  renal function and lytes are improving  re start on lasix daily    	        
Date of Service: 08-24-24 @ 10:22           CARDIOLOGY     PROGRESS  NOTE   ________________________________________________    CHIEF COMPLAINT:Patient is a 89y old  Male who presents with a chief complaint of AMS (23 Aug 2024 18:52)  no complain, comfortable  	  REVIEW OF SYSTEMS:  CONSTITUTIONAL: No fever, weight loss, or fatigue  EYES: No eye pain, visual disturbances, or discharge  ENT:  No difficulty hearing, tinnitus, vertigo; No sinus or throat pain  NECK: No pain or stiffness  RESPIRATORY: No cough, wheezing, chills or hemoptysis; No Shortness of Breath  CARDIOVASCULAR: No chest pain, palpitations, passing out, dizziness, or leg swelling  GASTROINTESTINAL: No abdominal or epigastric pain. No nausea, vomiting, or hematemesis; No diarrhea or constipation. No melena or hematochezia.  GENITOURINARY: No dysuria, frequency, hematuria, or incontinence  NEUROLOGICAL: No headaches, memory loss, loss of strength, numbness, or tremors  SKIN: No itching, burning, rashes, or lesions   LYMPH Nodes: No enlarged glands  ENDOCRINE: No heat or cold intolerance; No hair loss  MUSCULOSKELETAL: No joint pain or swelling; No muscle, back, or extremity pain  PSYCHIATRIC: No depression, anxiety, mood swings, or difficulty sleeping  HEME/LYMPH: No easy bruising, or bleeding gums  ALLERGY AND IMMUNOLOGIC: No hives or eczema	    [x ] All others negative	  [ ] Unable to obtain    PHYSICAL EXAM:  T(C): 36.8 (08-24-24 @ 05:00), Max: 36.8 (08-23-24 @ 11:54)  HR: 95 (08-24-24 @ 08:10) (89 - 106)  BP: 114/62 (08-24-24 @ 05:00) (103/62 - 114/83)  RR: 18 (08-24-24 @ 05:00) (18 - 18)  SpO2: 99% (08-24-24 @ 08:10) (92% - 99%)  Wt(kg): --  I&O's Summary    23 Aug 2024 07:01  -  24 Aug 2024 07:00  --------------------------------------------------------  IN: 540 mL / OUT: 0 mL / NET: 540 mL        Appearance: Normal	  HEENT:   Normal oral mucosa, PERRL, EOMI	  Lymphatic: No lymphadenopathy  Cardiovascular: Normal S1 S2, No JVD, + murmurs, No edema  Respiratory: rhonchi  Psychiatry: dementia  Gastrointestinal:  Soft, Non-tender, + BS	  Skin: No rashes, No ecchymoses, No cyanosis	  Neurologic: Non-focal  Extremities: Normal range of motion, No clubbing, cyanosis or edema  Vascular: Peripheral pulses palpable 2+ bilaterally    MEDICATIONS  (STANDING):  albuterol/ipratropium for Nebulization 3 milliLiter(s) Nebulizer every 6 hours  apixaban 2.5 milliGRAM(s) Oral two times a day  aspirin  chewable 81 milliGRAM(s) Oral daily  folic acid 1 milliGRAM(s) Oral daily  furosemide   Injectable 40 milliGRAM(s) IV Push daily  melatonin 5 milliGRAM(s) Oral at bedtime  metoprolol succinate  milliGRAM(s) Oral daily  pantoprazole    Tablet 40 milliGRAM(s) Oral before breakfast  spironolactone 25 milliGRAM(s) Oral daily      TELEMETRY: 	    ECG:  	  RADIOLOGY:  OTHER: 	  	  LABS:	 	    CARDIAC MARKERS:    08-24    140  |  98  |  32<H>  ----------------------------<  92  4.3   |  28  |  1.32<H>    Ca    8.9      24 Aug 2024 07:06  Phos  3.1     08-24  Mg     2.3     08-24    TPro  6.9  /  Alb  3.1<L>  /  TBili  0.5  /  DBili  x   /  AST  18  /  ALT  27  /  AlkPhos  109  08-23    proBNP:   Lipid Profile:   HgA1c:   TSH:     < from: Xray Chest 1 View- PORTABLE-Urgent (Xray Chest 1 View- PORTABLE-Urgent .) (08.19.24 @ 09:10) >  IMPRESSION:  Right pleural effusion and atelectasis is mildly improved from prior.    Stable dependent left pleural effusion and atelectasis      Assessment and plan  ---------------------------  88 y/o male with PMHx of pAF (no longer on Eliquis as per chart review) prostate CA in remission s/p radiation seed implants (~2004), large hiatal hernia, iron deficiency anemia, asthma, sleep apnea (does not wear CPAP), GERD, osteoarthritis, carpal tunnel, recent admission discharge last month July 5 for abdominal pain found to have pneumoperitoneum, status post emergent diagnostic lap converted to open status post gastric repair for perforation with Sandeep patch on TPN which was weaned off and transition to p.o. diet, also with acute hypoxic respiratory failure due to mucous plugging status post bronc on 6/15 with suctioning of mucous plug, treated with abx for PNA, GEMA, lytic lesion of humerus evaluated by heme/onc and ortho, previously elevated LFTs i/s/o liver lesion, anemia with FOB negative presents to ED today BIBEMS for SOB/hypoxia and AMS sent in from facility. While in ED pt was seen by PCP from facility who referred pt to ED and reports pt currently is significantly altered from his baseline mental status. Pt baseline A&Ox2  chf systolic acute on chronic  pt with sig cardiac and medical hx with increasing sob and change of mental status  echo noted with sig AI ans wall motion abnormality  tel , a.fib , hr control  echo noted  start on lasix drip, strict i/o, good UO  increase co2, dc sodium bicarb, fu lyte, add aldactone 25 mg daily  echo with sig regional wall motion abnormality ?ischemia schulz will need to discuss GOC  repeat chestv x ray if sig fluid will consider thoracentesis if symptomatic  chest x ray noted improvement in pleural effusion and atlectasis  pulmonary noted  A.fib , hr is well controlled on AC  renal function and lytes are improving  re start on lasix daily, pulmonary noted continue to fu lytes and renal function  lytes noted  tele a.fib 90 to 120 bpm  renal function and lytes noted  Electrolytes and renal function noted, continue diuresis  bp and hr is well controlled  discharge planning/ oob to the chair switch Lasix to po  	    	        
Date of Service: 08-25-24 @ 09:53           CARDIOLOGY     PROGRESS  NOTE   ________________________________________________    CHIEF COMPLAINT:Patient is a 89y old  Male who presents with a chief complaint of AMS (24 Aug 2024 10:21)  no complain, doing better  	  REVIEW OF SYSTEMS:  CONSTITUTIONAL: No fever, weight loss, or fatigue  EYES: No eye pain, visual disturbances, or discharge  ENT:  No difficulty hearing, tinnitus, vertigo; No sinus or throat pain  NECK: No pain or stiffness  RESPIRATORY: No cough, wheezing, chills or hemoptysis; No Shortness of Breath  CARDIOVASCULAR: No chest pain, palpitations, passing out, dizziness, or leg swelling  GASTROINTESTINAL: No abdominal or epigastric pain. No nausea, vomiting, or hematemesis; No diarrhea or constipation. No melena or hematochezia.  GENITOURINARY: No dysuria, frequency, hematuria, or incontinence  NEUROLOGICAL: No headaches, memory loss, loss of strength, numbness, or tremors  SKIN: No itching, burning, rashes, or lesions   LYMPH Nodes: No enlarged glands  ENDOCRINE: No heat or cold intolerance; No hair loss  MUSCULOSKELETAL: No joint pain or swelling; No muscle, back, or extremity pain  PSYCHIATRIC: No depression, anxiety, mood swings, or difficulty sleeping  HEME/LYMPH: No easy bruising, or bleeding gums  ALLERGY AND IMMUNOLOGIC: No hives or eczema	    [ ] All others negative	  [x ] Unable to obtain    PHYSICAL EXAM:  T(C): 36.7 (08-25-24 @ 09:25), Max: 36.9 (08-24-24 @ 20:06)  HR: 100 (08-25-24 @ 09:25) (94 - 112)  BP: 101/56 (08-25-24 @ 09:25) (98/58 - 132/72)  RR: 18 (08-25-24 @ 09:25) (17 - 18)  SpO2: 97% (08-25-24 @ 09:25) (91% - 97%)  Wt(kg): --  I&O's Summary    24 Aug 2024 07:01  -  25 Aug 2024 07:00  --------------------------------------------------------  IN: 290 mL / OUT: 200 mL / NET: 90 mL        Appearance: Normal	  HEENT:   Normal oral mucosa, PERRL, EOMI	  Lymphatic: No lymphadenopathy  Cardiovascular: Normal S1 S2, No JVD, + murmurs, No edema  Respiratory: rhonchi  Psychiatry: A & O x 3, Mood & affect appropriate  Gastrointestinal:  Soft, Non-tender, + BS	  Skin: No rashes, No ecchymoses, No cyanosis	  Extremities: Normal range of motion, No clubbing, cyanosis or edema  Vascular: Peripheral pulses palpable 2+ bilaterally    MEDICATIONS  (STANDING):  albuterol/ipratropium for Nebulization 3 milliLiter(s) Nebulizer every 6 hours  apixaban 2.5 milliGRAM(s) Oral two times a day  aspirin  chewable 81 milliGRAM(s) Oral daily  folic acid 1 milliGRAM(s) Oral daily  furosemide    Tablet 40 milliGRAM(s) Oral daily  melatonin 5 milliGRAM(s) Oral at bedtime  metoprolol succinate  milliGRAM(s) Oral daily  pantoprazole    Tablet 40 milliGRAM(s) Oral before breakfast  spironolactone 25 milliGRAM(s) Oral daily      TELEMETRY: 	    ECG:  	  RADIOLOGY:  OTHER: 	  	  LABS:	 	    CARDIAC MARKERS:                                10.3   9.89  )-----------( 330      ( 25 Aug 2024 05:46 )             32.4     08-25    135  |  97  |  33<H>  ----------------------------<  109<H>  4.4   |  27  |  1.34<H>    Ca    8.6      25 Aug 2024 05:47  Phos  3.1     08-24  Mg     2.3     08-24      proBNP:   Lipid Profile:   HgA1c:   TSH:         Assessment and plan  ---------------------------  88 y/o male with PMHx of pAF (no longer on Eliquis as per chart review) prostate CA in remission s/p radiation seed implants (~2004), large hiatal hernia, iron deficiency anemia, asthma, sleep apnea (does not wear CPAP), GERD, osteoarthritis, carpal tunnel, recent admission discharge last month July 5 for abdominal pain found to have pneumoperitoneum, status post emergent diagnostic lap converted to open status post gastric repair for perforation with Sandeep patch on TPN which was weaned off and transition to p.o. diet, also with acute hypoxic respiratory failure due to mucous plugging status post bronc on 6/15 with suctioning of mucous plug, treated with abx for PNA, GEMA, lytic lesion of humerus evaluated by heme/onc and ortho, previously elevated LFTs i/s/o liver lesion, anemia with FOB negative presents to ED today BIBEMS for SOB/hypoxia and AMS sent in from facility. While in ED pt was seen by PCP from facility who referred pt to ED and reports pt currently is significantly altered from his baseline mental status. Pt baseline A&Ox2  chf systolic acute on chronic  pt with sig cardiac and medical hx with increasing sob and change of mental status  echo noted with sig AI ans wall motion abnormality  tel , a.fib , hr control  echo noted  start on lasix drip, strict i/o, good UO  increase co2, dc sodium bicarb, fu lyte, add aldactone 25 mg daily  echo with sig regional wall motion abnormality ?ischemia schulz will need to discuss GOC  repeat chestv x ray if sig fluid will consider thoracentesis if symptomatic  chest x ray noted improvement in pleural effusion and atlectasis  pulmonary noted  A.fib , hr is well controlled on AC  renal function and lytes are improving  re start on lasix daily, pulmonary noted continue to fu lytes and renal function  lytes noted  tele a.fib 90 to 120 bpm  renal function and lytes noted  Electrolytes and renal function noted, continue diuresis  bp and hr is well controlled  dc planning/ oob to the chair  could not tolerate ACRE or ARB sec to hypotension and CKD    	        
Follow-up Pulm Progress Note    alert, confused   No new respiratory events overnight.  Denies SOB/CP.     Medications:  MEDICATIONS  (STANDING):  albuterol/ipratropium for Nebulization 3 milliLiter(s) Nebulizer every 6 hours  apixaban 2.5 milliGRAM(s) Oral two times a day  aspirin  chewable 81 milliGRAM(s) Oral daily  folic acid 1 milliGRAM(s) Oral daily  furosemide   Injectable 40 milliGRAM(s) IV Push daily  melatonin 5 milliGRAM(s) Oral at bedtime  metoprolol succinate  milliGRAM(s) Oral daily  pantoprazole    Tablet 40 milliGRAM(s) Oral before breakfast  spironolactone 25 milliGRAM(s) Oral daily    MEDICATIONS  (PRN):  acetaminophen   Oral Liquid .. 650 milliGRAM(s) Oral every 6 hours PRN Temp greater or equal to 38C (100.4F), Moderate Pain (4 - 6)  divalproex  milliGRAM(s) Oral three times a day PRN agitation          Vital Signs Last 24 Hrs  T(C): 36.4 (23 Aug 2024 04:57), Max: 36.6 (22 Aug 2024 20:01)  T(F): 97.5 (23 Aug 2024 04:57), Max: 97.9 (22 Aug 2024 20:01)  HR: 76 (23 Aug 2024 04:57) (67 - 76)  BP: 117/67 (23 Aug 2024 04:57) (104/70 - 117/67)  BP(mean): --  RR: 18 (23 Aug 2024 04:57) (18 - 18)  SpO2: 97% (23 Aug 2024 04:57) (90% - 97%)    Parameters below as of 23 Aug 2024 04:57  Patient On (Oxygen Delivery Method): room air        ABG - ( 22 Aug 2024 04:55 )  pH, Arterial: 7.54  pH, Blood: x     /  pCO2: 46    /  pO2: 74    / HCO3: 39    / Base Excess: 14.8  /  SaO2: 96.6                  08-22 @ 07:01  -  08-23 @ 07:00  --------------------------------------------------------  IN: 120 mL / OUT: 0 mL / NET: 120 mL          LABS:    08-23    141  |  99  |  30<H>  ----------------------------<  97  4.3   |  31  |  1.28    Ca    8.8      23 Aug 2024 07:01  Phos  3.4     08-23  Mg     2.3     08-23    TPro  6.9  /  Alb  3.1<L>  /  TBili  0.5  /  DBili  x   /  AST  18  /  ALT  27  /  AlkPhos  109  08-23          CAPILLARY BLOOD GLUCOSE          Urinalysis Basic - ( 23 Aug 2024 07:01 )    Color: x / Appearance: x / SG: x / pH: x  Gluc: 97 mg/dL / Ketone: x  / Bili: x / Urobili: x   Blood: x / Protein: x / Nitrite: x   Leuk Esterase: x / RBC: x / WBC x   Sq Epi: x / Non Sq Epi: x / Bacteria: x                      CULTURES:    Culture - Blood (collected 08-14-24 @ 10:55)  Source: .Blood Blood  Final Report (08-19-24 @ 15:00):    No growth at 5 days    Culture - Blood (collected 08-14-24 @ 10:40)  Source: .Blood Blood  Final Report (08-19-24 @ 15:00):    No growth at 5 days        Culture - Urine (collected 08-14-24 @ 11:50)  Source: Catheterized Catheterized  Final Report (08-15-24 @ 10:30):    No growth                  Physical Examination:  PULM: decreased BS at bases   CVS: S1, S2 heard    RADIOLOGY REVIEWED  CXR: improving pl effusions     CT chest:    < from: CT Angio Chest PE Protocol w/ IV Cont (08.14.24 @ 17:08) >  FINDINGS:  CHEST:  LUNGS, PLEURA AND LARGE AIRWAYS: Tracheobronchial secretions.   Intralobular septal thickening. Bilateral upper lobe patchy and ground   glass opacities. Moderate bilateral pleural effusions and compressive   atelectasis.  VESSELS: Aortic calcifications. Coronary artery calcifications. Contrast   bolus is satisfactory. No main, right main, left main, or lobar pulmonary   embolism. Limited evaluation of many of the segmental and subsegmental   pulmonary arteries secondary to motion and mixing artifact.  HEART: Heart size is normal. No pericardial effusion.  MEDIASTINUM AND ZANE: No lymphadenopathy.  CHEST WALL AND LOWER NECK: Anasarca.    ABDOMEN ANDPELVIS:  LIVER: A 2.7 x 2.7 cm hemangioma in the right lobe is unchanged.   Additional subcentimeter hypodense focus, too small to characterize.  BILE DUCTS: Normal caliber.  GALLBLADDER: Within normal limits.  SPLEEN: Within normal limits.  PANCREAS: Within normal limits.  ADRENALS: Within normal limits.  KIDNEYS/URETERS: No hydronephrosis. Residual contrast in the collecting   system.    BLADDER: Within normal limits.  REPRODUCTIVE ORGANS: Brachytherapy seeds within the prostate.    BOWEL: No bowel obstruction. Moderate hiatal hernia. Appendix is not   visualized. No evidence of inflammation in the pericecal region. Colonic   diverticulosis, without diverticulitis.  PERITONEUM/RETROPERITONEUM: Small volume ascites.  VESSELS: Atheroscleroticchanges.  LYMPH NODES: No lymphadenopathy.  ABDOMINAL WALL: Anasarca. Small bilateral fat-containing inguinal hernias.  BONES: Degenerative changes and dextroscoliosis. L5 pars defects. Lytic   lesion in the left humeral head with cortical disruptionis unchanged   from 6/11/2024, however increased from 6/25/2019.    IMPRESSION:  No pulmonary embolism to the level of the lobar pulmonary arteries.    Moderate bilateral pleural effusions and compressive atelectasis.    Small volume ascites and anasarca.        --- End of Report ---      < end of copied text >  
Patient is a 89y old  Male who presents with a chief complaint of AMS (19 Aug 2024 14:24)      INTERVAL HPI/OVERNIGHT EVENTS: Medically improved but has metabolic alkalosis  of 50. Held Lasix drip. CXR reordered and showed still pleural effusion not completely resolved.  Cardiology recommended thoracentesis if needed    Pain Location & Control:     MEDICATIONS  (STANDING):  albuterol/ipratropium for Nebulization 3 milliLiter(s) Nebulizer every 6 hours  apixaban 2.5 milliGRAM(s) Oral two times a day  aspirin  chewable 81 milliGRAM(s) Oral daily  folic acid 1 milliGRAM(s) Oral daily  melatonin 3 milliGRAM(s) Oral at bedtime  metoprolol succinate  milliGRAM(s) Oral daily  pantoprazole    Tablet 40 milliGRAM(s) Oral before breakfast  piperacillin/tazobactam IVPB.. 3.375 Gram(s) IV Intermittent every 8 hours  spironolactone 25 milliGRAM(s) Oral daily    MEDICATIONS  (PRN):  acetaminophen   Oral Liquid .. 650 milliGRAM(s) Oral every 6 hours PRN Temp greater or equal to 38C (100.4F), Moderate Pain (4 - 6)      Allergies    No Known Allergies    Intolerances        REVIEW OF SYSTEMS:  CONSTITUTIONAL: No fever, weight loss, or fatigue  EYES: No eye pain, visual disturbances, or discharge  ENMT:  No difficulty hearing, tinnitus, vertigo; No sinus or throat pain  NECK: No pain or stiffness  BREASTS: No pain, masses, or nipple discharge  RESPIRATORY: No cough, wheezing, chills or hemoptysis; No shortness of breath  CARDIOVASCULAR: No chest pain, palpitations, dizziness, or leg swelling  GASTROINTESTINAL: No abdominal or epigastric pain. No nausea, vomiting, or hematemesis; No diarrhea or constipation. No melena or hematochezia.  GENITOURINARY: No dysuria, frequency, hematuria, or incontinence  NEUROLOGICAL: No headaches, memory loss, loss of strength, numbness, or tremors  SKIN: No itching, burning, rashes, or lesions   LYMPH NODES: No enlarged glands  ENDOCRINE: No heat or cold intolerance; No hair loss; No polydipsia or polyuria  MUSCULOSKELETAL: No back pain  PSYCHIATRIC: No depression, anxiety, mood swings, or difficulty sleeping  HEME/LYMPH: No easy bruising, or bleeding gums  ALLERGY AND IMMUNOLOGIC: No hives or eczema    Vital Signs Last 24 Hrs  T(C): 36.8 (19 Aug 2024 10:51), Max: 36.8 (19 Aug 2024 10:51)  T(F): 98.2 (19 Aug 2024 10:51), Max: 98.2 (19 Aug 2024 10:51)  HR: 90 (19 Aug 2024 15:11) (66 - 97)  BP: 101/53 (19 Aug 2024 10:51) (101/53 - 106/67)  BP(mean): --  RR: 17 (19 Aug 2024 13:33) (17 - 18)  SpO2: 97% (19 Aug 2024 15:11) (92% - 98%)    Parameters below as of 19 Aug 2024 13:33  Patient On (Oxygen Delivery Method): nasal cannula  O2 Flow (L/min): 3      PHYSICAL EXAM:  GENERAL: NAD, well-groomed, well-developed  HEAD:  Atraumatic, Normocephalic  EYES: EOMI, PERRLA, conjunctiva and sclera clear  ENMT: No tonsillar erythema, exudates, or enlargement; Moist mucous membranes, Good dentition, No lesions  NECK: Supple, No JVD, Normal thyroid  NERVOUS SYSTEM:  Alert & Oriented X 1 awake but sleepy  CHEST/LUNG: Clear to auscultation bilaterally; No rales, rhonchi, wheezing, or rubs  HEART: Regular rate and rhythm; No murmurs, rubs, or gallops  ABDOMEN: Soft, Nontender, Nondistended; Bowel sounds present  EXTREMITIES:  2+ Peripheral Pulses, No clubbing or cyanosis  LYMPH: No lymphadenopathy noted  SKIN: No rashes or lesions      LABS:                        10.6   11.16 )-----------( 238      ( 19 Aug 2024 09:12 )             35.8     19 Aug 2024 09:12    142    |  89     |  31     ----------------------------<  107    3.3     |  43     |  1.54     Ca    8.5        19 Aug 2024 09:12        Urinalysis Basic - ( 19 Aug 2024 09:12 )    Color: x / Appearance: x / SG: x / pH: x  Gluc: 107 mg/dL / Ketone: x  / Bili: x / Urobili: x   Blood: x / Protein: x / Nitrite: x   Leuk Esterase: x / RBC: x / WBC x   Sq Epi: x / Non Sq Epi: x / Bacteria: x      CAPILLARY BLOOD GLUCOSE            Cultures  Culture Results:   No growth (08-14-24 @ 11:50)  Culture Results:   No growth at 5 days (08-14-24 @ 10:55)  Culture Results:   No growth at 5 days (08-14-24 @ 10:40)      RADIOLOGY & ADDITIONAL TESTS:    Imaging Personally Reviewed:  [X ] YES  [ ] NO    Consultant(s) Notes Reviewed:  [ X] YES  [ ] NO    Care Discussed with Consultants/Other Providers [ X] YES  [ ] NO
Patient is a 89y old  Male who presents with a chief complaint of AMS (21 Aug 2024 13:40)      INTERVAL HPI/OVERNIGHT EVENTS: Medic.ally stable. Bicarb is stable as well. Cre stable. Continue Lasix IV injection. F/u CXR on Friday.  Continue AVAPS at night. Patient more awake and  alert     Pain Location & Control:     MEDICATIONS  (STANDING):  albuterol/ipratropium for Nebulization 3 milliLiter(s) Nebulizer every 6 hours  apixaban 2.5 milliGRAM(s) Oral two times a day  aspirin  chewable 81 milliGRAM(s) Oral daily  folic acid 1 milliGRAM(s) Oral daily  furosemide   Injectable 40 milliGRAM(s) IV Push daily  melatonin 3 milliGRAM(s) Oral at bedtime  metoprolol succinate  milliGRAM(s) Oral daily  pantoprazole    Tablet 40 milliGRAM(s) Oral before breakfast  spironolactone 25 milliGRAM(s) Oral daily    MEDICATIONS  (PRN):  acetaminophen   Oral Liquid .. 650 milliGRAM(s) Oral every 6 hours PRN Temp greater or equal to 38C (100.4F), Moderate Pain (4 - 6)      Allergies    No Known Allergies    Intolerances        REVIEW OF SYSTEMS:  CONSTITUTIONAL: No fever, weight loss, or fatigue  EYES: No eye pain, visual disturbances, or discharge  ENMT:  No difficulty hearing, tinnitus, vertigo; No sinus or throat pain  NECK: No pain or stiffness  BREASTS: No pain, masses, or nipple discharge  RESPIRATORY: No cough, wheezing, chills or hemoptysis; No shortness of breath  CARDIOVASCULAR: No chest pain, palpitations, dizziness, or leg swelling  GASTROINTESTINAL: No abdominal or epigastric pain. No nausea, vomiting, or hematemesis; No diarrhea or constipation. No melena or hematochezia.  GENITOURINARY: No dysuria, frequency, hematuria, or incontinence  NEUROLOGICAL: No headaches, memory loss, loss of strength, numbness, or tremors  SKIN: No itching, burning, rashes, or lesions   LYMPH NODES: No enlarged glands  ENDOCRINE: No heat or cold intolerance; No hair loss; No polydipsia or polyuria  MUSCULOSKELETAL: No back pain  PSYCHIATRIC: No depression, anxiety, mood swings, or difficulty sleeping  HEME/LYMPH: No easy bruising, or bleeding gums  ALLERGY AND IMMUNOLOGIC: No hives or eczema    Vital Signs Last 24 Hrs  T(C): 36.5 (21 Aug 2024 20:04), Max: 36.9 (21 Aug 2024 05:21)  T(F): 97.7 (21 Aug 2024 20:04), Max: 98.5 (21 Aug 2024 05:21)  HR: 103 (21 Aug 2024 20:04) (84 - 103)  BP: 128/69 (21 Aug 2024 20:04) (94/58 - 128/69)  BP(mean): --  RR: 18 (21 Aug 2024 20:04) (18 - 28)  SpO2: 98% (21 Aug 2024 20:04) (94% - 100%)    Parameters below as of 21 Aug 2024 20:04  Patient On (Oxygen Delivery Method): room air        PHYSICAL EXAM:  GENERAL: NAD, well-groomed, well-developed  HEAD:  Atraumatic, Normocephalic  EYES: EOMI, PERRLA, conjunctiva and sclera clear  ENMT: No tonsillar erythema, exudates, or enlargement; Moist mucous membranes, Good dentition, No lesions  NECK: Supple, No JVD, Normal thyroid  NERVOUS SYSTEM:  Alert & Oriented X 2   CHEST/LUNG: Clear to auscultation bilaterally; No rales, rhonchi, wheezing, or rubs  HEART: Regular rate and rhythm; No murmurs, rubs, or gallops  ABDOMEN: Soft, Nontender, Nondistended; Bowel sounds present  EXTREMITIES:  2+ Peripheral Pulses, No clubbing or cyanosis  LYMPH: No lymphadenopathy noted  SKIN: No rashes or lesions      LABS:                        11.6   8.41  )-----------( 266      ( 21 Aug 2024 10:01 )             38.5     21 Aug 2024 10:01    140    |  93     |  27     ----------------------------<  113    3.7     |  36     |  1.38     Ca    8.8        21 Aug 2024 10:01  Phos  2.2       21 Aug 2024 10:01  Mg     2.4       21 Aug 2024 10:01        Urinalysis Basic - ( 21 Aug 2024 10:01 )    Color: x / Appearance: x / SG: x / pH: x  Gluc: 113 mg/dL / Ketone: x  / Bili: x / Urobili: x   Blood: x / Protein: x / Nitrite: x   Leuk Esterase: x / RBC: x / WBC x   Sq Epi: x / Non Sq Epi: x / Bacteria: x      CAPILLARY BLOOD GLUCOSE            Cultures      RADIOLOGY & ADDITIONAL TESTS:    Imaging Personally Reviewed:  [X ] YES  [ ] NO    Consultant(s) Notes Reviewed:  [ X] YES  [ ] NO    Care Discussed with Consultants/Other Providers [X ] YES  [ ] NO
Date of Service: 08-16-24 @ 07:40           CARDIOLOGY     PROGRESS  NOTE   ________________________________________________    CHIEF COMPLAINT:Patient is a 89y old  Male who presents with a chief complaint of AMS (15 Aug 2024 21:48)  comfortable  	  REVIEW OF SYSTEMS:  CONSTITUTIONAL: No fever, weight loss, or fatigue  EYES: No eye pain, visual disturbances, or discharge  ENT:  No difficulty hearing, tinnitus, vertigo; No sinus or throat pain  NECK: No pain or stiffness  RESPIRATORY: No cough, wheezing, chills or hemoptysis; + Shortness of Breath  CARDIOVASCULAR: No chest pain, palpitations, passing out, dizziness, + leg swelling  GASTROINTESTINAL: No abdominal or epigastric pain. No nausea, vomiting, or hematemesis; No diarrhea or constipation. No melena or hematochezia.  GENITOURINARY: No dysuria, frequency, hematuria, or incontinence  NEUROLOGICAL: No headaches, memory loss, loss of strength, numbness, or tremors  SKIN: No itching, burning, rashes, or lesions   LYMPH Nodes: No enlarged glands  ENDOCRINE: No heat or cold intolerance; No hair loss  MUSCULOSKELETAL: No joint pain or swelling; No muscle, back, or extremity pain  PSYCHIATRIC: No depression, anxiety, mood swings, or difficulty sleeping  HEME/LYMPH: No easy bruising, or bleeding gums  ALLERGY AND IMMUNOLOGIC: No hives or eczema	    [ ] All others negative	  [ ] Unable to obtain    PHYSICAL EXAM:  T(C): 37.1 (08-16-24 @ 04:24), Max: 37.1 (08-16-24 @ 04:24)  HR: 94 (08-16-24 @ 05:01) (60 - 98)  BP: 126/69 (08-16-24 @ 04:24) (125/69 - 178/73)  RR: 18 (08-16-24 @ 04:24) (18 - 18)  SpO2: 100% (08-16-24 @ 05:01) (95% - 100%)  Wt(kg): --  I&O's Summary    15 Aug 2024 07:01  -  16 Aug 2024 07:00  --------------------------------------------------------  IN: 60 mL / OUT: 3400 mL / NET: -3340 mL        Appearance: Normal	  HEENT:   Normal oral mucosa, PERRL, EOMI	  Lymphatic: No lymphadenopathy  Cardiovascular: Normal S1 S2, +JVD, + murmurs, + edema  Respiratory: Lungs clear to auscultation	  Psychiatry: A & O x 3, Mood & affect appropriate  Gastrointestinal:  Soft, Non-tender, + BS	  Skin: No rashes, No ecchymoses, No cyanosis	  Neurologic: Non-focal  Extremities: Normal range of motion, No clubbing, cyanosis + edema  Vascular: Peripheral pulses palpable 2+ bilaterally    MEDICATIONS  (STANDING):  albuterol/ipratropium for Nebulization 3 milliLiter(s) Nebulizer every 6 hours  aspirin  chewable 81 milliGRAM(s) Oral daily  cefTRIAXone   IVPB 1000 milliGRAM(s) IV Intermittent every 24 hours  enoxaparin Injectable 40 milliGRAM(s) SubCutaneous every 24 hours  folic acid 1 milliGRAM(s) Oral daily  furosemide Infusion 10 mG/Hr (5 mL/Hr) IV Continuous <Continuous>  metoprolol succinate  milliGRAM(s) Oral daily  pantoprazole    Tablet 40 milliGRAM(s) Oral before breakfast  sodium bicarbonate 650 milliGRAM(s) Oral three times a day  vancomycin  IVPB 750 milliGRAM(s) IV Intermittent every 24 hours      TELEMETRY: 	    ECG:  	  RADIOLOGY:  OTHER: 	  	  LABS:	 	    CARDIAC MARKERS:                          9.4    8.58  )-----------( 236      ( 16 Aug 2024 05:26 )             31.3     08-16    147<H>  |  100  |  36<H>  ----------------------------<  90  3.1<L>   |  36<H>  |  1.07    Ca    8.8      16 Aug 2024 05:26  Mg     1.9     08-16    TPro  6.1  /  Alb  2.9<L>  /  TBili  0.3  /  DBili  x   /  AST  57<H>  /  ALT  155<H>  /  AlkPhos  133<H>  08-15    proBNP:   Lipid Profile:   HgA1c:   TSH:   PT/INR - ( 14 Aug 2024 11:36 )   PT: 12.6 sec;   INR: 1.21 ratio         PTT - ( 14 Aug 2024 11:36 )  PTT:27.3 sec    < from: TTE W or WO Ultrasound Enhancing Agent (06.15.24 @ 09:19) >   1. Left ventricular cavity is small. Left ventricular wall thickness is normal. Left ventricular systolic function is moderately decreased with an ejection fraction of 40 % by Sharma's method of disks. Global left ventricular hypokinesis.   2. Multiple segmental abnormalities exist. See findings.   3. There is mild (grade 1) left ventricular diastolic dysfunction, with elevated filling pressure.   4. Normal right ventricular cavity size, with normal wall thickness, and normal systolic function.   5. Moderate aortic regurgitation.   6. No pericardial effusion seen.   7. Compared to the transthoracic echocardiogram performed on 6/29/2019, there has been an interval decline in LV systolic function.   8. Technically difficult image quality.    Culture - Urine (08.14.24 @ 11:50)    Specimen Source: Catheterized Catheterized   Culture Results:   No growth    Culture - Blood (08.14.24 @ 10:55)    Specimen Source: .Blood Blood   Culture Results:   No growth at 24 hours      < from: 12 Lead ECG (08.14.24 @ 15:12) >  Diagnosis Line ATRIAL FIBRILLATION  LEFT BUNDLE BRANCH BLOCK  ABNORMAL ECG  WHEN COMPARED WITH ECGOF 24-JUL-2024 20:20,  DECREASED VENTRICULAR RATE          Assessment and plan  ---------------------------  88 y/o male with PMHx of pAF (no longer on Eliquis as per chart review) prostate CA in remission s/p radiation seed implants (~2004), large hiatal hernia, iron deficiency anemia, asthma, sleep apnea (does not wear CPAP), GERD, osteoarthritis, carpal tunnel, recent admission discharge last month July 5 for abdominal pain found to have pneumoperitoneum, status post emergent diagnostic lap converted to open status post gastric repair for perforation with Sandeep patch on TPN which was weaned off and transition to p.o. diet, also with acute hypoxic respiratory failure due to mucous plugging status post bronc on 6/15 with suctioning of mucous plug, treated with abx for PNA, GEMA, lytic lesion of humerus evaluated by heme/onc and ortho, previously elevated LFTs i/s/o liver lesion, anemia with FOB negative presents to ED today BIBEMS for SOB/hypoxia and AMS sent in from facility. While in ED pt was seen by PCP from facility who referred pt to ED and reports pt currently is significantly altered from his baseline mental status. Pt baseline A&Ox2  pt with sig cardiac and medical hx with increasing sob and change of mental status  echo noted with sig AI ans wall motion abnormality  tel , a.fib , hr control  continue AC  will adjust cardiac meds  check lytes, noted  echo noted  start on lasix drip, strict i/o, good UO  increase co2, dc sodium bicarb, fu lyte, add aldactone 25 mg daily  ?dc abx, cultures are all negative  needs AC for A.Fib, unless is contraindicated      	        
Date of Service: 08-18-24 @ 10:09           CARDIOLOGY     PROGRESS  NOTE   ________________________________________________    CHIEF COMPLAINT:Patient is a 89y old  Male who presents with a chief complaint of Sepsis  no complain    	  REVIEW OF SYSTEMS:  CONSTITUTIONAL: No fever, weight loss, or fatigue  EYES: No eye pain, visual disturbances, or discharge  ENT:  No difficulty hearing, tinnitus, vertigo; No sinus or throat pain  NECK: No pain or stiffness  RESPIRATORY: No cough, wheezing, chills or hemoptysis; No Shortness of Breath  CARDIOVASCULAR: No chest pain, palpitations, passing out, dizziness, or leg swelling  GASTROINTESTINAL: No abdominal or epigastric pain. No nausea, vomiting, or hematemesis; No diarrhea or constipation. No melena or hematochezia.  GENITOURINARY: No dysuria, frequency, hematuria, or incontinence  NEUROLOGICAL: No headaches, memory loss, loss of strength, numbness, or tremors  SKIN: No itching, burning, rashes, or lesions   LYMPH Nodes: No enlarged glands  ENDOCRINE: No heat or cold intolerance; No hair loss  MUSCULOSKELETAL: No joint pain or swelling; No muscle, back, or extremity pain  PSYCHIATRIC: No depression, anxiety, mood swings, or difficulty sleeping  HEME/LYMPH: No easy bruising, or bleeding gums  ALLERGY AND IMMUNOLOGIC: No hives or eczema	    [ ] All others negative	  [x ] Unable to obtain    PHYSICAL EXAM:  T(C): 36.6 (08-18-24 @ 04:13), Max: 36.6 (08-18-24 @ 04:13)  HR: 84 (08-18-24 @ 07:50) (69 - 94)  BP: 113/59 (08-18-24 @ 04:13) (106/56 - 115/69)  RR: 18 (08-18-24 @ 04:13) (18 - 18)  SpO2: 97% (08-18-24 @ 04:13) (97% - 98%)  Wt(kg): --  I&O's Summary    17 Aug 2024 07:01  -  18 Aug 2024 07:00  --------------------------------------------------------  IN: 120 mL / OUT: 2925 mL / NET: -2805 mL        Appearance: Normal	  HEENT:   Normal oral mucosa, PERRL, EOMI	  Lymphatic: No lymphadenopathy  Cardiovascular: Normal S1 S2, No JVD, + murmurs, No edema  Respiratoryrhjonchi  Psychiatry: dementia  Gastrointestinal:  Soft, Non-tender, + BS	  Skin: No rashes, No ecchymoses, No cyanosis	  Neurologic can not ases  Extremities: Normal range of motion, No clubbing, cyanosis or edema  Vascular: Peripheral pulses palpable 2+ bilaterally    MEDICATIONS  (STANDING):  albuterol/ipratropium for Nebulization 3 milliLiter(s) Nebulizer every 6 hours  apixaban 2.5 milliGRAM(s) Oral two times a day  aspirin  chewable 81 milliGRAM(s) Oral daily  folic acid 1 milliGRAM(s) Oral daily  furosemide Infusion 5 mG/Hr (2.5 mL/Hr) IV Continuous <Continuous>  melatonin 3 milliGRAM(s) Oral at bedtime  metoprolol succinate  milliGRAM(s) Oral daily  pantoprazole    Tablet 40 milliGRAM(s) Oral before breakfast  piperacillin/tazobactam IVPB.. 3.375 Gram(s) IV Intermittent every 8 hours  spironolactone 25 milliGRAM(s) Oral daily      TELEMETRY: 	    ECG:  	  RADIOLOGY:  OTHER: 	  	  LABS:	 	    CARDIAC MARKERS:                                11.4   11.78 )-----------( 265      ( 18 Aug 2024 05:10 )             38.0     08-18    146<H>  |  91<L>  |  29<H>  ----------------------------<  108<H>  3.3<L>   |  43<H>  |  1.48<H>    Ca    9.0      18 Aug 2024 05:10  Phos  3.7     08-18  Mg     2.4     08-18      proBNP:   Lipid Profile:   HgA1c:   TSH:         Assessment and plan  ---------------------------  89 y/o male with PMHx of pAF (no longer on Eliquis as per chart review) prostate CA in remission s/p radiation seed implants (~2004), large hiatal hernia, iron deficiency anemia, asthma, sleep apnea (does not wear CPAP), GERD, osteoarthritis, carpal tunnel, recent admission discharge last month July 5 for abdominal pain found to have pneumoperitoneum, status post emergent diagnostic lap converted to open status post gastric repair for perforation with Sandeep patch on TPN which was weaned off and transition to p.o. diet, also with acute hypoxic respiratory failure due to mucous plugging status post bronc on 6/15 with suctioning of mucous plug, treated with abx for PNA, GEMA, lytic lesion of humerus evaluated by heme/onc and ortho, previously elevated LFTs i/s/o liver lesion, anemia with FOB negative presents to ED today BIBEMS for SOB/hypoxia and AMS sent in from facility. While in ED pt was seen by PCP from facility who referred pt to ED and reports pt currently is significantly altered from his baseline mental status. Pt baseline A&Ox2  pt with sig cardiac and medical hx with increasing sob and change of mental status  echo noted with sig AI ans wall motion abnormality  tel , a.fib , hr control  continue AC  will adjust cardiac meds  check lytes, noted  echo noted  start on lasix drip, strict i/o, good UO  increase co2, dc sodium bicarb, fu lyte, add aldactone 25 mg daily  ?dc abx, cultures are all negative  needs AC for A.Fib, unless is contraindicated, pt with hx of bleeding  decrease Lasix dose  repeat chest x ray in am  echo with sig regional wall motion abnormality ?ischemia schulz will need to discuss GOC  alkalosis and increase renal function, will hold on diuretics  repeat chestv x ray if sig fluid will consider thoracentesis if symptomatic  repeat lytes in am      	        
Date of Service: 08-19-24 @ 07:11           CARDIOLOGY     PROGRESS  NOTE   ________________________________________________    CHIEF COMPLAINT:Patient is a 89y old  Male who presents with a chief complaint of AMS (18 Aug 2024 10:09)  comfortable  	  REVIEW OF SYSTEMS:  CONSTITUTIONAL: No fever, weight loss, or fatigue  EYES: No eye pain, visual disturbances, or discharge  ENT:  No difficulty hearing, tinnitus, vertigo; No sinus or throat pain  NECK: No pain or stiffness  RESPIRATORY: No cough, wheezing, chills or hemoptysis; + Shortness of Breath  CARDIOVASCULAR: No chest pain, palpitations, passing out, dizziness, or leg swelling  GASTROINTESTINAL: No abdominal or epigastric pain. No nausea, vomiting, or hematemesis; No diarrhea or constipation. No melena or hematochezia.  GENITOURINARY: No dysuria, frequency, hematuria, or incontinence  NEUROLOGICAL: No headaches, memory loss, loss of strength, numbness, or tremors  SKIN: No itching, burning, rashes, or lesions   LYMPH Nodes: No enlarged glands  ENDOCRINE: No heat or cold intolerance; No hair loss  MUSCULOSKELETAL: No joint pain or swelling; No muscle, back, or extremity pain  PSYCHIATRIC: No depression, anxiety, mood swings, or difficulty sleeping  HEME/LYMPH: No easy bruising, or bleeding gums  ALLERGY AND IMMUNOLOGIC: No hives or eczema	    [ ] All others negative	  [ x] Unable to obtain    PHYSICAL EXAM:  T(C): 36.7 (08-19-24 @ 04:43), Max: 36.7 (08-18-24 @ 21:27)  HR: 89 (08-19-24 @ 05:00) (80 - 99)  BP: 106/67 (08-19-24 @ 04:43) (100/49 - 106/67)  RR: 18 (08-19-24 @ 04:43) (18 - 18)  SpO2: 95% (08-19-24 @ 05:00) (92% - 98%)  Wt(kg): --  I&O's Summary    18 Aug 2024 07:01  -  19 Aug 2024 07:00  --------------------------------------------------------  IN: 585 mL / OUT: 600 mL / NET: -15 mL        Appearance: Normal	  HEENT:   Normal oral mucosa, PERRL, EOMI	  Lymphatic: No lymphadenopathy  Cardiovascular: Normal S1 S2, No JVD, + murmurs, No edema  Respiratory:rhonchi  Psychiatry: dementia  Gastrointestinal:  Soft, Non-tender, + BS	  Skin: No rashes, No ecchymoses, No cyanosis	  Extremities: Normal range of motion, No clubbing, cyanosis or edema  Vascular: Peripheral pulses palpable 2+ bilaterally    MEDICATIONS  (STANDING):  albuterol/ipratropium for Nebulization 3 milliLiter(s) Nebulizer every 6 hours  apixaban 2.5 milliGRAM(s) Oral two times a day  aspirin  chewable 81 milliGRAM(s) Oral daily  folic acid 1 milliGRAM(s) Oral daily  melatonin 3 milliGRAM(s) Oral at bedtime  metoprolol succinate  milliGRAM(s) Oral daily  pantoprazole    Tablet 40 milliGRAM(s) Oral before breakfast  piperacillin/tazobactam IVPB.. 3.375 Gram(s) IV Intermittent every 8 hours  spironolactone 25 milliGRAM(s) Oral daily      TELEMETRY: 	    ECG:  	  RADIOLOGY:  OTHER: 	  	  LABS:	 	    CARDIAC MARKERS:                        11.4   11.78 )-----------( 265      ( 18 Aug 2024 05:10 )             38.0     08-18    146<H>  |  91<L>  |  29<H>  ----------------------------<  108<H>  3.3<L>   |  43<H>  |  1.48<H>    Ca    9.0      18 Aug 2024 05:10  Phos  3.7     08-18  Mg     2.4     08-18      proBNP:   Lipid Profile:   HgA1c:   TSH:     Culture - Blood (08.14.24 @ 10:55)    Specimen Source: .Blood Blood   Culture Results:   No growth at 4 days        Assessment and plan  ---------------------------  89 y/o male with PMHx of pAF (no longer on Eliquis as per chart review) prostate CA in remission s/p radiation seed implants (~2004), large hiatal hernia, iron deficiency anemia, asthma, sleep apnea (does not wear CPAP), GERD, osteoarthritis, carpal tunnel, recent admission discharge last month July 5 for abdominal pain found to have pneumoperitoneum, status post emergent diagnostic lap converted to open status post gastric repair for perforation with Sandeep patch on TPN which was weaned off and transition to p.o. diet, also with acute hypoxic respiratory failure due to mucous plugging status post bronc on 6/15 with suctioning of mucous plug, treated with abx for PNA, GEMA, lytic lesion of humerus evaluated by heme/onc and ortho, previously elevated LFTs i/s/o liver lesion, anemia with FOB negative presents to ED today BIBEMS for SOB/hypoxia and AMS sent in from facility. While in ED pt was seen by PCP from facility who referred pt to ED and reports pt currently is significantly altered from his baseline mental status. Pt baseline A&Ox2  pt with sig cardiac and medical hx with increasing sob and change of mental status  echo noted with sig AI ans wall motion abnormality  tel , a.fib , hr control  continue AC  will adjust cardiac meds  check lytes, noted  echo noted  start on lasix drip, strict i/o, good UO  increase co2, dc sodium bicarb, fu lyte, add aldactone 25 mg daily  ?dc abx, cultures are all negative  needs AC for A.Fib, unless is contraindicated, pt with hx of bleeding  decrease Lasix dose  repeat chest x ray in am  echo with sig regional wall motion abnormality ?ischemia schulz will need to discuss GOC  alkalosis and increase renal function, will hold on diuretics  repeat chestv x ray if sig fluid will consider thoracentesis if symptomatic  repeat lytes today continue to hold diuretics  chest x ray today  for comparison    	        
Date of Service: 08-21-24 @ 07:32           CARDIOLOGY     PROGRESS  NOTE   ________________________________________________    CHIEF COMPLAINT:Patient is a 89y old  Male who presents with a chief complaint of AMS (20 Aug 2024 20:48)  no complain, comfortable  	  REVIEW OF SYSTEMS:  CONSTITUTIONAL: No fever, weight loss, or fatigue  EYES: No eye pain, visual disturbances, or discharge  ENT:  No difficulty hearing, tinnitus, vertigo; No sinus or throat pain  NECK: No pain or stiffness  RESPIRATORY: No cough, wheezing, chills or hemoptysis; No Shortness of Breath  CARDIOVASCULAR: No chest pain, palpitations, passing out, dizziness, or leg swelling  GASTROINTESTINAL: No abdominal or epigastric pain. No nausea, vomiting, or hematemesis; No diarrhea or constipation. No melena or hematochezia.  GENITOURINARY: No dysuria, frequency, hematuria, or incontinence  NEUROLOGICAL: No headaches, memory loss, loss of strength, numbness, or tremors  SKIN: No itching, burning, rashes, or lesions   LYMPH Nodes: No enlarged glands  ENDOCRINE: No heat or cold intolerance; No hair loss  MUSCULOSKELETAL: No joint pain or swelling; No muscle, back, or extremity pain  PSYCHIATRIC: No depression, anxiety, mood swings, or difficulty sleeping  HEME/LYMPH: No easy bruising, or bleeding gums  ALLERGY AND IMMUNOLOGIC: No hives or eczema	    [ ] All others negative	  [x ] Unable to obtain    PHYSICAL EXAM:  T(C): 36.9 (08-21-24 @ 05:21), Max: 36.9 (08-20-24 @ 20:12)  HR: 95 (08-21-24 @ 06:18) (71 - 96)  BP: 108/64 (08-21-24 @ 05:21) (108/64 - 118/56)  RR: 22 (08-21-24 @ 06:17) (18 - 28)  SpO2: 98% (08-21-24 @ 06:18) (94% - 100%)  Wt(kg): --  I&O's Summary    20 Aug 2024 07:01  -  21 Aug 2024 07:00  --------------------------------------------------------  IN: 360 mL / OUT: 0 mL / NET: 360 mL        Appearance: Normal	  HEENT:   Normal oral mucosa, PERRL, EOMI	  Lymphatic: No lymphadenopathy  Cardiovascular: Normal S1 S2, No JVD, + murmurs, No edema  Respiratory decrease  bs  Psychiatry: dementia  Gastrointestinal:  Soft, Non-tender, + BS	  Skin: No rashes, No ecchymoses, No cyanosis	  Neurologic: can not asses  Extremities: No clubbing, cyanosis or edema  Vascular: Peripheral pulses palpable 2+ bilaterally    MEDICATIONS  (STANDING):  albuterol/ipratropium for Nebulization 3 milliLiter(s) Nebulizer every 6 hours  apixaban 2.5 milliGRAM(s) Oral two times a day  aspirin  chewable 81 milliGRAM(s) Oral daily  folic acid 1 milliGRAM(s) Oral daily  furosemide   Injectable 40 milliGRAM(s) IV Push daily  melatonin 3 milliGRAM(s) Oral at bedtime  metoprolol succinate  milliGRAM(s) Oral daily  pantoprazole    Tablet 40 milliGRAM(s) Oral before breakfast  piperacillin/tazobactam IVPB.. 3.375 Gram(s) IV Intermittent every 8 hours  spironolactone 25 milliGRAM(s) Oral daily      TELEMETRY: 	    ECG:  	  RADIOLOGY:  OTHER: 	  	  LABS:	 	    CARDIAC MARKERS:                            10.6   11.16 )-----------( 238      ( 19 Aug 2024 09:12 )             35.8     08-20    140  |  93<L>  |  30<H>  ----------------------------<  100<H>  3.8   |  36<H>  |  1.38<H>    Ca    8.7      20 Aug 2024 06:10  Phos  2.4     08-20  Mg     2.3     08-20      proBNP:   Lipid Profile:   HgA1c:   TSH:           Assessment and plan  ---------------------------  88 y/o male with PMHx of pAF (no longer on Eliquis as per chart review) prostate CA in remission s/p radiation seed implants (~2004), large hiatal hernia, iron deficiency anemia, asthma, sleep apnea (does not wear CPAP), GERD, osteoarthritis, carpal tunnel, recent admission discharge last month July 5 for abdominal pain found to have pneumoperitoneum, status post emergent diagnostic lap converted to open status post gastric repair for perforation with Sandeep patch on TPN which was weaned off and transition to p.o. diet, also with acute hypoxic respiratory failure due to mucous plugging status post bronc on 6/15 with suctioning of mucous plug, treated with abx for PNA, GEMA, lytic lesion of humerus evaluated by heme/onc and ortho, previously elevated LFTs i/s/o liver lesion, anemia with FOB negative presents to ED today BIBEMS for SOB/hypoxia and AMS sent in from facility. While in ED pt was seen by PCP from facility who referred pt to ED and reports pt currently is significantly altered from his baseline mental status. Pt baseline A&Ox2  pt with sig cardiac and medical hx with increasing sob and change of mental status  echo noted with sig AI ans wall motion abnormality  tel , a.fib , hr control  echo noted  start on lasix drip, strict i/o, good UO  increase co2, dc sodium bicarb, fu lyte, add aldactone 25 mg daily  echo with sig regional wall motion abnormality ?ischemia schulz will need to discuss GOC  repeat chestv x ray if sig fluid will consider thoracentesis if symptomatic  chest x ray noted improvement in pleural effusion and atlectasis  pulmonary noted  A.fib , hr is well controlled on AC  renal function and lytes are improving  re start on lasix daily, pulmonary noted continue to fy lytes and renal function  check lytes today    	        
Date of Service: 08-23-24 @ 10:39           CARDIOLOGY     PROGRESS  NOTE   ________________________________________________    CHIEF COMPLAINT:Patient is a 89y old  Male who presents with a chief complaint of AMS (22 Aug 2024 21:45)  no complain  	  REVIEW OF SYSTEMS:  CONSTITUTIONAL: No fever, weight loss, or fatigue  EYES: No eye pain, visual disturbances, or discharge  ENT:  No difficulty hearing, tinnitus, vertigo; No sinus or throat pain  NECK: No pain or stiffness  RESPIRATORY: No cough, wheezing, chills or hemoptysis; No Shortness of Breath  CARDIOVASCULAR: No chest pain, palpitations, passing out, dizziness, or leg swelling  GASTROINTESTINAL: No abdominal or epigastric pain. No nausea, vomiting, or hematemesis; No diarrhea or constipation. No melena or hematochezia.  GENITOURINARY: No dysuria, frequency, hematuria, or incontinence  NEUROLOGICAL: No headaches, memory loss, loss of strength, numbness, or tremors  SKIN: No itching, burning, rashes, or lesions   LYMPH Nodes: No enlarged glands  ENDOCRINE: No heat or cold intolerance; No hair loss  MUSCULOSKELETAL: No joint pain or swelling; No muscle, back, or extremity pain  PSYCHIATRIC: No depression, anxiety, mood swings, or difficulty sleeping  HEME/LYMPH: No easy bruising, or bleeding gums  ALLERGY AND IMMUNOLOGIC: No hives or eczema	    [x ] All others negative	  [ ] Unable to obtain    PHYSICAL EXAM:  T(C): 36.4 (08-23-24 @ 04:57), Max: 36.8 (08-22-24 @ 11:01)  HR: 76 (08-23-24 @ 04:57) (67 - 98)  BP: 117/67 (08-23-24 @ 04:57) (104/63 - 117/67)  RR: 18 (08-23-24 @ 04:57) (18 - 18)  SpO2: 97% (08-23-24 @ 04:57) (90% - 97%)  Wt(kg): --  I&O's Summary    22 Aug 2024 07:01  -  23 Aug 2024 07:00  --------------------------------------------------------  IN: 120 mL / OUT: 0 mL / NET: 120 mL    23 Aug 2024 07:01  -  23 Aug 2024 10:39  --------------------------------------------------------  IN: 300 mL / OUT: 0 mL / NET: 300 mL        Appearance: Normal	  HEENT:   Normal oral mucosa, PERRL, EOMI	  Lymphatic: No lymphadenopathy  Cardiovascular: Normal S1 S2, No JVD, + murmurs, No edema  Respiratory:rhonchi  Gastrointestinal:  Soft, Non-tender, + BS	  Skin: No rashes, No ecchymoses, No cyanosis	  Neurologic: Non-focal  Extremities: Normal range of motion, No clubbing, cyanosis or edema  Vascular: Peripheral pulses palpable 2+ bilaterally    MEDICATIONS  (STANDING):  albuterol/ipratropium for Nebulization 3 milliLiter(s) Nebulizer every 6 hours  apixaban 2.5 milliGRAM(s) Oral two times a day  aspirin  chewable 81 milliGRAM(s) Oral daily  folic acid 1 milliGRAM(s) Oral daily  furosemide   Injectable 40 milliGRAM(s) IV Push daily  melatonin 5 milliGRAM(s) Oral at bedtime  metoprolol succinate  milliGRAM(s) Oral daily  pantoprazole    Tablet 40 milliGRAM(s) Oral before breakfast  spironolactone 25 milliGRAM(s) Oral daily      TELEMETRY: 	    ECG:  	  RADIOLOGY:  OTHER: 	  	  LABS:	 	    CARDIAC MARKERS:        08-23    141  |  99  |  30<H>  ----------------------------<  97  4.3   |  31  |  1.28    Ca    8.8      23 Aug 2024 07:01  Phos  3.4     08-23  Mg     2.3     08-23    TPro  6.9  /  Alb  3.1<L>  /  TBili  0.5  /  DBili  x   /  AST  18  /  ALT  27  /  AlkPhos  109  08-23    proBNP:   Lipid Profile:   HgA1c:   TSH:     < from: Xray Chest 1 View- PORTABLE-Urgent (Xray Chest 1 View- PORTABLE-Urgent .) (08.19.24 @ 09:10) >  Right pleural effusion and atelectasis is mildly improved from prior.    Stable dependent left pleural effusion and atelectasis    Assessment and plan  ---------------------------  89 y/o male with PMHx of pAF (no longer on Eliquis as per chart review) prostate CA in remission s/p radiation seed implants (~2004), large hiatal hernia, iron deficiency anemia, asthma, sleep apnea (does not wear CPAP), GERD, osteoarthritis, carpal tunnel, recent admission discharge last month July 5 for abdominal pain found to have pneumoperitoneum, status post emergent diagnostic lap converted to open status post gastric repair for perforation with Sandeep patch on TPN which was weaned off and transition to p.o. diet, also with acute hypoxic respiratory failure due to mucous plugging status post bronc on 6/15 with suctioning of mucous plug, treated with abx for PNA, GEMA, lytic lesion of humerus evaluated by heme/onc and ortho, previously elevated LFTs i/s/o liver lesion, anemia with FOB negative presents to ED today BIBEMS for SOB/hypoxia and AMS sent in from facility. While in ED pt was seen by PCP from facility who referred pt to ED and reports pt currently is significantly altered from his baseline mental status. Pt baseline A&Ox2  chf systolic acute on chronic  pt with sig cardiac and medical hx with increasing sob and change of mental status  echo noted with sig AI ans wall motion abnormality  tel , a.fib , hr control  echo noted  start on lasix drip, strict i/o, good UO  increase co2, dc sodium bicarb, fu lyte, add aldactone 25 mg daily  echo with sig regional wall motion abnormality ?ischemia schulz will need to discuss GOC  repeat chestv x ray if sig fluid will consider thoracentesis if symptomatic  chest x ray noted improvement in pleural effusion and atlectasis  pulmonary noted  A.fib , hr is well controlled on AC  renal function and lytes are improving  re start on lasix daily, pulmonary noted continue to fu lytes and renal function  lytes noted  tele a.fib 90 to 120 bpm  renal function and lytes noted  Electrolytes and renal function noted, continue diuresis  bp and hr is well controlled  	        
Follow-up Pulm Progress Note    Lethargic but arousable, not in distress, comfortable on NC oxygen    Medications:  MEDICATIONS  (STANDING):  albuterol/ipratropium for Nebulization 3 milliLiter(s) Nebulizer every 6 hours  apixaban 2.5 milliGRAM(s) Oral two times a day  aspirin  chewable 81 milliGRAM(s) Oral daily  folic acid 1 milliGRAM(s) Oral daily  melatonin 3 milliGRAM(s) Oral at bedtime  metoprolol succinate  milliGRAM(s) Oral daily  pantoprazole    Tablet 40 milliGRAM(s) Oral before breakfast  piperacillin/tazobactam IVPB.. 3.375 Gram(s) IV Intermittent every 8 hours  spironolactone 25 milliGRAM(s) Oral daily    MEDICATIONS  (PRN):  acetaminophen   Oral Liquid .. 650 milliGRAM(s) Oral every 6 hours PRN Temp greater or equal to 38C (100.4F), Moderate Pain (4 - 6)          Vital Signs Last 24 Hrs  T(C): 36.8 (19 Aug 2024 10:51), Max: 36.8 (19 Aug 2024 10:51)  T(F): 98.2 (19 Aug 2024 10:51), Max: 98.2 (19 Aug 2024 10:51)  HR: 66 (19 Aug 2024 10:51) (66 - 97)  BP: 101/53 (19 Aug 2024 10:51) (101/53 - 106/67)  BP(mean): --  RR: 17 (19 Aug 2024 13:33) (17 - 18)  SpO2: 96% (19 Aug 2024 13:33) (92% - 98%)    Parameters below as of 19 Aug 2024 13:33  Patient On (Oxygen Delivery Method): nasal cannula  O2 Flow (L/min): 3      ABG - ( 19 Aug 2024 09:40 )  pH, Arterial: 7.50  pH, Blood: x     /  pCO2: 65    /  pO2: 147   / HCO3: 51    / Base Excess: 23.2  /  SaO2: 100.0                 08-18 @ 07:01  -  08-19 @ 07:00  --------------------------------------------------------  IN: 585 mL / OUT: 600 mL / NET: -15 mL          LABS:                        10.6   11.16 )-----------( 238      ( 19 Aug 2024 09:12 )             35.8     08-19    142  |  89<L>  |  31<H>  ----------------------------<  107<H>  3.3<L>   |  43<H>  |  1.54<H>    Ca    8.5      19 Aug 2024 09:12  Phos  3.7     08-18  Mg     2.4     08-18            CAPILLARY BLOOD GLUCOSE          Urinalysis Basic - ( 19 Aug 2024 09:12 )    Color: x / Appearance: x / SG: x / pH: x  Gluc: 107 mg/dL / Ketone: x  / Bili: x / Urobili: x   Blood: x / Protein: x / Nitrite: x   Leuk Esterase: x / RBC: x / WBC x   Sq Epi: x / Non Sq Epi: x / Bacteria: x                      CULTURES: (if applicable)  Culture Results:   No growth (08-14 @ 11:50)  Culture Results:   No growth at 4 days (08-14 @ 10:55)  Culture Results:   No growth at 4 days (08-14 @ 10:40)          Physical Examination:  PULM: poor entry at the bases, no wheezing  CVS: S1, S2 heard    RADIOLOGY REVIEWED  CXR:     CT chest:    TTE:  
Patient is a 89y old  Male who presents with a chief complaint of AMS (16 Aug 2024 13:11)      INTERVAL HPI/OVERNIGHT EVENTS: Patient improved. Awake and responsive sitting on the chair . Oxygen stable. PCOS improved down to 42 PH 7.45  Off of AVAPS on oxygen 6L. F/u pulmonologist . Still on Lasix drip due to bilateral pleural effusion and congestion and HF. IV abx  switched to IV Zosyn per  pulmonology recommendations. Vancomycin discontinued. Nasal MRSA negative.  Bicarb elevated, discontinue  Bicarb supplement. Started on Aldactone per cardiologist for HF.     Pain Location & Control:     MEDICATIONS  (STANDING):  albuterol/ipratropium for Nebulization 3 milliLiter(s) Nebulizer every 6 hours  apixaban 2.5 milliGRAM(s) Oral two times a day  aspirin  chewable 81 milliGRAM(s) Oral daily  folic acid 1 milliGRAM(s) Oral daily  furosemide Infusion 10 mG/Hr (5 mL/Hr) IV Continuous <Continuous>  metoprolol succinate  milliGRAM(s) Oral daily  pantoprazole    Tablet 40 milliGRAM(s) Oral before breakfast  piperacillin/tazobactam IVPB.- 3.375 Gram(s) IV Intermittent once  spironolactone 25 milliGRAM(s) Oral daily    MEDICATIONS  (PRN):  OLANZapine Disintegrating Tablet 10 milliGRAM(s) Oral at bedtime PRN Insomnia      Allergies    No Known Allergies    Intolerances        REVIEW OF SYSTEMS:  UTO demented    Vital Signs Last 24 Hrs  T(C): 36.3 (16 Aug 2024 11:27), Max: 37.1 (16 Aug 2024 04:24)  T(F): 97.4 (16 Aug 2024 11:27), Max: 98.7 (16 Aug 2024 04:24)  HR: 86 (16 Aug 2024 16:04) (74 - 98)  BP: 127/65 (16 Aug 2024 16:04) (125/69 - 137/75)  BP(mean): --  RR: 18 (16 Aug 2024 15:05) (18 - 18)  SpO2: 97% (16 Aug 2024 16:04) (95% - 100%)    Parameters below as of 16 Aug 2024 16:04  Patient On (Oxygen Delivery Method): nasal cannula  O2 Flow (L/min): 6      PHYSICAL EXAM:  GENERAL: NAD, well-groomed, well-developed  HEAD:  Atraumatic, Normocephalic  EYES: EOMI, PERRLA, conjunctiva and sclera clear  ENMT: No tonsillar erythema, exudates, or enlargement; Moist mucous membranes, Good dentition, No lesions  NECK: Supple, No JVD, Normal thyroid  NERVOUS SYSTEM:  Alert & Oriented X 1 awake, responsive and confused.   CHEST/LUNG: Clear to auscultation bilaterally; No rales, rhonchi, wheezing, or rubs  HEART: Regular rate and rhythm; No murmurs, rubs, or gallops  ABDOMEN: Soft, Nontender, Nondistended; Bowel sounds present  EXTREMITIES:  2+ Peripheral Pulses, No clubbing or cyanosis  LYMPH: No lymphadenopathy noted  SKIN: No rashes or lesions      LABS:                        9.4    8.58  )-----------( 236      ( 16 Aug 2024 05:26 )             31.3     16 Aug 2024 05:26    147    |  100    |  36     ----------------------------<  90     3.1     |  36     |  1.07     Ca    8.8        16 Aug 2024 05:26  Mg     1.9       16 Aug 2024 05:26        Urinalysis Basic - ( 16 Aug 2024 05:26 )    Color: x / Appearance: x / SG: x / pH: x  Gluc: 90 mg/dL / Ketone: x  / Bili: x / Urobili: x   Blood: x / Protein: x / Nitrite: x   Leuk Esterase: x / RBC: x / WBC x   Sq Epi: x / Non Sq Epi: x / Bacteria: x      CAPILLARY BLOOD GLUCOSE            Cultures  Culture Results:   No growth (08-14-24 @ 11:50)  Culture Results:   No growth at 48 Hours (08-14-24 @ 10:55)  Culture Results:   No growth at 48 Hours (08-14-24 @ 10:40)    Lactate, Blood: 2.2 mmol/L (08-16-24 @ 00:04)    RADIOLOGY & ADDITIONAL TESTS:    Imaging Personally Reviewed:  [ X] YES  [ ] NO    Consultant(s) Notes Reviewed:  [X ] YES  [ ] NO    Care Discussed with Consultants/Other Providers [X ] YES  [ ] NO
Patient is a 89y old  Male who presents with a chief complaint of AMS (22 Aug 2024 10:17)      INTERVAL HPI/OVERNIGHT EVENTS: Medically stable and more awake.  Bicarb stable on Lasix IV once a day. cre stable as well. Continue diuretics. F/u CXR tomorrow. Spoke with whole family at bedside.     Pain Location & Control:     MEDICATIONS  (STANDING):  albuterol/ipratropium for Nebulization 3 milliLiter(s) Nebulizer every 6 hours  apixaban 2.5 milliGRAM(s) Oral two times a day  aspirin  chewable 81 milliGRAM(s) Oral daily  folic acid 1 milliGRAM(s) Oral daily  furosemide   Injectable 40 milliGRAM(s) IV Push daily  melatonin 3 milliGRAM(s) Oral at bedtime  metoprolol succinate  milliGRAM(s) Oral daily  pantoprazole    Tablet 40 milliGRAM(s) Oral before breakfast  spironolactone 25 milliGRAM(s) Oral daily    MEDICATIONS  (PRN):  acetaminophen   Oral Liquid .. 650 milliGRAM(s) Oral every 6 hours PRN Temp greater or equal to 38C (100.4F), Moderate Pain (4 - 6)      Allergies    No Known Allergies    Intolerances        REVIEW OF SYSTEMS:  CONSTITUTIONAL: No fever, weight loss, or fatigue  EYES: No eye pain, visual disturbances, or discharge  ENMT:  No difficulty hearing, tinnitus, vertigo; No sinus or throat pain  NECK: No pain or stiffness  BREASTS: No pain, masses, or nipple discharge  RESPIRATORY: No cough, wheezing, chills or hemoptysis; No shortness of breath  CARDIOVASCULAR: No chest pain, palpitations, dizziness, or leg swelling  GASTROINTESTINAL: No abdominal or epigastric pain. No nausea, vomiting, or hematemesis; No diarrhea or constipation. No melena or hematochezia.  GENITOURINARY: No dysuria, frequency, hematuria, or incontinence  NEUROLOGICAL: No headaches, memory loss, loss of strength, numbness, or tremors  SKIN: No itching, burning, rashes, or lesions   LYMPH NODES: No enlarged glands  ENDOCRINE: No heat or cold intolerance; No hair loss; No polydipsia or polyuria  MUSCULOSKELETAL: No back pain  PSYCHIATRIC: No depression, anxiety, mood swings, or difficulty sleeping  HEME/LYMPH: No easy bruising, or bleeding gums  ALLERGY AND IMMUNOLOGIC: No hives or eczema    Vital Signs Last 24 Hrs  T(C): 36.6 (22 Aug 2024 20:01), Max: 36.8 (22 Aug 2024 11:01)  T(F): 97.9 (22 Aug 2024 20:01), Max: 98.2 (22 Aug 2024 11:01)  HR: 67 (22 Aug 2024 20:01) (67 - 107)  BP: 104/70 (22 Aug 2024 20:01) (104/63 - 114/56)  BP(mean): 77 (22 Aug 2024 11:01) (77 - 77)  RR: 18 (22 Aug 2024 20:01) (18 - 18)  SpO2: 90% (22 Aug 2024 20:01) (90% - 92%)    Parameters below as of 22 Aug 2024 20:01  Patient On (Oxygen Delivery Method): room air        PHYSICAL EXAM:  GENERAL: NAD, well-groomed, well-developed  HEAD:  Atraumatic, Normocephalic  EYES: EOMI, PERRLA, conjunctiva and sclera clear  ENMT: No tonsillar erythema, exudates, or enlargement; Moist mucous membranes, Good dentition, No lesions  NECK: Supple, No JVD, Normal thyroid  NERVOUS SYSTEM:  Alert & Oriented X 1   CHEST/LUNG: Clear to auscultation bilaterally; No rales, rhonchi, wheezing, or rubs  HEART: Regular rate and rhythm; No murmurs, rubs, or gallops  ABDOMEN: Soft, Nontender, Nondistended; Bowel sounds present  EXTREMITIES:  2+ Peripheral Pulses, No clubbing or cyanosis  LYMPH: No lymphadenopathy noted  SKIN: No rashes or lesions      LABS:    22 Aug 2024 06:12    138    |  93     |  29     ----------------------------<  105    3.8     |  35     |  1.34     Ca    8.8        22 Aug 2024 06:12  Phos  2.2       22 Aug 2024 06:12  Mg     2.2       22 Aug 2024 06:12        Urinalysis Basic - ( 22 Aug 2024 06:12 )    Color: x / Appearance: x / SG: x / pH: x  Gluc: 105 mg/dL / Ketone: x  / Bili: x / Urobili: x   Blood: x / Protein: x / Nitrite: x   Leuk Esterase: x / RBC: x / WBC x   Sq Epi: x / Non Sq Epi: x / Bacteria: x      CAPILLARY BLOOD GLUCOSE            Cultures      RADIOLOGY & ADDITIONAL TESTS:    Imaging Personally Reviewed:  [X ] YES  [ ] NO    Consultant(s) Notes Reviewed:  [ X] YES  [ ] NO    Care Discussed with Consultants/Other Providers [X ] YES  [ ] NO
Patient is a 89y old  Male who presents with a chief complaint of AMS (23 Aug 2024 11:33)      INTERVAL HPI/OVERNIGHT EVENTS: Medically improved. Numbers improving. Continue Lasix injection. F/u  cardiology for discharge plan. Patient was agiatted started on Depakote and calmed down. F/u psych.     Pain Location & Control:     MEDICATIONS  (STANDING):  albuterol/ipratropium for Nebulization 3 milliLiter(s) Nebulizer every 6 hours  apixaban 2.5 milliGRAM(s) Oral two times a day  aspirin  chewable 81 milliGRAM(s) Oral daily  folic acid 1 milliGRAM(s) Oral daily  furosemide   Injectable 40 milliGRAM(s) IV Push daily  melatonin 5 milliGRAM(s) Oral at bedtime  metoprolol succinate  milliGRAM(s) Oral daily  pantoprazole    Tablet 40 milliGRAM(s) Oral before breakfast  spironolactone 25 milliGRAM(s) Oral daily    MEDICATIONS  (PRN):  acetaminophen   Oral Liquid .. 650 milliGRAM(s) Oral every 6 hours PRN Temp greater or equal to 38C (100.4F), Moderate Pain (4 - 6)  divalproex  milliGRAM(s) Oral three times a day PRN agitation      Allergies    No Known Allergies    Intolerances        REVIEW OF SYSTEMS:  CONSTITUTIONAL: No fever, weight loss, or fatigue  EYES: No eye pain, visual disturbances, or discharge  ENMT:  No difficulty hearing, tinnitus, vertigo; No sinus or throat pain  NECK: No pain or stiffness  BREASTS: No pain, masses, or nipple discharge  RESPIRATORY: No cough, wheezing, chills or hemoptysis; No shortness of breath  CARDIOVASCULAR: No chest pain, palpitations, dizziness, or leg swelling  GASTROINTESTINAL: No abdominal or epigastric pain. No nausea, vomiting, or hematemesis; No diarrhea or constipation. No melena or hematochezia.  GENITOURINARY: No dysuria, frequency, hematuria, or incontinence  NEUROLOGICAL: No headaches, memory loss, loss of strength, numbness, or tremors  SKIN: No itching, burning, rashes, or lesions   LYMPH NODES: No enlarged glands  ENDOCRINE: No heat or cold intolerance; No hair loss; No polydipsia or polyuria  MUSCULOSKELETAL: No back pain  PSYCHIATRIC: No depression, anxiety, mood swings, or difficulty sleeping  HEME/LYMPH: No easy bruising, or bleeding gums  ALLERGY AND IMMUNOLOGIC: No hives or eczema    Vital Signs Last 24 Hrs  T(C): 36.8 (23 Aug 2024 11:54), Max: 36.8 (23 Aug 2024 11:54)  T(F): 98.2 (23 Aug 2024 11:54), Max: 98.2 (23 Aug 2024 11:54)  HR: 96 (23 Aug 2024 11:54) (67 - 96)  BP: 103/62 (23 Aug 2024 11:54) (103/62 - 117/67)  BP(mean): --  RR: 18 (23 Aug 2024 11:54) (18 - 18)  SpO2: 96% (23 Aug 2024 11:54) (90% - 97%)    Parameters below as of 23 Aug 2024 11:54  Patient On (Oxygen Delivery Method): room air        PHYSICAL EXAM:  GENERAL: NAD, well-groomed, well-developed  HEAD:  Atraumatic, Normocephalic  EYES: EOMI, PERRLA, conjunctiva and sclera clear  ENMT: No tonsillar erythema, exudates, or enlargement; Moist mucous membranes, Good dentition, No lesions  NECK: Supple, No JVD, Normal thyroid  NERVOUS SYSTEM:  Alert & Oriented X3, Good concentration; Motor Strength 5/5 B/L upper and lower extremities; DTRs 2+ intact and symmetric  CHEST/LUNG: Clear to auscultation bilaterally; No rales, rhonchi, wheezing, or rubs  HEART: Regular rate and rhythm; No murmurs, rubs, or gallops  ABDOMEN: Soft, Nontender, Nondistended; Bowel sounds present  EXTREMITIES:  2+ Peripheral Pulses, No clubbing or cyanosis  LYMPH: No lymphadenopathy noted  SKIN: No rashes or lesions  INCISION:  Dressing dry and intact    LABS:    23 Aug 2024 07:01    141    |  99     |  30     ----------------------------<  97     4.3     |  31     |  1.28     Ca    8.8        23 Aug 2024 07:01  Phos  3.4       23 Aug 2024 07:01  Mg     2.3       23 Aug 2024 07:01    TPro  6.9    /  Alb  3.1    /  TBili  0.5    /  DBili  x      /  AST  18     /  ALT  27     /  AlkPhos  109    23 Aug 2024 07:01      Urinalysis Basic - ( 23 Aug 2024 07:01 )    Color: x / Appearance: x / SG: x / pH: x  Gluc: 97 mg/dL / Ketone: x  / Bili: x / Urobili: x   Blood: x / Protein: x / Nitrite: x   Leuk Esterase: x / RBC: x / WBC x   Sq Epi: x / Non Sq Epi: x / Bacteria: x      CAPILLARY BLOOD GLUCOSE            Cultures      RADIOLOGY & ADDITIONAL TESTS:    Imaging Personally Reviewed:  [X ] YES  [ ] NO    Consultant(s) Notes Reviewed:  [ x] YES  [ ] NO    Care Discussed with Consultants/Other Providers [X ] YES  [ ] NO
Follow-up Pulm Progress Note    more alert today  denies SOB/CP  Sats 100% on RA    Medications:  MEDICATIONS  (STANDING):  albuterol/ipratropium for Nebulization 3 milliLiter(s) Nebulizer every 6 hours  apixaban 2.5 milliGRAM(s) Oral two times a day  aspirin  chewable 81 milliGRAM(s) Oral daily  folic acid 1 milliGRAM(s) Oral daily  furosemide   Injectable 40 milliGRAM(s) IV Push daily  melatonin 3 milliGRAM(s) Oral at bedtime  metoprolol succinate  milliGRAM(s) Oral daily  pantoprazole    Tablet 40 milliGRAM(s) Oral before breakfast  spironolactone 25 milliGRAM(s) Oral daily    MEDICATIONS  (PRN):  acetaminophen   Oral Liquid .. 650 milliGRAM(s) Oral every 6 hours PRN Temp greater or equal to 38C (100.4F), Moderate Pain (4 - 6)          Vital Signs Last 24 Hrs  T(C): 36.3 (21 Aug 2024 10:55), Max: 36.9 (20 Aug 2024 20:12)  T(F): 97.4 (21 Aug 2024 10:55), Max: 98.5 (21 Aug 2024 05:21)  HR: 95 (21 Aug 2024 10:55) (71 - 96)  BP: 94/58 (21 Aug 2024 10:55) (94/58 - 108/72)  BP(mean): --  RR: 19 (21 Aug 2024 10:55) (18 - 28)  SpO2: 100% (21 Aug 2024 10:55) (94% - 100%)    Parameters below as of 21 Aug 2024 10:55  Patient On (Oxygen Delivery Method): nasal cannula              08-20 @ 07:01  -  08-21 @ 07:00  --------------------------------------------------------  IN: 360 mL / OUT: 0 mL / NET: 360 mL          LABS:                        11.6   8.41  )-----------( 266      ( 21 Aug 2024 10:01 )             38.5     08-21    140  |  93<L>  |  27<H>  ----------------------------<  113<H>  3.7   |  36<H>  |  1.38<H>    Ca    8.8      21 Aug 2024 10:01  Phos  2.2     08-21  Mg     2.4     08-21            CAPILLARY BLOOD GLUCOSE          Urinalysis Basic - ( 21 Aug 2024 10:01 )    Color: x / Appearance: x / SG: x / pH: x  Gluc: 113 mg/dL / Ketone: x  / Bili: x / Urobili: x   Blood: x / Protein: x / Nitrite: x   Leuk Esterase: x / RBC: x / WBC x   Sq Epi: x / Non Sq Epi: x / Bacteria: x                      CULTURES:    Culture - Blood (collected 08-14-24 @ 10:55)  Source: .Blood Blood  Final Report (08-19-24 @ 15:00):    No growth at 5 days    Culture - Blood (collected 08-14-24 @ 10:40)  Source: .Blood Blood  Final Report (08-19-24 @ 15:00):    No growth at 5 days        Culture - Urine (collected 08-14-24 @ 11:50)  Source: Catheterized Catheterized  Final Report (08-15-24 @ 10:30):    No growth                                Physical Examination:  PULM: decreased BS at bases   CVS: S1, S2 heard    RADIOLOGY REVIEWED  CXR: improving pl effusions     CT chest:    < from: CT Angio Chest PE Protocol w/ IV Cont (08.14.24 @ 17:08) >  FINDINGS:  CHEST:  LUNGS, PLEURA AND LARGE AIRWAYS: Tracheobronchial secretions.   Intralobular septal thickening. Bilateral upper lobe patchy and ground   glass opacities. Moderate bilateral pleural effusions and compressive   atelectasis.  VESSELS: Aortic calcifications. Coronary artery calcifications. Contrast   bolus is satisfactory. No main, right main, left main, or lobar pulmonary   embolism. Limited evaluation of many of the segmental and subsegmental   pulmonary arteries secondary to motion and mixing artifact.  HEART: Heart size is normal. No pericardial effusion.  MEDIASTINUM AND ZANE: No lymphadenopathy.  CHEST WALL AND LOWER NECK: Anasarca.    ABDOMEN ANDPELVIS:  LIVER: A 2.7 x 2.7 cm hemangioma in the right lobe is unchanged.   Additional subcentimeter hypodense focus, too small to characterize.  BILE DUCTS: Normal caliber.  GALLBLADDER: Within normal limits.  SPLEEN: Within normal limits.  PANCREAS: Within normal limits.  ADRENALS: Within normal limits.  KIDNEYS/URETERS: No hydronephrosis. Residual contrast in the collecting   system.    BLADDER: Within normal limits.  REPRODUCTIVE ORGANS: Brachytherapy seeds within the prostate.    BOWEL: No bowel obstruction. Moderate hiatal hernia. Appendix is not   visualized. No evidence of inflammation in the pericecal region. Colonic   diverticulosis, without diverticulitis.  PERITONEUM/RETROPERITONEUM: Small volume ascites.  VESSELS: Atheroscleroticchanges.  LYMPH NODES: No lymphadenopathy.  ABDOMINAL WALL: Anasarca. Small bilateral fat-containing inguinal hernias.  BONES: Degenerative changes and dextroscoliosis. L5 pars defects. Lytic   lesion in the left humeral head with cortical disruptionis unchanged   from 6/11/2024, however increased from 6/25/2019.    IMPRESSION:  No pulmonary embolism to the level of the lobar pulmonary arteries.    Moderate bilateral pleural effusions and compressive atelectasis.    Small volume ascites and anasarca.        --- End of Report ---      < end of copied text >  
Follow-up Pulm Progress Note    much more alert today   denies SOB/CP     Medications:  MEDICATIONS  (STANDING):  albuterol/ipratropium for Nebulization 3 milliLiter(s) Nebulizer every 6 hours  apixaban 2.5 milliGRAM(s) Oral two times a day  aspirin  chewable 81 milliGRAM(s) Oral daily  cefTRIAXone   IVPB 1000 milliGRAM(s) IV Intermittent every 24 hours  folic acid 1 milliGRAM(s) Oral daily  furosemide Infusion 10 mG/Hr (5 mL/Hr) IV Continuous <Continuous>  metoprolol succinate  milliGRAM(s) Oral daily  pantoprazole    Tablet 40 milliGRAM(s) Oral before breakfast  spironolactone 25 milliGRAM(s) Oral daily    MEDICATIONS  (PRN):  OLANZapine Disintegrating Tablet 10 milliGRAM(s) Oral at bedtime PRN Insomnia          Vital Signs Last 24 Hrs  T(C): 36.3 (16 Aug 2024 11:27), Max: 37.1 (16 Aug 2024 04:24)  T(F): 97.4 (16 Aug 2024 11:27), Max: 98.7 (16 Aug 2024 04:24)  HR: 74 (16 Aug 2024 11:27) (60 - 98)  BP: 137/75 (16 Aug 2024 11:27) (125/69 - 159/73)  BP(mean): --  RR: 18 (16 Aug 2024 11:27) (18 - 18)  SpO2: 100% (16 Aug 2024 11:27) (95% - 100%)    Parameters below as of 16 Aug 2024 11:27  Patient On (Oxygen Delivery Method): nasal cannula        ABG - ( 16 Aug 2024 04:56 )  pH, Arterial: 7.45  pH, Blood: x     /  pCO2: 62    /  pO2: 152   / HCO3: 43    / Base Excess: 15.9  /  SaO2: 99.0              VBG pH 7.30 08-15 @ 18:03    VBG pCO2 80 08-15 @ 18:03    VBG O2 sat 45.2 08-15 @ 18:03    VBG lactate 2.1 08-15 @ 18:03      08-15 @ 07:01  -  08-16 @ 07:00  --------------------------------------------------------  IN: 60 mL / OUT: 3400 mL / NET: -3340 mL          LABS:                        9.4    8.58  )-----------( 236      ( 16 Aug 2024 05:26 )             31.3     08-16    147<H>  |  100  |  36<H>  ----------------------------<  90  3.1<L>   |  36<H>  |  1.07    Ca    8.8      16 Aug 2024 05:26  Mg     1.9     08-16    TPro  6.1  /  Alb  2.9<L>  /  TBili  0.3  /  DBili  x   /  AST  57<H>  /  ALT  155<H>  /  AlkPhos  133<H>  08-15              Urinalysis Basic - ( 16 Aug 2024 05:26 )    Color: x / Appearance: x / SG: x / pH: x  Gluc: 90 mg/dL / Ketone: x  / Bili: x / Urobili: x   Blood: x / Protein: x / Nitrite: x   Leuk Esterase: x / RBC: x / WBC x   Sq Epi: x / Non Sq Epi: x / Bacteria: x      Procalcitonin: 0.10 ng/mL (08-15-24 @ 07:01)  Procalcitonin: 0.12 ng/mL (08-14-24 @ 11:36)                  CULTURES:    Culture - Blood (collected 08-14-24 @ 10:55)  Source: .Blood Blood  Preliminary Report (08-15-24 @ 15:01):    No growth at 24 hours    Culture - Blood (collected 08-14-24 @ 10:40)  Source: .Blood Blood  Preliminary Report (08-15-24 @ 15:01):    No growth at 24 hours        Culture - Urine (collected 08-14-24 @ 11:50)  Source: Catheterized Catheterized  Final Report (08-15-24 @ 10:30):    No growth                Physical Examination:  PULM: decreased BS at bases   CVS: S1, S2 heard    RADIOLOGY REVIEWED    CT chest: < from: CT Angio Chest PE Protocol w/ IV Cont (08.14.24 @ 17:08) >  FINDINGS:  CHEST:  LUNGS, PLEURA AND LARGE AIRWAYS: Tracheobronchial secretions.   Intralobular septal thickening. Bilateral upper lobe patchy and ground   glass opacities. Moderate bilateral pleural effusions and compressive   atelectasis.  VESSELS: Aortic calcifications. Coronary artery calcifications. Contrast   bolus is satisfactory. No main, right main, left main, or lobar pulmonary   embolism. Limited evaluation of many of the segmental and subsegmental   pulmonary arteries secondary to motion and mixing artifact.  HEART: Heart size is normal. No pericardial effusion.  MEDIASTINUM AND ZANE: No lymphadenopathy.  CHEST WALL AND LOWER NECK: Anasarca.    ABDOMEN ANDPELVIS:  LIVER: A 2.7 x 2.7 cm hemangioma in the right lobe is unchanged.   Additional subcentimeter hypodense focus, too small to characterize.  BILE DUCTS: Normal caliber.  GALLBLADDER: Within normal limits.  SPLEEN: Within normal limits.  PANCREAS: Within normal limits.  ADRENALS: Within normal limits.  KIDNEYS/URETERS: No hydronephrosis. Residual contrast in the collecting   system.    BLADDER: Within normal limits.  REPRODUCTIVE ORGANS: Brachytherapy seeds within the prostate.    BOWEL: No bowel obstruction. Moderate hiatal hernia. Appendix is not   visualized. No evidence of inflammation in the pericecal region. Colonic   diverticulosis, without diverticulitis.  PERITONEUM/RETROPERITONEUM: Small volume ascites.  VESSELS: Atheroscleroticchanges.  LYMPH NODES: No lymphadenopathy.  ABDOMINAL WALL: Anasarca. Small bilateral fat-containing inguinal hernias.  BONES: Degenerative changes and dextroscoliosis. L5 pars defects. Lytic   lesion in the left humeral head with cortical disruptionis unchanged   from 6/11/2024, however increased from 6/25/2019.    IMPRESSION:  No pulmonary embolism to the level of the lobar pulmonary arteries.    Moderate bilateral pleural effusions and compressive atelectasis.    Small volume ascites and anasarca.      < end of copied text >      TTE: < from: TTE W or WO Ultrasound Enhancing Agent (06.15.24 @ 09:19) >     CONCLUSIONS:      1. Left ventricular cavity is small. Left ventricular wall thickness is normal. Left ventricular systolic function is moderately decreased with an ejection fraction of 40 % by Sharma's method of disks. Global left ventricular hypokinesis.   2. Multiple segmental abnormalities exist. See findings.   3. There is mild (grade 1) left ventricular diastolic dysfunction, with elevated filling pressure.   4. Normal right ventricular cavity size, with normal wall thickness, and normal systolic function.   5. Moderate aortic regurgitation.   6. No pericardial effusion seen.   7. Compared to the transthoracic echocardiogram performed on 6/29/2019, there has been an interval decline in LV systolic function.   8. Technically difficult image quality.    ________________________________________________________________________________________  FINDINGS:     Left Ventricle:  The left ventricular cavity is small. Left ventricular wall thicknessis normal. Left ventricular systolic function is moderately decreased with a calculated ejection fraction of 40 % by the Sharma's biplane method of disks. There is global left ventricular hypokinesis. There is mild (grade 1) left ventricular diastolic dysfunction, with elevated filling pressure.  LV Wall Scoring: There is diffuse hypokinesis.          Right Ventricle:  The right ventricular cavity is normal in size, with normal wall thickness and normal systolic function. Tricuspid annular planesystolic excursion (TAPSE) is 1.8 cm (normal >=1.7 cm).     Left Atrium:  The left atrium is normal in size.     Right Atrium:  The right atrium is normal in size with an indexed volume of 16.49 ml/m².     Interatrial Septum:  The interatrial septum appears intact.     Aortic Valve:  The aortic valve was not well visualized. There is calcification of the aortic valve leaflets. There is fibrocalcific aortic valve sclerosis without stenosis. There is moderate aortic regurgitation.     Mitral Valve:  There is moderate posterior calcification and mild calcification of the mitral valve annulus. There is mild leaflet calcification. There is no mitral valve stenosis. There is mild mitral regurgitation.     Tricuspid Valve:  Structurally normal tricuspid valve with normal leaflet excursion. There is mild tricuspid regurgitation. Estimated pulmonary artery systolic pressure is 44 mmHg.     Pulmonic Valve:  Structurally normal pulmonic valve with normal leaflet excursion. There is trace pulmonic regurgitation. There is evidence of pulmonary hypertension.     Aorta:  The aortic root appears normal in size.     Pericardium:  No pericardial effusion seen.     Systemic Veins:  The inferior vena cava is dilated (dilated >2.1cm) with abnormal inspiratory collapse (abnormal <50%) consistent with elevated right atrial pressure (~15, range 10-20mmHg).  ____________________________________________________________________  QUANTITATIVE DATA:  Left Ventricle Measurements: (Indexed to BSA)     IVSd (2D):   1.0 cm  LVPWd (2D):  1.1 cm  LVIDd (2D):  3.0 cm  LVIDs (2D):  2.7 cm  LV Mass:     88 g   49.9 g/m²  LV Vol d, MOD A2C: 119.0 ml 67.67 ml/m²  LV Vol d, MOD A4C: 90.2 ml  51.29 ml/m²  LV Vol d, MOD BP:  104.9 ml 59.67 ml/m²  LV Vol s, MOD A2C: 65.6 ml  37.30 ml/m²  LV Vol s, MOD A4C: 59.4 ml  33.75 ml/m²  LV Vol s, MOD BP:  63.0 ml  35.82 ml/m²  LVOT SV MOD BP:    42.0 ml  LV EF% MOD BP:     40 %     MV E Vmax:    0.81 m/s  MV A Vmax:    0.78 m/s  MV E/A:       1.03  e' lateral:   5.43 cm/s  e' medial:    5.26 cm/s  E/e' lateral: 14.86  E/e' medial:  15.34  E/e' Average: 15.10    Aorta Measurements: (Normal range) (Indexed to BSA)     Sinuses of Valsalva: 2.60 cm (3.1 - 3.7 cm)  Ao Annulus:          1.9 cm (2.3 - 2.9 cm)       Left Atrium Measurements: (Indexed to BSA)  LA Diam 2D:        2.90 cm  LA Vol s, MOD A4C: 56.90 ml.  LA Vol s, MOD A2C: 80.00 ml.    Right Ventricle Measurements: Right Atrial Measurements:     TAPSE:           1.8 cm       RA Vol:       29.00 ml  RV S' Vmax:  10.10 cm/s   RA Vol Index: 16.49 ml/m²  RV Base (RVID1): 3.5 cm  RV Mid (RVID2):  2.1 cm       LVOT / RVOT/ Qp/Qs Data: (Indexed to BSA)  LVOT Diameter:  1.90 cm  LVOT Area:      2.84 cm²  LVOT Vmax:      0.92 m/s  LVOT Vmn:       0.603 m/s  LVOT VTI:       18.60 cm  LVOT peak grad: 3 mmHg  LVOT mean grad: 2.0 mmHg  LVOT SV:        52.7 ml   29.99 ml/m²    Mitral Valve Measurements:     MV E Vmax: 0.8 m/s  MV A Vmax: 0.8 m/s  MV E/A:    1.0       Tricuspid Valve Measurements:     TR Vmax:   2.7 m/s  TR Peak Gradient: 29.4 mmHg  RA Pressure:      15 mmHg  PASP:             44 mmHg      < end of copied text >
Patient is a 89y old  Male who presents with a chief complaint of AMS (20 Aug 2024 12:27)      INTERVAL HPI/OVERNIGHT EVENTS:  Patient back on Lasix IV injection daily. Metabolic alkalosis improved. Continue Lasix injection     Pain Location & Control:     MEDICATIONS  (STANDING):  albuterol/ipratropium for Nebulization 3 milliLiter(s) Nebulizer every 6 hours  apixaban 2.5 milliGRAM(s) Oral two times a day  aspirin  chewable 81 milliGRAM(s) Oral daily  folic acid 1 milliGRAM(s) Oral daily  furosemide   Injectable 40 milliGRAM(s) IV Push daily  melatonin 3 milliGRAM(s) Oral at bedtime  metoprolol succinate  milliGRAM(s) Oral daily  pantoprazole    Tablet 40 milliGRAM(s) Oral before breakfast  piperacillin/tazobactam IVPB.. 3.375 Gram(s) IV Intermittent every 8 hours  spironolactone 25 milliGRAM(s) Oral daily    MEDICATIONS  (PRN):  acetaminophen   Oral Liquid .. 650 milliGRAM(s) Oral every 6 hours PRN Temp greater or equal to 38C (100.4F), Moderate Pain (4 - 6)      Allergies    No Known Allergies    Intolerances        REVIEW OF SYSTEMS:  UTO drowsy    Vital Signs Last 24 Hrs  T(C): 36.9 (20 Aug 2024 20:12), Max: 36.9 (20 Aug 2024 20:12)  T(F): 98.4 (20 Aug 2024 20:12), Max: 98.4 (20 Aug 2024 20:12)  HR: 83 (20 Aug 2024 20:12) (71 - 89)  BP: 108/72 (20 Aug 2024 20:12) (103/60 - 118/56)  BP(mean): --  RR: 18 (20 Aug 2024 20:12) (17 - 18)  SpO2: 100% (20 Aug 2024 20:12) (96% - 100%)    Parameters below as of 20 Aug 2024 20:12  Patient On (Oxygen Delivery Method): nasal cannula  O2 Flow (L/min): 3      PHYSICAL EXAM:  GENERAL: NAD, well-groomed, well-developed  HEAD:  Atraumatic, Normocephalic  EYES: EOMI, PERRLA, conjunctiva and sclera clear  ENMT: No tonsillar erythema, exudates, or enlargement; Moist mucous membranes, Good dentition, No lesions  NECK: Supple, No JVD, Normal thyroid  NERVOUS SYSTEM:  Alert & Oriented X 1 drowsy   CHEST/LUNG: Clear to auscultation bilaterally; No rales, rhonchi, wheezing, or rubs  HEART: Regular rate and rhythm; No murmurs, rubs, or gallops  ABDOMEN: Soft, Nontender, Nondistended; Bowel sounds present  EXTREMITIES:  2+ Peripheral Pulses, No clubbing or cyanosis  LYMPH: No lymphadenopathy noted  SKIN: No rashes or lesions      LABS:    20 Aug 2024 06:10    140    |  93     |  30     ----------------------------<  100    3.8     |  36     |  1.38     Ca    8.7        20 Aug 2024 06:10  Phos  2.4       20 Aug 2024 06:10  Mg     2.3       20 Aug 2024 06:10        Urinalysis Basic - ( 20 Aug 2024 06:10 )    Color: x / Appearance: x / SG: x / pH: x  Gluc: 100 mg/dL / Ketone: x  / Bili: x / Urobili: x   Blood: x / Protein: x / Nitrite: x   Leuk Esterase: x / RBC: x / WBC x   Sq Epi: x / Non Sq Epi: x / Bacteria: x      CAPILLARY BLOOD GLUCOSE            Cultures  Culture Results:   No growth (08-14-24 @ 11:50)  Culture Results:   No growth at 5 days (08-14-24 @ 10:55)  Culture Results:   No growth at 5 days (08-14-24 @ 10:40)      RADIOLOGY & ADDITIONAL TESTS:    Imaging Personally Reviewed:  [X ] YES  [ ] NO    Consultant(s) Notes Reviewed:  [ X] YES  [ ] NO    Care Discussed with Consultants/Other Providers [ X] YES  [ ] NO
Follow-up Pulm Progress Note    AVAPS not worn overnight  denies SOB/CP  Sats mid 90s on RA    Medications:  MEDICATIONS  (STANDING):  albuterol/ipratropium for Nebulization 3 milliLiter(s) Nebulizer every 6 hours  apixaban 2.5 milliGRAM(s) Oral two times a day  aspirin  chewable 81 milliGRAM(s) Oral daily  folic acid 1 milliGRAM(s) Oral daily  furosemide   Injectable 40 milliGRAM(s) IV Push daily  melatonin 3 milliGRAM(s) Oral at bedtime  metoprolol succinate  milliGRAM(s) Oral daily  pantoprazole    Tablet 40 milliGRAM(s) Oral before breakfast  potassium phosphate IVPB 30 milliMole(s) IV Intermittent once  spironolactone 25 milliGRAM(s) Oral daily    MEDICATIONS  (PRN):  acetaminophen   Oral Liquid .. 650 milliGRAM(s) Oral every 6 hours PRN Temp greater or equal to 38C (100.4F), Moderate Pain (4 - 6)          Vital Signs Last 24 Hrs  T(C): 36.7 (22 Aug 2024 04:31), Max: 36.7 (22 Aug 2024 04:31)  T(F): 98.1 (22 Aug 2024 04:31), Max: 98.1 (22 Aug 2024 04:31)  HR: 107 (22 Aug 2024 04:31) (95 - 107)  BP: 114/56 (22 Aug 2024 04:31) (94/58 - 128/69)  BP(mean): --  RR: 18 (22 Aug 2024 04:31) (18 - 19)  SpO2: 91% (22 Aug 2024 04:31) (91% - 100%)    Parameters below as of 22 Aug 2024 04:31  Patient On (Oxygen Delivery Method): room air        ABG - ( 22 Aug 2024 04:55 )  pH, Arterial: 7.54  pH, Blood: x     /  pCO2: 46    /  pO2: 74    / HCO3: 39    / Base Excess: 14.8  /  SaO2: 96.6                  08-21 @ 07:01  -  08-22 @ 07:00  --------------------------------------------------------  IN: 360 mL / OUT: 0 mL / NET: 360 mL          LABS:                        11.6   8.41  )-----------( 266      ( 21 Aug 2024 10:01 )             38.5     08-22    138  |  93<L>  |  29<H>  ----------------------------<  105<H>  3.8   |  35<H>  |  1.34<H>    Ca    8.8      22 Aug 2024 06:12  Phos  2.2     08-22  Mg     2.2     08-22            CAPILLARY BLOOD GLUCOSE          Urinalysis Basic - ( 22 Aug 2024 06:12 )    Color: x / Appearance: x / SG: x / pH: x  Gluc: 105 mg/dL / Ketone: x  / Bili: x / Urobili: x   Blood: x / Protein: x / Nitrite: x   Leuk Esterase: x / RBC: x / WBC x   Sq Epi: x / Non Sq Epi: x / Bacteria: x              CULTURES:       Culture - Blood (collected 08-14-24 @ 10:55)  Source: .Blood Blood  Final Report (08-19-24 @ 15:00):    No growth at 5 days    Culture - Blood (collected 08-14-24 @ 10:40)  Source: .Blood Blood  Final Report (08-19-24 @ 15:00):    No growth at 5 days        Culture - Urine (collected 08-14-24 @ 11:50)  Source: Catheterized Catheterized  Final Report (08-15-24 @ 10:30):    No growth              Physical Examination:  PULM: decreased BS at bases   CVS: S1, S2 heard    RADIOLOGY REVIEWED  CXR: improving pl effusions     CT chest:    < from: CT Angio Chest PE Protocol w/ IV Cont (08.14.24 @ 17:08) >  FINDINGS:  CHEST:  LUNGS, PLEURA AND LARGE AIRWAYS: Tracheobronchial secretions.   Intralobular septal thickening. Bilateral upper lobe patchy and ground   glass opacities. Moderate bilateral pleural effusions and compressive   atelectasis.  VESSELS: Aortic calcifications. Coronary artery calcifications. Contrast   bolus is satisfactory. No main, right main, left main, or lobar pulmonary   embolism. Limited evaluation of many of the segmental and subsegmental   pulmonary arteries secondary to motion and mixing artifact.  HEART: Heart size is normal. No pericardial effusion.  MEDIASTINUM AND ZANE: No lymphadenopathy.  CHEST WALL AND LOWER NECK: Anasarca.    ABDOMEN ANDPELVIS:  LIVER: A 2.7 x 2.7 cm hemangioma in the right lobe is unchanged.   Additional subcentimeter hypodense focus, too small to characterize.  BILE DUCTS: Normal caliber.  GALLBLADDER: Within normal limits.  SPLEEN: Within normal limits.  PANCREAS: Within normal limits.  ADRENALS: Within normal limits.  KIDNEYS/URETERS: No hydronephrosis. Residual contrast in the collecting   system.    BLADDER: Within normal limits.  REPRODUCTIVE ORGANS: Brachytherapy seeds within the prostate.    BOWEL: No bowel obstruction. Moderate hiatal hernia. Appendix is not   visualized. No evidence of inflammation in the pericecal region. Colonic   diverticulosis, without diverticulitis.  PERITONEUM/RETROPERITONEUM: Small volume ascites.  VESSELS: Atheroscleroticchanges.  LYMPH NODES: No lymphadenopathy.  ABDOMINAL WALL: Anasarca. Small bilateral fat-containing inguinal hernias.  BONES: Degenerative changes and dextroscoliosis. L5 pars defects. Lytic   lesion in the left humeral head with cortical disruptionis unchanged   from 6/11/2024, however increased from 6/25/2019.    IMPRESSION:  No pulmonary embolism to the level of the lobar pulmonary arteries.    Moderate bilateral pleural effusions and compressive atelectasis.    Small volume ascites and anasarca.        --- End of Report ---      < end of copied text >  
Patient is a 89y old  Male who presents with a chief complaint of AMS (15 Aug 2024 09:58)      INTERVAL HPI/OVERNIGHT EVENTS: Medically improved and more awake. Responsive and back to baseline. ABG showed improvement, weaned off AVAPS  since this morning on nasal cannula 6L. Try to wean him off of oxygen as well.  F/u repeat  ABG. Continue abx Ceftriaxone and Vancomycin. F/u pulmonologist.  Continue Lasix drip recommended by cardiology.      Pain Location & Control:     MEDICATIONS  (STANDING):  albuterol/ipratropium for Nebulization 3 milliLiter(s) Nebulizer every 6 hours  aspirin  chewable 81 milliGRAM(s) Oral daily  cefTRIAXone   IVPB 1000 milliGRAM(s) IV Intermittent every 24 hours  enoxaparin Injectable 40 milliGRAM(s) SubCutaneous every 24 hours  folic acid 1 milliGRAM(s) Oral daily  furosemide Infusion 10 mG/Hr (5 mL/Hr) IV Continuous <Continuous>  metoprolol succinate  milliGRAM(s) Oral daily  pantoprazole    Tablet 40 milliGRAM(s) Oral before breakfast  sodium bicarbonate 650 milliGRAM(s) Oral three times a day  vancomycin  IVPB 750 milliGRAM(s) IV Intermittent every 24 hours    MEDICATIONS  (PRN):  OLANZapine Disintegrating Tablet 10 milliGRAM(s) Oral at bedtime PRN Insomnia      Allergies    No Known Allergies    Intolerances        REVIEW OF SYSTEMS:  UTO    Vital Signs Last 24 Hrs  T(C): 36.4 (15 Aug 2024 20:01), Max: 36.6 (15 Aug 2024 00:05)  T(F): 97.5 (15 Aug 2024 20:01), Max: 97.9 (15 Aug 2024 00:05)  HR: 98 (15 Aug 2024 20:01) (60 - 98)  BP: 125/69 (15 Aug 2024 20:01) (111/65 - 178/73)  BP(mean): 81 (15 Aug 2024 00:05) (81 - 81)  RR: 18 (15 Aug 2024 20:01) (18 - 18)  SpO2: 95% (15 Aug 2024 20:01) (95% - 100%)    Parameters below as of 15 Aug 2024 20:01  Patient On (Oxygen Delivery Method): nasal cannula        PHYSICAL EXAM:  GENERAL: NAD, well-groomed, well-developed  HEAD:  Atraumatic, Normocephalic  EYES: EOMI, PERRLA, conjunctiva and sclera clear  ENMT: No tonsillar erythema, exudates, or enlargement; Moist mucous membranes, Good dentition, No lesions  NECK: Supple, No JVD, Normal thyroid  NERVOUS SYSTEM:  Alert & Oriented x 0 awake and responsive confused   CHEST/LUNG: Clear to auscultation bilaterally; No rales, rhonchi, wheezing, or rubs  HEART: Regular rate and rhythm; No murmurs, rubs, or gallops  ABDOMEN: Soft, Nontender, Nondistended; Bowel sounds present  EXTREMITIES:  2+ Peripheral Pulses, No clubbing or cyanosis  LYMPH: No lymphadenopathy noted  SKIN: No rashes or lesions    LABS:                        8.6    7.01  )-----------( 220      ( 15 Aug 2024 07:01 )             29.6     15 Aug 2024 07:01    144    |  103    |  42     ----------------------------<  96     4.0     |  28     |  1.17     Ca    8.9        15 Aug 2024 07:01    TPro  6.1    /  Alb  2.9    /  TBili  0.3    /  DBili  x      /  AST  57     /  ALT  155    /  AlkPhos  133    15 Aug 2024 07:01    PT/INR - ( 14 Aug 2024 11:36 )   PT: 12.6 sec;   INR: 1.21 ratio         PTT - ( 14 Aug 2024 11:36 )  PTT:27.3 sec  Urinalysis Basic - ( 15 Aug 2024 07:01 )    Color: x / Appearance: x / SG: x / pH: x  Gluc: 96 mg/dL / Ketone: x  / Bili: x / Urobili: x   Blood: x / Protein: x / Nitrite: x   Leuk Esterase: x / RBC: x / WBC x   Sq Epi: x / Non Sq Epi: x / Bacteria: x      CAPILLARY BLOOD GLUCOSE            Cultures  Culture Results:   No growth (08-14-24 @ 11:50)  Culture Results:   No growth at 24 hours (08-14-24 @ 10:55)  Culture Results:   No growth at 24 hours (08-14-24 @ 10:40)      RADIOLOGY & ADDITIONAL TESTS:    Imaging Personally Reviewed:  [ X] YES  [ ] NO    Consultant(s) Notes Reviewed:  [X ] YES  [ ] NO    Care Discussed with Consultants/Other Providers [X ] YES  [ ] NO
Follow-up Pulm Progress Note    Improving mentation this morning, awake and interactive. Denies any pain or shortness of breath. Remains on NC oxygen.    Medications:  MEDICATIONS  (STANDING):  albuterol/ipratropium for Nebulization 3 milliLiter(s) Nebulizer every 6 hours  apixaban 2.5 milliGRAM(s) Oral two times a day  aspirin  chewable 81 milliGRAM(s) Oral daily  folic acid 1 milliGRAM(s) Oral daily  furosemide   Injectable 40 milliGRAM(s) IV Push daily  melatonin 3 milliGRAM(s) Oral at bedtime  metoprolol succinate  milliGRAM(s) Oral daily  pantoprazole    Tablet 40 milliGRAM(s) Oral before breakfast  piperacillin/tazobactam IVPB.. 3.375 Gram(s) IV Intermittent every 8 hours  spironolactone 25 milliGRAM(s) Oral daily    MEDICATIONS  (PRN):  acetaminophen   Oral Liquid .. 650 milliGRAM(s) Oral every 6 hours PRN Temp greater or equal to 38C (100.4F), Moderate Pain (4 - 6)          Vital Signs Last 24 Hrs  T(C): 36.4 (20 Aug 2024 10:47), Max: 36.8 (19 Aug 2024 20:24)  T(F): 97.6 (20 Aug 2024 10:47), Max: 98.2 (19 Aug 2024 20:24)  HR: 84 (20 Aug 2024 10:47) (84 - 101)  BP: 118/56 (20 Aug 2024 10:47) (103/60 - 118/56)  BP(mean): --  RR: 18 (20 Aug 2024 10:47) (17 - 18)  SpO2: 100% (20 Aug 2024 10:47) (96% - 100%)    Parameters below as of 20 Aug 2024 10:47  Patient On (Oxygen Delivery Method): nasal cannula        ABG - ( 19 Aug 2024 09:40 )  pH, Arterial: 7.50  pH, Blood: x     /  pCO2: 65    /  pO2: 147   / HCO3: 51    / Base Excess: 23.2  /  SaO2: 100.0                 08-19 @ 07:01  -  08-20 @ 07:00  --------------------------------------------------------  IN: 480 mL / OUT: 0 mL / NET: 480 mL          LABS:                        10.6   11.16 )-----------( 238      ( 19 Aug 2024 09:12 )             35.8     08-20    140  |  93<L>  |  30<H>  ----------------------------<  100<H>  3.8   |  36<H>  |  1.38<H>    Ca    8.7      20 Aug 2024 06:10  Phos  2.4     08-20  Mg     2.3     08-20            CAPILLARY BLOOD GLUCOSE          Urinalysis Basic - ( 20 Aug 2024 06:10 )    Color: x / Appearance: x / SG: x / pH: x  Gluc: 100 mg/dL / Ketone: x  / Bili: x / Urobili: x   Blood: x / Protein: x / Nitrite: x   Leuk Esterase: x / RBC: x / WBC x   Sq Epi: x / Non Sq Epi: x / Bacteria: x                      CULTURES: (if applicable)  Culture Results:   No growth (08-14 @ 11:50)  Culture Results:   No growth at 5 days (08-14 @ 10:55)  Culture Results:   No growth at 5 days (08-14 @ 10:40)          Physical Examination:  PULM: Clear to auscultation bilaterally, no significant sputum production, Poor air entry at bases  CVS: S1, S2 heard  MSK: Severe kyphoscoliosis     RADIOLOGY REVIEWED  CXR:     CT chest:    TTE:

## 2024-08-25 NOTE — DISCHARGE NOTE PROVIDER - NSDCCPCAREPLAN_GEN_ALL_CORE_FT
PRINCIPAL DISCHARGE DIAGNOSIS  Diagnosis: Sepsis with acute hypoxic respiratory failure  Assessment and Plan of Treatment: sepsis in setting of pneumonia, antibiotics completed inpatient with improvement. Was on AVAPS during the night with supplemental oxygen daytime. Once observed off AVAPS and ABG improved, AVAPS discontinued.  Please seek medical attention if you develop shortness of breath, chest pain, or fever.      SECONDARY DISCHARGE DIAGNOSES  Diagnosis: Pleural effusion  Assessment and Plan of Treatment: Build up of fluid surrounding the lungs secondary to CHF exacerbation, treated with a lasix drip then transisitoned to IV lasix, later transitioned to oral lasix prior to discharge once more stable. Chest xray repeated prior to discharge with improvement in pleural effusion.. Home dose of lasix increased to 40 mg daily.

## 2024-08-29 ENCOUNTER — TRANSCRIPTION ENCOUNTER (OUTPATIENT)
Age: 89
End: 2024-08-29

## 2024-09-17 NOTE — ED PROVIDER NOTE - PHYSICAL EXAMINATION
Gen: elderly mad, poor historian   Head: normal appearing  HEENT: normal conjunctiva, oral mucosa dry   Lung: no respiratory distress, CTA b/l     CV: regular rate and rhythm, no murmurs  Abd: soft, non distended, non tender   MSK: no visible deformities  Neuro: No focal deficits

## 2024-09-17 NOTE — ED PROVIDER NOTE - OBJECTIVE STATEMENT
89-year-old male past medical history of paroxysmal A-fib no longer on Eliquis, prostate CA in remission s/p radiation, iron deficiency anemia, asthma, GERD, dementia who is nonverbal baseline) presents to ED for increased agitation, hypoxia to 88% and improved to 95% on 3 L in the setting of COVID. Patient poor historian.  Obtain collateral from nursing facility.  Arrived on 2 L NC given low saturation.

## 2024-09-17 NOTE — ED ADULT NURSE NOTE - OBJECTIVE STATEMENT
89 year old male, A&OX0, BIB EMS from facility for hypoxia. Patient unable to provide history. According to EMS, patient confused at baseline but staff states he is more confused lately. Patient is nonverbal, unable to follow commands or answer questions. According to EMS, patient positive for covid. On arrival, patient hypoxic to 80s. Place on NC 3 L. Patient placed on cardiac monitor, sinus tachycardia. Febrile to 101.2 rectally. Patient coughing, audible crackles noted. IV placed and labs drawn. Safety and comfort measures maintained.

## 2024-09-17 NOTE — ED PROVIDER NOTE - ATTENDING CONTRIBUTION TO CARE
Patient is an 89-year-old male sent in from [facility] for evaluation of hypoxia and worsening mental status.  Patient has dementia at baseline.  He was noted to be 88% on room air and tested to be COVID-positive.  Per paperwork sent with patient, he is full code.  His baseline mental status is alert, disoriented but cannot follow simple instructions.  He is not ambulatory.  His medical history includes paroxysmal atrial fibrillation, asthma, prostate CA, obstructive sleep apnea, dementia, dysphagia, GERD, hypertension, CAD.  Patient is unable to provide any history and appears to be confused, not speaking.  Per collateral obtained by Dr. Chauhan,  Patient was hypoxic to 88% and was placed on 3 L nasal cannula, noting improvement to 95%.    VS noted  Gen:   Elderly, chronically ill  HEENT: EOMI, mmm,   Lungs: bilateral rhonchi  CVS: RRR   Abd; Soft non tender, non distended   Ext: no edema  Skin: no rash  Neuro  awake, moves all extremities   A/P: Hypoxia in the setting of COVID infection.  Patient is unable to provide any history.  Will send sepsis labs, check EKG, check CTA chest to evaluate for extent of pneumonia/COVID infection.  Will also rule out concomitant bacterial infection and sent cultures, check urine.  Plan for CT head as well.  - Saran GOMEZ

## 2024-09-17 NOTE — ED PROVIDER NOTE - CARE PLAN
1 Principal Discharge DX:	COVID   Principal Discharge DX:	COVID  Secondary Diagnosis:	Pneumonia  Secondary Diagnosis:	Bilateral pleural effusion  Secondary Diagnosis:	Pulmonary edema

## 2024-09-17 NOTE — ED PROVIDER NOTE - PROGRESS NOTE DETAILS
[facility]: Hypoxic today 88 % RA. Was diagnosed with COVID 6 days ago, paxlovid and decadron, admit to [facility].     Right lung collapse mucus plug, intubated, extubated  was on bumex for chf, copd/ pneumonia    [phone] CT shows:      No pulmonary embolism in the central, lobar, or segmental pulmonary   arteries.    Moderate right greater than left pleural effusions with interlobular   septal thickening and patchy bilateral groundglass opacities suggestive   of pulmonary edema. Superimposed infection cannot be entirely excluded.      Discussed with Dr. Pulido, recommendation for vancomycin/ cefepime and admit to his service.

## 2024-09-17 NOTE — ED ADULT NURSE REASSESSMENT NOTE - NS ED NURSE REASSESS COMMENT FT1
Rapid response called on pt by [facility] Diallo. Pt noted to be satting at 85% on 5L nasal cannula and HR in 150s- pt noted to be in afib RVR. Pt placed on non-rebreather and rapid team at bedside.

## 2024-09-17 NOTE — ED ADULT NURSE REASSESSMENT NOTE - NS ED NURSE REASSESS COMMENT FT1
Pt placed on 1:1 for safety. Pt ripping off nasal cannula. Pt agitated and restless. Pt medicated with Haldol per MD in order to get CT scan. Pt placed in mittens as well in order to stop pulling at nasal cannula.

## 2024-09-17 NOTE — ED PROVIDER NOTE - CLINICAL SUMMARY MEDICAL DECISION MAKING FREE TEXT BOX
Febrile tachycardic male with confirmed COVID presents to ED for increased agitation and hypoxic to 88%.  Physical exam patient poor historian at baseline.  Normal pupillary reflex.  Response to painful stimuli.  Moving extremity spontaneously.  On 2LNC saturating well.  Notable rhonchi in the left lower lung field.  Order code sepsis, CT head, CXR to rule out viral URI versus UTI versus infectious etiology.  Will reassess.

## 2024-09-18 RX ORDER — LIDOCAINE/BENZALKONIUM/ALCOHOL
0 SOLUTION, NON-ORAL TOPICAL
Refills: 0 | DISCHARGE

## 2024-09-18 RX ORDER — ACETAMINOPHEN 325 MG/1
2 TABLET ORAL
Refills: 0 | DISCHARGE

## 2024-09-18 RX ORDER — FOLIC ACID 1 MG
1 TABLET ORAL
Refills: 0 | DISCHARGE

## 2024-09-18 RX ORDER — ASPIRIN 81 MG
1 TABLET, DELAYED RELEASE (ENTERIC COATED) ORAL
Refills: 0 | DISCHARGE

## 2024-09-18 RX ORDER — IPRATROPIUM BROMIDE AND ALBUTEROL SULFATE .5; 3 MG/3ML; MG/3ML
3 SOLUTION RESPIRATORY (INHALATION)
Refills: 0 | DISCHARGE

## 2024-09-18 RX ORDER — PANTOPRAZOLE SODIUM 40 MG
1 TABLET, DELAYED RELEASE (ENTERIC COATED) ORAL
Refills: 0 | DISCHARGE

## 2024-09-18 RX ORDER — METOPROLOL TARTRATE 100 MG/1
1 TABLET ORAL
Refills: 0 | DISCHARGE

## 2024-09-18 NOTE — H&P ADULT - NSHPPHYSICALEXAM_GEN_ALL_CORE
T(C): 38 (09-18-24 @ 00:34), Max: 38.4 (09-17-24 @ 17:47)  HR: 122 (09-18-24 @ 00:34) (103 - 128)  BP: 134/86 (09-18-24 @ 00:34) (133/78 - 154/86)  RR: 20 (09-18-24 @ 00:34) (17 - 20)  SpO2: 95% (09-18-24 @ 00:34) (83% - 100%)  GENERAL: lying in bed    EYES: EOMI, PERRLA; conjunctiva and sclera clear  ENMT: Moist oral mucosa, no pharyngeal injection or exudate   NECK: Supple, no palpable masses; no JVD  RESPIRATORY: increased wob; lungs w coarse crackles + wheezing on auscultation bilaterally  CARDIOVASCULAR: irregularly irregular rhythm, tachycardic, normal S1 and S2, no murmur/rub/gallop; No lower extremity edema; Peripheral pulses are 2+ bilaterally  ABDOMEN: Nontender to palpation, normoactive bowel sounds, no rebound/guarding   MUSCULOSKELETAL:  no joint swelling or tenderness to palpation  PSYCH: A+O x0  NEUROLOGY: unable to assess in lieu of ams, no gross motor or sensory deficits   SKIN: No rashes; no palpable lesions T(C): 38 (09-18-24 @ 00:34), Max: 38.4 (09-17-24 @ 17:47)  HR: 122 (09-18-24 @ 00:34) (103 - 128)  BP: 134/86 (09-18-24 @ 00:34) (133/78 - 154/86)  RR: 20 (09-18-24 @ 00:34) (17 - 20)  SpO2: 95% (09-18-24 @ 00:34) (83% - 100%)  GENERAL: lying in bed    EYES: EOMI, PERRLA; conjunctiva and sclera clear  ENMT: Moist oral mucosa, no pharyngeal injection or exudate   NECK: Supple, no palpable masses; no JVD  RESPIRATORY: increased wob; lungs w coarse crackles + rhonchi on auscultation bilaterally; difficulty clearing secretions  CARDIOVASCULAR: irregularly irregular rhythm, tachycardic, normal S1 and S2, no murmur/rub/gallop; No lower extremity edema; Peripheral pulses are 2+ bilaterally  ABDOMEN: Nontender to palpation, normoactive bowel sounds, no rebound/guarding   MUSCULOSKELETAL:  no joint swelling or tenderness to palpation  PSYCH: A+O x0  NEUROLOGY: unable to assess in lieu of ams, no gross motor or sensory deficits   SKIN: No rashes; no palpable lesions

## 2024-09-18 NOTE — H&P ADULT - PROBLEM SELECTOR PLAN 6
lesions over gluteal fold  start acyclovir ct imaging shows "Marked soft tissue swelling centered around the left parotid gland and adjacent soft tissues, incompletely visualized. Within the limitations of the study, no loculated collections are definitely seen."  consider us/ct w iv contrast   broad spec abx as above  id consult in am

## 2024-09-18 NOTE — PHARMACOTHERAPY INTERVENTION NOTE - COMMENTS
Recommended obtaining an infectious diseases consult in the setting of Staphylococcus aureus bacteremia.    Spoke with Reyes, Emelin (NP)    Chato Da Silva, PharmD, BCIDP  Clinical Pharmacy Specialist, Infectious Diseases  Tele-Antimicrobial Stewardship Program (Tele-ASP)  Tele-ASP Phone: [phone]

## 2024-09-18 NOTE — H&P ADULT - PROBLEM SELECTOR PLAN 1
leukocytosis, tachycardic, febrile + lactic acidosis; meets severe sepsis criteria  source likely covid, +/- superimposed pna, and/or shingles   s/p vanc + cefepime + 1.5 L LR i nER  trend lactate q4-6h until wnl  follow up blood cultures    Monitor for fever, changes in white count  broad spec empirical abx therapy as described below

## 2024-09-18 NOTE — PROGRESS NOTE ADULT - SUBJECTIVE AND OBJECTIVE BOX
Patient is a 89y old  Male who presents with a chief complaint of sob/carter (18 Sep 2024 13:54)      INTERVAL HPI/OVERNIGHT EVENTS:  Patient stable. He was hypoxic. Not improving with high  flow so switched to AVAPs per son request, who is pulmonologist.  Treated with IV Cefepime only for superimposed infection. Continue Remdesivir  with Decadron.     Pain Location & Control:     MEDICATIONS  (STANDING):  acetylcysteine 20%  Inhalation 4 milliLiter(s) Inhalation every 12 hours  albuterol/ipratropium for Nebulization 3 milliLiter(s) Nebulizer every 6 hours  aspirin  chewable 81 milliGRAM(s) Oral daily  cefepime   IVPB      dexAMETHasone  Injectable 6 milliGRAM(s) IV Push daily  furosemide   Injectable 20 milliGRAM(s) IV Push two times a day  heparin  Infusion.  Unit(s)/Hr (11 mL/Hr) IV Continuous <Continuous>  lactated ringers. 1000 milliLiter(s) (60 mL/Hr) IV Continuous <Continuous>  metoprolol succinate  milliGRAM(s) Oral daily  pantoprazole  Injectable 40 milliGRAM(s) IV Push daily  remdesivir  IVPB   IV Intermittent   sodium chloride 3%  Inhalation 4 milliLiter(s) Inhalation every 12 hours  sodium chloride 3%  Inhalation 4 milliLiter(s) Inhalation every 12 hours  vancomycin  IVPB 1000 milliGRAM(s) IV Intermittent once    MEDICATIONS  (PRN):  acetaminophen     Tablet .. 650 milliGRAM(s) Oral every 6 hours PRN Temp greater or equal to 38C (100.4F), Mild Pain (1 - 3)  aluminum hydroxide/magnesium hydroxide/simethicone Suspension 30 milliLiter(s) Oral every 4 hours PRN Dyspepsia  benzonatate 100 milliGRAM(s) Oral every 8 hours PRN Cough  divalproex  milliGRAM(s) Oral three times a day PRN agitation  guaifenesin/dextromethorphan Oral Liquid 10 milliLiter(s) Oral every 4 hours PRN Cough  heparin   Injectable 4500 Unit(s) IV Push every 6 hours PRN For aPTT less than 40  heparin   Injectable 2000 Unit(s) IV Push every 6 hours PRN For aPTT between 40 - 57  melatonin 3 milliGRAM(s) Oral at bedtime PRN Insomnia  metoprolol tartrate Injectable 2.5 milliGRAM(s) IV Push every 6 hours PRN sustained afib rvr >120  ondansetron Injectable 4 milliGRAM(s) IV Push every 8 hours PRN Nausea and/or Vomiting      Allergies    No Known Allergies    Intolerances        REVIEW OF SYSTEMS:  UTO demented     Vital Signs Last 24 Hrs  T(C): 36.6 (18 Sep 2024 16:10), Max: 38 (18 Sep 2024 00:34)  T(F): 97.9 (18 Sep 2024 16:10), Max: 100.4 (18 Sep 2024 00:34)  HR: 123 (18 Sep 2024 17:20) (103 - 140)  BP: 127/79 (18 Sep 2024 16:10) (126/68 - 149/70)  BP(mean): 92 (17 Sep 2024 19:00) (92 - 92)  RR: 19 (18 Sep 2024 16:10) (18 - 20)  SpO2: 100% (18 Sep 2024 17:20) (75% - 100%)    Parameters below as of 18 Sep 2024 16:10  Patient On (Oxygen Delivery Method): nasal cannula, high flow        PHYSICAL EXAM:  GENERAL: NAD, well-groomed, well-developed  HEAD:  Atraumatic, Normocephalic  EYES: EOMI, PERRLA, conjunctiva and sclera clear  ENMT: No tonsillar erythema, exudates, or enlargement; Moist mucous membranes, Good dentition, No lesions  NECK: Supple, No JVD, Normal thyroid  NERVOUS SYSTEM:  Alert & Oriented X 0 lethargic   CHEST/LUNG: Clear to auscultation bilaterally; No rales, rhonchi, wheezing, or rubs  HEART: Regular rate and rhythm; No murmurs, rubs, or gallops  ABDOMEN: Soft, Nontender, Nondistended; Bowel sounds present  EXTREMITIES:  2+ Peripheral Pulses, No clubbing or cyanosis  LYMPH: No lymphadenopathy noted  SKIN: No rashes or lesions      LABS:                        9.0    15.36 )-----------( 310      ( 18 Sep 2024 10:20 )             29.6     18 Sep 2024 07:12    147    |  108    |  48     ----------------------------<  161    4.3     |  23     |  1.34     Ca    8.8        18 Sep 2024 07:12  Phos  3.7       18 Sep 2024 00:32  Mg     1.9       18 Sep 2024 00:32    TPro  7.2    /  Alb  3.0    /  TBili  0.6    /  DBili  x      /  AST  35     /  ALT  37     /  AlkPhos  110    18 Sep 2024 07:12    PT/INR - ( 18 Sep 2024 07:10 )   PT: 21.7 sec;   INR: 2.11 ratio         PTT - ( 18 Sep 2024 10:21 )  PTT:40.7 sec  Urinalysis Basic - ( 18 Sep 2024 07:12 )    Color: x / Appearance: x / SG: x / pH: x  Gluc: 161 mg/dL / Ketone: x  / Bili: x / Urobili: x   Blood: x / Protein: x / Nitrite: x   Leuk Esterase: x / RBC: x / WBC x   Sq Epi: x / Non Sq Epi: x / Bacteria: x      CAPILLARY BLOOD GLUCOSE      POCT Blood Glucose.: 208 mg/dL (18 Sep 2024 12:26)  POCT Blood Glucose.: 197 mg/dL (18 Sep 2024 01:34)        Cultures  Culture Results:   Growth in aerobic bottle: Gram Positive Cocci in Clusters  Growth in anaerobic bottle: Gram Positive Cocci in Clusters  Direct identification is available within approximately 3-5  hours either by Blood Panel Multiplexed PCR or Direct  MALDI-TOF. Details: [email or web address] (09-17-24 @ 15:20)    Lactate, Blood: 3.3 mmol/L (09-18-24 @ 07:10)  Lactate, Blood: 2.3 mmol/L (09-17-24 @ 20:16)    RADIOLOGY & ADDITIONAL TESTS:    Imaging Personally Reviewed:  [X ] YES  [ ] NO    Consultant(s) Notes Reviewed:  [ X] YES  [ ] NO    Care Discussed with Consultants/Other Providers [ X] YES  [ ] NO

## 2024-09-18 NOTE — ADVANCED PRACTICE NURSE CONSULT - RECOMMEDATIONS
Will recommend the followin. consider a Dermatology consult for further evaluation  2. continue with T&P  3. routine pericare with Corinne  4. seat cushion when oob to chair  5. off-load heels with boots  6. nutrition support as pt condition allows  tx plan discussed with RN

## 2024-09-18 NOTE — H&P ADULT - PROBLEM SELECTOR PLAN 4
no clinft of acs; trop 89->82, 2/2 demand; ekg with no new st seg - t wave changes suggestive of acute ischemia  ct imaging suggestive of pulm edema; bnp ~25k; tte shows, "Left ventricular cavity is small. Left ventricular wall thickness is normal. Left ventricular systolic function is moderately decreased with an ejection fraction of 40 % by Sharma's method of disks. Global left ventricular hypokinesis....Multiple segmental abnormalities exist. ..... There is mild (grade 1) left ventricular diastolic dysfunction, with elevated filling pressure. ....Moderate aortic regurgitation."  trend volume status via I/Os, daily weights; salt and fluid restriction   diurese w lasix 20 ivp bid; adjust to maintain goal net negative fluid balance  cards consult in am

## 2024-09-18 NOTE — CONSULT NOTE ADULT - PROBLEM SELECTOR RECOMMENDATION 4
CT chest with possible underlying bacterial PNA  -Continue course of empiric abx CT chest with possible underlying bacterial PNA  -Continue course of empiric abx. Pt from a facility, consider changing abx to Cefepime.

## 2024-09-18 NOTE — ADVANCED PRACTICE NURSE CONSULT - ASSESSMENT
The pt was encountered on [facility]- Mr Andrade was awake with his eyes open but he did not respond to verbal interaction. As per review of the medical record, he is s/p RRT this pm for hypoxia and has had 3 RRTs in a 24-hour period. presently, he is  on HFNC. A low air-loss surface is in use and he needs assistance with T&P. as he was a/w a skin changes, a nutrition consult is pending for further evaluation.  He is on Airborne Isolation as he is Covid +  upon assessment, the pt was incontinent of a large amount of urine and pericare was provided. on the lower back, sacrum and b/l buttocks- a red raised vesicular rash was noted. question the etiology - may have a shingles-like appearance would recommend a dermatology consult for further evaluation  discussed same with primary RN  b/l heels with blanchable erythema, may be early stage deep tissue injury

## 2024-09-18 NOTE — SWALLOW BEDSIDE ASSESSMENT ADULT - H & P REVIEW
89yo m mci/dementia, dorothea, asthma, gerd w large hiatal hernia, pud c/b gastric perforation s/p exlap, pafib, hfref, prostate ca s/p radiation seeds, oa, p/w sob/carter; in er, found to be meeting severe sepsis criteria, in acute hypoxemic respiratory failure; suspect multifactorial 2/2 afib rvr iso covid pna + adhf; admit to medicine for further mgmt. Source of sepsis likely COVID +/- superimposed PNA and or shingles, on abx. Pt in respiratory distress during admission with x2 RRT for hypoxia; recs for supplemental oxygen as needed and agressive pulmonary toilet/hygiene. Low threshold for MICU consult. Pt w/ PNA due to COVID-19 virus. Also with acute decompensated heart failure; CT imaging also suggestive of pulmonary edema. A-fib with RVR likely sepsis driven. Parotitis noted ct imaging shows "Marked soft tissue swelling centered around the left parotid gland and adjacent soft tissues, incompletely visualized. Within the limitations of the study, no loculated collections are definitely seen."consider us/ct w iv contrast; ID consult pending. Also note w/ lesion of left shoulder; clarify goc in am prior to pursuing further work up. NPO for now. Patient DNR/DNI w/ trial of NIV./yes

## 2024-09-18 NOTE — RAPID RESPONSE TEAM SUMMARY - NSADDTLFINDINGSRRT_GEN_ALL_CORE
RRT called for hypoxia in the 80s while on non-rebreather. Afib with RVR in HR in the 150s. SBP>100. After suctioning of large amounts of mucous by RT, O2 saturation improved with good waveform. Patient has known history of atrial fibrillation, not on beta blockers at this time. EKG performed showing atrial fibrillation with RVR. Likely in the setting of sepsis. Was given 1L NS bolus in the ED. Additional 500 cc bolus given during RRT. Rectal temperature of 100.4. Ofirmev 1g IV given. Patient received Cefepime in the ED. Currently ordered for Ceftriaxone and Doxycycline. HR in the 120s.     Primary team at bedside. Recommend further fluids if HR sustains. Defer abx regimen to primary team. Patient additionally with shingles of the gluteal fold. Recommend IV acyclovir as well as LR at 1 cc/kg/hour. Patient to be moved to telemetry unit

## 2024-09-18 NOTE — H&P ADULT - ASSESSMENT
severe sepsis    ahrf    covid pna    adhf    afib rvr 88yo m mci/dementia, dorothea, asthma, gerd w large hiatal hernia, pud c/b gastric perforation s/p exlap, pafib, hfref, prostate ca s/p radiation seeds, oa, p/w  88yo m mci/dementia, dorothea, asthma, gerd w large hiatal hernia, pud c/b gastric perforation s/p exlap, pafib, hfref, prostate ca s/p radiation seeds, oa, p/w             typical chest pain, however, now resolved on encounter  trop 30 -> 28  ekg with no new st seg - t wave changes suggestive of acute ischemia  s/p aspirin 162  obtain tte, lipid profile, a1c  Monitor for chest pain, telemetry/EKG changes  continue asa, statin, bb  prn nitroglycerin sl for recurrent chest pain  cards consulted by er; " spoke to cards they're aware of the pt, consult for unstable angina, pt tba tele with them following." follow up formal recs    ct imaging suggestive of pulm edema; bnp ~25k; tte shows     obtain repeat tte, orthostatic vitals  Monitor SpO2, RR, for signs of respiratory distress; Goal SpO2 >88-92%, PaO2 >55-60 mmHg  trend volume status via I/Os, daily weights; salt and fluid restriction   Bronchodilators + oxygen supplementation as needed to maintain goal  aggressive pulmonary toilet/hygiene  continue home losartan 50 bid, metoprolol succinate 50 bid, eplerenone 25, prn bumex          88yo m mci/dementia, dorothea, asthma, gerd w large hiatal hernia, pud c/b gastric perforation s/p exlap, pafib, hfref, prostate ca s/p radiation seeds, oa, p/w                 89yo m mci/dementia, dorothea, asthma, gerd w large hiatal hernia, pud c/b gastric perforation s/p exlap, pafib, hfref, prostate ca s/p radiation seeds, oa, p/w sob/carter; in er, found to be meeting severe sepsis criteria, in acute hypoxemic respiratory failure; suspect multifactorial 2/2 afib rvr iso covid pna + adhf; admit to medicine for further mgmt.

## 2024-09-18 NOTE — ADVANCED PRACTICE NURSE CONSULT - REASON FOR CONSULT
Requested by staff to assess skin status: b/l buttocks, posterior thighs and back. PMH is noted:  89-year-old male past medical history of paroxysmal A-fib no longer on Eliquis, prostate CA in remission s/p radiation, iron deficiency anemia, asthma, GERD, dementia who is nonverbal baseline) presents to ED for increased agitation, hypoxia to 88% and improved to 95% on 3 L in the setting of COVID. Patient poor historian.  Obtain collateral from nursing facility.  Arrived on 2 L NC given low saturation.

## 2024-09-18 NOTE — H&P ADULT - NSHPADDITIONALINFOADULT_GEN_ALL_CORE
full code  bed rest  npo for now  vte ppx w home ac dnr/dni w trial niv  bed rest  npo for now  vte ppx w ufh gtt

## 2024-09-18 NOTE — H&P ADULT - PROBLEM SELECTOR PLAN 5
likely being driven by sepsis, hypoxemia, adhf  ekg shows afib rvr @ 126/min  ac w eliquis   rate control w toprol   lopressor 2.5-5mg ivp for sustained rates of >120/min likely being driven by sepsis, hypoxemia, adhf  ekg shows afib rvr @ 126/min  monitor on telemetry  ac w eliquis   rate control w toprol   lopressor 2.5-5mg ivp for sustained rates of >120/min likely being driven by sepsis, hypoxemia, adhf  ekg shows afib rvr @ 126/min  monitor on telemetry  npo so switch po eliquis to ufh gtt  rate control w toprol   lopressor 2.5-5mg ivp for sustained rates of >120/min

## 2024-09-18 NOTE — CONSULT NOTE ADULT - PROBLEM SELECTOR RECOMMENDATION 9
2nd to COVID PNA, +possible bacterial PNA  -Pleural effusions appear stable compared to prior   -Continue abx   -ABG with no CO2 retention   -o2 sats <90% on HFNC 60L/100%. Start AVAPS TV 40, min pressure 10, max pressure 25, RR 14, ePAP 5, FiO2 100%.   -Wean o2 as tolerated, keep sats >90% 2nd to COVID PNA, +possible bacterial PNA  -Pleural effusions appear stable compared to prior   -Continue abx   -ABG with no CO2 retention   -o2 sats <90% on HFNC 60L/100%. Start AVAPS , min pressure 10, max pressure 25, RR 14, ePAP 5, FiO2 100%.   -Wean o2 as tolerated, keep sats >90%

## 2024-09-18 NOTE — H&P ADULT - HISTORY OF PRESENT ILLNESS
88yo m mci/dementia, dorothea, asthma, gerd w large hiatal hernia, pud c/b gastric perforation s/p exlap, pafib, hfref, prostate ca s/p radiation seeds, oa, p/w sob/carter; in er, found to be meeting severe sepsis criteria, in acute hypoxemic respiratory failure; suspect multifactorial 2/2 afib rvr iso covid pna + adhf; admit to medicine for further mgmt

## 2024-09-18 NOTE — PATIENT PROFILE ADULT - FALL HARM RISK - HARM RISK INTERVENTIONS
Assistance with ambulation/Assistance OOB with selected safe patient handling equipment/Communicate Risk of Fall with Harm to all staff/Discuss with provider need for PT consult/Monitor for mental status changes/Monitor gait and stability/Move patient closer to nurses' station/Reinforce activity limits and safety measures with patient and family/Reorient to person, place and time as needed/Tailored Fall Risk Interventions/Toileting schedule using arm’s reach rule for commode and bathroom/Use of alarms - bed, chair and/or voice tab/Visual Cue: Yellow wristband and red socks/Bed in lowest position, wheels locked, appropriate side rails in place/Call bell, personal items and telephone in reach/Instruct patient to call for assistance before getting out of bed or chair/Non-slip footwear when patient is out of bed/[place] to call system/Physically safe environment - no spills, clutter or unnecessary equipment/Purposeful Proactive Rounding/Room/bathroom lighting operational, light cord in reach

## 2024-09-18 NOTE — ED ADULT NURSE REASSESSMENT NOTE - NS ED NURSE REASSESS COMMENT FT1
Rapid response concluded on pt. Pt suctioned, given 500cc of fluids, ofirmev. Pt is satting at 97% on 5L nasal cannula.

## 2024-09-18 NOTE — CONSULT NOTE ADULT - SUBJECTIVE AND OBJECTIVE BOX
PULMONARY CONSULT    HPI: 89 y/o M with PMH of asthma, CAROL (not on CPAP), dementia, GERD, large hiatal hernia, PUD c/b gastric perforation s/p exlap, pafib, hfref, prostate ca s/p radiation seeds, OA. Presents with SOB and QUACH. Found to be in acute hypoxemic respiratory failure. +COVID. CT chest with b/l pl effusions, b/l opacities. Course c/b worsening hypoxia s/p RRTs.             PAST MEDICAL & SURGICAL HISTORY:  Obstructive sleep apnea  Carpal tunnel syndrome  H/O prostate cancer  Hiatal hernia  JULIUS (iron deficiency anemia)  H/O knee surgery      Allergies  No Known Allergies    FAMILY HISTORY:  FH: heart attack (Father)      Social history: unable to obtain     Review of Systems: unable to obtain       Medications:  MEDICATIONS  (STANDING):  acetylcysteine 20%  Inhalation 4 milliLiter(s) Inhalation every 12 hours  albuterol/ipratropium for Nebulization 3 milliLiter(s) Nebulizer every 6 hours  aspirin  chewable 81 milliGRAM(s) Oral daily  cefTRIAXone   IVPB 1000 milliGRAM(s) IV Intermittent every 24 hours  dexAMETHasone  Injectable 6 milliGRAM(s) IV Push daily  doxycycline IVPB 100 milliGRAM(s) IV Intermittent every 12 hours  furosemide   Injectable 20 milliGRAM(s) IV Push two times a day  heparin  Infusion.  Unit(s)/Hr (11 mL/Hr) IV Continuous <Continuous>  lactated ringers. 1000 milliLiter(s) (60 mL/Hr) IV Continuous <Continuous>  metoprolol succinate  milliGRAM(s) Oral daily  pantoprazole  Injectable 40 milliGRAM(s) IV Push daily  remdesivir  IVPB   IV Intermittent   remdesivir  IVPB 200 milliGRAM(s) IV Intermittent every 24 hours  sodium chloride 3%  Inhalation 4 milliLiter(s) Inhalation every 12 hours  sodium chloride 3%  Inhalation 4 milliLiter(s) Inhalation every 12 hours    MEDICATIONS  (PRN):  acetaminophen     Tablet .. 650 milliGRAM(s) Oral every 6 hours PRN Temp greater or equal to 38C (100.4F), Mild Pain (1 - 3)  aluminum hydroxide/magnesium hydroxide/simethicone Suspension 30 milliLiter(s) Oral every 4 hours PRN Dyspepsia  benzonatate 100 milliGRAM(s) Oral every 8 hours PRN Cough  divalproex  milliGRAM(s) Oral three times a day PRN agitation  guaifenesin/dextromethorphan Oral Liquid 10 milliLiter(s) Oral every 4 hours PRN Cough  heparin   Injectable 4500 Unit(s) IV Push every 6 hours PRN For aPTT less than 40  heparin   Injectable 2000 Unit(s) IV Push every 6 hours PRN For aPTT between 40 - 57  melatonin 3 milliGRAM(s) Oral at bedtime PRN Insomnia  metoprolol tartrate Injectable 2.5 milliGRAM(s) IV Push every 6 hours PRN sustained afib rvr >120  ondansetron Injectable 4 milliGRAM(s) IV Push every 8 hours PRN Nausea and/or Vomiting            Vital Signs Last 24 Hrs  T(C): 36.6 (18 Sep 2024 11:42), Max: 38.4 (17 Sep 2024 17:47)  T(F): 97.9 (18 Sep 2024 11:42), Max: 101.2 (17 Sep 2024 17:47)  HR: 106 (18 Sep 2024 12:25) (103 - 140)  BP: 127/78 (18 Sep 2024 11:42) (126/68 - 154/86)  BP(mean): 92 (17 Sep 2024 19:00) (92 - 107)  RR: 20 (18 Sep 2024 12:25) (17 - 20)  SpO2: 97% (18 Sep 2024 12:25) (75% - 100%)    Parameters below as of 18 Sep 2024 12:25  Patient On (Oxygen Delivery Method): nasal cannula, high flow  O2 Flow (L/min): 60  O2 Concentration (%): 100    ABG - ( 18 Sep 2024 13:16 )  pH, Arterial: 7.35  pH, Blood: x     /  pCO2: 44    /  pO2: 138   / HCO3: 24    / Base Excess: -1.3  /  SaO2: 99.1              VBG pH 7.27 09-18 @ 00:17  VBG pCO2 58 09-18 @ 00:17  VBG O2 sat 28.7 09-18 @ 00:17  VBG lactate 3.6 09-18 @ 00:17  VBG pH 7.44 09-17 @ 15:30  VBG pCO2 46 09-17 @ 15:30  VBG O2 sat 50.9 09-17 @ 15:30  VBG lactate 2.4 09-17 @ 15:30            LABS:                        9.0    15.36 )-----------( 310      ( 18 Sep 2024 10:20 )             29.6     09-18    147[H]  |  108  |  48[H]  ----------------------------<  161[H]  4.3   |  23  |  1.34[H]    Ca    8.8      18 Sep 2024 07:12  Phos  3.7     09-18  Mg     1.9     09-18    TPro  7.2  /  Alb  3.0[L]  /  TBili  0.6  /  DBili  x   /  AST  35  /  ALT  37  /  AlkPhos  110  09-18          CAPILLARY BLOOD GLUCOSE      POCT Blood Glucose.: 208 mg/dL (18 Sep 2024 12:26)    PT/INR - ( 18 Sep 2024 07:10 )   PT: 21.7 sec;   INR: 2.11 ratio         PTT - ( 18 Sep 2024 10:21 )  PTT:40.7 sec  Urinalysis Basic - ( 18 Sep 2024 07:12 )    Color: x / Appearance: x / SG: x / pH: x  Gluc: 161 mg/dL / Ketone: x  / Bili: x / Urobili: x   Blood: x / Protein: x / Nitrite: x   Leuk Esterase: x / RBC: x / WBC x   Sq Epi: x / Non Sq Epi: x / Bacteria: x            Physical Examination:    General: No acute distress.      HEENT: Pupils equal, reactive to light.  Symmetric.    PULM: decreased BS    CVS: S1, S2    ABD: Soft, nondistended, nontender, normoactive bowel sounds, no masses    EXT: No edema, nontender    SKIN: Warm and well perfused, no rashes noted.    NEURO: oriented x 0          RADIOLOGY REVIEWED    CT chest: < from: CT Angio Chest PE Protocol w/ IV Cont (09.17.24 @ 20:46) >  FINDINGS:    AIRWAYS/LUNGS/PLEURA: Nonocclusive layering secretions in the distal   trachea. Otherwise patent central airways. Multifocal bilateral   groundglass opacities. Interlobular septal thickening. Moderate right   greater than left pleural effusions with adjacent compressive   atelectasis, similar to prior.    MEDIASTINUM AND ZANE: No lymphadenopathy. Mildly prominent nonspecific   paratracheal lymph nodesmeasuring up to 9 mm.    PULMONARY ANGIOGRAM: No pulmonary embolism with limited evaluation of the   subsegmental branches in the lower lobes.    VESSELS: Aortic calcifications. Coronary artery calcifications.    HEART: Stable heart size. No pericardial effusion. Mitral annular   calcification.    VISUALIZED UPPER ABDOMEN: Moderate hiatal hernia with a large portion of   the stomach within the thorax.    CHEST WALL AND BONES: Scoliosis and degenerative changes of the spine.    IMPRESSION:    No pulmonary embolism in the central, lobar, or segmental pulmonary   arteries.    Moderate right greater than left pleural effusions with interlobular   septal thickening and patchy bilateral groundglass opacities suggestive   of pulmonary edema. Superimposed infection cannot be entirely excluded.    --- End of Report ---          < end of copied text >      TTE: < from: TTE W or WO Ultrasound Enhancing Agent (06.15.24 @ 09:19) >  CONCLUSIONS:      1. Left ventricular cavity is small. Left ventricular wall thickness is normal. Left ventricular systolic function is moderately decreased with an ejection fraction of 40 % by Sharma's method of disks. Global left ventricular hypokinesis.   2. Multiple segmental abnormalities exist. See findings.   3. There is mild (grade 1) left ventricular diastolic dysfunction, with elevated filling pressure.   4. Normal right ventricular cavity size, with normal wall thickness, and normal systolic function.   5. Moderate aortic regurgitation.   6. No pericardial effusion seen.   7. Compared to the transthoracic echocardiogram performed on 6/29/2019, there has been an interval decline in LV systolic function.   8. Technically difficult image quality.    ________________________________________________________________________________________  FINDINGS:     Left Ventricle:  The left ventricular cavity is small. Left ventricular wall thicknessis normal. Left ventricular systolic function is moderately decreased with a calculated ejection fraction of 40 % by the Sharma's biplane method of disks. There is global left ventricular hypokinesis. There is mild (grade 1) left ventricular diastolic dysfunction, with elevated filling pressure.  LV Wall Scoring: There is diffuse hypokinesis.          Right Ventricle:  The right ventricular cavity is normal in size, with normal wall thickness and normal systolic function. Tricuspid annular planesystolic excursion (TAPSE) is 1.8 cm (normal >=1.7 cm).     Left Atrium:  The left atrium is normal in size.     Right Atrium:  The right atrium is normal in size with an indexed volume of 16.49 ml/m².     Interatrial Septum:  The interatrial septum appears intact.     Aortic Valve:  The aortic valve was not well visualized. There is calcification of the aortic valve leaflets. There is fibrocalcific aortic valve sclerosis without stenosis. There is moderate aortic regurgitation.     Mitral Valve:  There is moderate posterior calcification and mild calcification of the mitral valve annulus. There is mild leaflet calcification. There is no mitral valve stenosis. There is mild mitral regurgitation.     Tricuspid Valve:  Structurally normal tricuspid valve with normal leaflet excursion. There is mild tricuspid regurgitation. Estimated pulmonary artery systolic pressure is 44 mmHg.     Pulmonic Valve:  Structurally normal pulmonic valve with normal leaflet excursion. There is trace pulmonic regurgitation. There is evidence of pulmonary hypertension.     Aorta:  The aortic root appears normal in size.     Pericardium:  No pericardial effusion seen.     Systemic Veins:  The inferior vena cava is dilated (dilated >2.1cm) with abnormal inspiratory collapse (abnormal <50%) consistent with elevated right atrial pressure (~15, range 10-20mmHg).  ____________________________________________________________________  QUANTITATIVE DATA:  Left Ventricle Measurements: (Indexed to BSA)     IVSd (2D):   1.0 cm  LVPWd (2D):  1.1 cm  LVIDd (2D):  3.0 cm  LVIDs (2D):  2.7 cm  LV Mass:     88 g   49.9 g/m²  LV Vol d, MOD A2C: 119.0 ml 67.67 ml/m²  LV Vol d, MOD A4C: 90.2 ml  51.29 ml/m²  LV Vol d, MOD BP:  104.9 ml 59.67 ml/m²  LV Vol s, MOD A2C: 65.6 ml  37.30 ml/m²  LV Vol s, MOD A4C: 59.4 ml  33.75 ml/m²  LV Vol s, MOD BP:  63.0 ml  35.82 ml/m²  LVOT SV MOD BP:    42.0 ml  LV EF% MOD BP:     40 %     MV E Vmax:    0.81 m/s  MV A Vmax:    0.78 m/s  MV E/A:       1.03  e' lateral:   5.43 cm/s  e' medial:    5.26 cm/s  E/e' lateral: 14.86  E/e' medial:  15.34  E/e' Average: 15.10    Aorta Measurements: (Normal range) (Indexed to BSA)     Sinuses of Valsalva: 2.60 cm (3.1 - 3.7 cm)  Ao Annulus:          1.9 cm (2.3 - 2.9 cm)       Left Atrium Measurements: (Indexed to BSA)  LA Diam 2D:        2.90 cm  LA Vol s, MOD A4C: 56.90 ml.  LA Vol s, MOD A2C: 80.00 ml.    Right Ventricle Measurements: Right Atrial Measurements:     TAPSE:           1.8 cm       RA Vol:       29.00 ml  RV S' Vmax:  10.10 cm/s   RA Vol Index: 16.49 ml/m²  RV Base (RVID1): 3.5 cm  RV Mid (RVID2):  2.1 cm       LVOT / RVOT/ Qp/Qs Data: (Indexed to BSA)  LVOT Diameter:  1.90 cm  LVOT Area:      2.84 cm²  LVOT Vmax:      0.92 m/s  LVOT Vmn:       0.603 m/s  LVOT VTI:       18.60 cm  LVOT peak grad: 3 mmHg  LVOT mean grad: 2.0 mmHg  LVOT SV:        52.7 ml   29.99 ml/m²    Mitral Valve Measurements:     MV E Vmax: 0.8 m/s  MV A Vmax: 0.8 m/s  MV E/A:    1.0       Tricuspid Valve Measurements:     TR Vmax:   2.7 m/s  TR Peak Gradient: 29.4 mmHg  RA Pressure:      15 mmHg  PASP:             44 mmHg    ________________________________________________________________________________________  Electronically signed on 6/15/2024 at 11:10:51 AM by Lowell Sharpe         *** Final ***    < end of copied text >     PULMONARY CONSULT    HPI: 89 y/o M with PMH of asthma, CAROL (not on CPAP), dementia, GERD, large hiatal hernia, PUD c/b gastric perforation s/p exlap, pafib, hfref, prostate ca s/p radiation seeds, OA. Presents with SOB and QUACH. Found to be in acute hypoxemic respiratory failure. +COVID. CT chest with b/l pl effusions, b/l opacities. Course c/b worsening hypoxia s/p RRTs. Opens eyes to voice, ROS unable to be obtained.             PAST MEDICAL & SURGICAL HISTORY:  Obstructive sleep apnea  Carpal tunnel syndrome  H/O prostate cancer  Hiatal hernia  JULIUS (iron deficiency anemia)  H/O knee surgery      Allergies  No Known Allergies    FAMILY HISTORY:  FH: heart attack (Father)      Social history: unable to obtain     Review of Systems: unable to obtain       Medications:  MEDICATIONS  (STANDING):  acetylcysteine 20%  Inhalation 4 milliLiter(s) Inhalation every 12 hours  albuterol/ipratropium for Nebulization 3 milliLiter(s) Nebulizer every 6 hours  aspirin  chewable 81 milliGRAM(s) Oral daily  cefTRIAXone   IVPB 1000 milliGRAM(s) IV Intermittent every 24 hours  dexAMETHasone  Injectable 6 milliGRAM(s) IV Push daily  doxycycline IVPB 100 milliGRAM(s) IV Intermittent every 12 hours  furosemide   Injectable 20 milliGRAM(s) IV Push two times a day  heparin  Infusion.  Unit(s)/Hr (11 mL/Hr) IV Continuous <Continuous>  lactated ringers. 1000 milliLiter(s) (60 mL/Hr) IV Continuous <Continuous>  metoprolol succinate  milliGRAM(s) Oral daily  pantoprazole  Injectable 40 milliGRAM(s) IV Push daily  remdesivir  IVPB   IV Intermittent   remdesivir  IVPB 200 milliGRAM(s) IV Intermittent every 24 hours  sodium chloride 3%  Inhalation 4 milliLiter(s) Inhalation every 12 hours  sodium chloride 3%  Inhalation 4 milliLiter(s) Inhalation every 12 hours    MEDICATIONS  (PRN):  acetaminophen     Tablet .. 650 milliGRAM(s) Oral every 6 hours PRN Temp greater or equal to 38C (100.4F), Mild Pain (1 - 3)  aluminum hydroxide/magnesium hydroxide/simethicone Suspension 30 milliLiter(s) Oral every 4 hours PRN Dyspepsia  benzonatate 100 milliGRAM(s) Oral every 8 hours PRN Cough  divalproex  milliGRAM(s) Oral three times a day PRN agitation  guaifenesin/dextromethorphan Oral Liquid 10 milliLiter(s) Oral every 4 hours PRN Cough  heparin   Injectable 4500 Unit(s) IV Push every 6 hours PRN For aPTT less than 40  heparin   Injectable 2000 Unit(s) IV Push every 6 hours PRN For aPTT between 40 - 57  melatonin 3 milliGRAM(s) Oral at bedtime PRN Insomnia  metoprolol tartrate Injectable 2.5 milliGRAM(s) IV Push every 6 hours PRN sustained afib rvr >120  ondansetron Injectable 4 milliGRAM(s) IV Push every 8 hours PRN Nausea and/or Vomiting            Vital Signs Last 24 Hrs  T(C): 36.6 (18 Sep 2024 11:42), Max: 38.4 (17 Sep 2024 17:47)  T(F): 97.9 (18 Sep 2024 11:42), Max: 101.2 (17 Sep 2024 17:47)  HR: 106 (18 Sep 2024 12:25) (103 - 140)  BP: 127/78 (18 Sep 2024 11:42) (126/68 - 154/86)  BP(mean): 92 (17 Sep 2024 19:00) (92 - 107)  RR: 20 (18 Sep 2024 12:25) (17 - 20)  SpO2: 97% (18 Sep 2024 12:25) (75% - 100%)    Parameters below as of 18 Sep 2024 12:25  Patient On (Oxygen Delivery Method): nasal cannula, high flow  O2 Flow (L/min): 60  O2 Concentration (%): 100    ABG - ( 18 Sep 2024 13:16 )  pH, Arterial: 7.35  pH, Blood: x     /  pCO2: 44    /  pO2: 138   / HCO3: 24    / Base Excess: -1.3  /  SaO2: 99.1              VBG pH 7.27 09-18 @ 00:17  VBG pCO2 58 09-18 @ 00:17  VBG O2 sat 28.7 09-18 @ 00:17  VBG lactate 3.6 09-18 @ 00:17  VBG pH 7.44 09-17 @ 15:30  VBG pCO2 46 09-17 @ 15:30  VBG O2 sat 50.9 09-17 @ 15:30  VBG lactate 2.4 09-17 @ 15:30            LABS:                        9.0    15.36 )-----------( 310      ( 18 Sep 2024 10:20 )             29.6     09-18    147[H]  |  108  |  48[H]  ----------------------------<  161[H]  4.3   |  23  |  1.34[H]    Ca    8.8      18 Sep 2024 07:12  Phos  3.7     09-18  Mg     1.9     09-18    TPro  7.2  /  Alb  3.0[L]  /  TBili  0.6  /  DBili  x   /  AST  35  /  ALT  37  /  AlkPhos  110  09-18          CAPILLARY BLOOD GLUCOSE      POCT Blood Glucose.: 208 mg/dL (18 Sep 2024 12:26)    PT/INR - ( 18 Sep 2024 07:10 )   PT: 21.7 sec;   INR: 2.11 ratio         PTT - ( 18 Sep 2024 10:21 )  PTT:40.7 sec  Urinalysis Basic - ( 18 Sep 2024 07:12 )    Color: x / Appearance: x / SG: x / pH: x  Gluc: 161 mg/dL / Ketone: x  / Bili: x / Urobili: x   Blood: x / Protein: x / Nitrite: x   Leuk Esterase: x / RBC: x / WBC x   Sq Epi: x / Non Sq Epi: x / Bacteria: x            Physical Examination:    General: No acute distress.      HEENT: Pupils equal, reactive to light.  Symmetric.    PULM: decreased BS    CVS: S1, S2    ABD: Soft, nondistended, nontender, normoactive bowel sounds, no masses    EXT: No edema, nontender    SKIN: Warm and well perfused, no rashes noted.    NEURO: oriented x 0          RADIOLOGY REVIEWED    CT chest: < from: CT Angio Chest PE Protocol w/ IV Cont (09.17.24 @ 20:46) >  FINDINGS:    AIRWAYS/LUNGS/PLEURA: Nonocclusive layering secretions in the distal   trachea. Otherwise patent central airways. Multifocal bilateral   groundglass opacities. Interlobular septal thickening. Moderate right   greater than left pleural effusions with adjacent compressive   atelectasis, similar to prior.    MEDIASTINUM AND ZANE: No lymphadenopathy. Mildly prominent nonspecific   paratracheal lymph nodesmeasuring up to 9 mm.    PULMONARY ANGIOGRAM: No pulmonary embolism with limited evaluation of the   subsegmental branches in the lower lobes.    VESSELS: Aortic calcifications. Coronary artery calcifications.    HEART: Stable heart size. No pericardial effusion. Mitral annular   calcification.    VISUALIZED UPPER ABDOMEN: Moderate hiatal hernia with a large portion of   the stomach within the thorax.    CHEST WALL AND BONES: Scoliosis and degenerative changes of the spine.    IMPRESSION:    No pulmonary embolism in the central, lobar, or segmental pulmonary   arteries.    Moderate right greater than left pleural effusions with interlobular   septal thickening and patchy bilateral groundglass opacities suggestive   of pulmonary edema. Superimposed infection cannot be entirely excluded.    --- End of Report ---          < end of copied text >      TTE: < from: TTE W or WO Ultrasound Enhancing Agent (06.15.24 @ 09:19) >  CONCLUSIONS:      1. Left ventricular cavity is small. Left ventricular wall thickness is normal. Left ventricular systolic function is moderately decreased with an ejection fraction of 40 % by Sharma's method of disks. Global left ventricular hypokinesis.   2. Multiple segmental abnormalities exist. See findings.   3. There is mild (grade 1) left ventricular diastolic dysfunction, with elevated filling pressure.   4. Normal right ventricular cavity size, with normal wall thickness, and normal systolic function.   5. Moderate aortic regurgitation.   6. No pericardial effusion seen.   7. Compared to the transthoracic echocardiogram performed on 6/29/2019, there has been an interval decline in LV systolic function.   8. Technically difficult image quality.    ________________________________________________________________________________________  FINDINGS:     Left Ventricle:  The left ventricular cavity is small. Left ventricular wall thicknessis normal. Left ventricular systolic function is moderately decreased with a calculated ejection fraction of 40 % by the Sharma's biplane method of disks. There is global left ventricular hypokinesis. There is mild (grade 1) left ventricular diastolic dysfunction, with elevated filling pressure.  LV Wall Scoring: There is diffuse hypokinesis.          Right Ventricle:  The right ventricular cavity is normal in size, with normal wall thickness and normal systolic function. Tricuspid annular planesystolic excursion (TAPSE) is 1.8 cm (normal >=1.7 cm).     Left Atrium:  The left atrium is normal in size.     Right Atrium:  The right atrium is normal in size with an indexed volume of 16.49 ml/m².     Interatrial Septum:  The interatrial septum appears intact.     Aortic Valve:  The aortic valve was not well visualized. There is calcification of the aortic valve leaflets. There is fibrocalcific aortic valve sclerosis without stenosis. There is moderate aortic regurgitation.     Mitral Valve:  There is moderate posterior calcification and mild calcification of the mitral valve annulus. There is mild leaflet calcification. There is no mitral valve stenosis. There is mild mitral regurgitation.     Tricuspid Valve:  Structurally normal tricuspid valve with normal leaflet excursion. There is mild tricuspid regurgitation. Estimated pulmonary artery systolic pressure is 44 mmHg.     Pulmonic Valve:  Structurally normal pulmonic valve with normal leaflet excursion. There is trace pulmonic regurgitation. There is evidence of pulmonary hypertension.     Aorta:  The aortic root appears normal in size.     Pericardium:  No pericardial effusion seen.     Systemic Veins:  The inferior vena cava is dilated (dilated >2.1cm) with abnormal inspiratory collapse (abnormal <50%) consistent with elevated right atrial pressure (~15, range 10-20mmHg).  ____________________________________________________________________  QUANTITATIVE DATA:  Left Ventricle Measurements: (Indexed to BSA)     IVSd (2D):   1.0 cm  LVPWd (2D):  1.1 cm  LVIDd (2D):  3.0 cm  LVIDs (2D):  2.7 cm  LV Mass:     88 g   49.9 g/m²  LV Vol d, MOD A2C: 119.0 ml 67.67 ml/m²  LV Vol d, MOD A4C: 90.2 ml  51.29 ml/m²  LV Vol d, MOD BP:  104.9 ml 59.67 ml/m²  LV Vol s, MOD A2C: 65.6 ml  37.30 ml/m²  LV Vol s, MOD A4C: 59.4 ml  33.75 ml/m²  LV Vol s, MOD BP:  63.0 ml  35.82 ml/m²  LVOT SV MOD BP:    42.0 ml  LV EF% MOD BP:     40 %     MV E Vmax:    0.81 m/s  MV A Vmax:    0.78 m/s  MV E/A:       1.03  e' lateral:   5.43 cm/s  e' medial:    5.26 cm/s  E/e' lateral: 14.86  E/e' medial:  15.34  E/e' Average: 15.10    Aorta Measurements: (Normal range) (Indexed to BSA)     Sinuses of Valsalva: 2.60 cm (3.1 - 3.7 cm)  Ao Annulus:          1.9 cm (2.3 - 2.9 cm)       Left Atrium Measurements: (Indexed to BSA)  LA Diam 2D:        2.90 cm  LA Vol s, MOD A4C: 56.90 ml.  LA Vol s, MOD A2C: 80.00 ml.    Right Ventricle Measurements: Right Atrial Measurements:     TAPSE:           1.8 cm       RA Vol:       29.00 ml  RV S' Vmax:  10.10 cm/s   RA Vol Index: 16.49 ml/m²  RV Base (RVID1): 3.5 cm  RV Mid (RVID2):  2.1 cm       LVOT / RVOT/ Qp/Qs Data: (Indexed to BSA)  LVOT Diameter:  1.90 cm  LVOT Area:      2.84 cm²  LVOT Vmax:      0.92 m/s  LVOT Vmn:       0.603 m/s  LVOT VTI:       18.60 cm  LVOT peak grad: 3 mmHg  LVOT mean grad: 2.0 mmHg  LVOT SV:        52.7 ml   29.99 ml/m²    Mitral Valve Measurements:     MV E Vmax: 0.8 m/s  MV A Vmax: 0.8 m/s  MV E/A:    1.0       Tricuspid Valve Measurements:     TR Vmax:   2.7 m/s  TR Peak Gradient: 29.4 mmHg  RA Pressure:      15 mmHg  PASP:             44 mmHg    ________________________________________________________________________________________  Electronically signed on 6/15/2024 at 11:10:51 AM by Lowell Sharpe         *** Final ***    < end of copied text >

## 2024-09-18 NOTE — H&P ADULT - PROBLEM SELECTOR PLAN 3
covid +  ct imaging showing possible superimposed bacterial pna  follow up sputum culture, atypical pna work up, mrsa screen  trend inflammatory markers   moinitor for fever, changes in white count  isolation precautions  empirical abx with ceftriaxone and doxy  bronchodilators + oxygen supplementation  as needed to maintain goal  symptomatic relief with antitussives, mucolytics, antipyretics  start remdesivir + dexamethasone.

## 2024-09-18 NOTE — H&P ADULT - NSHPREVIEWOFSYSTEMS_GEN_ALL_CORE
CONSTITUTIONAL: No fever. no weakness  ENMT:  No sinus or throat pain  RESPIRATORY: No cough, wheezing, chills or hemoptysis; +sob/carter  CARDIOVASCULAR: No chest pain, palpitations, dizziness, or leg swelling  GASTROINTESTINAL: No abdominal or epigastric pain. No nausea, vomiting, or hematemesis; No diarrhea or constipation. No melena or hematochezia.  GENITOURINARY: No dysuria or incontinence  NEUROLOGICAL: No headaches, memory loss, loss of strength, numbness, or tremors  SKIN: No rashes,  No hives or eczema  ENDOCRINE: No heat or cold intolerance; No hair loss  MUSCULOSKELETAL: No joint pain or swelling; No muscle, back, or extremity pain  PSYCHIATRIC: No depression, anxiety, mood swings, or difficulty sleeping  HEME/LYMPH: No easy bruising, or bleeding gums; no enlarged LN

## 2024-09-18 NOTE — CHART NOTE - NSCHARTNOTEFT_GEN_A_CORE
Medicine ACP Event Note  Date of Service: 09-18-24 @ 03:33    Subjective: Notified by RN RRT called for hypoxia, pt desatting into low 70's. Patient seen and examined at bedside. RRT team present. See RRT note for full details.         Objective Findings:  T(C): 36.4 (09-18-24 @ 01:34), Max: 38.4 (09-17-24 @ 17:47)  HR: 103 (09-18-24 @ 02:59) (103 - 140)  BP: 149/70 (09-18-24 @ 01:34) (133/78 - 154/86)  RR: 20 (09-18-24 @ 02:59) (17 - 20)  SpO2: 99% (09-18-24 @ 02:59) (75% - 100%)  Wt(kg): --  Daily Height in cm: 170.18 (17 Sep 2024 16:06)    Daily       Physical Exam:  Gen: Patient in resp distress, A&O 0-1 at basline   CV: Irregular, irregular   Lungs:  Decreased breath sounds in B/L LL w crackles  Abd: soft, non-tender; bowel sounds present  Ext: No edema; warm and well perfused    Telemetry:     Laboratory Data:                        10.8   17.32 )-----------( 329      ( 18 Sep 2024 00:26 )             36.0     09-18    144  |  108  |  45[H]  ----------------------------<  184[H]  5.0   |  16[L]  |  1.17    Ca    8.7      18 Sep 2024 00:32  Phos  3.7     09-18  Mg     1.9     09-18    TPro  7.4  /  Alb  2.9[L]  /  TBili  0.6  /  DBili  x   /  AST  35  /  ALT  29  /  AlkPhos  135[H]  09-18    PT/INR - ( 18 Sep 2024 00:26 )   PT: 18.8 sec;   INR: 1.74 ratio         PTT - ( 18 Sep 2024 00:26 )  PTT:25.4 sec          Inpatient Medications:  MEDICATIONS  (STANDING):  acetylcysteine 20%  Inhalation 4 milliLiter(s) Inhalation every 12 hours  albuterol/ipratropium for Nebulization 3 milliLiter(s) Nebulizer every 6 hours  aspirin  chewable 81 milliGRAM(s) Oral daily  cefTRIAXone   IVPB 1000 milliGRAM(s) IV Intermittent every 24 hours  dexAMETHasone  Injectable 6 milliGRAM(s) IV Push daily  doxycycline IVPB 100 milliGRAM(s) IV Intermittent every 12 hours  furosemide   Injectable 20 milliGRAM(s) IV Push two times a day  heparin  Infusion.  Unit(s)/Hr (11 mL/Hr) IV Continuous <Continuous>  lactated ringers. 1000 milliLiter(s) (60 mL/Hr) IV Continuous <Continuous>  metoprolol succinate  milliGRAM(s) Oral daily  pantoprazole  Injectable 40 milliGRAM(s) IV Push daily  sodium chloride 3%  Inhalation 4 milliLiter(s) Inhalation every 12 hours      Assessment/Plan of Care:  89-year-old male past medical history of paroxysmal A-fib no longer on Eliquis, prostate CA in remission s/p radiation, iron deficiency anemia, asthma, GERD, dementia who is nonverbal baseline) presents to ED for increased agitation, hypoxia to 88% and improved to 95% on 3 L in the setting of COVID. Patient poor historian.  Obtain collateral from nursing facility.  Arrived on 2 L NC given low saturation.    s/p RRT for hypoxia  - See RRT note for full details  - Patient placed on NRB mask with minimal improvement, patient then placed on HFNC w/ improvement  - IV Lasix 20 administered   - STAT labs w/ ABG  - Attending Physician present at bedside w/ RRT team,   - Will monitor patient closely overnight  - Cards/Pulm consult in am   - Patient DNR/DNI w/ trial of NIV  - Patient's son updated by attending, [facility] established    - Will endorse to day team in AM           Giorgi Avilez PA-C  Medicine ACP

## 2024-09-18 NOTE — PATIENT PROFILE ADULT - TRANSPORTATION
Outpatient Psychiatry Follow-Up Visit (MD/NP)    12/14/2022    Clinical Status of Patient:  Outpatient (Ambulatory)(Virtual)  The patient location is:    [address] [place] [id number]  The patient location [place] is:  [facility]  no
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
## 2024-09-18 NOTE — RAPID RESPONSE TEAM SUMMARY - NSADDTLFINDINGSRRT_GEN_ALL_CORE
Course c/b RRT earlier in the night for hypoxia and A-fib w/ RVR (See prior RRT note for more details). Second RRT called for hypoxia (80's on 6L NC) and HR > 130. Upon arrival, pt on NRB w/ fluctuating O2 sat readings (80 - 90's). On PE, pt AOx0, cold extremities w/ coarse breath sounds. Deep sunctioning performed w/ gastric contents evacuated. Based on imaging and PE, signs of volume overload, pt s/p additional Lasix 20 x 1 (received Lasix 20 in ED). IV lopressor 2.5 x2 for rate control ('s --> HR < 110). Suscpect hypoxia driving A-fib w/ RVR. Differential: Likely multifactorial - COVID PNA w/? superimposed bacterial PNA w/ volume overload. ABG performed and notable for hypoxemia - escalated to HFNC. Primary ACP and attending at bedside. Agree w/ continuing IV abx, steroids, suctioning q4h, airway clearance measures. Monitor urine output (Bladder scan during RRT unremarkable). GOC per primary team.

## 2024-09-18 NOTE — CONSULT NOTE ADULT - NS ATTEND AMEND GEN_ALL_CORE FT
suggest change to AVAPS   change to cefepime  continue remdesivir/decadron  maintain sat>90%  prognosis guarded  dw son and md

## 2024-09-18 NOTE — PHARMACOTHERAPY INTERVENTION NOTE - COMMENTS
Recommended vancomycin 750mg IV q24h in the setting of MRSA bacteremia. According to [employer], a Bayesian pharmacokinetic modeling program, a vancomycin regimen of 750mg IV q24h predicts a future steady-state vancomycin AUC/BEATRIZ of ~460mg*hr/mL.    Chato Da Silva, PharmD, [facility]  Clinical Pharmacy Specialist, Infectious Diseases  Tele-Antimicrobial Stewardship Program (Tele-ASP)  Tele-ASP Phone: [phone]  Recommended vancomycin 750mg IV q24h in the setting of MRSA bacteremia. According to [employer], a Bayesian pharmacokinetic modeling program, a vancomycin regimen of 750mg IV q24h predicts a future steady-state vancomycin AUC/BEATRIZ of ~460mg*hr/mL.    Spoke with Reyes Monegro, Emelin (NP)    Chato Da Silva, PharmD, [facility]  Clinical Pharmacy Specialist, Infectious Diseases  Tele-Antimicrobial Stewardship Program (Tele-ASP)  Tele-ASP Phone: [phone]

## 2024-09-18 NOTE — H&P ADULT - PROBLEM SELECTOR PLAN 7
mr left shoulder joint from prev hospitalization reviewed; "Large rim-enhancing markedly T2 hyperintense lesion within the proximal humerus measuring 5.1 cm. Notable soft tissue extension of this process into the superior cuff fibers, as described. Markedly heterogeneously enhancing synovial/intra-articular involvement of this process measuring 5.2 cm (series 13 image 25) which is indeterminate, and has an enhancement pattern in appearance which differs from the intraosseous component. This could represent extension of the lesion into soft tissue versus possibly localized adjacent synovitis. Advise histopathological correlation. Moderate glenohumeral joint effusion. 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 If clinically indicated correlation with a staging FDG PET-CT and/or biopsy may be of additional value"  clarify goc in am prior to pursuing further work up

## 2024-09-18 NOTE — SWALLOW BEDSIDE ASSESSMENT ADULT - COMMENTS
hx con't  9/18 x2 RRT in early AM for hypoxia and abnormal heart rate. First RRT: Pt w/ hypoxia on NRB in the 80's. Pt required suctioning of large amounts of mucus with improved O2 saturation.EKG performed showing atrial fibrillation with RVR. Likely in the setting of sepsis. Pt also febrile. given abx. Also noted with shingles on the gluteal fold, recommendations for IV acyclovir. Second RRT: Pt satting 80's on 6LNC and HR >130. Pt placed on NRB satting 80-90's. pt AOx0, cold extremities w/ coarse breath sounds. Deep sunctioning performed w/ gastric contents evacuated. Based on imaging and PE, signs of volume overload, pt s/p additional Lasix. Suscpect hypoxia driving A-fib w/ RVR. Differential: Likely multifactorial - COVID PNA w/? superimposed bacterial PNA w/ volume overload. ABG performed and notable for hypoxemia - escalated to HFNC. Primary ACP and attending at bedside. Agree w/ continuing IV abx, steroids, suctioning q4h, airway clearance measures.    DYSPHAGIA HX: Known to this service from June 2024 with x1 FEES and more recently x1 MBS (6/26/24) recs for Minced-moist and thin liquids via small sips/bites. Bedside swallow exam 8/18/24 recommending puree and thin liquids and pt w/ incomplete dentition with reduced tolerance of chewables

## 2024-09-18 NOTE — H&P ADULT - PROBLEM SELECTOR PLAN 2
in respiratory distress with adequate spo2 on 4-5 LPM via NC  follow up vbg  Monitor SpO2, RR, for signs of respiratory distress; Goal SpO2 >88-92%, PaO2 >55-60 mmHg  bronchodilators + oxygen supplementation as needed  aggressive pulmonary toilet/hygiene   low threshold for micu consult in respiratory distress with adequate spo2 on 4-5 LPM via NC  follow up abg  Monitor SpO2, RR, for signs of respiratory distress; Goal SpO2 >88-92%, PaO2 >55-60 mmHg  bronchodilators + oxygen supplementation/nippv w hfnc vs avaps as needed  aggressive pulmonary toilet/hygiene   low threshold for micu consult

## 2024-09-18 NOTE — PATIENT PROFILE ADULT - DO YOU FEEL UNSAFE AT HOME, WORK, OR SCHOOL?
Patient is calling due to Lab appointment cancelled for 10/30/23  Prior to her scheduled clinic visit on 10/30/23 at 11:00.    Message sent to the scheduling mgr Saurabh Mckinnon at [facility].    Per [facility] colleagues no appointment necessary any longer for lab draws if orders already have been placed, will notify the patient tomorrow - 10/10/23.  
no

## 2024-09-18 NOTE — RAPID RESPONSE TEAM SUMMARY - NSADDTLFINDINGSRRT_GEN_ALL_CORE
Third RRT in 24 hours. RRT called for hypoxia. On arrival, VS: T 92.9, /70, , RR24, O2 sat 79%. Pt was on HFNC and settings were changed to max settings on arrival of RRT. On PE, AOx0 w/ coarse jacobo breath sounds. Primary nurse at bedside reports pt had duoneb treatment earlier in the morning. Pt received CT Chest w/ jacobo pleural effusions and had not received 20mg IV lasix dose that was due. Lasix 20 mg IVP given during RRT. Pt was deep suctioned at time of RRT. Current hypoxia is most likely iso COVID vs superimposed bacterial PNA. Primary team at bedside during time of RRT called pt's son who is a pulmonologist, and son requests for trial of non-invasive despite being told that pt has increased secretions in airway and altered mental status. Primary team to start NIPPV if pt desaturates while on HFNC. Primary team to f/u ABG w/ lactate and trial NIPPV. Third RRT in 24 hours. RRT called for hypoxia. On arrival, VS: T 92.9, /70, , RR24, O2 sat 79%. Pt was on HFNC and settings were changed to max settings on arrival of RRT. On PE, AOx0 w/ coarse jacobo breath sounds. Primary nurse at bedside reports pt had duoneb treatment earlier in the morning. Pt received CT Chest w/ jacobo pleural effusions and had not received 20mg IV lasix dose that was due. Lasix 20 mg IVP given during RRT. Pt was deep suctioned at time of RRT. Current hypoxia is most likely iso COVID vs superimposed bacterial PNA. Primary team at bedside during time of RRT called pt's son who is a pulmonologist, and son requests for trial of non-invasive despite being told that pt has increased secretions in airway and altered mental status. O2 sat improved to 90s at end of RRT. Primary team to start NIPPV if pt desaturates while on HFNC. Primary team to f/u ABG w/ lactate and trial NIPPV. Third RRT in 24 hours. RRT called for hypoxia. On arrival, VS: T 92.9, /70, , RR24, O2 sat 79%. Pt was on HFNC and settings were changed to max settings on arrival of RRT. On PE, AOx0 w/ coarse jacobo breath sounds. Primary nurse at bedside reports pt had duoneb treatment earlier in the morning. Pt received CT Chest w/ jacobo pleural effusions and had not received 20mg IV lasix dose that was due. Lasix 20 mg IVP given during RRT. Pt was deep suctioned at time of RRT. Current hypoxia is most likely iso COVID vs superimposed bacterial PNA. Primary team at bedside during time of RRT called pt's son who is a pulmonologist, and son requests for trial of non-invasive despite being told that pt has increased secretions in airway and altered mental status. O2 sat improved to 90s at end of RRT. Advised primary team to do frequent suctioning and hypertonic saline nebs. Primary team to start NIPPV if pt desaturates while on HFNC. Primary team to f/u ABG w/ lactate and trial NIPPV.

## 2024-09-19 NOTE — PHARMACOTHERAPY INTERVENTION NOTE - COMMENTS
LEXI Andrade is a 89 yo w dementia w recent admissions in July (s/p gastric perforation) and August (HF) now admitted with sepsis and respiratory distress, found to be covid positive and with MRSA bacteremia.    Seen by ID/Dr. Alegre and changed to daptomycin 500mg q48h which is appropriate for indication and GEMA.  Added on CPK to today's labs for baseline, to be followed weekly on daptomycin.    Thank you,  Lissette Mcgee, PharmD, BCIDP  Clinical Pharmacist, Infectious Diseases  Available via Teams   Cell: [phone]

## 2024-09-19 NOTE — PROVIDER CONTACT NOTE (OTHER) - SITUATION
Pt on heparin gtt, PTT not drawn since 9/18 18:52. Pt hard stick, PCA, RN and phlebotomist unable to draw labs on patient

## 2024-09-19 NOTE — CONSULT NOTE ADULT - SUBJECTIVE AND OBJECTIVE BOX
Patient is a 89y old  Male who presents with a chief complaint of sob/carter (18 Sep 2024 18:40)    HPI:  89yo m mci/dementia, dorothea, asthma, gerd w large hiatal hernia, pud c/b gastric perforation s/p exlap, pafib, hfref, prostate ca s/p radiation seeds, oa, p/w sob/carter; in er, found to be meeting severe sepsis criteria, in acute hypoxemic respiratory failure; suspect multifactorial 2/2 afib rvr iso covid pna + adhf; admit to medicine for further mgmt (18 Sep 2024 00:52)      PAST MEDICAL & SURGICAL HISTORY:  Obstructive sleep apnea on CPAP      Carpal tunnel syndrome      H/O prostate cancer      Hiatal hernia      JULIUS (iron deficiency anemia)      H/O knee surgery          Social history:    FAMILY HISTORY:  FH: heart attack (Father)          e    Allergic/Immunologic:	No hives or rash   Allergies    No Known Allergies    Intolerances        Antimicrobials:    cefepime   IVPB 1000 milliGRAM(s) IV Intermittent every 12 hours  cefepime   IVPB      remdesivir  IVPB 100 milliGRAM(s) IV Intermittent every 24 hours  remdesivir  IVPB   IV Intermittent   vancomycin  IVPB 750 milliGRAM(s) IV Intermittent every 24 hours        Vital Signs Last 24 Hrs  T(C): 37.3 (19 Sep 2024 12:23), Max: 37.3 (19 Sep 2024 12:23)  T(F): 99.2 (19 Sep 2024 12:23), Max: 99.2 (19 Sep 2024 12:23)  HR: 129 (19 Sep 2024 12:23) (111 - 132)  BP: 122/78 (19 Sep 2024 12:23) (107/60 - 137/90)  BP(mean): --  RR: 19 (19 Sep 2024 12:23) (16 - 20)  SpO2: 99% (19 Sep 2024 12:23) (91% - 100%)    Parameters below as of 19 Sep 2024 12:23  Patient On (Oxygen Delivery Method): BiPAP/CPAP         Eyes:PERRL EOMI.NO discharge or conjunctival injection    ENMT:No sinus tenderness.No thrush.No pharyngeal exudate or erythema.Fair dental hygiene    Neck:Supple,No LN,no JVD      Respiratory:Good air entry bilaterally,CTA    Cardiovascular:S1 S2 wnl, No murmurs,rub or gallops    Gastrointestinal:Soft BS(+) no tenderness no masses ,No rebound or guarding    Genitourinary:No CVA tendereness     Rectal:    Extremities:No cyanosis,clubbing or edema.    Vascular:peripheral pulses felt                                     8.3    18.23 )-----------( 256      ( 19 Sep 2024 09:58 )             28.1         09-19    149[H]  |  111[H]  |  72[H]  ----------------------------<  145[H]  4.9   |  23  |  2.58[H]    Ca    8.5      19 Sep 2024 09:58  Phos  6.1     09-19  Mg     2.3     09-19    TPro  6.8  /  Alb  2.8[L]  /  TBili  0.5  /  DBili  0.4[H]  /  AST  409[H]  /  ALT  344[H]  /  AlkPhos  115  09-19      RECENT CULTURES:  09-17 @ 17:46  Clean Catch Clean Catch (Midstream)  --  --  --    <10,000 CFU/mL Normal Urogenital Maria Guadalupe  --  09-17 @ 15:30  .Blood Blood-Peripheral  --  --  --    No growth at 24 hours  --  09-17 @ 15:20  .Blood Blood-Peripheral  Blood Culture PCR  Blood Culture PCR  PCR    Growth in aerobic and anaerobic bottles: Staphylococcus aureus  Direct identification is available within approximately 3-5  hours either by Blood Panel Multiplexed PCR or Direct  MALDI-TOF. Details: [email or web address]  --      MICROBIOLOGY:  Culture Results:   <10,000 CFU/mL Normal Urogenital Maria Guadalupe (09-17 @ 17:46)  Culture Results:   No growth at 24 hours (09-17 @ 15:30)  Culture Results:   Growth in aerobic and anaerobic bottles: Staphylococcus aureus  Direct identification is available within approximately 3-5  hours either by Blood Panel Multiplexed PCR or Direct  MALDI-TOF. Details: [email or web address] (09-17 @ 15:20)          Radiology:      Assessment:        Recommendations and Plan:    Pager [phone]  After 5 pm/weekends or if no response :[phone]

## 2024-09-19 NOTE — PROGRESS NOTE ADULT - SUBJECTIVE AND OBJECTIVE BOX
Follow-up Pulm Progress Note    No new respiratory events overnight.  Denies SOB/CP.     Medications:  MEDICATIONS  (STANDING):  acetylcysteine 20%  Inhalation 4 milliLiter(s) Inhalation every 12 hours  albuterol/ipratropium for Nebulization 3 milliLiter(s) Nebulizer every 6 hours  aspirin  chewable 81 milliGRAM(s) Oral daily  cefepime   IVPB 1000 milliGRAM(s) IV Intermittent every 12 hours  cefepime   IVPB      chlorhexidine 2% Cloths 1 Application(s) Topical daily  dexAMETHasone  Injectable 6 milliGRAM(s) IV Push daily  furosemide   Injectable 20 milliGRAM(s) IV Push two times a day  heparin  Infusion.  Unit(s)/Hr (11 mL/Hr) IV Continuous <Continuous>  lactated ringers. 1000 milliLiter(s) (60 mL/Hr) IV Continuous <Continuous>  metoprolol succinate  milliGRAM(s) Oral daily  pantoprazole  Injectable 40 milliGRAM(s) IV Push daily  remdesivir  IVPB   IV Intermittent   remdesivir  IVPB 100 milliGRAM(s) IV Intermittent every 24 hours  sodium chloride 3%  Inhalation 4 milliLiter(s) Inhalation every 12 hours  sodium chloride 3%  Inhalation 4 milliLiter(s) Inhalation every 12 hours  vancomycin  IVPB 750 milliGRAM(s) IV Intermittent every 24 hours    MEDICATIONS  (PRN):  acetaminophen     Tablet .. 650 milliGRAM(s) Oral every 6 hours PRN Temp greater or equal to 38C (100.4F), Mild Pain (1 - 3)  aluminum hydroxide/magnesium hydroxide/simethicone Suspension 30 milliLiter(s) Oral every 4 hours PRN Dyspepsia  benzonatate 100 milliGRAM(s) Oral every 8 hours PRN Cough  divalproex  milliGRAM(s) Oral three times a day PRN agitation  guaifenesin/dextromethorphan Oral Liquid 10 milliLiter(s) Oral every 4 hours PRN Cough  heparin   Injectable 4500 Unit(s) IV Push every 6 hours PRN For aPTT less than 40  heparin   Injectable 2000 Unit(s) IV Push every 6 hours PRN For aPTT between 40 - 57  melatonin 3 milliGRAM(s) Oral at bedtime PRN Insomnia  metoprolol tartrate Injectable 2.5 milliGRAM(s) IV Push every 6 hours PRN sustained afib rvr >120  ondansetron Injectable 4 milliGRAM(s) IV Push every 8 hours PRN Nausea and/or Vomiting          Vital Signs Last 24 Hrs  T(C): 37.3 (19 Sep 2024 12:23), Max: 37.3 (19 Sep 2024 12:23)  T(F): 99.2 (19 Sep 2024 12:23), Max: 99.2 (19 Sep 2024 12:23)  HR: 129 (19 Sep 2024 12:23) (111 - 132)  BP: 122/78 (19 Sep 2024 12:23) (107/60 - 137/90)  BP(mean): --  RR: 19 (19 Sep 2024 12:23) (16 - 20)  SpO2: 99% (19 Sep 2024 12:23) (91% - 100%)    Parameters below as of 19 Sep 2024 12:23  Patient On (Oxygen Delivery Method): BiPAP/CPAP        ABG - ( 19 Sep 2024 12:12 )  pH, Arterial: 7.28  pH, Blood: x     /  pCO2: 55    /  pO2: 93    / HCO3: 26    / Base Excess: -1.8  /  SaO2: 98.4              VBG pH 7.27 09-18 @ 00:17    VBG pCO2 58 09-18 @ 00:17    VBG O2 sat 28.7 09-18 @ 00:17    VBG lactate 3.6 09-18 @ 00:17  VBG pH 7.44 09-17 @ 15:30    VBG pCO2 46 09-17 @ 15:30    VBG O2 sat 50.9 09-17 @ 15:30    VBG lactate 2.4 09-17 @ 15:30          LABS:                        8.3    18.23 )-----------( 256      ( 19 Sep 2024 09:58 )             28.1     09-19    149[H]  |  111[H]  |  72[H]  ----------------------------<  145[H]  4.9   |  23  |  2.58[H]    Ca    8.5      19 Sep 2024 09:58  Phos  6.1     09-19  Mg     2.3     09-19    TPro  6.8  /  Alb  2.8[L]  /  TBili  0.5  /  DBili  0.4[H]  /  AST  409[H]  /  ALT  344[H]  /  AlkPhos  115  09-19          CAPILLARY BLOOD GLUCOSE      POCT Blood Glucose.: 208 mg/dL (18 Sep 2024 12:26)    PT/INR - ( 19 Sep 2024 09:58 )   PT: 22.5 sec;   INR: 2.09 ratio         PTT - ( 19 Sep 2024 09:58 )  PTT:72.9 sec  Urinalysis Basic - ( 19 Sep 2024 09:58 )    Color: x / Appearance: x / SG: x / pH: x  Gluc: 145 mg/dL / Ketone: x  / Bili: x / Urobili: x   Blood: x / Protein: x / Nitrite: x   Leuk Esterase: x / RBC: x / WBC x   Sq Epi: x / Non Sq Epi: x / Bacteria: x                      CULTURES:     Culture - Blood (collected 09-17-24 @ 15:30)  Source: .Blood Blood-Peripheral  Preliminary Report (09-18-24 @ 22:01):    No growth at 24 hours    Culture - Blood (collected 09-17-24 @ 15:20)  Source: .Blood Blood-Peripheral  Gram Stain (09-18-24 @ 18:29):    Growth in aerobic bottle: Gram Positive Cocci in Clusters    Growth in anaerobic bottle: Gram Positive Cocci in Clusters  Preliminary Report (09-19-24 @ 10:01):    Growth in aerobic and anaerobic bottles: Staphylococcus aureus    Direct identification is available within approximately 3-5    hours either by Blood Panel Multiplexed PCR or Direct    MALDI-TOF. Details: [email or web address]  Organism: Blood Culture PCR (09-18-24 @ 18:27)  Organism: Blood Culture PCR (09-18-24 @ 18:27)      Method Type: PCR      -  Methicillin resistant Staphylococcus aureus (MRSA): Detec        Culture - Urine (collected 09-17-24 @ 17:46)  Source: Clean Catch Clean Catch (Midstream)  Final Report (09-19-24 @ 06:32):    <10,000 CFU/mL Normal Urogenital Maria Guadalupe                Physical Examination:  PULM: Clear to auscultation bilaterally, no significant sputum production  CVS: S1, S2 heard    RADIOLOGY REVIEWED    CT chest:    < from: CT Angio Chest PE Protocol w/ IV Cont (09.17.24 @ 20:46) >  FINDINGS:    AIRWAYS/LUNGS/PLEURA: Nonocclusive layering secretions in the distal   trachea. Otherwise patent central airways. Multifocal bilateral   groundglass opacities. Interlobular septal thickening. Moderate right   greater than left pleural effusions with adjacent compressive   atelectasis, similar to prior.    MEDIASTINUM AND ZANE: No lymphadenopathy. Mildly prominent nonspecific   paratracheal lymph nodesmeasuring up to 9 mm.    PULMONARY ANGIOGRAM: No pulmonary embolism with limited evaluation of the   subsegmental branches in the lower lobes.    VESSELS: Aortic calcifications. Coronary artery calcifications.    HEART: Stable heart size. No pericardial effusion. Mitral annular   calcification.    VISUALIZED UPPER ABDOMEN: Moderate hiatal hernia with a large portion of   the stomach within the thorax.    CHEST WALL AND BONES: Scoliosis and degenerative changes of the spine.    IMPRESSION:    No pulmonary embolism in the central, lobar, or segmental pulmonary   arteries.    Moderate right greater than left pleural effusions with interlobular   septal thickening and patchy bilateral groundglass opacities suggestive   of pulmonary edema. Superimposed infection cannot be entirely excluded.    < end of copied text >     Follow-up Pulm Progress Note    pt seen on AVAPS, MV ~7.5  ROS unable to be obtained  Sats 100% on 100% FiO2  02 lowered to 80% with sats maintaining 98%     Medications:  MEDICATIONS  (STANDING):  acetylcysteine 20%  Inhalation 4 milliLiter(s) Inhalation every 12 hours  albuterol/ipratropium for Nebulization 3 milliLiter(s) Nebulizer every 6 hours  aspirin  chewable 81 milliGRAM(s) Oral daily  cefepime   IVPB 1000 milliGRAM(s) IV Intermittent every 12 hours  cefepime   IVPB      chlorhexidine 2% Cloths 1 Application(s) Topical daily  dexAMETHasone  Injectable 6 milliGRAM(s) IV Push daily  furosemide   Injectable 20 milliGRAM(s) IV Push two times a day  heparin  Infusion.  Unit(s)/Hr (11 mL/Hr) IV Continuous <Continuous>  lactated ringers. 1000 milliLiter(s) (60 mL/Hr) IV Continuous <Continuous>  metoprolol succinate  milliGRAM(s) Oral daily  pantoprazole  Injectable 40 milliGRAM(s) IV Push daily  remdesivir  IVPB   IV Intermittent   remdesivir  IVPB 100 milliGRAM(s) IV Intermittent every 24 hours  sodium chloride 3%  Inhalation 4 milliLiter(s) Inhalation every 12 hours  sodium chloride 3%  Inhalation 4 milliLiter(s) Inhalation every 12 hours  vancomycin  IVPB 750 milliGRAM(s) IV Intermittent every 24 hours    MEDICATIONS  (PRN):  acetaminophen     Tablet .. 650 milliGRAM(s) Oral every 6 hours PRN Temp greater or equal to 38C (100.4F), Mild Pain (1 - 3)  aluminum hydroxide/magnesium hydroxide/simethicone Suspension 30 milliLiter(s) Oral every 4 hours PRN Dyspepsia  benzonatate 100 milliGRAM(s) Oral every 8 hours PRN Cough  divalproex  milliGRAM(s) Oral three times a day PRN agitation  guaifenesin/dextromethorphan Oral Liquid 10 milliLiter(s) Oral every 4 hours PRN Cough  heparin   Injectable 4500 Unit(s) IV Push every 6 hours PRN For aPTT less than 40  heparin   Injectable 2000 Unit(s) IV Push every 6 hours PRN For aPTT between 40 - 57  melatonin 3 milliGRAM(s) Oral at bedtime PRN Insomnia  metoprolol tartrate Injectable 2.5 milliGRAM(s) IV Push every 6 hours PRN sustained afib rvr >120  ondansetron Injectable 4 milliGRAM(s) IV Push every 8 hours PRN Nausea and/or Vomiting          Vital Signs Last 24 Hrs  T(C): 37.3 (19 Sep 2024 12:23), Max: 37.3 (19 Sep 2024 12:23)  T(F): 99.2 (19 Sep 2024 12:23), Max: 99.2 (19 Sep 2024 12:23)  HR: 129 (19 Sep 2024 12:23) (111 - 132)  BP: 122/78 (19 Sep 2024 12:23) (107/60 - 137/90)  BP(mean): --  RR: 19 (19 Sep 2024 12:23) (16 - 20)  SpO2: 99% (19 Sep 2024 12:23) (91% - 100%)    Parameters below as of 19 Sep 2024 12:23  Patient On (Oxygen Delivery Method): BiPAP/CPAP        ABG - ( 19 Sep 2024 12:12 )  pH, Arterial: 7.28  pH, Blood: x     /  pCO2: 55    /  pO2: 93    / HCO3: 26    / Base Excess: -1.8  /  SaO2: 98.4              VBG pH 7.27 09-18 @ 00:17    VBG pCO2 58 09-18 @ 00:17    VBG O2 sat 28.7 09-18 @ 00:17    VBG lactate 3.6 09-18 @ 00:17  VBG pH 7.44 09-17 @ 15:30    VBG pCO2 46 09-17 @ 15:30    VBG O2 sat 50.9 09-17 @ 15:30    VBG lactate 2.4 09-17 @ 15:30          LABS:                        8.3    18.23 )-----------( 256      ( 19 Sep 2024 09:58 )             28.1     09-19    149[H]  |  111[H]  |  72[H]  ----------------------------<  145[H]  4.9   |  23  |  2.58[H]    Ca    8.5      19 Sep 2024 09:58  Phos  6.1     09-19  Mg     2.3     09-19    TPro  6.8  /  Alb  2.8[L]  /  TBili  0.5  /  DBili  0.4[H]  /  AST  409[H]  /  ALT  344[H]  /  AlkPhos  115  09-19          CAPILLARY BLOOD GLUCOSE      POCT Blood Glucose.: 208 mg/dL (18 Sep 2024 12:26)    PT/INR - ( 19 Sep 2024 09:58 )   PT: 22.5 sec;   INR: 2.09 ratio         PTT - ( 19 Sep 2024 09:58 )  PTT:72.9 sec  Urinalysis Basic - ( 19 Sep 2024 09:58 )    Color: x / Appearance: x / SG: x / pH: x  Gluc: 145 mg/dL / Ketone: x  / Bili: x / Urobili: x   Blood: x / Protein: x / Nitrite: x   Leuk Esterase: x / RBC: x / WBC x   Sq Epi: x / Non Sq Epi: x / Bacteria: x                      CULTURES:     Culture - Blood (collected 09-17-24 @ 15:30)  Source: .Blood Blood-Peripheral  Preliminary Report (09-18-24 @ 22:01):    No growth at 24 hours    Culture - Blood (collected 09-17-24 @ 15:20)  Source: .Blood Blood-Peripheral  Gram Stain (09-18-24 @ 18:29):    Growth in aerobic bottle: Gram Positive Cocci in Clusters    Growth in anaerobic bottle: Gram Positive Cocci in Clusters  Preliminary Report (09-19-24 @ 10:01):    Growth in aerobic and anaerobic bottles: Staphylococcus aureus    Direct identification is available within approximately 3-5    hours either by Blood Panel Multiplexed PCR or Direct    MALDI-TOF. Details: [email or web address]  Organism: Blood Culture PCR (09-18-24 @ 18:27)  Organism: Blood Culture PCR (09-18-24 @ 18:27)      Method Type: PCR      -  Methicillin resistant Staphylococcus aureus (MRSA): Detec        Culture - Urine (collected 09-17-24 @ 17:46)  Source: Clean Catch Clean Catch (Midstream)  Final Report (09-19-24 @ 06:32):    <10,000 CFU/mL Normal Urogenital Maria Guadalupe                Physical Examination:  PULM: Clear to auscultation bilaterally, no significant sputum production  CVS: S1, S2 heard    RADIOLOGY REVIEWED    CT chest:    < from: CT Angio Chest PE Protocol w/ IV Cont (09.17.24 @ 20:46) >  FINDINGS:    AIRWAYS/LUNGS/PLEURA: Nonocclusive layering secretions in the distal   trachea. Otherwise patent central airways. Multifocal bilateral   groundglass opacities. Interlobular septal thickening. Moderate right   greater than left pleural effusions with adjacent compressive   atelectasis, similar to prior.    MEDIASTINUM AND ZANE: No lymphadenopathy. Mildly prominent nonspecific   paratracheal lymph nodesmeasuring up to 9 mm.    PULMONARY ANGIOGRAM: No pulmonary embolism with limited evaluation of the   subsegmental branches in the lower lobes.    VESSELS: Aortic calcifications. Coronary artery calcifications.    HEART: Stable heart size. No pericardial effusion. Mitral annular   calcification.    VISUALIZED UPPER ABDOMEN: Moderate hiatal hernia with a large portion of   the stomach within the thorax.    CHEST WALL AND BONES: Scoliosis and degenerative changes of the spine.    IMPRESSION:    No pulmonary embolism in the central, lobar, or segmental pulmonary   arteries.    Moderate right greater than left pleural effusions with interlobular   septal thickening and patchy bilateral groundglass opacities suggestive   of pulmonary edema. Superimposed infection cannot be entirely excluded.    < end of copied text >

## 2024-09-19 NOTE — PHARMACOTHERAPY INTERVENTION NOTE - NSPHARMCOMMASP
ASP - Recommend ID Consult
ASP - Lab/ test recommended
ASP - Therapy recommended/ Alternative therapy

## 2024-09-19 NOTE — PROGRESS NOTE ADULT - SUBJECTIVE AND OBJECTIVE BOX
Patient is a 89y old  Male who presents with a chief complaint of sob/carter (19 Sep 2024 12:50)      INTERVAL HPI/OVERNIGHT EVENTS: Blood cx positive with bacteremia with MRSA. Continue IV abx per ID recommendations for bacteremia and PNA. Continue Remdesivir and Decadron. Patient is on AVAPS, comfortable and sleeping without any agitation. Patient was retaining CO2 slightly and high flow switched to AVAPS. F/u pulmonologist     Pain Location & Control:     MEDICATIONS  (STANDING):  acetylcysteine 20%  Inhalation 4 milliLiter(s) Inhalation every 12 hours  albuterol/ipratropium for Nebulization 3 milliLiter(s) Nebulizer every 6 hours  aspirin  chewable 81 milliGRAM(s) Oral daily  chlorhexidine 2% Cloths 1 Application(s) Topical daily  DAPTOmycin IVPB      dexAMETHasone  Injectable 6 milliGRAM(s) IV Push daily  furosemide   Injectable 20 milliGRAM(s) IV Push two times a day  heparin  Infusion.  Unit(s)/Hr (11 mL/Hr) IV Continuous <Continuous>  lactated ringers. 1000 milliLiter(s) (60 mL/Hr) IV Continuous <Continuous>  metoprolol succinate  milliGRAM(s) Oral daily  pantoprazole  Injectable 40 milliGRAM(s) IV Push daily  remdesivir  IVPB 100 milliGRAM(s) IV Intermittent every 24 hours  remdesivir  IVPB   IV Intermittent   sodium chloride 3%  Inhalation 4 milliLiter(s) Inhalation every 12 hours  sodium chloride 3%  Inhalation 4 milliLiter(s) Inhalation every 12 hours    MEDICATIONS  (PRN):  acetaminophen     Tablet .. 650 milliGRAM(s) Oral every 6 hours PRN Temp greater or equal to 38C (100.4F), Mild Pain (1 - 3)  aluminum hydroxide/magnesium hydroxide/simethicone Suspension 30 milliLiter(s) Oral every 4 hours PRN Dyspepsia  benzonatate 100 milliGRAM(s) Oral every 8 hours PRN Cough  divalproex  milliGRAM(s) Oral three times a day PRN agitation  guaifenesin/dextromethorphan Oral Liquid 10 milliLiter(s) Oral every 4 hours PRN Cough  heparin   Injectable 4500 Unit(s) IV Push every 6 hours PRN For aPTT less than 40  heparin   Injectable 2000 Unit(s) IV Push every 6 hours PRN For aPTT between 40 - 57  melatonin 3 milliGRAM(s) Oral at bedtime PRN Insomnia  metoprolol tartrate Injectable 2.5 milliGRAM(s) IV Push every 6 hours PRN sustained afib rvr >120  ondansetron Injectable 4 milliGRAM(s) IV Push every 8 hours PRN Nausea and/or Vomiting      Allergies    No Known Allergies    Intolerances        REVIEW OF SYSTEMS:  UTO demented and lethargic     Vital Signs Last 24 Hrs  T(C): 36.6 (19 Sep 2024 20:36), Max: 37.3 (19 Sep 2024 12:23)  T(F): 97.8 (19 Sep 2024 20:36), Max: 99.2 (19 Sep 2024 12:23)  HR: 121 (19 Sep 2024 20:36) (121 - 132)  BP: 130/69 (19 Sep 2024 20:36) (107/60 - 144/75)  BP(mean): --  RR: 19 (19 Sep 2024 20:36) (16 - 19)  SpO2: 100% (19 Sep 2024 20:36) (91% - 100%)    Parameters below as of 19 Sep 2024 20:36  Patient On (Oxygen Delivery Method): BiPAP/CPAP        PHYSICAL EXAM:  GENERAL: NAD, well-groomed, well-developed  HEAD:  Atraumatic, Normocephalic  EYES: EOMI, PERRLA, conjunctiva and sclera clear  ENMT: No tonsillar erythema, exudates, or enlargement; Moist mucous membranes, Good dentition, No lesions  NECK: Supple, No JVD, Normal thyroid  NERVOUS SYSTEM:  Alert & Oriented X 0 lethargic, arousable.   CHEST/LUNG: Clear to auscultation bilaterally; No rales, rhonchi, wheezing, or rubs  HEART: Regular rate and rhythm; No murmurs, rubs, or gallops  ABDOMEN: Soft, Nontender, Nondistended; Bowel sounds present  EXTREMITIES:  2+ Peripheral Pulses, No clubbing or cyanosis  LYMPH: No lymphadenopathy noted  SKIN: No rashes or lesions      LABS:                        8.3    18.23 )-----------( 256      ( 19 Sep 2024 09:58 )             28.1     19 Sep 2024 09:58    149    |  111    |  72     ----------------------------<  145    4.9     |  23     |  2.58     Ca    8.5        19 Sep 2024 09:58  Phos  6.1       19 Sep 2024 09:58  Mg     2.3       19 Sep 2024 09:58    TPro  6.8    /  Alb  2.8    /  TBili  0.5    /  DBili  0.4    /  AST  409    /  ALT  344    /  AlkPhos  115    19 Sep 2024 09:58    PT/INR - ( 19 Sep 2024 09:58 )   PT: 22.5 sec;   INR: 2.09 ratio         PTT - ( 19 Sep 2024 09:58 )  PTT:72.9 sec  Urinalysis Basic - ( 19 Sep 2024 09:58 )    Color: x / Appearance: x / SG: x / pH: x  Gluc: 145 mg/dL / Ketone: x  / Bili: x / Urobili: x   Blood: x / Protein: x / Nitrite: x   Leuk Esterase: x / RBC: x / WBC x   Sq Epi: x / Non Sq Epi: x / Bacteria: x      CAPILLARY BLOOD GLUCOSE      POCT Blood Glucose.: 119 mg/dL (19 Sep 2024 20:11)        Cultures  Culture Results:   <10,000 CFU/mL Normal Urogenital Maria Guadalupe (09-17-24 @ 17:46)  Culture Results:   No growth at 24 hours (09-17-24 @ 15:30)  Culture Results:   Growth in aerobic and anaerobic bottles: Staphylococcus aureus  Direct identification is available within approximately 3-5  hours either by Blood Panel Multiplexed PCR or Direct  MALDI-TOF. Details: [email or web address] (09-17-24 @ 15:20)    Lactate, Blood: 1.4 mmol/L (09-19-24 @ 09:58)    RADIOLOGY & ADDITIONAL TESTS:    Imaging Personally Reviewed:  [X ] YES  [ ] NO    Consultant(s) Notes Reviewed:  [ X] YES  [ ] NO    Care Discussed with Consultants/Other Providers [ x] YES  [ ] NO

## 2024-09-20 NOTE — PROGRESS NOTE ADULT - SUBJECTIVE AND OBJECTIVE BOX
Patient is a 89y old  Male who presents with a chief complaint of sob/carter (20 Sep 2024 16:43)      INTERVAL HPI/OVERNIGHT EVENTS: Still on AVAPS stable and comfortable. Without any agitation, sleeping well. He has renal failure, kidney function worse K up to 5.4 on Lasix IV BID. F/u cardiologist to adjust dose of Lasix. Cre mildly better than yesterday 3.5. Consult nephrology for hypernatremia, renal failure and hyperkalemia. Continue IV abx, IV Remdesivir, Decadron and IV Daptomycin to cover bacteremia with MRSA.     Pain Location & Control:     MEDICATIONS  (STANDING):  acetylcysteine 20%  Inhalation 4 milliLiter(s) Inhalation every 12 hours  albuterol/ipratropium for Nebulization 3 milliLiter(s) Nebulizer every 6 hours  aspirin  chewable 81 milliGRAM(s) Oral daily  chlorhexidine 2% Cloths 1 Application(s) Topical daily  DAPTOmycin IVPB      dexAMETHasone  Injectable 6 milliGRAM(s) IV Push daily  dextrose 5%. 1000 milliLiter(s) (50 mL/Hr) IV Continuous <Continuous>  epoetin tania-epbx (RETACRIT) Injectable 54556 Unit(s) SubCutaneous <User Schedule>  heparin  Infusion.  Unit(s)/Hr (11 mL/Hr) IV Continuous <Continuous>  metoprolol succinate  milliGRAM(s) Oral daily  pantoprazole  Injectable 40 milliGRAM(s) IV Push daily  sodium chloride 3%  Inhalation 4 milliLiter(s) Inhalation every 12 hours  sodium chloride 3%  Inhalation 4 milliLiter(s) Inhalation every 12 hours    MEDICATIONS  (PRN):  acetaminophen     Tablet .. 650 milliGRAM(s) Oral every 6 hours PRN Temp greater or equal to 38C (100.4F), Mild Pain (1 - 3)  aluminum hydroxide/magnesium hydroxide/simethicone Suspension 30 milliLiter(s) Oral every 4 hours PRN Dyspepsia  benzonatate 100 milliGRAM(s) Oral every 8 hours PRN Cough  divalproex  milliGRAM(s) Oral three times a day PRN agitation  guaifenesin/dextromethorphan Oral Liquid 10 milliLiter(s) Oral every 4 hours PRN Cough  heparin   Injectable 4500 Unit(s) IV Push every 6 hours PRN For aPTT less than 40  heparin   Injectable 2000 Unit(s) IV Push every 6 hours PRN For aPTT between 40 - 57  metoprolol tartrate Injectable 2.5 milliGRAM(s) IV Push every 6 hours PRN sustained afib rvr >120  ondansetron Injectable 4 milliGRAM(s) IV Push every 8 hours PRN Nausea and/or Vomiting      Allergies    No Known Allergies    Intolerances        REVIEW OF SYSTEMS:  UTO lethargic     Vital Signs Last 24 Hrs  T(C): 36.8 (20 Sep 2024 16:35), Max: 36.8 (20 Sep 2024 04:40)  T(F): 98.3 (20 Sep 2024 16:35), Max: 98.3 (20 Sep 2024 08:22)  HR: 111 (20 Sep 2024 16:35) (111 - 136)  BP: 110/69 (20 Sep 2024 16:35) (110/69 - 130/69)  BP(mean): --  RR: 20 (20 Sep 2024 16:35) (18 - 23)  SpO2: 100% (20 Sep 2024 16:35) (97% - 100%)    Parameters below as of 20 Sep 2024 16:35  Patient On (Oxygen Delivery Method): BiPAP/CPAP        PHYSICAL EXAM:  GENERAL: NAD, well-groomed, well-developed  HEAD:  Atraumatic, Normocephalic  EYES: EOMI, PERRLA, conjunctiva and sclera clear  ENMT: No tonsillar erythema, exudates, or enlargement; Moist mucous membranes, Good dentition, No lesions  NECK: Supple, No JVD, Normal thyroid  NERVOUS SYSTEM:  Alert & Oriented X 0 lethargic, arousable   CHEST/LUNG: Clear to auscultation bilaterally; No rales, rhonchi, wheezing, or rubs  HEART: Regular rate and rhythm; No murmurs, rubs, or gallops  ABDOMEN: Soft, Nontender, Nondistended; Bowel sounds present  EXTREMITIES:  2+ Peripheral Pulses, No clubbing or cyanosis  LYMPH: No lymphadenopathy noted  SKIN: No rashes or lesions      LABS:                        7.6    21.16 )-----------( 190      ( 20 Sep 2024 10:03 )             25.7     20 Sep 2024 10:04    150    |  110    |  97     ----------------------------<  133    5.4     |  22     |  3.86     Ca    8.1        20 Sep 2024 10:04    TPro  6.8    /  Alb  2.6    /  TBili  0.5    /  DBili  0.3    /  AST  316    /  ALT  380    /  AlkPhos  117    20 Sep 2024 10:04    PT/INR - ( 20 Sep 2024 08:45 )   PT: 22.8 sec;   INR: 2.22 ratio         PTT - ( 20 Sep 2024 08:45 )  PTT:61.5 sec  Urinalysis Basic - ( 20 Sep 2024 10:04 )    Color: x / Appearance: x / SG: x / pH: x  Gluc: 133 mg/dL / Ketone: x  / Bili: x / Urobili: x   Blood: x / Protein: x / Nitrite: x   Leuk Esterase: x / RBC: x / WBC x   Sq Epi: x / Non Sq Epi: x / Bacteria: x      CAPILLARY BLOOD GLUCOSE      POCT Blood Glucose.: 150 mg/dL (20 Sep 2024 17:40)  POCT Blood Glucose.: 156 mg/dL (20 Sep 2024 12:05)  POCT Blood Glucose.: 140 mg/dL (20 Sep 2024 04:30)  POCT Blood Glucose.: 119 mg/dL (19 Sep 2024 20:11)        Cultures  Culture Results:   No growth at 24 hours (09-19-24 @ 08:58)  Culture Results:   No growth at 24 hours (09-19-24 @ 08:47)  Culture Results:   <10,000 CFU/mL Normal Urogenital Maria Guadalupe (09-17-24 @ 17:46)  Culture Results:   No growth at 48 Hours (09-17-24 @ 15:30)  Culture Results:   Growth in aerobic and anaerobic bottles: Methicillin Resistant  Staphylococcus aureus  Direct identification is available within approximately 3-5  hours either by Blood Panel Multiplexed PCR or Direct  MALDI-TOF. Details: [email or web address] (09-17-24 @ 15:20)      RADIOLOGY & ADDITIONAL TESTS:    Imaging Personally Reviewed:  [X ] YES  [ ] NO    Consultant(s) Notes Reviewed:  [ X] YES  [ ] NO    Care Discussed with Consultants/Other Providers [ X] YES  [ ] NO

## 2024-09-20 NOTE — PROGRESS NOTE ADULT - SUBJECTIVE AND OBJECTIVE BOX
infectious diseases progress note:    Patient is a 89y old  Male who presents with a chief complaint of sob/carter (19 Sep 2024 21:54)        Infection due to severe acute respiratory syndrome coronavirus 2 (SARS-CoV-2)             Allergies    No Known Allergies    Intolerances        ANTIBIOTICS/RELEVANT:  antimicrobials  DAPTOmycin IVPB      remdesivir  IVPB 100 milliGRAM(s) IV Intermittent every 24 hours  remdesivir  IVPB   IV Intermittent     immunologic:    OTHER:  acetaminophen     Tablet .. 650 milliGRAM(s) Oral every 6 hours PRN  acetylcysteine 20%  Inhalation 4 milliLiter(s) Inhalation every 12 hours  albuterol/ipratropium for Nebulization 3 milliLiter(s) Nebulizer every 6 hours  aluminum hydroxide/magnesium hydroxide/simethicone Suspension 30 milliLiter(s) Oral every 4 hours PRN  aspirin  chewable 81 milliGRAM(s) Oral daily  benzonatate 100 milliGRAM(s) Oral every 8 hours PRN  chlorhexidine 2% Cloths 1 Application(s) Topical daily  dexAMETHasone  Injectable 6 milliGRAM(s) IV Push daily  divalproex  milliGRAM(s) Oral three times a day PRN  furosemide   Injectable 20 milliGRAM(s) IV Push two times a day  guaifenesin/dextromethorphan Oral Liquid 10 milliLiter(s) Oral every 4 hours PRN  heparin   Injectable 4500 Unit(s) IV Push every 6 hours PRN  heparin   Injectable 2000 Unit(s) IV Push every 6 hours PRN  heparin  Infusion.  Unit(s)/Hr IV Continuous <Continuous>  lactated ringers. 1000 milliLiter(s) IV Continuous <Continuous>  melatonin 3 milliGRAM(s) Oral at bedtime PRN  metoprolol succinate  milliGRAM(s) Oral daily  metoprolol tartrate Injectable 2.5 milliGRAM(s) IV Push every 6 hours PRN  ondansetron Injectable 4 milliGRAM(s) IV Push every 8 hours PRN  pantoprazole  Injectable 40 milliGRAM(s) IV Push daily  sodium chloride 3%  Inhalation 4 milliLiter(s) Inhalation every 12 hours  sodium chloride 3%  Inhalation 4 milliLiter(s) Inhalation every 12 hours      Objective:  Vital Signs Last 24 Hrs  T(C): 36.8 (20 Sep 2024 04:40), Max: 37.3 (19 Sep 2024 12:23)  T(F): 98.2 (20 Sep 2024 04:40), Max: 99.2 (19 Sep 2024 12:23)  HR: 133 (20 Sep 2024 05:53) (121 - 136)  BP: 120/72 (20 Sep 2024 04:40) (107/60 - 144/75)  BP(mean): --  RR: 19 (20 Sep 2024 04:40) (19 - 19)  SpO2: 100% (20 Sep 2024 05:53) (91% - 100%)    Parameters below as of 20 Sep 2024 04:40  Patient On (Oxygen Delivery Method): BiPAP/CPAP           Eyes:LAURO, EOMI  Ear/Nose/Throat: no oral lesion, no sinus tenderness on percussion	  Neck:no JVD, no lymphadenopathy, supple  Respiratory: CTA jacobo  Cardiovascular: S1S2 RRR, no murmurs  Gastrointestinal:soft, (+) BS, no HSM  Extremities:no e/e/c        LABS:                        8.3    18.23 )-----------( 256      ( 19 Sep 2024 09:58 )             28.1     09-19    149[H]  |  111[H]  |  72[H]  ----------------------------<  145[H]  4.9   |  23  |  2.58[H]    Ca    8.5      19 Sep 2024 09:58  Phos  6.1     09-19  Mg     2.3     09-19    TPro  6.8  /  Alb  2.8[L]  /  TBili  0.5  /  DBili  0.4[H]  /  AST  409[H]  /  ALT  344[H]  /  AlkPhos  115  09-19    PT/INR - ( 19 Sep 2024 09:58 )   PT: 22.5 sec;   INR: 2.09 ratio         PTT - ( 19 Sep 2024 09:58 )  PTT:72.9 sec  Urinalysis Basic - ( 19 Sep 2024 09:58 )    Color: x / Appearance: x / SG: x / pH: x  Gluc: 145 mg/dL / Ketone: x  / Bili: x / Urobili: x   Blood: x / Protein: x / Nitrite: x   Leuk Esterase: x / RBC: x / WBC x   Sq Epi: x / Non Sq Epi: x / Bacteria: x          MICROBIOLOGY:    RECENT CULTURES:  09-17 @ 17:46 Clean Catch Clean Catch (Midstream)                <10,000 CFU/mL Normal Urogenital Maria Guadalupe    09-17 @ 15:30 .Blood Blood-Peripheral                No growth at 48 Hours    09-17 @ 15:20 .Blood Blood-Peripheral   PCR    Growth in aerobic bottle: Gram Positive Cocci in Clusters  Growth in anaerobic bottle: Gram Positive Cocci in Clusters    Blood Culture PCR  Blood Culture PCR     Growth in aerobic and anaerobic bottles: Staphylococcus aureus  Direct identification is available within approximately 3-5  hours either by Blood Panel Multiplexed PCR or Direct  MALDI-TOF. Details: [email or web address]          RESPIRATORY CULTURES:              RADIOLOGY & ADDITIONAL STUDIES:        Pager [phone]  After 5 pm/weekends or if no response :[phone]

## 2024-09-20 NOTE — DIETITIAN INITIAL EVALUATION ADULT - PERTINENT LABORATORY DATA
09-20    150[H]  |  110[H]  |  97[H]  ----------------------------<  133[H]  5.4[H]   |  22  |  3.86[H]    Ca    8.1[L]      20 Sep 2024 10:04  Phos  6.1     09-19  Mg     2.3     09-19    TPro  6.8  /  Alb  2.6[L]  /  TBili  0.5  /  DBili  0.3  /  AST  316[H]  /  ALT  380[H]  /  AlkPhos  117  09-20  POCT Blood Glucose.: 156 mg/dL (09-20-24 @ 12:05)  A1C with Estimated Average Glucose Result: 6.4 % (09-18-24 @ 07:12)  A1C with Estimated Average Glucose Result: 5.9 % (06-19-24 @ 10:05)

## 2024-09-20 NOTE — CONSULT NOTE ADULT - NSCONSULTADDITIONALINFOA_GEN_ALL_CORE
MEDICATIONS  (STANDING):  acetylcysteine 20%  Inhalation 4 milliLiter(s) Inhalation every 12 hours  albuterol/ipratropium for Nebulization 3 milliLiter(s) Nebulizer every 6 hours  aspirin  chewable 81 milliGRAM(s) Oral daily  chlorhexidine 2% Cloths 1 Application(s) Topical daily  DAPTOmycin IVPB      dexAMETHasone  Injectable 6 milliGRAM(s) IV Push daily  dextrose 5%. 1000 milliLiter(s) (50 mL/Hr) IV Continuous <Continuous>  heparin  Infusion.  Unit(s)/Hr (11 mL/Hr) IV Continuous <Continuous>  metoprolol succinate  milliGRAM(s) Oral daily  pantoprazole  Injectable 40 milliGRAM(s) IV Push daily  sodium chloride 3%  Inhalation 4 milliLiter(s) Inhalation every 12 hours  sodium chloride 3%  Inhalation 4 milliLiter(s) Inhalation every 12 hours

## 2024-09-20 NOTE — DIETITIAN INITIAL EVALUATION ADULT - ORAL INTAKE PTA/DIET HISTORY
- Noted pt poor historian; Subjective dietary hx primarily obtained from previous RD notes; known to service with prior severe protein calorie malnutrition dx (6/19/24).   - On previous admissions, noted hx of pt receiving PO diet and parenteral nutrition (June - July 2024). On previous admissions, pt with poor PO intake while on oral diet; was receiving Ensure Plus High Protein (Provides 20g PRO, 350 Den per serving). No micronutrient supplementation noted.   - Noted hx of dysphagia; MBS (6/26/24) with recommendations for minced and moist diet, thin liquids via small sips/bites; s/p bedside swallow evaluation (8/18) wit recommendations for pureed, thin liquids. Noted pt with incomplete dentition and with decreased tolerance of chewables.   - No food allergies/intolerances noted.

## 2024-09-20 NOTE — DIETITIAN INITIAL EVALUATION ADULT - OTHER CALCULATIONS
Used dosing wt for caloric/protein needs in consideration of advanced age, BMI, malnutrition, CHF, AHRF, severe sepsis, possible early stages of deep tissue injury.  Defer fluid needs to team secondary to CHF.

## 2024-09-20 NOTE — DIETITIAN INITIAL EVALUATION ADULT - REASON
Unable to obtain pt's consent at this time secondary to nonverbal, AAOx0. RD will reassess as able/appropriate.

## 2024-09-20 NOTE — DIETITIAN INITIAL EVALUATION ADULT - ADD RECOMMEND
[X] Medical team to advance diet when medically feasible. If NPO status > 7 days, consider alternate means of nutrition if within pt/family GOC.          [X] trend electrolytes and replete as able.    [X] Consider adding multivitamin and thiamine once daily given prolonged inadequate PO intake and vitamin C for wound healing, pending no medical contraindications.   [X] Malnutrition sticker placed in chart   [X] RD will continue to monitor PO intake, GI tolerance, weight trends, skin integrity, BMs, labs/electrolytes prn.   RD remains available upon request.

## 2024-09-20 NOTE — DIETITIAN INITIAL EVALUATION ADULT - NSFNSPHYEXAMSKINFT_GEN_A_CORE
Per wound care (9/18), noted pt with b/l heels with blanchable erythemas, may be early stages of deep tissue injury.

## 2024-09-20 NOTE — PROGRESS NOTE ADULT - SUBJECTIVE AND OBJECTIVE BOX
Follow-up Pulm Progress Note    seen on AVAPS  Fio2 decreased from 80% to 60%  ROS unable to be obtained        Medications:  MEDICATIONS  (STANDING):  acetylcysteine 20%  Inhalation 4 milliLiter(s) Inhalation every 12 hours  albuterol/ipratropium for Nebulization 3 milliLiter(s) Nebulizer every 6 hours  aspirin  chewable 81 milliGRAM(s) Oral daily  chlorhexidine 2% Cloths 1 Application(s) Topical daily  DAPTOmycin IVPB      dexAMETHasone  Injectable 6 milliGRAM(s) IV Push daily  furosemide   Injectable 20 milliGRAM(s) IV Push two times a day  heparin  Infusion.  Unit(s)/Hr (11 mL/Hr) IV Continuous <Continuous>  lactated ringers. 1000 milliLiter(s) (60 mL/Hr) IV Continuous <Continuous>  metoprolol succinate  milliGRAM(s) Oral daily  pantoprazole  Injectable 40 milliGRAM(s) IV Push daily  remdesivir  IVPB   IV Intermittent   remdesivir  IVPB 100 milliGRAM(s) IV Intermittent every 24 hours  sodium chloride 3%  Inhalation 4 milliLiter(s) Inhalation every 12 hours  sodium chloride 3%  Inhalation 4 milliLiter(s) Inhalation every 12 hours    MEDICATIONS  (PRN):  acetaminophen     Tablet .. 650 milliGRAM(s) Oral every 6 hours PRN Temp greater or equal to 38C (100.4F), Mild Pain (1 - 3)  aluminum hydroxide/magnesium hydroxide/simethicone Suspension 30 milliLiter(s) Oral every 4 hours PRN Dyspepsia  benzonatate 100 milliGRAM(s) Oral every 8 hours PRN Cough  divalproex  milliGRAM(s) Oral three times a day PRN agitation  guaifenesin/dextromethorphan Oral Liquid 10 milliLiter(s) Oral every 4 hours PRN Cough  heparin   Injectable 4500 Unit(s) IV Push every 6 hours PRN For aPTT less than 40  heparin   Injectable 2000 Unit(s) IV Push every 6 hours PRN For aPTT between 40 - 57  melatonin 3 milliGRAM(s) Oral at bedtime PRN Insomnia  metoprolol tartrate Injectable 2.5 milliGRAM(s) IV Push every 6 hours PRN sustained afib rvr >120  ondansetron Injectable 4 milliGRAM(s) IV Push every 8 hours PRN Nausea and/or Vomiting          Vital Signs Last 24 Hrs  T(C): 36.8 (20 Sep 2024 08:22), Max: 37.3 (19 Sep 2024 12:23)  T(F): 98.3 (20 Sep 2024 08:22), Max: 99.2 (19 Sep 2024 12:23)  HR: 123 (20 Sep 2024 09:21) (121 - 136)  BP: 114/71 (20 Sep 2024 08:22) (114/71 - 144/75)  BP(mean): --  RR: 18 (20 Sep 2024 08:22) (18 - 19)  SpO2: 100% (20 Sep 2024 09:21) (96% - 100%)    Parameters below as of 20 Sep 2024 08:22  Patient On (Oxygen Delivery Method): BiPAP/CPAP        ABG - ( 20 Sep 2024 11:20 )  pH, Arterial: 7.27  pH, Blood: x     /  pCO2: 52    /  pO2: 108   / HCO3: 24    / Base Excess: -3.6  /  SaO2: 98.6                      LABS:                        7.6    21.16 )-----------( 190      ( 20 Sep 2024 10:03 )             25.7     09-20    150[H]  |  110[H]  |  97[H]  ----------------------------<  133[H]  5.4[H]   |  22  |  3.86[H]    Ca    8.1[L]      20 Sep 2024 10:04  Phos  6.1     09-19  Mg     2.3     09-19    TPro  6.8  /  Alb  2.6[L]  /  TBili  0.5  /  DBili  0.3  /  AST  316[H]  /  ALT  380[H]  /  AlkPhos  117  09-20          CAPILLARY BLOOD GLUCOSE      POCT Blood Glucose.: 140 mg/dL (20 Sep 2024 04:30)    PT/INR - ( 20 Sep 2024 08:45 )   PT: 22.8 sec;   INR: 2.22 ratio         PTT - ( 20 Sep 2024 08:45 )  PTT:61.5 sec  Urinalysis Basic - ( 20 Sep 2024 10:04 )    Color: x / Appearance: x / SG: x / pH: x  Gluc: 133 mg/dL / Ketone: x  / Bili: x / Urobili: x   Blood: x / Protein: x / Nitrite: x   Leuk Esterase: x / RBC: x / WBC x   Sq Epi: x / Non Sq Epi: x / Bacteria: x                      CULTURES:     Culture - Blood (collected 09-17-24 @ 15:30)  Source: .Blood Blood-Peripheral  Preliminary Report (09-19-24 @ 22:01):    No growth at 48 Hours    Culture - Blood (collected 09-17-24 @ 15:20)  Source: .Blood Blood-Peripheral  Gram Stain (09-18-24 @ 18:29):    Growth in aerobic bottle: Gram Positive Cocci in Clusters    Growth in anaerobic bottle: Gram Positive Cocci in Clusters  Final Report (09-20-24 @ 10:32):    Growth in aerobic and anaerobic bottles: Methicillin Resistant    Staphylococcus aureus    Direct identification is available within approximately 3-5    hours either by Blood Panel Multiplexed PCR or Direct    MALDI-TOF. Details: [email or web address]  Organism: Blood Culture PCR  Methicillin resistant Staphylococcus aureus (09-20-24 @ 10:32)  Organism: Methicillin resistant Staphylococcus aureus (09-20-24 @ 10:32)      Method Type: BEATRIZ      -  Clindamycin: S <=0.25      -  Daptomycin: S 1      -  Erythromycin: R >4      -  Gentamicin: S <=1 Should not be used as monotherapy      -  Linezolid: S 2      -  Oxacillin: R >2      -  Penicillin: R >8      -  Rifampin: S <=1 Should not be used as monotherapy      -  Tetracycline: S <=1      -  Trimethoprim/Sulfamethoxazole: S <=0.5/9.5      -  Vancomycin: S 1  Organism: Blood Culture PCR (09-20-24 @ 10:32)      Method Type: PCR      -  Methicillin resistant Staphylococcus aureus (MRSA): Detec        Culture - Urine (collected 09-17-24 @ 17:46)  Source: Clean Catch Clean Catch (Midstream)  Final Report (09-19-24 @ 06:32):    <10,000 CFU/mL Normal Urogenital Maria Guadalupe                Physical Examination:  PULM: decreased BS  CVS: S1, S2 heard    RADIOLOGY REVIEWED    CT chest:    < from: CT Angio Chest PE Protocol w/ IV Cont (09.17.24 @ 20:46) >  FINDINGS:    AIRWAYS/LUNGS/PLEURA: Nonocclusive layering secretions in the distal   trachea. Otherwise patent central airways. Multifocal bilateral   groundglass opacities. Interlobular septal thickening. Moderate right   greater than left pleural effusions with adjacent compressive   atelectasis, similar to prior.    MEDIASTINUM AND ZANE: No lymphadenopathy. Mildly prominent nonspecific   paratracheal lymph nodesmeasuring up to 9 mm.    PULMONARY ANGIOGRAM: No pulmonary embolism with limited evaluation of the   subsegmental branches in the lower lobes.    VESSELS: Aortic calcifications. Coronary artery calcifications.    HEART: Stable heart size. No pericardial effusion. Mitral annular   calcification.    VISUALIZED UPPER ABDOMEN: Moderate hiatal hernia with a large portion of   the stomach within the thorax.    CHEST WALL AND BONES: Scoliosis and degenerative changes of the spine.    IMPRESSION:    No pulmonary embolism in the central, lobar, or segmental pulmonary   arteries.    Moderate right greater than left pleural effusions with interlobular   septal thickening and patchy bilateral groundglass opacities suggestive   of pulmonary edema. Superimposed infection cannot be entirely excluded.    < end of copied text >

## 2024-09-20 NOTE — DIETITIAN INITIAL EVALUATION ADULT - OTHER INFO
- S/p RRT x 2 (9/18) secondary to hypoxia.   - Severe sepsis likely secondary to COVID+, +/- superimposed PNA, and/or shingles.    - AHRF secondary to COVID+, PNA, CHF. On continuos AVAPs. Ordered for decadron, Abx.   - Acute decompensated HF; ordered for Lasix.  - Noted trending hypernatremia (9/20), hyperkalemia (9/20), hyperphosphatemia (9/19). IVF: LR @ 60 mL/hr.     Wt Hx:  - Per chart, dosing wt of 59 kg (9/17).   - Wt hx in kg (Lilia TRUJILLO, previous RD notes) as follows: 65.9 (6/11), 76.8 (6/27/22), 72.7 (6/27/22). Overall suspected wt loss of 10.4% x 3 mos (clinically significant, based on wts from 6/11 and 9/17) likely in setting of inadequate PO intake, recurrent hospitalizations. RD will continue to monitor weight trends as available/able.   - IBW: 67.3 kg (based on ht of 67 in)

## 2024-09-20 NOTE — DIETITIAN INITIAL EVALUATION ADULT - REASON INDICATOR FOR ASSESSMENT
RD Consult Indicated for: MST Score 2 or >  Source: Team, Electronic Medical Record; unable to interview pt at this time secondary to AAOx0, nonverbal.   Chart reviewed, events noted.

## 2024-09-20 NOTE — DIETITIAN INITIAL EVALUATION ADULT - NSFNSGIIOFT_GEN_A_CORE
I&O's Detail    20 Sep 2024 07:01  -  20 Sep 2024 15:24  --------------------------------------------------------  IN:  Total IN: 0 mL    OUT:    Oral Fluid: 0 mL  Total OUT: 0 mL    Total NET: 0 mL

## 2024-09-20 NOTE — DIETITIAN INITIAL EVALUATION ADULT - PROBLEM SELECTOR PLAN 5
likely being driven by sepsis, hypoxemia, adhf  ekg shows afib rvr @ 126/min  monitor on telemetry  npo so switch po eliquis to ufh gtt  rate control w toprol   lopressor 2.5-5mg ivp for sustained rates of >120/min

## 2024-09-20 NOTE — PROVIDER CONTACT NOTE (OTHER) - SITUATION
Couldn't draw morning labs on pt, multiple attempts were made from PCA and RN, respiratory therapist tried multiple times as well. Pt is a hard stick. Couldn't draw morning labs on pt, multiple attempts were made from PCA and RN, respiratory therapist tried multiple times as well. Pt currently on heparin gtt, running at 12 mL/hr. Pt is a hard stick.

## 2024-09-20 NOTE — DIETITIAN INITIAL EVALUATION ADULT - SIGNS/SYMPTOMS
AHRF r/t CHF exacerbation; possible early stages of deep tissue injury per wound care <75% EER x >/= 1 mos, wt loss of >7.5% x 3 mos

## 2024-09-20 NOTE — DIETITIAN INITIAL EVALUATION ADULT - PERTINENT MEDS FT
MEDICATIONS  (STANDING):  acetylcysteine 20%  Inhalation 4 milliLiter(s) Inhalation every 12 hours  albuterol/ipratropium for Nebulization 3 milliLiter(s) Nebulizer every 6 hours  aspirin  chewable 81 milliGRAM(s) Oral daily  chlorhexidine 2% Cloths 1 Application(s) Topical daily  DAPTOmycin IVPB      dexAMETHasone  Injectable 6 milliGRAM(s) IV Push daily  furosemide   Injectable 20 milliGRAM(s) IV Push two times a day  heparin  Infusion.  Unit(s)/Hr (11 mL/Hr) IV Continuous <Continuous>  lactated ringers. 1000 milliLiter(s) (60 mL/Hr) IV Continuous <Continuous>  metoprolol succinate  milliGRAM(s) Oral daily  pantoprazole  Injectable 40 milliGRAM(s) IV Push daily  sodium chloride 3%  Inhalation 4 milliLiter(s) Inhalation every 12 hours  sodium chloride 3%  Inhalation 4 milliLiter(s) Inhalation every 12 hours    MEDICATIONS  (PRN):  acetaminophen     Tablet .. 650 milliGRAM(s) Oral every 6 hours PRN Temp greater or equal to 38C (100.4F), Mild Pain (1 - 3)  aluminum hydroxide/magnesium hydroxide/simethicone Suspension 30 milliLiter(s) Oral every 4 hours PRN Dyspepsia  benzonatate 100 milliGRAM(s) Oral every 8 hours PRN Cough  divalproex  milliGRAM(s) Oral three times a day PRN agitation  guaifenesin/dextromethorphan Oral Liquid 10 milliLiter(s) Oral every 4 hours PRN Cough  heparin   Injectable 4500 Unit(s) IV Push every 6 hours PRN For aPTT less than 40  heparin   Injectable 2000 Unit(s) IV Push every 6 hours PRN For aPTT between 40 - 57  melatonin 3 milliGRAM(s) Oral at bedtime PRN Insomnia  metoprolol tartrate Injectable 2.5 milliGRAM(s) IV Push every 6 hours PRN sustained afib rvr >120  ondansetron Injectable 4 milliGRAM(s) IV Push every 8 hours PRN Nausea and/or Vomiting

## 2024-09-20 NOTE — DIETITIAN INITIAL EVALUATION ADULT - ORAL NUTRITION SUPPLEMENTS
CHIEF COMPLAINT: Acne follow-up     TELEPHONE VISIT DUE TO URI SYMPTOMS    HISTORY OF PRESENT ILLNESS: Norma Bee is a 55 year old female who presents for acne follow-up.  patient has been using azelex cream  And spironolactone 50 mg BID. She still has some mild acne at times but feels the azelex cream helps    She also continues to have white hairs on the face.    ALLERGIES:   Allergen Reactions   • Tylenol GI UPSET         IMPRESSION/PLAN:    1. Acne  Continue azaleic acid 20% cream BID as this is helping.    2. Hirsutism  Increase spironolactone to 100 mg in AM and 50 mg in PM  Labs due in 2 weeks (BMP), then will refill med  Recent CMP in 6/21 showed normal K, normal creatinine and mildly decreased GFR at 81 (stable, not worsening)    Follow-up 3 mo.    On 11/2/2021, IRenée LPN scribed the services personally performed by Lisa K Muchard, MD  The documentation recorded by the scribe accurately and completely reflects the service(s) I personally performed and the decisions made by me.       
If able to advance to PO diet, add Nepro Protein Shake (per serving provides 19 g PRO, 420 kcal) 2x/d to optimize PO intake; trend electrolytes and monitor for indications to adjust oral nutrition supplementation.

## 2024-09-20 NOTE — DIETITIAN INITIAL EVALUATION ADULT - NSFNSGIASSESSMENTFT_GEN_A_CORE
No N/V nor diarrhea/constipation noted. Last BM not documented.   Ordered for odansetron, Aluminum Hydroxide; Magnesium Hydroxide; Simethicone Suspension, PPI.

## 2024-09-20 NOTE — CONSULT NOTE ADULT - SUBJECTIVE AND OBJECTIVE BOX
Patient is a 89y Male whom presented to the hospital with gabriele     PAST MEDICAL & SURGICAL HISTORY:  Obstructive sleep apnea on CPAP      Carpal tunnel syndrome      H/O prostate cancer      Hiatal hernia      JULIUS (iron deficiency anemia)      H/O knee surgery          MEDICATIONS  (STANDING):  acetylcysteine 20%  Inhalation 4 milliLiter(s) Inhalation every 12 hours  albuterol/ipratropium for Nebulization 3 milliLiter(s) Nebulizer every 6 hours  aspirin  chewable 81 milliGRAM(s) Oral daily  chlorhexidine 2% Cloths 1 Application(s) Topical daily  DAPTOmycin IVPB      dexAMETHasone  Injectable 6 milliGRAM(s) IV Push daily  dextrose 5%. 1000 milliLiter(s) (50 mL/Hr) IV Continuous <Continuous>  heparin  Infusion.  Unit(s)/Hr (11 mL/Hr) IV Continuous <Continuous>  metoprolol succinate  milliGRAM(s) Oral daily  pantoprazole  Injectable 40 milliGRAM(s) IV Push daily  sodium chloride 3%  Inhalation 4 milliLiter(s) Inhalation every 12 hours  sodium chloride 3%  Inhalation 4 milliLiter(s) Inhalation every 12 hours      Allergies    No Known Allergies    Intolerances        SOCIAL HISTORY:  Denies ETOh,Smoking,     FAMILY HISTORY:  FH: heart attack (Father)        REVIEW OF SYSTEMS:  unable to obtained a good review system          VITAL:  T(C): , Max: 36.8 (09-20-24 @ 04:40)  T(F): , Max: 98.3 (09-20-24 @ 08:22)  HR: 111 (09-20-24 @ 16:35)  BP: 110/69 (09-20-24 @ 16:35)  BP(mean): --  RR: 20 (09-20-24 @ 16:35)  SpO2: 100% (09-20-24 @ 16:35)  Wt(kg): --    I and O's:    09-20 @ 07:01  -  09-20 @ 16:44  --------------------------------------------------------  IN: 0 mL / OUT: 0 mL / NET: 0 mL          PHYSICAL EXAM:    Constitutional: on cpap   HEENT: conjunctive   clear   Neck:  No JVD  Respiratory: decrease bs b/l   Cardiovascular: S1 and S2  Gastrointestinal: BS+, soft, NT/ND  Extremities: No peripheral edema    LABS:                        7.6    21.16 )-----------( 190      ( 20 Sep 2024 10:03 )             25.7     09-20    150[H]  |  110[H]  |  97[H]  ----------------------------<  133[H]  5.4[H]   |  22  |  3.86[H]    Ca    8.1[L]      20 Sep 2024 10:04  Phos  6.1     09-19  Mg     2.3     09-19    TPro  6.8  /  Alb  2.6[L]  /  TBili  0.5  /  DBili  0.3  /  AST  316[H]  /  ALT  380[H]  /  AlkPhos  117  09-20      Urine Studies:  Urinalysis Basic - ( 20 Sep 2024 10:04 )    Color: x / Appearance: x / SG: x / pH: x  Gluc: 133 mg/dL / Ketone: x  / Bili: x / Urobili: x   Blood: x / Protein: x / Nitrite: x   Leuk Esterase: x / RBC: x / WBC x   Sq Epi: x / Non Sq Epi: x / Bacteria: x            RADIOLOGY & ADDITIONAL STUDIES:

## 2024-09-20 NOTE — DIETITIAN INITIAL EVALUATION ADULT - PROBLEM SELECTOR PLAN 2
in respiratory distress with adequate spo2 on 4-5 LPM via NC  follow up abg  Monitor SpO2, RR, for signs of respiratory distress; Goal SpO2 >88-92%, PaO2 >55-60 mmHg  bronchodilators + oxygen supplementation/nippv w hfnc vs avaps as needed  aggressive pulmonary toilet/hygiene   low threshold for micu consult

## 2024-09-21 NOTE — PROVIDER CONTACT NOTE (SEPSIS SCREENING) - SEPSIS CRITERIA TO COINCIDE WITH MEWS
WBC less than 4 or greater than 12/Heart Rate greater than 90
WBC less than 4 or greater than 12/Heart Rate greater than 90

## 2024-09-21 NOTE — PROGRESS NOTE ADULT - SUBJECTIVE AND OBJECTIVE BOX
Patient is a 89y Male whom presented to the hospital with gabriele     PAST MEDICAL & SURGICAL HISTORY:  Obstructive sleep apnea on CPAP      Carpal tunnel syndrome      H/O prostate cancer      Hiatal hernia      JULIUS (iron deficiency anemia)      H/O knee surgery          MEDICATIONS  (STANDING):  acetylcysteine 20%  Inhalation 4 milliLiter(s) Inhalation every 12 hours  albuterol/ipratropium for Nebulization 3 milliLiter(s) Nebulizer every 6 hours  aspirin  chewable 81 milliGRAM(s) Oral daily  chlorhexidine 2% Cloths 1 Application(s) Topical daily  DAPTOmycin IVPB      dexAMETHasone  Injectable 6 milliGRAM(s) IV Push daily  dextrose 5%. 1000 milliLiter(s) (50 mL/Hr) IV Continuous <Continuous>  heparin  Infusion.  Unit(s)/Hr (11 mL/Hr) IV Continuous <Continuous>  metoprolol succinate  milliGRAM(s) Oral daily  pantoprazole  Injectable 40 milliGRAM(s) IV Push daily  sodium chloride 3%  Inhalation 4 milliLiter(s) Inhalation every 12 hours  sodium chloride 3%  Inhalation 4 milliLiter(s) Inhalation every 12 hours      Allergies    No Known Allergies    Intolerances        SOCIAL HISTORY:  Denies ETOh,Smoking,     FAMILY HISTORY:  FH: heart attack (Father)        REVIEW OF SYSTEMS:  unable to obtained a good review system          VITAL:  T(C): , Max: 36.8 (09-20-24 @ 04:40)  T(F): , Max: 98.3 (09-20-24 @ 08:22)  HR: 111 (09-20-24 @ 16:35)  BP: 110/69 (09-20-24 @ 16:35)  BP(mean): --  RR: 20 (09-20-24 @ 16:35)  SpO2: 100% (09-20-24 @ 16:35)  Wt(kg): --    I and O's:    09-20 @ 07:01  -  09-20 @ 16:44  --------------------------------------------------------  IN: 0 mL / OUT: 0 mL / NET: 0 mL          PHYSICAL EXAM:    Constitutional: on cpap   HEENT: conjunctive   clear   Neck:  No JVD  Respiratory: decrease bs b/l   Cardiovascular: S1 and S2  Gastrointestinal: BS+, soft, NT/ND  Extremities: No peripheral edema    LABS:                        7.6    21.16 )-----------( 190      ( 20 Sep 2024 10:03 )             25.7     09-20    150[H]  |  110[H]  |  97[H]  ----------------------------<  133[H]  5.4[H]   |  22  |  3.86[H]    Ca    8.1[L]      20 Sep 2024 10:04  Phos  6.1     09-19  Mg     2.3     09-19    TPro  6.8  /  Alb  2.6[L]  /  TBili  0.5  /  DBili  0.3  /  AST  316[H]  /  ALT  380[H]  /  AlkPhos  117  09-20      Urine Studies:  Urinalysis Basic - ( 20 Sep 2024 10:04 )    Color: x / Appearance: x / SG: x / pH: x  Gluc: 133 mg/dL / Ketone: x  / Bili: x / Urobili: x   Blood: x / Protein: x / Nitrite: x   Leuk Esterase: x / RBC: x / WBC x   Sq Epi: x / Non Sq Epi: x / Bacteria: x            RADIOLOGY & ADDITIONAL STUDIES:                   Patient is a 89y Male whom presented to the hospital with gabriele     PAST MEDICAL & SURGICAL HISTORY:  Obstructive sleep apnea on CPAP      Carpal tunnel syndrome      H/O prostate cancer      Hiatal hernia      JULIUS (iron deficiency anemia)      H/O knee surgery          MEDICATIONS  (STANDING):  acetylcysteine 20%  Inhalation 4 milliLiter(s) Inhalation every 12 hours  albuterol/ipratropium for Nebulization 3 milliLiter(s) Nebulizer every 6 hours  aspirin  chewable 81 milliGRAM(s) Oral daily  chlorhexidine 2% Cloths 1 Application(s) Topical daily  DAPTOmycin IVPB      dexAMETHasone  Injectable 6 milliGRAM(s) IV Push daily  dextrose 5%. 1000 milliLiter(s) (50 mL/Hr) IV Continuous <Continuous>  heparin  Infusion.  Unit(s)/Hr (11 mL/Hr) IV Continuous <Continuous>  metoprolol succinate  milliGRAM(s) Oral daily  pantoprazole  Injectable 40 milliGRAM(s) IV Push daily  sodium chloride 3%  Inhalation 4 milliLiter(s) Inhalation every 12 hours  sodium chloride 3%  Inhalation 4 milliLiter(s) Inhalation every 12 hours      Allergies    No Known Allergies    Intolerances        SOCIAL HISTORY:  Denies ETOh,Smoking,     FAMILY HISTORY:  FH: heart attack (Father)        REVIEW OF SYSTEMS:  unable to obtained a good review system                            8.3    23.07 )-----------( 149      ( 21 Sep 2024 10:44 )             26.7       CBC Full  -  ( 21 Sep 2024 10:44 )  WBC Count : 23.07 K/uL  RBC Count : 2.95 M/uL  Hemoglobin : 8.3 g/dL  Hematocrit : 26.7 %  Platelet Count - Automated : 149 K/uL  Mean Cell Volume : 90.5 fl  Mean Cell Hemoglobin : 28.1 pg  Mean Cell Hemoglobin Concentration : 31.1 gm/dL  Auto Neutrophil # : 22.47 K/uL  Auto Lymphocyte # : 0.00 K/uL  Auto Monocyte # : 0.60 K/uL  Auto Eosinophil # : 0.00 K/uL  Auto Basophil # : 0.00 K/uL  Auto Neutrophil % : 97.4 %  Auto Lymphocyte % : 0.0 %  Auto Monocyte % : 2.6 %  Auto Eosinophil % : 0.0 %  Auto Basophil % : 0.0 %      09-21    148[H]  |  107  |  113[H]  ----------------------------<  232[H]  5.0   |  20[L]  |  5.12[H]    Ca    7.4[L]      21 Sep 2024 11:26    TPro  6.6  /  Alb  2.6[L]  /  TBili  0.5  /  DBili  0.3  /  AST  136[H]  /  ALT  319[H]  /  AlkPhos  116  09-21      CAPILLARY BLOOD GLUCOSE      POCT Blood Glucose.: 216 mg/dL (21 Sep 2024 17:31)  POCT Blood Glucose.: 239 mg/dL (21 Sep 2024 12:15)  POCT Blood Glucose.: 263 mg/dL (21 Sep 2024 05:30)  POCT Blood Glucose.: 193 mg/dL (20 Sep 2024 23:24)      Vital Signs Last 24 Hrs  T(C): 37 (21 Sep 2024 17:35), Max: 37.1 (20 Sep 2024 23:25)  T(F): 98.6 (21 Sep 2024 17:35), Max: 98.8 (20 Sep 2024 23:25)  HR: 96 (21 Sep 2024 17:35) (96 - 133)  BP: 91/55 (21 Sep 2024 17:35) (91/55 - 122/69)  BP(mean): --  RR: 20 (21 Sep 2024 17:35) (20 - 20)  SpO2: 98% (21 Sep 2024 17:35) (94% - 100%)    Parameters below as of 21 Sep 2024 17:35  Patient On (Oxygen Delivery Method): nasal cannula, high flow        Urinalysis Basic - ( 21 Sep 2024 11:26 )    Color: x / Appearance: x / SG: x / pH: x  Gluc: 232 mg/dL / Ketone: x  / Bili: x / Urobili: x   Blood: x / Protein: x / Nitrite: x   Leuk Esterase: x / RBC: x / WBC x   Sq Epi: x / Non Sq Epi: x / Bacteria: x        PT/INR - ( 21 Sep 2024 11:24 )   PT: 23.1 sec;   INR: 2.15 ratio         PTT - ( 21 Sep 2024 18:17 )  PTT:91.1 sec    PHYSICAL EXAM:    Constitutional: on cpap   HEENT: conjunctive   clear   Neck:  No JVD  Respiratory: decrease bs b/l   Cardiovascular: S1 and S2  Gastrointestinal: BS+, soft, NT/ND  Extremities: No peripheral edema    LABS:                        7.6    21.16 )-----------( 190      ( 20 Sep 2024 10:03 )             25.7     09-20    150[H]  |  110[H]  |  97[H]  ----------------------------<  133[H]  5.4[H]   |  22  |  3.86[H]    Ca    8.1[L]      20 Sep 2024 10:04  Phos  6.1     09-19  Mg     2.3     09-19    TPro  6.8  /  Alb  2.6[L]  /  TBili  0.5  /  DBili  0.3  /  AST  316[H]  /  ALT  380[H]  /  AlkPhos  117  09-20      Urine Studies:  Urinalysis Basic - ( 20 Sep 2024 10:04 )    Color: x / Appearance: x / SG: x / pH: x  Gluc: 133 mg/dL / Ketone: x  / Bili: x / Urobili: x   Blood: x / Protein: x / Nitrite: x   Leuk Esterase: x / RBC: x / WBC x   Sq Epi: x / Non Sq Epi: x / Bacteria: x            RADIOLOGY & ADDITIONAL STUDIES:

## 2024-09-21 NOTE — PROVIDER CONTACT NOTE (SEPSIS SCREENING) - BACKGROUND:
89 yo M admitted with COVID found to have sepsis s/p vanco, cefepime in ED,  c/w RDV, decadron. Pt DNR/DNI; trial NIV. MRSA bacteremia-cont dapto per ID. HF s/p HFNC on cont AVAPS. AFIB w/RVR 9/17 on hep gtt and metoprolq6 PRN IV.   Multiple [place] for hypoxemia and afib w/ RVR while being hospitalized.  89 yo M admitted with COVID found to have sepsis s/p vanco, cefepime in ED,  c/w RDV, decadron. Pt DNR/DNI; trial NIV. MRSA bacteremia-cont dapto per ID. HF s/p HFNC on cont AVAPS. AFIB w/RVR 9/17 on hep gtt and metoprolq6 PRN IV.   Multiple [place] for hypoxemia and afib w/ RVR while being hospitalized. MEWS score of 6.

## 2024-09-21 NOTE — PROGRESS NOTE ADULT - SUBJECTIVE AND OBJECTIVE BOX
Date of Service: 09-21-24 @ 11:40           CARDIOLOGY     PROGRESS  NOTE   ________________________________________________    CHIEF COMPLAINT:Patient is a 89y old  Male who presents with a chief complaint of sob/carter (21 Sep 2024 10:28)  on AVAPS  	  REVIEW OF SYSTEMS:  CONSTITUTIONAL: No fever, weight loss, or fatigue  EYES: No eye pain, visual disturbances, or discharge  ENT:  No difficulty hearing, tinnitus, vertigo; No sinus or throat pain  NECK: No pain or stiffness  RESPIRATORY: No cough, wheezing, chills or hemoptysis; No Shortness of Breath  CARDIOVASCULAR: No chest pain, palpitations, passing out, dizziness, or leg swelling  GASTROINTESTINAL: No abdominal or epigastric pain. No nausea, vomiting, or hematemesis; No diarrhea or constipation. No melena or hematochezia.  GENITOURINARY: No dysuria, frequency, hematuria, or incontinence  NEUROLOGICAL: No headaches, memory loss, loss of strength, numbness, or tremors  SKIN: No itching, burning, rashes, or lesions   LYMPH Nodes: No enlarged glands  ENDOCRINE: No heat or cold intolerance; No hair loss  MUSCULOSKELETAL: No joint pain or swelling; No muscle, back, or extremity pain  PSYCHIATRIC: No depression, anxiety, mood swings, or difficulty sleeping  HEME/LYMPH: No easy bruising, or bleeding gums  ALLERGY AND IMMUNOLOGIC: No hives or eczema	    [ ] All others negative	  [x ] Unable to obtain    PHYSICAL EXAM:  T(C): 36.6 (09-21-24 @ 09:38), Max: 37.1 (09-20-24 @ 23:25)  HR: 119 (09-21-24 @ 10:06) (111 - 133)  BP: 117/67 (09-21-24 @ 09:38) (110/69 - 122/69)  RR: 20 (09-21-24 @ 09:38) (20 - 23)  SpO2: 95% (09-21-24 @ 10:06) (94% - 100%)  Wt(kg): --  I&O's Summary    20 Sep 2024 07:01  -  21 Sep 2024 07:00  --------------------------------------------------------  IN: 0 mL / OUT: 0 mL / NET: 0 mL        Appearance: Normal	  HEENT:   Normal oral mucosa, PERRL, EOMI	  Lymphatic: No lymphadenopathy  Cardiovascular: Normal S1 S2, No JVD, + murmurs, No edema  Respiratory: decrease bs  Gastrointestinal:  Soft, Non-tender, + BS	  Skin: No rashes, No ecchymoses, No cyanosis	  Neurologic: can not asses  Extremities: Normal range of motion, No clubbing, cyanosis or edema    MEDICATIONS  (STANDING):  acetylcysteine 20%  Inhalation 4 milliLiter(s) Inhalation every 12 hours  albuterol/ipratropium for Nebulization 3 milliLiter(s) Nebulizer every 6 hours  aspirin  chewable 81 milliGRAM(s) Oral daily  chlorhexidine 2% Cloths 1 Application(s) Topical daily  DAPTOmycin IVPB      DAPTOmycin IVPB 500 milliGRAM(s) IV Intermittent every 48 hours  dexAMETHasone  Injectable 6 milliGRAM(s) IV Push daily  dextrose 5%. 1000 milliLiter(s) (50 mL/Hr) IV Continuous <Continuous>  epoetin tania-epbx (RETACRIT) Injectable 07996 Unit(s) SubCutaneous <User Schedule>  heparin  Infusion.  Unit(s)/Hr (11 mL/Hr) IV Continuous <Continuous>  metoprolol succinate  milliGRAM(s) Oral daily  pantoprazole  Injectable 40 milliGRAM(s) IV Push daily  sodium chloride 3%  Inhalation 4 milliLiter(s) Inhalation every 12 hours  sodium chloride 3%  Inhalation 4 milliLiter(s) Inhalation every 12 hours      TELEMETRY: 	    ECG:  	  RADIOLOGY:  OTHER: 	  	  LABS:	 	    CARDIAC MARKERS:                                8.3    23.07 )-----------( 149      ( 21 Sep 2024 10:44 )             26.7     09-20    150[H]  |  110[H]  |  97[H]  ----------------------------<  133[H]  5.4[H]   |  22  |  3.86[H]    Ca    8.1[L]      20 Sep 2024 10:04    TPro  6.8  /  Alb  2.6[L]  /  TBili  0.5  /  DBili  0.3  /  AST  316[H]  /  ALT  380[H]  /  AlkPhos  117  09-20    proBNP:   Lipid Profile: Cholesterol 137  LDL --  HDL 41  TG 76    HgA1c:   TSH: Thyroid Stimulating Hormone, Serum: 0.17 uIU/mL (09-18 @ 07:12)    PT/INR - ( 20 Sep 2024 08:45 )   PT: 22.8 sec;   INR: 2.22 ratio         PTT - ( 20 Sep 2024 08:45 )  PTT:61.5 sec    ·  Problem: Acute respiratory failure with hypoxia and hypercapnia.   ·  Plan: 2nd to COVID PNA, +possible bacterial PNA  -Pleural effusions appear stable compared to prior   -ABG on 9/18 with no CO2 retention   -o2 sats <90% on HFNC 60L/100% on 9/18, AVAPS started.   -ABG drawn on 9/20. AVAPS settings changed to , back up rate 18, min pressure 10, max pressure 30, ePAP 5, FiO2 60%.    -AM ABG noted (7.37/35/156/20). Suggest place on HFNC 40L/60% and continue AVAPS qHS and PRN daytime. Decrease FiO2 on AVAPS to 50%.   -Wean o2 as tolerated, keep sats >90%.        Assessment and plan  ---------------------------  89yo m mci/dementia, dorothea, asthma, gerd w large hiatal hernia, pud c/b gastric perforation s/p exlap, pafib, hfref, prostate ca s/p radiation seeds, oa, p/w sob/carter; in er, found to be meeting severe sepsis criteria, in acute hypoxemic respiratory failure; suspect multifactorial 2/2 afib rvr iso covid pna + adhf; admit to medicine for further mgmt.  covid pneumoniae, s/p RDV continue DEXA  continue abx as per ID  bilateral pleural effusion, may consider thoracentesis .can not do diuresis sec to hypernatremia ?chronic  MRSA bacteriemia continue abx, repeat blood cultures are negative  may add gentle hydration will discuss with nephrology  palliative eval  check bladder scan if pt has no Gill

## 2024-09-21 NOTE — PROGRESS NOTE ADULT - SUBJECTIVE AND OBJECTIVE BOX
Follow-up Pulm Progress Note    restless at times   Seen on AVAPS sats 95 %on FiO2 60%   ROS unable to be obtained     Medications:  MEDICATIONS  (STANDING):  acetylcysteine 20%  Inhalation 4 milliLiter(s) Inhalation every 12 hours  albuterol/ipratropium for Nebulization 3 milliLiter(s) Nebulizer every 6 hours  aspirin  chewable 81 milliGRAM(s) Oral daily  chlorhexidine 2% Cloths 1 Application(s) Topical daily  DAPTOmycin IVPB      DAPTOmycin IVPB 500 milliGRAM(s) IV Intermittent every 48 hours  dexAMETHasone  Injectable 6 milliGRAM(s) IV Push daily  dextrose 5%. 1000 milliLiter(s) (50 mL/Hr) IV Continuous <Continuous>  epoetin tania-epbx (RETACRIT) Injectable 89984 Unit(s) SubCutaneous <User Schedule>  heparin  Infusion.  Unit(s)/Hr (11 mL/Hr) IV Continuous <Continuous>  metoprolol succinate  milliGRAM(s) Oral daily  pantoprazole  Injectable 40 milliGRAM(s) IV Push daily  sodium chloride 3%  Inhalation 4 milliLiter(s) Inhalation every 12 hours  sodium chloride 3%  Inhalation 4 milliLiter(s) Inhalation every 12 hours    MEDICATIONS  (PRN):  acetaminophen     Tablet .. 650 milliGRAM(s) Oral every 6 hours PRN Temp greater or equal to 38C (100.4F), Mild Pain (1 - 3)  aluminum hydroxide/magnesium hydroxide/simethicone Suspension 30 milliLiter(s) Oral every 4 hours PRN Dyspepsia  benzonatate 100 milliGRAM(s) Oral every 8 hours PRN Cough  divalproex  milliGRAM(s) Oral three times a day PRN agitation  guaifenesin/dextromethorphan Oral Liquid 10 milliLiter(s) Oral every 4 hours PRN Cough  heparin   Injectable 2000 Unit(s) IV Push every 6 hours PRN For aPTT between 40 - 57  heparin   Injectable 4500 Unit(s) IV Push every 6 hours PRN For aPTT less than 40  metoprolol tartrate Injectable 2.5 milliGRAM(s) IV Push every 6 hours PRN sustained afib rvr >120  ondansetron Injectable 4 milliGRAM(s) IV Push every 8 hours PRN Nausea and/or Vomiting          Vital Signs Last 24 Hrs  T(C): 36.6 (21 Sep 2024 09:38), Max: 37.1 (20 Sep 2024 23:25)  T(F): 97.8 (21 Sep 2024 09:38), Max: 98.8 (20 Sep 2024 23:25)  HR: 119 (21 Sep 2024 10:06) (111 - 133)  BP: 117/67 (21 Sep 2024 09:38) (110/69 - 122/69)  BP(mean): --  RR: 20 (21 Sep 2024 09:38) (20 - 23)  SpO2: 95% (21 Sep 2024 10:06) (94% - 100%)    Parameters below as of 21 Sep 2024 09:38  Patient On (Oxygen Delivery Method): AVAPS        ABG - ( 21 Sep 2024 10:10 )  pH, Arterial: 7.37  pH, Blood: x     /  pCO2: 35    /  pO2: 156   / HCO3: 20    / Base Excess: -4.4  /  SaO2: 98.8                  09-20 @ 07:01  -  09-21 @ 07:00  --------------------------------------------------------  IN: 0 mL / OUT: 0 mL / NET: 0 mL          LABS:                        7.6    21.16 )-----------( 190      ( 20 Sep 2024 10:03 )             25.7     09-20    150[H]  |  110[H]  |  97[H]  ----------------------------<  133[H]  5.4[H]   |  22  |  3.86[H]    Ca    8.1[L]      20 Sep 2024 10:04    TPro  6.8  /  Alb  2.6[L]  /  TBili  0.5  /  DBili  0.3  /  AST  316[H]  /  ALT  380[H]  /  AlkPhos  117  09-20          CAPILLARY BLOOD GLUCOSE      POCT Blood Glucose.: 263 mg/dL (21 Sep 2024 05:30)    PT/INR - ( 20 Sep 2024 08:45 )   PT: 22.8 sec;   INR: 2.22 ratio         PTT - ( 20 Sep 2024 08:45 )  PTT:61.5 sec  Urinalysis Basic - ( 20 Sep 2024 10:04 )    Color: x / Appearance: x / SG: x / pH: x  Gluc: 133 mg/dL / Ketone: x  / Bili: x / Urobili: x   Blood: x / Protein: x / Nitrite: x   Leuk Esterase: x / RBC: x / WBC x   Sq Epi: x / Non Sq Epi: x / Bacteria: x                      CULTURES: (    Culture - Blood (collected 09-19-24 @ 08:58)  Source: .Blood Blood-Peripheral  Preliminary Report (09-20-24 @ 15:01):    No growth at 24 hours    Culture - Blood (collected 09-19-24 @ 08:47)  Source: .Blood Blood-Peripheral  Preliminary Report (09-20-24 @ 15:01):    No growth at 24 hours    Culture - Blood (collected 09-17-24 @ 15:30)  Source: .Blood Blood-Peripheral  Preliminary Report (09-20-24 @ 22:00):    No growth at 72 Hours    Culture - Blood (collected 09-17-24 @ 15:20)  Source: .Blood Blood-Peripheral  Gram Stain (09-18-24 @ 18:29):    Growth in aerobic bottle: Gram Positive Cocci in Clusters    Growth in anaerobic bottle: Gram Positive Cocci in Clusters  Final Report (09-20-24 @ 10:32):    Growth in aerobic and anaerobic bottles: Methicillin Resistant    Staphylococcus aureus    Direct identification is available within approximately 3-5    hours either by Blood Panel Multiplexed PCR or Direct    MALDI-TOF. Details: [email or web address]  Organism: Blood Culture PCR  Methicillin resistant Staphylococcus aureus (09-20-24 @ 10:32)  Organism: Methicillin resistant Staphylococcus aureus (09-20-24 @ 10:32)      Method Type: BEATRIZ      -  Clindamycin: S <=0.25      -  Daptomycin: S 1      -  Erythromycin: R >4      -  Gentamicin: S <=1 Should not be used as monotherapy      -  Linezolid: S 2      -  Oxacillin: R >2      -  Penicillin: R >8      -  Rifampin: S <=1 Should not be used as monotherapy      -  Tetracycline: S <=1      -  Trimethoprim/Sulfamethoxazole: S <=0.5/9.5      -  Vancomycin: S 1  Organism: Blood Culture PCR (09-20-24 @ 10:32)      Method Type: PCR      -  Methicillin resistant Staphylococcus aureus (MRSA): Detec        Culture - Urine (collected 09-17-24 @ 17:46)  Source: Clean Catch Clean Catch (Midstream)  Final Report (09-19-24 @ 06:32):    <10,000 CFU/mL Normal Urogenital Maria Guadalupe                      Physical Examination:  PULM: decreased BS  CVS: S1, S2 heard    RADIOLOGY REVIEWED    CT chest:    < from: CT Angio Chest PE Protocol w/ IV Cont (09.17.24 @ 20:46) >  FINDINGS:    AIRWAYS/LUNGS/PLEURA: Nonocclusive layering secretions in the distal   trachea. Otherwise patent central airways. Multifocal bilateral   groundglass opacities. Interlobular septal thickening. Moderate right   greater than left pleural effusions with adjacent compressive   atelectasis, similar to prior.    MEDIASTINUM AND ZANE: No lymphadenopathy. Mildly prominent nonspecific   paratracheal lymph nodesmeasuring up to 9 mm.    PULMONARY ANGIOGRAM: No pulmonary embolism with limited evaluation of the   subsegmental branches in the lower lobes.    VESSELS: Aortic calcifications. Coronary artery calcifications.    HEART: Stable heart size. No pericardial effusion. Mitral annular   calcification.    VISUALIZED UPPER ABDOMEN: Moderate hiatal hernia with a large portion of   the stomach within the thorax.    CHEST WALL AND BONES: Scoliosis and degenerative changes of the spine.    IMPRESSION:    No pulmonary embolism in the central, lobar, or segmental pulmonary   arteries.    Moderate right greater than left pleural effusions with interlobular   septal thickening and patchy bilateral groundglass opacities suggestive   of pulmonary edema. Superimposed infection cannot be entirely excluded.    < end of copied text >

## 2024-09-21 NOTE — PROGRESS NOTE ADULT - NS ATTEND AMEND GEN_ALL_CORE FT
suggest place on HFNC 40L/60% and continue AVAPS qHS and PRN daytime   decrease FiO2 on AVAPS to 50%.
AVAPS settings changed to , back up rate 18, min pressure 10, max pressure 30, ePAP 5, FiO2 80%.    trend lft's  abx as per id  abg in am  prognosis guarded
suggest dc remdesivir  fu cmp in am  continue avaps and increase tv to 500  check vbg in am

## 2024-09-21 NOTE — PROVIDER CONTACT NOTE (SEPSIS SCREENING) - ACTION/TREATMENT ORDERED:
ACP made aware. rectal temp done and normal.  ACP made aware. rectal temp done and normal. No more orders at this time.

## 2024-09-22 NOTE — PROGRESS NOTE ADULT - SUBJECTIVE AND OBJECTIVE BOX
Patient is a 89y Male whom presented to the hospital with gabriele     PAST MEDICAL & SURGICAL HISTORY:  Obstructive sleep apnea on CPAP      Carpal tunnel syndrome      H/O prostate cancer      Hiatal hernia      JULIUS (iron deficiency anemia)      H/O knee surgery          MEDICATIONS  (STANDING):  acetylcysteine 20%  Inhalation 4 milliLiter(s) Inhalation every 12 hours  albuterol/ipratropium for Nebulization 3 milliLiter(s) Nebulizer every 6 hours  aspirin  chewable 81 milliGRAM(s) Oral daily  chlorhexidine 2% Cloths 1 Application(s) Topical daily  DAPTOmycin IVPB      dexAMETHasone  Injectable 6 milliGRAM(s) IV Push daily  dextrose 5%. 1000 milliLiter(s) (50 mL/Hr) IV Continuous <Continuous>  heparin  Infusion.  Unit(s)/Hr (11 mL/Hr) IV Continuous <Continuous>  metoprolol succinate  milliGRAM(s) Oral daily  pantoprazole  Injectable 40 milliGRAM(s) IV Push daily  sodium chloride 3%  Inhalation 4 milliLiter(s) Inhalation every 12 hours  sodium chloride 3%  Inhalation 4 milliLiter(s) Inhalation every 12 hours      Allergies    No Known Allergies    Intolerances        SOCIAL HISTORY:  Denies ETOh,Smoking,     FAMILY HISTORY:  FH: heart attack (Father)        REVIEW OF SYSTEMS:  unable to obtained a good review system                            8.5    29.90 )-----------( 130      ( 22 Sep 2024 09:32 )             26.6       CBC Full  -  ( 22 Sep 2024 09:32 )  WBC Count : 29.90 K/uL  RBC Count : 2.98 M/uL  Hemoglobin : 8.5 g/dL  Hematocrit : 26.6 %  Platelet Count - Automated : 130 K/uL  Mean Cell Volume : 89.3 fl  Mean Cell Hemoglobin : 28.5 pg  Mean Cell Hemoglobin Concentration : 32.0 gm/dL  Auto Neutrophil # : x  Auto Lymphocyte # : x  Auto Monocyte # : x  Auto Eosinophil # : x  Auto Basophil # : x  Auto Neutrophil % : x  Auto Lymphocyte % : x  Auto Monocyte % : x  Auto Eosinophil % : x  Auto Basophil % : x      09-22    145  |  106  |  128[H]  ----------------------------<  195[H]  5.9[H]   |  17[L]  |  5.98[H]    Ca    7.2[L]      22 Sep 2024 09:32    TPro  6.6  /  Alb  2.6[L]  /  TBili  0.7  /  DBili  x   /  AST  532[H]  /  ALT  525[H]  /  AlkPhos  117  09-22      CAPILLARY BLOOD GLUCOSE      POCT Blood Glucose.: 259 mg/dL (22 Sep 2024 12:12)  POCT Blood Glucose.: 243 mg/dL (22 Sep 2024 06:27)  POCT Blood Glucose.: 317 mg/dL (22 Sep 2024 00:21)  POCT Blood Glucose.: 216 mg/dL (21 Sep 2024 17:31)      Vital Signs Last 24 Hrs  T(C): 36.3 (22 Sep 2024 11:18), Max: 37 (21 Sep 2024 17:35)  T(F): 97.3 (22 Sep 2024 11:18), Max: 98.6 (21 Sep 2024 17:35)  HR: 130 (22 Sep 2024 11:18) (96 - 136)  BP: 103/73 (22 Sep 2024 11:18) (91/55 - 116/73)  BP(mean): --  RR: 20 (22 Sep 2024 11:18) (20 - 20)  SpO2: 96% (22 Sep 2024 11:18) (96% - 100%)    Parameters below as of 22 Sep 2024 11:18  Patient On (Oxygen Delivery Method): nasal cannula, high flow        Urinalysis Basic - ( 22 Sep 2024 09:32 )    Color: x / Appearance: x / SG: x / pH: x  Gluc: 195 mg/dL / Ketone: x  / Bili: x / Urobili: x   Blood: x / Protein: x / Nitrite: x   Leuk Esterase: x / RBC: x / WBC x   Sq Epi: x / Non Sq Epi: x / Bacteria: x        PT/INR - ( 22 Sep 2024 01:00 )   PT: 23.9 sec;   INR: 2.22 ratio         PTT - ( 22 Sep 2024 09:32 )  PTT:104.3 sec        PHYSICAL EXAM:    Constitutional: on cpap   HEENT: conjunctive   clear   Neck:  No JVD  Respiratory: decrease bs b/l   Cardiovascular: S1 and S2  Gastrointestinal: BS+, soft, NT/ND  Extremities: No peripheral edema    LABS:                        7.6    21.16 )-----------( 190      ( 20 Sep 2024 10:03 )             25.7     09-20    150[H]  |  110[H]  |  97[H]  ----------------------------<  133[H]  5.4[H]   |  22  |  3.86[H]    Ca    8.1[L]      20 Sep 2024 10:04  Phos  6.1     09-19  Mg     2.3     09-19    TPro  6.8  /  Alb  2.6[L]  /  TBili  0.5  /  DBili  0.3  /  AST  316[H]  /  ALT  380[H]  /  AlkPhos  117  09-20      Urine Studies:  Urinalysis Basic - ( 20 Sep 2024 10:04 )    Color: x / Appearance: x / SG: x / pH: x  Gluc: 133 mg/dL / Ketone: x  / Bili: x / Urobili: x   Blood: x / Protein: x / Nitrite: x   Leuk Esterase: x / RBC: x / WBC x   Sq Epi: x / Non Sq Epi: x / Bacteria: x            RADIOLOGY & ADDITIONAL STUDIES:

## 2024-09-22 NOTE — PROGRESS NOTE ADULT - SUBJECTIVE AND OBJECTIVE BOX
Date of Service: 09-22-24 @ 10:32           CARDIOLOGY     PROGRESS  NOTE   ________________________________________________    CHIEF COMPLAINT:Patient is a 89y old  Male who presents with a chief complaint of sob/carter (21 Sep 2024 19:33)  comfortable  	  REVIEW OF SYSTEMS:  CONSTITUTIONAL: No fever, weight loss, or fatigue  EYES: No eye pain, visual disturbances, or discharge  ENT:  No difficulty hearing, tinnitus, vertigo; No sinus or throat pain  NECK: No pain or stiffness  RESPIRATORY: No cough, wheezing, chills or hemoptysis; No Shortness of Breath  CARDIOVASCULAR: No chest pain, palpitations, passing out, dizziness, or leg swelling  GASTROINTESTINAL: No abdominal or epigastric pain. No nausea, vomiting, or hematemesis; No diarrhea or constipation. No melena or hematochezia.  GENITOURINARY: No dysuria, frequency, hematuria, or incontinence  NEUROLOGICAL: No headaches, memory loss, loss of strength, numbness, or tremors  SKIN: No itching, burning, rashes, or lesions   LYMPH Nodes: No enlarged glands  ENDOCRINE: No heat or cold intolerance; No hair loss  MUSCULOSKELETAL: No joint pain or swelling; No muscle, back, or extremity pain  PSYCHIATRIC: No depression, anxiety, mood swings, or difficulty sleeping  HEME/LYMPH: No easy bruising, or bleeding gums  ALLERGY AND IMMUNOLOGIC: No hives or eczema	    [ ] All others negative	  [x ] Unable to obtain    PHYSICAL EXAM:  T(C): 36.5 (09-22-24 @ 07:25), Max: 37 (09-21-24 @ 17:35)  HR: 136 (09-22-24 @ 09:33) (96 - 136)  BP: 116/73 (09-22-24 @ 07:25) (91/55 - 116/73)  RR: 20 (09-22-24 @ 07:25) (20 - 20)  SpO2: 98% (09-22-24 @ 09:33) (96% - 100%)  Wt(kg): --  I&O's Summary    21 Sep 2024 07:01  -  22 Sep 2024 07:00  --------------------------------------------------------  IN: 732 mL / OUT: 0 mL / NET: 732 mL    22 Sep 2024 07:01  -  22 Sep 2024 10:32  --------------------------------------------------------  IN: 0 mL / OUT: 0 mL / NET: 0 mL        Appearance: lethargic  HEENT:   Normal oral mucosa, PERRL, EOMI	  Lymphatic: No lymphadenopathy  Cardiovascular: Normal S1 S2, No JVD, + murmurs, No edema  Respiratory: rhonchi  Gastrointestinal:  Soft, Non-tender, + BS	  Skin: No rashes, No ecchymoses, No cyanosis	  Neurologic: can not asses  Extremities: Normal range of motion, No clubbing, cyanosis or edema    MEDICATIONS  (STANDING):  acetylcysteine 20%  Inhalation 4 milliLiter(s) Inhalation every 12 hours  albuterol/ipratropium for Nebulization 3 milliLiter(s) Nebulizer every 6 hours  aspirin  chewable 81 milliGRAM(s) Oral daily  chlorhexidine 2% Cloths 1 Application(s) Topical daily  DAPTOmycin IVPB      DAPTOmycin IVPB 500 milliGRAM(s) IV Intermittent every 48 hours  dexAMETHasone  Injectable 6 milliGRAM(s) IV Push daily  dextrose 5%. 1000 milliLiter(s) (50 mL/Hr) IV Continuous <Continuous>  dextrose 5%. 1000 milliLiter(s) (50 mL/Hr) IV Continuous <Continuous>  dextrose 5%. 1000 milliLiter(s) (100 mL/Hr) IV Continuous <Continuous>  dextrose 50% Injectable 12.5 Gram(s) IV Push once  dextrose 50% Injectable 25 Gram(s) IV Push once  dextrose 50% Injectable 25 Gram(s) IV Push once  epoetin tania-epbx (RETACRIT) Injectable 11312 Unit(s) SubCutaneous <User Schedule>  glucagon  Injectable 1 milliGRAM(s) IntraMuscular once  heparin  Infusion.  Unit(s)/Hr (11 mL/Hr) IV Continuous <Continuous>  insulin lispro (ADMELOG) corrective regimen sliding scale   SubCutaneous every 6 hours  metoprolol succinate  milliGRAM(s) Oral daily  pantoprazole  Injectable 40 milliGRAM(s) IV Push daily  sodium chloride 3%  Inhalation 4 milliLiter(s) Inhalation every 12 hours  sodium chloride 3%  Inhalation 4 milliLiter(s) Inhalation every 12 hours      TELEMETRY: 	    ECG:  	  RADIOLOGY:  OTHER: 	  	  LABS:	 	    CARDIAC MARKERS:                            8.5    29.90 )-----------( 130      ( 22 Sep 2024 09:32 )             26.6     09-22    145  |  106  |  128[H]  ----------------------------<  195[H]  5.9[H]   |  17[L]  |  5.98[H]    Ca    7.2[L]      22 Sep 2024 09:32    TPro  6.6  /  Alb  2.6[L]  /  TBili  0.7  /  DBili  x   /  AST  532[H]  /  ALT  525[H]  /  AlkPhos  117  09-22    proBNP:   Lipid Profile: Cholesterol 137  LDL --  HDL 41  TG 76    HgA1c:   TSH: Thyroid Stimulating Hormone, Serum: 0.17 uIU/mL (09-18 @ 07:12)    PT/INR - ( 22 Sep 2024 01:00 )   PT: 23.9 sec;   INR: 2.22 ratio         PTT - ( 22 Sep 2024 09:32 )  PTT:104.3 sec      Assessment and plan  ---------------------------  88yo m mci/dementia, dorothea, asthma, gerd w large hiatal hernia, pud c/b gastric perforation s/p exlap, pafib, hfref, prostate ca s/p radiation seeds, oa, p/w sob/carter; in er, found to be meeting severe sepsis criteria, in acute hypoxemic respiratory failure; suspect multifactorial 2/2 afib rvr iso covid pna + adhf; admit to medicine for further mgmt.  covid pneumoniae, s/p RDV continue DEXA  continue abx as per ID  bilateral pleural effusion, may consider thoracentesis .can not do diuresis sec to hypernatremia ?chronic  MRSA bacteriemia continue abx, repeat blood cultures are negative  may add gentle hydration will discuss with nephrology  palliative eval  check bladder scan if pt has no Gill  pt is not getting his lopressor since is NPO/ will consider NGT  add iv lopressor in rapid a..fib/ flutter  sodium level has improved  increase lft  renal failure as per nephrology

## 2024-09-22 NOTE — PROGRESS NOTE ADULT - SUBJECTIVE AND OBJECTIVE BOX
Follow Up:      Interval History/ROS:Patient is a 89y old  Male who presents with a chief complaint of sob/carter (22 Sep 2024 10:32)  Seen at bedside, remains afebrile, on HFNC, unresponsive.   Worsening kidney function. repeat blood cx 9/19: GPC (1/4 bottles)       Allergies  No Known Allergies        ANTIMICROBIALS:    DAPTOmycin IVPB    DAPTOmycin IVPB 500 every 48 hours        OTHER MEDS: MEDICATIONS  (STANDING):  acetaminophen     Tablet .. 650 every 6 hours PRN  acetylcysteine 20%  Inhalation 4 every 12 hours  albuterol/ipratropium for Nebulization 3 every 6 hours  aluminum hydroxide/magnesium hydroxide/simethicone Suspension 30 every 4 hours PRN  aspirin  chewable 81 daily  benzonatate 100 every 8 hours PRN  dexAMETHasone  Injectable 6 daily  dextrose 50% Injectable 12.5 once  dextrose 50% Injectable 25 once  dextrose 50% Injectable 25 once  dextrose Oral Gel 15 once PRN  divalproex  three times a day PRN  epoetin tania-epbx (RETACRIT) Injectable 14409 <User Schedule>  glucagon  Injectable 1 once  guaifenesin/dextromethorphan Oral Liquid 10 every 4 hours PRN  heparin   Injectable 4500 every 6 hours PRN  heparin   Injectable 2000 every 6 hours PRN  heparin  Infusion.  <Continuous>  insulin lispro (ADMELOG) corrective regimen sliding scale  every 6 hours  metoprolol tartrate Injectable 5 every 6 hours  ondansetron Injectable 4 every 8 hours PRN  pantoprazole  Injectable 40 daily  sodium chloride 3%  Inhalation 4 every 12 hours  sodium chloride 3%  Inhalation 4 every 12 hours      Vital Signs Last 24 Hrs  T(F): 97.3 (09-22-24 @ 11:18), Max: 101.2 (09-17-24 @ 17:47)    Vital Signs Last 24 Hrs  HR: 130 (09-22-24 @ 11:18) (96 - 136)  BP: 103/73 (09-22-24 @ 11:18) (91/55 - 116/73)  RR: 20 (09-22-24 @ 11:18)  SpO2: 96% (09-22-24 @ 11:18) (96% - 100%)  Wt(kg): --    EXAM:    GA: ill appearing   CV: tachycardic  Lungs: rhonchi +  Abd: soft  Ext: no edema  Neuro: not responsive   IV: no apparent phlebitis    Labs:                        8.5    29.90 )-----------( 130      ( 22 Sep 2024 09:32 )             26.6     09-22    145  |  106  |  128[H]  ----------------------------<  195[H]  5.9[H]   |  17[L]  |  5.98[H]    Ca    7.2[L]      22 Sep 2024 09:32    TPro  6.6  /  Alb  2.6[L]  /  TBili  0.7  /  DBili  x   /  AST  532[H]  /  ALT  525[H]  /  AlkPhos  117  09-22      WBC Trend:  WBC Count: 29.90 (09-22-24 @ 09:32)  WBC Count: 23.07 (09-21-24 @ 10:44)  WBC Count: 21.16 (09-20-24 @ 10:03)  WBC Count: 18.23 (09-19-24 @ 09:58)      Creatine Trend:  Creatinine: 5.98 (09-22)  Creatinine: 5.69 (09-22)  Creatinine: 5.12 (09-21)  Creatinine: 5.05 (09-21)      Liver Biochemical Testing Trend:  Alanine Aminotransferase (ALT/SGPT): 525 *H* (09-22)  Alanine Aminotransferase (ALT/SGPT): 452 *H* (09-22)  Alanine Aminotransferase (ALT/SGPT): 319 *H* (09-21)  Alanine Aminotransferase (ALT/SGPT): 323 *H* (09-21)  Alanine Aminotransferase (ALT/SGPT): 380 *H* (09-20)  Aspartate Aminotransferase (AST/SGOT): 532 (09-22-24 @ 09:32)  Aspartate Aminotransferase (AST/SGOT): 413 (09-22-24 @ 01:00)  Aspartate Aminotransferase (AST/SGOT): 136 (09-21-24 @ 11:26)  Aspartate Aminotransferase (AST/SGOT): 136 (09-21-24 @ 11:26)  Aspartate Aminotransferase (AST/SGOT): 316 (09-20-24 @ 10:04)  Bilirubin Total: 0.7 (09-22)  Bilirubin Direct: 0.4 (09-22)  Bilirubin Total: 0.6 (09-22)  Bilirubin Total: 0.5 (09-21)  Bilirubin Direct: 0.3 (09-21)      Trend LDH  09-18-24 @ 07:12  282[H]  08-16-24 @ 05:26  196  06-15-24 @ 17:46  199      Urinalysis Basic - ( 22 Sep 2024 09:32 )    Color: x / Appearance: x / SG: x / pH: x  Gluc: 195 mg/dL / Ketone: x  / Bili: x / Urobili: x   Blood: x / Protein: x / Nitrite: x   Leuk Esterase: x / RBC: x / WBC x   Sq Epi: x / Non Sq Epi: x / Bacteria: x        MICROBIOLOGY:    MRSA PCR Result.: NotDetec (08-15-24 @ 10:28)  MRSA PCR Result.: NotDetec (06-16-24 @ 13:24)      Culture - Blood (collected 19 Sep 2024 08:58)  Source: .Blood Blood-Peripheral  Preliminary Report:    No growth at 48 Hours    Culture - Blood (collected 19 Sep 2024 08:47)  Source: .Blood Blood-Peripheral  Preliminary Report:    Growth in anaerobic bottle: Gram Positive Cocci in Clusters    Culture - Urine (collected 17 Sep 2024 17:46)  Source: Clean Catch Clean Catch (Midstream)  Final Report:    <10,000 CFU/mL Normal Urogenital Maria Guadalupe    Culture - Blood (collected 17 Sep 2024 15:30)  Source: .Blood Blood-Peripheral  Preliminary Report:    No growth at 4 days    Culture - Blood (collected 17 Sep 2024 15:20)  Source: .Blood Blood-Peripheral  Final Report:    Growth in aerobic and anaerobic bottles: Methicillin Resistant    Staphylococcus aureus    Direct identification is available within approximately 3-5    hours either by Blood Panel Multiplexed PCR or Direct    MALDI-TOF. Details: [email or web address]  Organism: Blood Culture PCR  Methicillin resistant Staphylococcus aureus  Organism: Methicillin resistant Staphylococcus aureus    Sensitivities:      Method Type: BEATRIZ      -  Clindamycin: S <=0.25      -  Daptomycin: S 1      -  Erythromycin: R >4      -  Gentamicin: S <=1 Should not be used as monotherapy      -  Linezolid: S 2      -  Oxacillin: R >2      -  Penicillin: R >8      -  Rifampin: S <=1 Should not be used as monotherapy      -  Tetracycline: S <=1      -  Trimethoprim/Sulfamethoxazole: S <=0.5/9.5      -  Vancomycin: S 1  Organism: Blood Culture PCR    Sensitivities:      Method Type: PCR      -  Methicillin resistant Staphylococcus aureus (MRSA): Detec    Culture - Urine (collected 14 Aug 2024 11:50)  Source: Catheterized Catheterized  Final Report:    No growth    Culture - Blood (collected 14 Aug 2024 10:55)  Source: .Blood Blood  Final Report:    No growth at 5 days    Culture - Blood (collected 14 Aug 2024 10:40)  Source: .Blood Blood  Final Report:    No growth at 5 days    Culture - Blood (collected 09 Jul 2024 18:55)  Source: .Blood Blood  Final Report:    No growth at 5 days    Culture - Blood (collected 09 Jul 2024 18:50)  Source: .Blood Blood  Final Report:    No growth at 5 days      Ferritin: 1490 (09-18)      Lactate Dehydrogenase, Serum: 282 (09-18)      Troponin T, High Sensitivity Result: 82 (09-17)  Troponin T, High Sensitivity Result: 89 (09-17)    Blood Gas Venous - Lactate: 2.0 (09-21 @ 11:00)        RADIOLOGY:  imaging below personally reviewed      < from: CT Angio Chest PE Protocol w/ IV Cont (09.17.24 @ 20:46) >  MPRESSION:    No pulmonary embolism in the central, lobar, or segmental pulmonary   arteries.    Moderate right greater than left pleural effusions with interlobular   septal thickening and patchy bilateral groundglass opacities suggestive   of pulmonary edema. Superimposed infection cannot be entirely excluded.    --- End of Report ---    < end of copied text >

## 2024-09-22 NOTE — PROVIDER CONTACT NOTE (CRITICAL VALUE NOTIFICATION) - ASSESSMENT
AAOx0. VSS on HFNC as ordered; no s/s of CP, SOB, no acute events on tele monitor
Patient is A&Ox0. HR 110s, /73, Sp02 is 96% on HFNC, RR 12.
Patient A&Ox0, patient lethargic. HR is high

## 2024-09-22 NOTE — PROVIDER CONTACT NOTE (OTHER) - ACTION/TREATMENT ORDERED:
Provider at bedside, pulse ox on bedside vitals machine 100%. Provider at bedside, pulse ox on bedside vitals machine 100%., Provider order no AVAPS

## 2024-09-22 NOTE — PROVIDER CONTACT NOTE (OTHER) - RECOMMENDATIONS
Come see patient? holding metoprolol. draw blood cultures through a stick? Come see patient? holding metoprolol. draw blood cultures through a stick? AVAPS?

## 2024-09-23 NOTE — CHART NOTE - NSCHARTNOTEFT_GEN_A_CORE
HPI:  89yo m mci/dementia, dorothea, asthma, gerd w large hiatal hernia, pud c/b gastric perforation s/p exlap, pafib, hfref, prostate ca s/p radiation seeds, oa, p/w sob/carter; in er, found to be meeting severe sepsis criteria, in acute hypoxemic respiratory failure; suspect multifactorial 2/2 afib rvr iso covid pna + adhf; admit to medicine for further mgmt.     Pt w/ worsening renal function. Pt is currently DNR/DNI, no HD as per HCP.  Pt w/ persistent hyperkalemia despite insulin/dextrose/calcium and Kayexalate.  This am venous pH 7.13.  Case discuss with Dr. Harris, as per Dr. Harris no further interventions as per his GOC discussion with the family.     RN updated on plan of care   Will endorse to day team    Emelin Reyes Monegro, NP   Medicine Department   [facility] [id number] HPI:  89yo m mci/dementia, dorothea, asthma, gerd w large hiatal hernia, pud c/b gastric perforation s/p exlap, pafib, hfref, prostate ca s/p radiation seeds, oa, p/w sob/carter; in er, found to be meeting severe sepsis criteria, in acute hypoxemic respiratory failure; suspect multifactorial 2/2 afib rvr iso covid pna + adhf; admit to medicine for further mgmt.     Pt w/ worsening renal function. Pt is currently DNR/DNI, no HD as per HCP.  Pt w/ persistent hyperkalemia despite insulin/dextrose/calcium and Kayexalate.  This am venous pH 7.13.  Case discussed with Dr. Harris, as per Dr. Harris no further interventions as per his GOC discussion with HCP.  Furthermore, as per Dr. Harris he will update HCP  today about pt's condition.     RN updated on plan of care   Will endorse to day team    Emelin Reyes Monegro, NP   Medicine Department   [facility] [id number]

## 2024-09-23 NOTE — PROGRESS NOTE ADULT - SUBJECTIVE AND OBJECTIVE BOX
Follow-up Pulm Progress Note    nonlabored on HFNC  ROS unable to be obtained     Medications:  MEDICATIONS  (STANDING):  acetylcysteine 20%  Inhalation 4 milliLiter(s) Inhalation every 12 hours  albuterol/ipratropium for Nebulization 3 milliLiter(s) Nebulizer every 6 hours  aspirin  chewable 81 milliGRAM(s) Oral daily  chlorhexidine 2% Cloths 1 Application(s) Topical daily  DAPTOmycin IVPB      DAPTOmycin IVPB 500 milliGRAM(s) IV Intermittent every 48 hours  dexAMETHasone  Injectable 6 milliGRAM(s) IV Push daily  dextrose 5%. 1000 milliLiter(s) (50 mL/Hr) IV Continuous <Continuous>  dextrose 5%. 1000 milliLiter(s) (100 mL/Hr) IV Continuous <Continuous>  dextrose 5%. 1000 milliLiter(s) (30 mL/Hr) IV Continuous <Continuous>  dextrose 50% Injectable 12.5 Gram(s) IV Push once  dextrose 50% Injectable 25 Gram(s) IV Push once  dextrose 50% Injectable 25 Gram(s) IV Push once  epoetin tania-epbx (RETACRIT) Injectable 96048 Unit(s) SubCutaneous <User Schedule>  glucagon  Injectable 1 milliGRAM(s) IntraMuscular once  heparin  Infusion.  Unit(s)/Hr (11 mL/Hr) IV Continuous <Continuous>  insulin lispro (ADMELOG) corrective regimen sliding scale   SubCutaneous every 6 hours  metoprolol tartrate Injectable 5 milliGRAM(s) IV Push every 6 hours  pantoprazole  Injectable 40 milliGRAM(s) IV Push daily  sodium chloride 3%  Inhalation 4 milliLiter(s) Inhalation every 12 hours  sodium chloride 3%  Inhalation 4 milliLiter(s) Inhalation every 12 hours  sodium polystyrene sulfonate Enema 30 Gram(s) Rectal once    MEDICATIONS  (PRN):  acetaminophen     Tablet .. 650 milliGRAM(s) Oral every 6 hours PRN Temp greater or equal to 38C (100.4F), Mild Pain (1 - 3)  aluminum hydroxide/magnesium hydroxide/simethicone Suspension 30 milliLiter(s) Oral every 4 hours PRN Dyspepsia  benzonatate 100 milliGRAM(s) Oral every 8 hours PRN Cough  dextrose Oral Gel 15 Gram(s) Oral once PRN Blood Glucose LESS THAN 70 milliGRAM(s)/deciliter  divalproex  milliGRAM(s) Oral three times a day PRN agitation  guaifenesin/dextromethorphan Oral Liquid 10 milliLiter(s) Oral every 4 hours PRN Cough  heparin   Injectable 4500 Unit(s) IV Push every 6 hours PRN For aPTT less than 40  heparin   Injectable 2000 Unit(s) IV Push every 6 hours PRN For aPTT between 40 - 57  ondansetron Injectable 4 milliGRAM(s) IV Push every 8 hours PRN Nausea and/or Vomiting          Vital Signs Last 24 Hrs  T(C): 36.3 (23 Sep 2024 08:01), Max: 36.5 (23 Sep 2024 00:13)  T(F): 97.3 (23 Sep 2024 08:01), Max: 97.7 (23 Sep 2024 00:13)  HR: 100 (23 Sep 2024 08:01) (100 - 130)  BP: 108/68 (23 Sep 2024 08:01) (103/62 - 111/77)  BP(mean): --  RR: 22 (23 Sep 2024 08:01) (11 - 25)  SpO2: 100% (23 Sep 2024 08:01) (95% - 100%)    Parameters below as of 23 Sep 2024 08:01  Patient On (Oxygen Delivery Method): BiPAP/CPAP        ABG - ( 21 Sep 2024 10:10 )  pH, Arterial: 7.37  pH, Blood: x     /  pCO2: 35    /  pO2: 156   / HCO3: 20    / Base Excess: -4.4  /  SaO2: 98.8              VBG pH 7.13 09-23 @ 04:23    VBG pCO2 61 09-23 @ 04:23    VBG O2 sat 67.5 09-23 @ 04:23    VBG lactate 3.9 09-23 @ 04:23  VBG pH 7.35 09-21 @ 11:00    VBG pCO2 39 09-21 @ 11:00    VBG O2 sat 100.0 09-21 @ 11:00    VBG lactate 2.0 09-21 @ 11:00      09-22 @ 07:01  -  09-23 @ 07:00  --------------------------------------------------------  IN: 0 mL / OUT: 0 mL / NET: 0 mL          LABS:                        8.4    26.07 )-----------( 89       ( 23 Sep 2024 05:25 )             27.1     09-23    145  |  102  |  143[H]  ----------------------------<  231[H]  5.9[H]   |  17[L]  |  6.71[H]    Ca    7.2[L]      23 Sep 2024 04:04    TPro  6.8  /  Alb  2.8[L]  /  TBili  0.9  /  DBili  0.6[H]  /  AST  689[H]  /  ALT  653[H]  /  AlkPhos  120  09-23          CAPILLARY BLOOD GLUCOSE      POCT Blood Glucose.: 203 mg/dL (23 Sep 2024 09:38)    PT/INR - ( 23 Sep 2024 04:04 )   PT: 22.9 sec;   INR: 2.23 ratio         PTT - ( 23 Sep 2024 05:26 )  PTT:96.8 sec  Urinalysis Basic - ( 23 Sep 2024 04:04 )    Color: x / Appearance: x / SG: x / pH: x  Gluc: 231 mg/dL / Ketone: x  / Bili: x / Urobili: x   Blood: x / Protein: x / Nitrite: x   Leuk Esterase: x / RBC: x / WBC x   Sq Epi: x / Non Sq Epi: x / Bacteria: x                      CULTURES:       Culture - Blood (collected 09-19-24 @ 08:58)  Source: .Blood Blood-Peripheral  Preliminary Report (09-22-24 @ 15:01):    No growth at 72 Hours    Culture - Blood (collected 09-19-24 @ 08:47)  Source: .Blood Blood-Peripheral  Gram Stain (09-22-24 @ 12:35):    Growth in anaerobic bottle: Gram Positive Cocci in Clusters  Preliminary Report (09-22-24 @ 12:35):    Growth in anaerobic bottle: Gram Positive Cocci in Clusters    Culture - Blood (collected 09-17-24 @ 15:30)  Source: .Blood Blood-Peripheral  Final Report (09-22-24 @ 22:00):    No growth at 5 days    Culture - Blood (collected 09-17-24 @ 15:20)  Source: .Blood Blood-Peripheral  Gram Stain (09-18-24 @ 18:29):    Growth in aerobic bottle: Gram Positive Cocci in Clusters    Growth in anaerobic bottle: Gram Positive Cocci in Clusters  Final Report (09-20-24 @ 10:32):    Growth in aerobic and anaerobic bottles: Methicillin Resistant    Staphylococcus aureus    Direct identification is available within approximately 3-5    hours either by Blood Panel Multiplexed PCR or Direct    MALDI-TOF. Details: [email or web address]  Organism: Blood Culture PCR  Methicillin resistant Staphylococcus aureus (09-20-24 @ 10:32)  Organism: Methicillin resistant Staphylococcus aureus (09-20-24 @ 10:32)      Method Type: BEATRIZ      -  Clindamycin: S <=0.25      -  Daptomycin: S 1      -  Erythromycin: R >4      -  Gentamicin: S <=1 Should not be used as monotherapy      -  Linezolid: S 2      -  Oxacillin: R >2      -  Penicillin: R >8      -  Rifampin: S <=1 Should not be used as monotherapy      -  Tetracycline: S <=1      -  Trimethoprim/Sulfamethoxazole: S <=0.5/9.5      -  Vancomycin: S 1  Organism: Blood Culture PCR (09-20-24 @ 10:32)      Method Type: PCR      -  Methicillin resistant Staphylococcus aureus (MRSA): Detec        Culture - Urine (collected 09-17-24 @ 17:46)  Source: Clean Catch Clean Catch (Midstream)  Final Report (09-19-24 @ 06:32):    <10,000 CFU/mL Normal Urogenital Maria Guadalupe                        Physical Examination:  PULM: decreased BS  CVS: S1, S2 heard    RADIOLOGY REVIEWED    CT chest:    < from: CT Angio Chest PE Protocol w/ IV Cont (09.17.24 @ 20:46) >  FINDINGS:    AIRWAYS/LUNGS/PLEURA: Nonocclusive layering secretions in the distal   trachea. Otherwise patent central airways. Multifocal bilateral   groundglass opacities. Interlobular septal thickening. Moderate right   greater than left pleural effusions with adjacent compressive   atelectasis, similar to prior.    MEDIASTINUM AND ZANE: No lymphadenopathy. Mildly prominent nonspecific   paratracheal lymph nodesmeasuring up to 9 mm.    PULMONARY ANGIOGRAM: No pulmonary embolism with limited evaluation of the   subsegmental branches in the lower lobes.    VESSELS: Aortic calcifications. Coronary artery calcifications.    HEART: Stable heart size. No pericardial effusion. Mitral annular   calcification.    VISUALIZED UPPER ABDOMEN: Moderate hiatal hernia with a large portion of   the stomach within the thorax.    CHEST WALL AND BONES: Scoliosis and degenerative changes of the spine.    IMPRESSION:    No pulmonary embolism in the central, lobar, or segmental pulmonary   arteries.    Moderate right greater than left pleural effusions with interlobular   septal thickening and patchy bilateral groundglass opacities suggestive   of pulmonary edema. Superimposed infection cannot be entirely excluded.    < end of copied text >

## 2024-09-23 NOTE — CONSULT NOTE ADULT - PROBLEM SELECTOR RECOMMENDATION 3
CT chest with moderate b/l pl effusions   -Appear grossly unchanged compared to prior scan  -Keep O>I as tolerated
pt awake, non-verbal   in no acute distress at time of exam

## 2024-09-23 NOTE — CHART NOTE - NSCHARTNOTEFT_GEN_A_CORE
Interim Note    Pt NPO and comfort measures only, pt inappropriate for nutrition follow up at this time.    RD remains available.  Teresa Li, MS, RD, CDN, CNSC, CDCES TEAMS

## 2024-09-23 NOTE — PROGRESS NOTE ADULT - SUBJECTIVE AND OBJECTIVE BOX
Patient is a 89y Male whom presented to the hospital with gabriele     PAST MEDICAL & SURGICAL HISTORY:  Obstructive sleep apnea on CPAP      Carpal tunnel syndrome      H/O prostate cancer      Hiatal hernia      JULIUS (iron deficiency anemia)      H/O knee surgery          MEDICATIONS  (STANDING):  acetylcysteine 20%  Inhalation 4 milliLiter(s) Inhalation every 12 hours  albuterol/ipratropium for Nebulization 3 milliLiter(s) Nebulizer every 6 hours  aspirin  chewable 81 milliGRAM(s) Oral daily  chlorhexidine 2% Cloths 1 Application(s) Topical daily  DAPTOmycin IVPB      dexAMETHasone  Injectable 6 milliGRAM(s) IV Push daily  dextrose 5%. 1000 milliLiter(s) (50 mL/Hr) IV Continuous <Continuous>  heparin  Infusion.  Unit(s)/Hr (11 mL/Hr) IV Continuous <Continuous>  metoprolol succinate  milliGRAM(s) Oral daily  pantoprazole  Injectable 40 milliGRAM(s) IV Push daily  sodium chloride 3%  Inhalation 4 milliLiter(s) Inhalation every 12 hours  sodium chloride 3%  Inhalation 4 milliLiter(s) Inhalation every 12 hours      Allergies    No Known Allergies    Intolerances        SOCIAL HISTORY:  Denies ETOh,Smoking,     FAMILY HISTORY:  FH: heart attack (Father)        REVIEW OF SYSTEMS:  unable to obtained a good review system                                            8.4    26.07 )-----------( 89       ( 23 Sep 2024 05:25 )             27.1       CBC Full  -  ( 23 Sep 2024 05:25 )  WBC Count : 26.07 K/uL  RBC Count : 2.93 M/uL  Hemoglobin : 8.4 g/dL  Hematocrit : 27.1 %  Platelet Count - Automated : 89 K/uL  Mean Cell Volume : 92.5 fl  Mean Cell Hemoglobin : 28.7 pg  Mean Cell Hemoglobin Concentration : 31.0 gm/dL  Auto Neutrophil # : x  Auto Lymphocyte # : x  Auto Monocyte # : x  Auto Eosinophil # : x  Auto Basophil # : x  Auto Neutrophil % : x  Auto Lymphocyte % : x  Auto Monocyte % : x  Auto Eosinophil % : x  Auto Basophil % : x      09-23    145  |  104  |  135[H]  ----------------------------<  167[H]  5.9[H]   |  18[L]  |  7.03[H]    Ca    7.2[L]      23 Sep 2024 11:24    TPro  6.8  /  Alb  2.8[L]  /  TBili  0.9  /  DBili  0.6[H]  /  AST  689[H]  /  ALT  653[H]  /  AlkPhos  120  09-23      CAPILLARY BLOOD GLUCOSE      POCT Blood Glucose.: 145 mg/dL (23 Sep 2024 13:07)  POCT Blood Glucose.: 130 mg/dL (23 Sep 2024 11:07)  POCT Blood Glucose.: 203 mg/dL (23 Sep 2024 09:38)  POCT Blood Glucose.: 151 mg/dL (23 Sep 2024 08:21)  POCT Blood Glucose.: 253 mg/dL (23 Sep 2024 06:00)  POCT Blood Glucose.: 210 mg/dL (23 Sep 2024 00:56)  POCT Blood Glucose.: 226 mg/dL (22 Sep 2024 23:55)  POCT Blood Glucose.: 147 mg/dL (22 Sep 2024 21:24)      Vital Signs Last 24 Hrs  T(C): 36.4 (23 Sep 2024 13:58), Max: 36.5 (23 Sep 2024 00:13)  T(F): 97.6 (23 Sep 2024 13:58), Max: 97.7 (23 Sep 2024 00:13)  HR: 103 (23 Sep 2024 14:36) (100 - 116)  BP: 82/58 (23 Sep 2024 13:58) (82/58 - 111/77)  BP(mean): --  RR: 18 (23 Sep 2024 14:36) (16 - 25)  SpO2: 98% (23 Sep 2024 14:36) (95% - 100%)    Parameters below as of 23 Sep 2024 14:36  Patient On (Oxygen Delivery Method): nasal cannula, high flow  O2 Flow (L/min): 40  O2 Concentration (%): 70    Urinalysis Basic - ( 23 Sep 2024 11:24 )    Color: x / Appearance: x / SG: x / pH: x  Gluc: 167 mg/dL / Ketone: x  / Bili: x / Urobili: x   Blood: x / Protein: x / Nitrite: x   Leuk Esterase: x / RBC: x / WBC x   Sq Epi: x / Non Sq Epi: x / Bacteria: x        PT/INR - ( 23 Sep 2024 04:04 )   PT: 22.9 sec;   INR: 2.23 ratio         PTT - ( 23 Sep 2024 12:37 )  PTT:140.7 sec                PHYSICAL EXAM:    Constitutional: on cpap   HEENT: conjunctive   clear   Neck:  No JVD  Respiratory: decrease bs b/l   Cardiovascular: S1 and S2  Gastrointestinal: BS+, soft, NT/ND  Extremities: No peripheral edema

## 2024-09-23 NOTE — CONSULT NOTE ADULT - ASSESSMENT
89yo m mci/dementia, dorothea, asthma, gerd w large hiatal hernia, pud c/b gastric perforation s/p exlap, pafib, hfref, prostate ca s/p radiation seeds, oa, p/w sob/carter; in er, found to be meeting severe sepsis criteria, in acute hypoxemic respiratory failure; suspect multifactorial 2/2 afib rvr iso covid pna + adhf; admit to medicine for further mgmt (18 Sep 2024 00:52)    ACUTE RENAL FAILURE: due to sepsis , start d5w while pt is npo   Serum creatinine is  at  3.86    , approximating GFR at   ml/min.   There is no progression . No uremic symptoms  pos  evidence of anemia .  Fluid status stable.  Will continue to avoid nephrotoxic drugs.  Patient remains asymptomatic.   Continue current therapy.  hold  diuretic.  hold   ACE inhibitor.  hold   ARB.  Additional evaluation:   ECG,    echocardiogram,     CXR,  will obtained recent   renal ultrasound to evalaute kidney size and possible stones ,    Admit for septic workup and ID evaluation,send blood and urine cx,serial lactate levels,monitor vitals closley,ivfs hydration,monitor urine output and renal profile,iv abx as per id cons  DAPTOmycin IVPB      dexAMETHasone  Injectable 6 milliGRAM(s) IV Push daily    BP monitoring,continue current antihypertensive meds, low salt diet,followup with PMD in 1-2 weeks    ANEMIA PLAN:  Anemia of chronic disease:  We will continue epo  aiming for a HCT of 32-36 %.   We will monitor Iron stores, B12 and RBC folate .  
89 year old with  Dementia   s/p gastric perforation admitted with sepsis and respiratory distress  covid positive and diffuse infiltrates with positive BC for MRSA from 9/17  currently on high considerations of oxygen and decadron and remdesivir   while in the hospital, he has developed ARF  currently minimally responsive     Covid  complete course of remdesivr and decadron as planned   MRSA sepsis CT with bilateral effusions and GG changes   ARF secondary to sepsis but need to be careful with vanco ( dapto cannot be used for MRSA pna but  doubt that his pna is bacterial   Shoulder mass   seen on MRI  Doubt bacterial as present in JULY     suggest   dc vanco  dc cefepime  ( no suggestion of gram negative pna  dapto for mrsa   monitor ARF  repeat BC in 48 hr  echo  not a  candidate for JOSE   prognosis gurarded 
88 y/o M with PMH of asthma, CAROL (not on CPAP), dementia, GERD, large hiatal hernia, PUD c/b gastric perforation s/p exlap, pafib, hfref, prostate ca s/p radiation seeds, OA. Presents with SOB and QUACH. Found to be in acute hypoxemic respiratory failure. +COVID. CT chest with b/l pl effusions, b/l opacities. Course c/b worsening hypoxia s/p RRTs.  
88yo m mci/dementia, dorothea, asthma, gerd w large hiatal hernia, pud c/b gastric perforation s/p exlap, pafib, hfref, prostate ca s/p radiation seeds, oa, p/w sob/carter; in er, found to be meeting severe sepsis criteria, in acute hypoxemic respiratory failure; suspect multifactorial 2/2 afib rvr iso covid pna + adhf. Course c/b worsening renal failure and worsening acidosis. HD not in line with GOC. Palliative consulted for assistance with GOC.

## 2024-09-23 NOTE — CONSULT NOTE ADULT - PROBLEM SELECTOR RECOMMENDATION 4
see [facility] note above  DNR/I can continue HFNC  surrogates are pt's kids, spokesperson is Demarcus (pt's son, pulm-crit physician)   comfort measures only, pending transfer to PCU

## 2024-09-23 NOTE — PROGRESS NOTE ADULT - CONVERSATION DETAILS
Patient accepted by palliative care in PCU . Discussed with family and son agreed with comfort care palliative care. Discussed with  family regarding advanced care planning  for at least 30 minutes. Reviewed MOLST form and decided to keep it as DNR/DNI/ comfort care.
Discussed with son regarding advanced care planning and reviewed MOLST form and  decided to keep as DNR/DNI only/ No comfort care yet. Discussed about prognosis and he disagrees with comfort care at this time.

## 2024-09-23 NOTE — PROGRESS NOTE ADULT - SUBJECTIVE AND OBJECTIVE BOX
infectious diseases progress note:    Patient is a 89y old  Male who presents with a chief complaint of sob/carter (22 Sep 2024 14:29)        Infection due to severe acute respiratory syndrome coronavirus 2 (SARS-CoV-2)          ROS:  CONSTITUTIONAL:  Negative fever or chills, feels well, good appetite  EYES:  Negative  blurry vision or double vision  CARDIOVASCULAR:  Negative for chest pain or palpitations  RESPIRATORY:  Negative for cough, wheezing, or SOB   GASTROINTESTINAL:  Negative for nausea, vomiting, diarrhea, constipation, or abdominal pain  GENITOURINARY:  Negative frequency, urgency or dysuria  NEUROLOGIC:  No headache, confusion, dizziness, lightheadedness    Allergies    No Known Allergies    Intolerances        ANTIBIOTICS/RELEVANT:  antimicrobials  DAPTOmycin IVPB      DAPTOmycin IVPB 500 milliGRAM(s) IV Intermittent every 48 hours    immunologic:  epoetin tania-epbx (RETACRIT) Injectable 09405 Unit(s) SubCutaneous <User Schedule>    OTHER:  acetaminophen     Tablet .. 650 milliGRAM(s) Oral every 6 hours PRN  acetylcysteine 20%  Inhalation 4 milliLiter(s) Inhalation every 12 hours  albuterol/ipratropium for Nebulization 3 milliLiter(s) Nebulizer every 6 hours  aluminum hydroxide/magnesium hydroxide/simethicone Suspension 30 milliLiter(s) Oral every 4 hours PRN  aspirin  chewable 81 milliGRAM(s) Oral daily  benzonatate 100 milliGRAM(s) Oral every 8 hours PRN  chlorhexidine 2% Cloths 1 Application(s) Topical daily  dexAMETHasone  Injectable 6 milliGRAM(s) IV Push daily  dextrose 5%. 1000 milliLiter(s) IV Continuous <Continuous>  dextrose 5%. 1000 milliLiter(s) IV Continuous <Continuous>  dextrose 5%. 1000 milliLiter(s) IV Continuous <Continuous>  dextrose 50% Injectable 12.5 Gram(s) IV Push once  dextrose 50% Injectable 25 Gram(s) IV Push once  dextrose 50% Injectable 25 Gram(s) IV Push once  dextrose Oral Gel 15 Gram(s) Oral once PRN  divalproex  milliGRAM(s) Oral three times a day PRN  glucagon  Injectable 1 milliGRAM(s) IntraMuscular once  guaifenesin/dextromethorphan Oral Liquid 10 milliLiter(s) Oral every 4 hours PRN  heparin   Injectable 2000 Unit(s) IV Push every 6 hours PRN  heparin   Injectable 4500 Unit(s) IV Push every 6 hours PRN  heparin  Infusion.  Unit(s)/Hr IV Continuous <Continuous>  insulin lispro (ADMELOG) corrective regimen sliding scale   SubCutaneous every 6 hours  metoprolol tartrate Injectable 5 milliGRAM(s) IV Push every 6 hours  ondansetron Injectable 4 milliGRAM(s) IV Push every 8 hours PRN  pantoprazole  Injectable 40 milliGRAM(s) IV Push daily  sodium chloride 3%  Inhalation 4 milliLiter(s) Inhalation every 12 hours  sodium chloride 3%  Inhalation 4 milliLiter(s) Inhalation every 12 hours  sodium polystyrene sulfonate Enema 30 Gram(s) Rectal once      Objective:  Vital Signs Last 24 Hrs  T(C): 36.3 (23 Sep 2024 08:01), Max: 36.5 (23 Sep 2024 00:13)  T(F): 97.3 (23 Sep 2024 08:01), Max: 97.7 (23 Sep 2024 00:13)  HR: 100 (23 Sep 2024 08:01) (100 - 136)  BP: 108/68 (23 Sep 2024 08:01) (103/62 - 111/77)  BP(mean): --  RR: 22 (23 Sep 2024 08:01) (11 - 25)  SpO2: 100% (23 Sep 2024 08:01) (95% - 100%)    Parameters below as of 23 Sep 2024 08:01  Patient On (Oxygen Delivery Method): BiPAP/CPAP        PHYSICAL EXAM:  Constitutional:Well-developed, well nourished--no acute distress  Eyes:LAURO, EOMI  Ear/Nose/Throat: no oral lesion, no sinus tenderness on percussion	  Neck:no JVD, no lymphadenopathy, supple  Respiratory: CTA jacobo  Cardiovascular: S1S2 RRR, no murmurs  Gastrointestinal:soft, (+) BS, no HSM  Extremities:no e/e/c        LABS:                        8.4    26.07 )-----------( 89       ( 23 Sep 2024 05:25 )             27.1     09-23    145  |  102  |  143[H]  ----------------------------<  231[H]  5.9[H]   |  17[L]  |  6.71[H]    Ca    7.2[L]      23 Sep 2024 04:04    TPro  6.8  /  Alb  2.8[L]  /  TBili  0.9  /  DBili  0.6[H]  /  AST  689[H]  /  ALT  653[H]  /  AlkPhos  120  09-23    PT/INR - ( 23 Sep 2024 04:04 )   PT: 22.9 sec;   INR: 2.23 ratio         PTT - ( 23 Sep 2024 05:26 )  PTT:96.8 sec  Urinalysis Basic - ( 23 Sep 2024 04:04 )    Color: x / Appearance: x / SG: x / pH: x  Gluc: 231 mg/dL / Ketone: x  / Bili: x / Urobili: x   Blood: x / Protein: x / Nitrite: x   Leuk Esterase: x / RBC: x / WBC x   Sq Epi: x / Non Sq Epi: x / Bacteria: x          MICROBIOLOGY:    RECENT CULTURES:  09-19 @ 08:58 .Blood Blood-Peripheral                No growth at 72 Hours    09-19 @ 08:47 .Blood Blood-Peripheral       Growth in anaerobic bottle: Gram Positive Cocci in Clusters           Growth in anaerobic bottle: Gram Positive Cocci in Clusters    09-17 @ 17:46 Clean Catch Clean Catch (Midstream)                <10,000 CFU/mL Normal Urogenital Maria Guadalupe    09-17 @ 15:30 .Blood Blood-Peripheral                No growth at 5 days    09-17 @ 15:20 .Blood Blood-Peripheral   PCR    Growth in aerobic bottle: Gram Positive Cocci in Clusters  Growth in anaerobic bottle: Gram Positive Cocci in Clusters    Blood Culture PCR  Methicillin resistant Staphylococcus aureus  Blood Culture PCR     Growth in aerobic and anaerobic bottles: Methicillin Resistant  Staphylococcus aureus  Direct identification is available within approximately 3-5  hours either by Blood Panel Multiplexed PCR or Direct  MALDI-TOF. Details: [email or web address]          RESPIRATORY CULTURES:              RADIOLOGY & ADDITIONAL STUDIES:        Pager [phone]  After 5 pm/weekends or if no response :[phone]

## 2024-09-23 NOTE — CONSULT NOTE ADULT - PROBLEM SELECTOR RECOMMENDATION 2
+COVID  -CT chest with COVID PNA  -Continue Remdesivir x 5 days (monitor creatinine and LFTs)  -Continue Decadron 6mg qd x 10 days  -DVT ppx  -Trend inflammatory markers
pt remains on HFNC, as per GOC continue HFNC at this time  started on symptom management medications  0.3mg IVP Dilaudid q2 hours prn for dyspnea/sob  0.4mg IVP Glycopyrrolate q6 hours prn for audible excessive secretions

## 2024-09-23 NOTE — CONSULT NOTE ADULT - PROBLEM SELECTOR RECOMMENDATION 5
will continue to follow for symptoms/goc  for symptoms started  Dilaudid 0.3 mg IV q2h prn dyspnea or tachypnea  Dilaudid 0.3 mg IV q2h prn severe pain and Dilaudid 0.2 mg IV q2h prn moderate pain  Ativan 0.25mg IV q2h prn agitation   Glycopyrrolate 0.4 mg IV q6h prn copious oral secretions   Zofran 4 mg IV q8h prn nausea or vomiting   Can be reached by TEAMS M-F 9-5 Jen Lock Any other time please page [phone] if needed

## 2024-09-23 NOTE — PROVIDER CONTACT NOTE (CRITICAL VALUE NOTIFICATION) - SITUATION
Provider made aware of critical value: ph venous-7.13. No new orders given.
blood cultures from 9/19, growing gram positive cooci in clusters in the anaerobic bottle
blood cultures have gram + cocci in clusters
both bottles of blood cultures grew MRSA

## 2024-09-23 NOTE — PROGRESS NOTE ADULT - SUBJECTIVE AND OBJECTIVE BOX
Patient is a 89y old  Male who presents with a chief complaint of sob/carter (23 Sep 2024 13:20)      INTERVAL HPI/OVERNIGHT EVENTS: Went into acute renal failure  end stage renal disease. Started on dialysis but family requested to stop dialysis and requested palliative/ comfort care. Patient transferred to PCU palliative care unit on  high flow oxygen only. Continuation of IV abx per  primary team in PCU to discuss with family. Otherwise prognosis very poor.      Pain Location & Control:     MEDICATIONS  (STANDING):  chlorhexidine 2% Cloths 1 Application(s) Topical daily  dexAMETHasone  Injectable 6 milliGRAM(s) IV Push daily  pantoprazole  Injectable 40 milliGRAM(s) IV Push daily    MEDICATIONS  (PRN):  acetaminophen  Suppository .. 650 milliGRAM(s) Rectal every 8 hours PRN Temp greater or equal to 38C (100.4F), Mild Pain (1 - 3)  bisacodyl Suppository 10 milliGRAM(s) Rectal daily PRN Constipation  glycopyrrolate Injectable 0.4 milliGRAM(s) IV Push every 6 hours PRN excessive audible secretions  HYDROmorphone  Injectable 0.3 milliGRAM(s) IV Push every 1 hour PRN Dyspnea/SOB  HYDROmorphone  Injectable 0.2 milliGRAM(s) IV Push every 1 hour PRN Moderate Pain (4 - 6)  HYDROmorphone  Injectable 0.3 milliGRAM(s) IV Push every 1 hour PRN Severe Pain (7 - 10)  LORazepam   Injectable 0.25 milliGRAM(s) IV Push every 1 hour PRN anxiety, agitation, delirium      Allergies    No Known Allergies    Intolerances        REVIEW OF SYSTEMS:  UTO     Vital Signs Last 24 Hrs  T(C): 36.4 (23 Sep 2024 13:58), Max: 36.5 (23 Sep 2024 00:13)  T(F): 97.6 (23 Sep 2024 13:58), Max: 97.7 (23 Sep 2024 00:13)  HR: 103 (23 Sep 2024 14:36) (100 - 116)  BP: 82/58 (23 Sep 2024 13:58) (82/58 - 111/77)  BP(mean): --  RR: 18 (23 Sep 2024 14:36) (16 - 25)  SpO2: 98% (23 Sep 2024 14:36) (95% - 100%)    Parameters below as of 23 Sep 2024 14:36  Patient On (Oxygen Delivery Method): nasal cannula, high flow  O2 Flow (L/min): 40  O2 Concentration (%): 70    PHYSICAL EXAM:  GENERAL: NAD, well-groomed, well-developed  HEAD:  Atraumatic, Normocephalic  EYES: EOMI, PERRLA, conjunctiva and sclera clear  ENMT: No tonsillar erythema, exudates, or enlargement; Moist mucous membranes, Good dentition, No lesions  NECK: Supple, No JVD, Normal thyroid  NERVOUS SYSTEM:  Alert & Oriented X 0 , lethargic and not following commands   CHEST/LUNG: Clear to auscultation bilaterally; No rales, rhonchi, wheezing, or rubs  HEART: Regular rate and rhythm; No murmurs, rubs, or gallops  ABDOMEN: Soft, Nontender, Nondistended; Bowel sounds present  EXTREMITIES:  2+ Peripheral Pulses, No clubbing or cyanosis  LYMPH: No lymphadenopathy noted  SKIN: No rashes or lesions      LABS:                        8.4    26.07 )-----------( 89       ( 23 Sep 2024 05:25 )             27.1     23 Sep 2024 11:24    145    |  104    |  135    ----------------------------<  167    5.9     |  18     |  7.03     Ca    7.2        23 Sep 2024 11:24    TPro  6.8    /  Alb  2.8    /  TBili  0.9    /  DBili  0.6    /  AST  689    /  ALT  653    /  AlkPhos  120    23 Sep 2024 04:04    PT/INR - ( 23 Sep 2024 04:04 )   PT: 22.9 sec;   INR: 2.23 ratio         PTT - ( 23 Sep 2024 12:37 )  PTT:140.7 sec  Urinalysis Basic - ( 23 Sep 2024 11:24 )    Color: x / Appearance: x / SG: x / pH: x  Gluc: 167 mg/dL / Ketone: x  / Bili: x / Urobili: x   Blood: x / Protein: x / Nitrite: x   Leuk Esterase: x / RBC: x / WBC x   Sq Epi: x / Non Sq Epi: x / Bacteria: x      CAPILLARY BLOOD GLUCOSE      POCT Blood Glucose.: 145 mg/dL (23 Sep 2024 13:07)  POCT Blood Glucose.: 130 mg/dL (23 Sep 2024 11:07)  POCT Blood Glucose.: 203 mg/dL (23 Sep 2024 09:38)  POCT Blood Glucose.: 151 mg/dL (23 Sep 2024 08:21)  POCT Blood Glucose.: 253 mg/dL (23 Sep 2024 06:00)  POCT Blood Glucose.: 210 mg/dL (23 Sep 2024 00:56)  POCT Blood Glucose.: 226 mg/dL (22 Sep 2024 23:55)  POCT Blood Glucose.: 147 mg/dL (22 Sep 2024 21:24)  POCT Blood Glucose.: 179 mg/dL (22 Sep 2024 18:39)        Cultures  Culture Results:   No growth at 4 days (09-19-24 @ 08:58)  Culture Results:   Growth in anaerobic bottle: Methicillin Resistant Staphylococcus aureus  See previous culture [id number] (09-19-24 @ 08:47)  Culture Results:   <10,000 CFU/mL Normal Urogenital Maria Guadalupe (09-17-24 @ 17:46)  Culture Results:   No growth at 5 days (09-17-24 @ 15:30)  Culture Results:   Growth in aerobic and anaerobic bottles: Methicillin Resistant  Staphylococcus aureus  Direct identification is available within approximately 3-5  hours either by Blood Panel Multiplexed PCR or Direct  MALDI-TOF. Details: [email or web address] (09-17-24 @ 15:20)      RADIOLOGY & ADDITIONAL TESTS:    Imaging Personally Reviewed:  [X ] YES  [ ] NO    Consultant(s) Notes Reviewed:  [ X] YES  [ ] NO    Care Discussed with Consultants/Other Providers [ X] YES  [ ] NO

## 2024-09-23 NOTE — CONSULT NOTE ADULT - SUBJECTIVE AND OBJECTIVE BOX
Date of Service: 09-23-24 @ 13:22    HPI: Narayan Andrade [id number] 89yo m mci/dementia, dorothea, asthma, gerd w large hiatal hernia, pud c/b gastric perforation s/p exlap, pafib, hfref, prostate ca s/p radiation seeds, oa, p/w sob/carter; in er, found to be meeting severe sepsis criteria, in acute hypoxemic respiratory failure; suspect multifactorial 2/2 afib rvr iso covid pna + adhf. Course c/b worsening renal failure and worsening acidosis. HD not in line with GOC. Palliative consulted for assistance with GOC.       PERTINENT PM/SXH:   GERD (gastroesophageal reflux disease)    Asthma    Obstructive sleep apnea on CPAP    Carpal tunnel syndrome    H/O prostate cancer    Hiatal hernia    JULIUS (iron deficiency anemia)      No significant past surgical history    H/O knee surgery      FAMILY HISTORY:  FH: heart attack (Father)        ITEMS NOT CHECKED ARE NOT PRESENT    SOCIAL HISTORY:   Significant other/partner[x ]  Children[x ]  Rastafari/Spirituality:  Substance hx:  [ ]   Tobacco hx:  [ ]   Alcohol hx: [ ]   Home Opioid hx:  [x ] I-Stop Reference No: #: [id number]  Living Situation: [ ]Home  [ x]Long term care  [ ]Rehab [ ]Other    ADVANCE DIRECTIVES:    DNR/MOLST  [ ]  Living Will  [ ]   DECISION MAKER(s):  [ ] Health Care Proxy(s)  [ ] Surrogate(s)  [ ] Guardian           Name(s): Phone Number(s):    BASELINE (I)ADL(s) (prior to admission):  [place]: [ ]Total  [ ] Moderate [ ]Dependent    Allergies    No Known Allergies    Intolerances    MEDICATIONS  (STANDING):  aspirin  chewable 81 milliGRAM(s) Oral daily  chlorhexidine 2% Cloths 1 Application(s) Topical daily  DAPTOmycin IVPB      DAPTOmycin IVPB 500 milliGRAM(s) IV Intermittent every 48 hours  dexAMETHasone  Injectable 6 milliGRAM(s) IV Push daily  dextrose 5%. 1000 milliLiter(s) (30 mL/Hr) IV Continuous <Continuous>  metoprolol tartrate Injectable 5 milliGRAM(s) IV Push every 6 hours  pantoprazole  Injectable 40 milliGRAM(s) IV Push daily  sodium chloride 3%  Inhalation 4 milliLiter(s) Inhalation every 12 hours  sodium chloride 3%  Inhalation 4 milliLiter(s) Inhalation every 12 hours    MEDICATIONS  (PRN):  acetaminophen     Tablet .. 650 milliGRAM(s) Oral every 6 hours PRN Temp greater or equal to 38C (100.4F), Mild Pain (1 - 3)  aluminum hydroxide/magnesium hydroxide/simethicone Suspension 30 milliLiter(s) Oral every 4 hours PRN Dyspepsia  benzonatate 100 milliGRAM(s) Oral every 8 hours PRN Cough  divalproex  milliGRAM(s) Oral three times a day PRN agitation  glycopyrrolate Injectable 0.4 milliGRAM(s) IV Push every 6 hours PRN excessive audible secretions  guaifenesin/dextromethorphan Oral Liquid 10 milliLiter(s) Oral every 4 hours PRN Cough  HYDROmorphone  Injectable 0.3 milliGRAM(s) IV Push every 2 hours PRN Severe Pain (7 - 10)  HYDROmorphone  Injectable 0.2 milliGRAM(s) IV Push every 2 hours PRN Moderate Pain (4 - 6)  HYDROmorphone  Injectable 0.3 milliGRAM(s) IV Push every 2 hours PRN Dyspnea/SOB  LORazepam   Injectable 0.25 milliGRAM(s) IV Push every 2 hours PRN anxiety, agitation, delirium  ondansetron Injectable 4 milliGRAM(s) IV Push every 8 hours PRN Nausea and/or Vomiting    PRESENT SYMPTOMS: [ ]Unable to self-report see CPOT, PAINADs, RDOS  Source if other than patient:  [ ]Family   [ ]Team     Pain: [ ]yes [ ]no  QOL impact -   Location -                    Aggravating factors -  Quality -  Radiation -  Timing-  Severity (0-10 scale):  Minimal acceptable level (0-10 scale):       Dyspnea:                           [ ]Mild [ ]Moderate [ ]Severe  Anxiety:                             [ ]Mild [ ]Moderate [ ]Severe  Fatigue:                             [ ]Mild [ ]Moderate [ ]Severe  Nausea:                             [ ]Mild [ ]Moderate [ ]Severe  Loss of appetite:              [ ]Mild [ ]Moderate [ ]Severe  Constipation:                    [ ]Mild [ ]Moderate [ ]Severe    PCSSQ [Palliative Care Spiritual Screening Question]   Severity (0-10):  Score of 4 or > indicate consideration of Chaplaincy referral.  Chaplaincy Referral: [ ] yes [ ] refused [ ] following    Caregiver [place]? : [ ] yes [ ] no [ ] deferred:  Social work referral [ ] Patient & Family Centered Care Referral [ ]     Anticipatory Grief Present?: [ ] yes [ ] no  [ ] deferred: Palliative Social work referral [ ]  Patient & Family Centered Care Referral [ ]       Other Symptoms:  [ ]All other review of systems negative   [ ] Unable to obtain due to poor mentation    PHYSICAL EXAM:  Vital Signs Last 24 Hrs  T(C): 36.3 (23 Sep 2024 08:01), Max: 36.5 (23 Sep 2024 00:13)  T(F): 97.3 (23 Sep 2024 08:01), Max: 97.7 (23 Sep 2024 00:13)  HR: 101 (23 Sep 2024 09:55) (100 - 125)  BP: 108/68 (23 Sep 2024 08:01) (103/62 - 111/77)  BP(mean): --  RR: 20 (23 Sep 2024 09:55) (11 - 25)  SpO2: 96% (23 Sep 2024 09:55) (95% - 100%)    Parameters below as of 23 Sep 2024 09:55  Patient On (Oxygen Delivery Method): nasal cannula, high flow  O2 Flow (L/min): 40  O2 Concentration (%): 60 I&O's Summary    22 Sep 2024 07:01  -  23 Sep 2024 07:00  --------------------------------------------------------  IN: 0 mL / OUT: 0 mL / NET: 0 mL        GENERAL:  [x]Alert  [x]Oriented x 3  [ ]Lethargic  [ ]Cachexia  [ ]Unarousable  [x]Verbal  [ ]Non-Verbal  Behavioral:   [ ]Anxiety  [ ]Delirium [ ]Agitation [ ]Other  HEENT:  [x]Normal   [ ]Dry mouth   [ ]ET Tube/Trach  [ ]Oral lesions  PULMONARY:   [x]Clear [ ]Tachypnea  [ ]Audible excessive secretions   [ ]Rhonchi        [ ]Right [ ]Left [ ]Bilateral  [ ]Crackles        [ ]Right [ ]Left [ ]Bilateral  [ ]Wheezing     [ ]Right [ ]Left [ ]Bilateral  [ ]Diminished BS [ ] Right [ ]Left [ ]Bilateral  CARDIOVASCULAR:    [x]Regular [ ]Irregular [ ]Tachy  [ ]Tod [ ]Murmur [ ]Other  GASTROINTESTINAL:  [x]Soft  [ ]Distended   [x]+BS  [x]Non tender [ ]Tender  [ ]PEG [ ]OGT/ NGT   Last BM:    GENITOURINARY:  [x]Normal [ ]Incontinent   [ ]Oliguria/Anuria   [ ]Gill  MUSCULOSKELETAL:   [ ]Normal   [x]Weakness  [ ]Bed/Wheelchair bound [ ]Edema  NEUROLOGIC:   [x]No focal deficits  [ ] Cognitive impairment  [ ] Dysphagia [ ]Dysarthria [ ] Paresis [ ]Other   SKIN:   [x]Normal  [ ]Rash   [ ]Pressure ulcer(s) [ ]y [ ]n present on admission    CRITICAL CARE:  [ ] Shock Present  [ ]Septic [ ]Cardiogenic [ ]Neurologic [ ]Hypovolemic  [ ]  Vasopressors [ ]  Inotropes   [ ]Respiratory failure present [ ]Mechanical ventilation [ ]Non-invasive ventilatory support [ ]High flow    [ ]Acute  [ ]Chronic [ ]Hypoxic  [ ]Hypercarbic [ ]Other  [ ]Other organ failure     LABS:                        8.4    26.07 )-----------( 89       ( 23 Sep 2024 05:25 )             27.1   09-23    145  |  104  |  135[H]  ----------------------------<  167[H]  5.9[H]   |  18[L]  |  7.03[H]    Ca    7.2[L]      23 Sep 2024 11:24    TPro  6.8  /  Alb  2.8[L]  /  TBili  0.9  /  DBili  0.6[H]  /  AST  689[H]  /  ALT  653[H]  /  AlkPhos  120  09-23  PT/INR - ( 23 Sep 2024 04:04 )   PT: 22.9 sec;   INR: 2.23 ratio         PTT - ( 23 Sep 2024 12:37 )  PTT:140.7 sec    Urinalysis Basic - ( 23 Sep 2024 11:24 )    Color: x / Appearance: x / SG: x / pH: x  Gluc: 167 mg/dL / Ketone: x  / Bili: x / Urobili: x   Blood: x / Protein: x / Nitrite: x   Leuk Esterase: x / RBC: x / WBC x   Sq Epi: x / Non Sq Epi: x / Bacteria: x      RADIOLOGY & ADDITIONAL STUDIES:    PROTEIN CALORIE MALNUTRITION PRESENT: [ ]mild [ ]moderate [ ]severe [ ]underweight [ ]morbid obesity  https://www.andeal.org/vault/0860/web/files/ONC/Table_Clinical%20Characteristics%20to%20Document%20Malnutrition-White%20JV%20et%20al%202012.pdf    Height (cm): 170.2 (09-17-24 @ 16:06), 170.2 (08-14-24 @ 10:29), 170.2 (07-24-24 @ 18:38)  Weight (kg): 59 (09-17-24 @ 16:06), 72.6 (08-14-24 @ 10:29), 81.6 (07-24-24 @ 18:38)  BMI (kg/m2): 20.4 (09-17-24 @ 16:06), 25.1 (08-14-24 @ 10:29), 28.2 (07-24-24 @ 18:38)    [ ]PPSV2 < or = to 30% [ ]significant weight loss  [ ]poor nutritional intake  [ ]anasarca[ ]Artificial Nutrition      Other REFERRALS:  [ ]Hospice  [ ]Child Life  [ ]Social Work  [ ]Case management [ ]Holistic Therapy     Care Coordination Assessment 201 [C. Provider] (09-18-24 @ 11:06)      Palliative Performance Scale:  http://npcrc.org/files/news/palliative_performance_scale_ppsv2.pdf  (Ctrl +  left click to view)  Respiratory Distress Observation Tool:  https://homecareinformation.net/handouts/hen/Respiratory_Distress_Observation_Scale.pdf (Ctrl +  left click to view)  PAINAD Score:  http://geriatrictoolkit.[place].[facility]/cog/painad.pdf (Ctrl +  left click to view)   Date of Service: 09-23-24 @ 13:22    HPI:  88yo m mci/dementia, dorothea, asthma, gerd w large hiatal hernia, pud c/b gastric perforation s/p exlap, pafib, hfref, prostate ca s/p radiation seeds, oa, p/w sob/carter; in er, found to be meeting severe sepsis criteria, in acute hypoxemic respiratory failure; suspect multifactorial 2/2 afib rvr iso covid pna + adhf. Course c/b worsening renal failure and worsening acidosis. HD not in line with GOC. Palliative consulted for assistance with GOC.       PERTINENT PM/SXH:   GERD (gastroesophageal reflux disease)    Asthma    Obstructive sleep apnea on CPAP    Carpal tunnel syndrome    H/O prostate cancer    Hiatal hernia    JULIUS (iron deficiency anemia)      No significant past surgical history    H/O knee surgery      FAMILY HISTORY:  FH: heart attack (Father)        ITEMS NOT CHECKED ARE NOT PRESENT    SOCIAL HISTORY:   Significant other/partner[x ]  Children[x ]  Quaker/Spirituality:  Substance hx:  [ ]   Tobacco hx:  [ ]   Alcohol hx: [ ]   Home Opioid hx:  [x ] I-Stop Reference No: #: [id number]  Living Situation: [ ]Home  [ x]Long term care  [ ]Rehab [ ]Other    ADVANCE DIRECTIVES:    DNR/MOLST  [x ]  Living Will  [ ]   DECISION MAKER(s):  [ ] Health Care Proxy(s)  [x ] Surrogate(s)  [ ] Guardian           Name(s): Phone Number(s): pt's children, spokesperson is Demarcus (son)     BASELINE (I)ADL(s) (prior to admission):  [place]: [ ]Total  [ ] Moderate [x ]Dependent    Allergies    No Known Allergies    Intolerances    MEDICATIONS  (STANDING):  aspirin  chewable 81 milliGRAM(s) Oral daily  chlorhexidine 2% Cloths 1 Application(s) Topical daily  DAPTOmycin IVPB      DAPTOmycin IVPB 500 milliGRAM(s) IV Intermittent every 48 hours  dexAMETHasone  Injectable 6 milliGRAM(s) IV Push daily  dextrose 5%. 1000 milliLiter(s) (30 mL/Hr) IV Continuous <Continuous>  metoprolol tartrate Injectable 5 milliGRAM(s) IV Push every 6 hours  pantoprazole  Injectable 40 milliGRAM(s) IV Push daily  sodium chloride 3%  Inhalation 4 milliLiter(s) Inhalation every 12 hours  sodium chloride 3%  Inhalation 4 milliLiter(s) Inhalation every 12 hours    MEDICATIONS  (PRN):  acetaminophen     Tablet .. 650 milliGRAM(s) Oral every 6 hours PRN Temp greater or equal to 38C (100.4F), Mild Pain (1 - 3)  aluminum hydroxide/magnesium hydroxide/simethicone Suspension 30 milliLiter(s) Oral every 4 hours PRN Dyspepsia  benzonatate 100 milliGRAM(s) Oral every 8 hours PRN Cough  divalproex  milliGRAM(s) Oral three times a day PRN agitation  glycopyrrolate Injectable 0.4 milliGRAM(s) IV Push every 6 hours PRN excessive audible secretions  guaifenesin/dextromethorphan Oral Liquid 10 milliLiter(s) Oral every 4 hours PRN Cough  HYDROmorphone  Injectable 0.3 milliGRAM(s) IV Push every 2 hours PRN Severe Pain (7 - 10)  HYDROmorphone  Injectable 0.2 milliGRAM(s) IV Push every 2 hours PRN Moderate Pain (4 - 6)  HYDROmorphone  Injectable 0.3 milliGRAM(s) IV Push every 2 hours PRN Dyspnea/SOB  LORazepam   Injectable 0.25 milliGRAM(s) IV Push every 2 hours PRN anxiety, agitation, delirium  ondansetron Injectable 4 milliGRAM(s) IV Push every 8 hours PRN Nausea and/or Vomiting    PRESENT SYMPTOMS: [ x]Unable to self-report see CPOT, PAINADs, RDOS  Source if other than patient:  [ ]Family   [xTeam     Pain: [ ]yes [ ]no  QOL impact -   Location -                    Aggravating factors -  Quality -  Radiation -  Timing-  Severity (0-10 scale):  Minimal acceptable level (0-10 scale):       Dyspnea:                           [ ]Mild [ ]Moderate [ ]Severe  Anxiety:                             [ ]Mild [ ]Moderate [ ]Severe  Fatigue:                             [ ]Mild [ ]Moderate [ ]Severe  Nausea:                             [ ]Mild [ ]Moderate [ ]Severe  Loss of appetite:              [ ]Mild [ ]Moderate [ ]Severe  Constipation:                    [ ]Mild [ ]Moderate [ ]Severe    PCSSQ [Palliative Care Spiritual Screening Question]   Severity (0-10):  Score of 4 or > indicate consideration of Chaplaincy referral.  Chaplaincy Referral: [ ] yes [ ] refused [ ] following    Caregiver [place]? : [ ] yes [ ] no [ ] deferred:  Social work referral [ ] Patient & Family Centered Care Referral [ ]     Anticipatory Grief Present?: [ ] yes [ ] no  [ ] deferred: Palliative Social work referral [ ]  Patient & Family Centered Care Referral [ ]       Other Symptoms:  [ ]All other review of systems negative   [x ] Unable to obtain due to poor mentation    PHYSICAL EXAM:  Vital Signs Last 24 Hrs  T(C): 36.3 (23 Sep 2024 08:01), Max: 36.5 (23 Sep 2024 00:13)  T(F): 97.3 (23 Sep 2024 08:01), Max: 97.7 (23 Sep 2024 00:13)  HR: 101 (23 Sep 2024 09:55) (100 - 125)  BP: 108/68 (23 Sep 2024 08:01) (103/62 - 111/77)  BP(mean): --  RR: 20 (23 Sep 2024 09:55) (11 - 25)  SpO2: 96% (23 Sep 2024 09:55) (95% - 100%)    Parameters below as of 23 Sep 2024 09:55  Patient On (Oxygen Delivery Method): nasal cannula, high flow  O2 Flow (L/min): 40  O2 Concentration (%): 60 I&O's Summary    22 Sep 2024 07:01  -  23 Sep 2024 07:00  --------------------------------------------------------  IN: 0 mL / OUT: 0 mL / NET: 0 mL        GENERAL:  [ ]Alert  []Oriented x  [x ]Lethargic  [ ]Cachexia  [ ]Unarousable  [x]Verbal  [ ]Non-Verbal  Behavioral:   [ ]Anxiety  [ ]Delirium [ ]Agitation [ ]Other  HEENT:  [ ]Normal   [x ]Dry mouth   [ ]ET Tube/Trach  [ ]Oral lesions  PULMONARY:   [ ]Clear [x ]Tachypnea  [ ]Audible excessive secretions   [ ]Rhonchi        [ ]Right [ ]Left [ ]Bilateral  [ ]Crackles        [ ]Right [ ]Left [ ]Bilateral  [ ]Wheezing     [ ]Right [ ]Left [ ]Bilateral  [x ]Diminished BS [ ] Right [ ]Left [x ]Bilateral  CARDIOVASCULAR:    [ ]Regular [x ]Irregular [ ]Tachy  [ ]Tod [ ]Murmur [ ]Other  GASTROINTESTINAL:  [x]Soft  [ ]Distended   [x]+BS  [x]Non tender [ ]Tender  [ ]PEG [ ]OGT/ NGT   Last BM:    GENITOURINARY:  [ ]Normal [ ]Incontinent   [ ]Oliguria/Anuria   [ ]Gill  MUSCULOSKELETAL:   [ ]Normal   [x]Weakness  [x ]Bed/Wheelchair bound [ ]Edema  NEUROLOGIC:   [ ]No focal deficits  [ x] Cognitive impairment  [ ] Dysphagia [ ]Dysarthria [ ] Paresis [ ]Other   SKIN:   [x]Normal  [ ]Rash   [ ]Pressure ulcer(s) [ ]y [ ]n present on admission    CRITICAL CARE:  [ ] Shock Present  [ ]Septic [ ]Cardiogenic [ ]Neurologic [ ]Hypovolemic  [ ]  Vasopressors [ ]  Inotropes   [ ]Respiratory failure present [ ]Mechanical ventilation [ ]Non-invasive ventilatory support [ ]High flow    [ ]Acute  [ ]Chronic [ ]Hypoxic  [ ]Hypercarbic [ ]Other  [ ]Other organ failure     LABS:                        8.4    26.07 )-----------( 89       ( 23 Sep 2024 05:25 )             27.1   09-23    145  |  104  |  135[H]  ----------------------------<  167[H]  5.9[H]   |  18[L]  |  7.03[H]    Ca    7.2[L]      23 Sep 2024 11:24    TPro  6.8  /  Alb  2.8[L]  /  TBili  0.9  /  DBili  0.6[H]  /  AST  689[H]  /  ALT  653[H]  /  AlkPhos  120  09-23  PT/INR - ( 23 Sep 2024 04:04 )   PT: 22.9 sec;   INR: 2.23 ratio         PTT - ( 23 Sep 2024 12:37 )  PTT:140.7 sec    Urinalysis Basic - ( 23 Sep 2024 11:24 )    Color: x / Appearance: x / SG: x / pH: x  Gluc: 167 mg/dL / Ketone: x  / Bili: x / Urobili: x   Blood: x / Protein: x / Nitrite: x   Leuk Esterase: x / RBC: x / WBC x   Sq Epi: x / Non Sq Epi: x / Bacteria: x      RADIOLOGY & ADDITIONAL STUDIES:  < from: CT Angio Chest PE Protocol w/ IV Cont (09.17.24 @ 20:46) >    ACC: [id number] EXAM:  CT ANGIO CHEST PULM ART WAWI   ORDERED BY: GENARO WALKER     PROCEDURE DATE:  09/17/2024          INTERPRETATION:  CLINICAL INFORMATION: Shortness of breath, evaluate for   pulmonary embolism.    COMPARISON: CT chest 8/14/2024.    CONTRAST/COMPLICATIONS:  IV Contrast: Omnipaque 350  60 cc administered   40 cc discarded  Oral Contrast: NONE  Complications: None reported at time of study completion    PROCEDURE:  CT Angiogram of the chest was obtained with intravenous contrast. Three   dimensional maximum intensity projection (MIP) images were generated.    FINDINGS:    AIRWAYS/LUNGS/PLEURA: Nonocclusive layering secretions in the distal   trachea. Otherwise patent central airways. Multifocal bilateral   groundglass opacities. Interlobular septal thickening. Moderate right   greater than left pleural effusions with adjacent compressive   atelectasis, similar to prior.    MEDIASTINUM AND ZANE: No lymphadenopathy. Mildly prominent nonspecific   paratracheal lymph nodesmeasuring up to 9 mm.    PULMONARY ANGIOGRAM: No pulmonary embolism with limited evaluation of the   subsegmental branches in the lower lobes.    VESSELS: Aortic calcifications. Coronary artery calcifications.    HEART: Stable heart size. No pericardial effusion. Mitral annular   calcification.    VISUALIZED UPPER ABDOMEN: Moderate hiatal hernia with a large portion of   the stomach within the thorax.    CHEST WALL AND BONES: Scoliosis and degenerative changes of the spine.    IMPRESSION:    No pulmonary embolism in the central, lobar, or segmental pulmonary   arteries.    Moderate right greater than left pleural effusions with interlobular   septal thickening and patchy bilateral groundglass opacities suggestive   of pulmonary edema. Superimposed infection cannot be entirely excluded.    --- End of Report ---           PETER GUERIN DO; Resident Radiologist  This document has been electronically signed.   SHERRIE PINTO MD; Attending Radiologist  This document has been electronically signed. Sep 17 2024  9:40PM    < end of copied text >    PROTEIN CALORIE MALNUTRITION PRESENT: [ ]mild [ ]moderate [ ]severe [ ]underweight [ ]morbid obesity  https://www.andeal.org/vault/2440/web/files/ONC/Table_Clinical%20Characteristics%20to%20Document%20Malnutrition-White%20JV%20et%20al%968746.pdf    Height (cm): 170.2 (09-17-24 @ 16:06), 170.2 (08-14-24 @ 10:29), 170.2 (07-24-24 @ 18:38)  Weight (kg): 59 (09-17-24 @ 16:06), 72.6 (08-14-24 @ 10:29), 81.6 (07-24-24 @ 18:38)  BMI (kg/m2): 20.4 (09-17-24 @ 16:06), 25.1 (08-14-24 @ 10:29), 28.2 (07-24-24 @ 18:38)    [ x]PPSV2 < or = to 30% [ ]significant weight loss  [ ]poor nutritional intake  [ ]anasarca[ ]Artificial Nutrition      Other REFERRALS:  [ ]Hospice  [ ]Child Life  [ ]Social Work  [ ]Case management [ ]Holistic Therapy     Care Coordination Assessment 201 [C. Provider] (09-18-24 @ 11:06)      Palliative Performance Scale:  http://npcrc.org/files/news/palliative_performance_scale_ppsv2.pdf  (Ctrl +  left click to view)  Respiratory Distress Observation Tool:  https://homecareinformation.net/handouts/hen/Respiratory_Distress_Observation_Scale.pdf (Ctrl +  left click to view)  PAINAD Score:  http://geriatrictoolkit.[place].[facility]/cog/painad.pdf (Ctrl +  left click to view)

## 2024-09-23 NOTE — CONSULT NOTE ADULT - CONVERSATION DETAILS
Spoke with pt's son Demarcus via phone. 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 Reviewed conversation held with Pulm ACP this morning. He stated that he and his family are aware that his dad is at end of life and his current condition is not a quality of life he would find meaningful. He shared AVAPs and HD are not in line with GOC as previously discussed.   Discussed transition to comfort care and transfer to the PCU. He feels PCU is in line with GOC. He would like to continue HFNC as he feels this is not causing his father discomfort but is amenable to initiation of prn symptom medications, deescalation of continuous monitoring, as well as discontinuation of blood draws and non-essential medications.   [facility], Demarcus stated he has 2 siblings all of whom are involved in dad's care. They shared the same philosophy and plan of care for dad wanting to optimize comfort and he updates them with medical information as they all work. Palliative contact information provided if any of the pt's children have questions regarding plan of care. Emotional support provided.

## 2024-09-24 NOTE — PROGRESS NOTE ADULT - TIME BILLING
Discussed with PA, nurses and pulmonologist  about antibiotic therapy.
Discussed palliative team
Discussed with ACP NP.
Discussed with  cardiology and son
Discussed with ACP PA and son
minutes spent on total encounter. The necessity of the time spent during the encounter on this date of service was due to:     Total time spent including the following  [ x] Physical chart review and documentation   An extensive review of the physical chart, electronic health record, and documentation was conducted to obtain collateral information including but not limited to:   - Current inpatient records (ED, H&P, primary team, and consultants [IR])   - Inpatient values/results (CBC, CMP, CT)   - Prior inpatient records (prior GaP notes when he was admitted to [facility])   - Current or proposed treatment plans   - Pharmacotherapy review   [ x]discussion with the interdisciplinary palliative care team -RN, , clinicians, trainees,   [ x]discussion with the patient/family/decision maker  [x ]Physical Exam and/or review of systems   [ x]Formulation of assessment and plan   [ x]Evaluating for response to treatment and side effects of opioids or benzodiazepines.  [x ]Review of care coordination documentation.

## 2024-09-24 NOTE — PROGRESS NOTE ADULT - ASSESSMENT
88yo m mci/dementia, dorothea, asthma, gerd w large hiatal hernia, pud c/b gastric perforation s/p exlap, pafib, hfref, prostate ca s/p radiation seeds, oa, p/w sob/carter; in er, found to be meeting severe sepsis criteria, in acute hypoxemic respiratory failure; suspect multifactorial 2/2 afib rvr iso covid pna + adhf; admit to medicine for further mgmt (18 Sep 024 00:52)    ACUTE RENAL FAILURE: oliguric atn , discuss with son who is pul , critical car md and is refusing hemodialysis    due to sepsis , start d5w while pt is npo   Serum creatinine is  increasing   f/u with palliative care 
89 year old with  Dementia   s/p gastric perforation admitted with sepsis and respiratory distress  covid positive and diffuse infiltrates with positive BC for MRSA from 9/17  currently on high considerations of oxygen and decadron and remdesivir   while in the hospital, he has developed ARF  currently minimally responsive     Covid  complete course of remdesivr and decadron as planned   MRSA sepsis CT with bilateral effusions and GG changes doubt MRSA or bacterial pna   ARF secondary to sepsis but need to be careful with vanco ( dapto cannot be used for MRSA pna but  doubt that his pna is bacterial   Shoulder mass   seen on MRI  Doubt bacterial as present in JULY   repeat blood Cx (9/19): GPC (1/4 bottles)    Recommendations:   Daptomycin dose decreased to q48hrs (worsening kidney function)  Complete therapy for Covid   Send repeat blood cx X2    Weekly CPK  at high risk for aspiration  can add ab for the latter if there is a change .  GOC discussion  prognosis poor             
89yo m mci/dementia, dorothea, asthma, gerd w large hiatal hernia, pud c/b gastric perforation s/p exlap, pafib, hfref, prostate ca s/p radiation seeds, oa, p/w sob/carter; in er, found to be meeting severe sepsis criteria, in acute hypoxemic respiratory failure; suspect multifactorial 2/2 afib rvr iso covid pna + adhf; admit to medicine for further mgmt (18 Sep 2024 00:52)    ACUTE RENAL FAILURE: due to sepsis , start d5w while pt is npo   Serum creatinine is  at  3.86    , approximating GFR at   ml/min.   There is no progression . No uremic symptoms  pos  evidence of anemia .  Fluid status stable.  Will continue to avoid nephrotoxic drugs.  Patient remains asymptomatic.   Continue current therapy.  hold  diuretic.  hold   ACE inhibitor.  hold   ARB.  Additional evaluation:   ECG,    echocardiogram,     CXR,  will obtained recent   renal ultrasound to evalaute kidney size and possible stones ,    Admit for septic workup and ID evaluation,send blood and urine cx,serial lactate levels,monitor vitals closley,ivfs hydration,monitor urine output and renal profile,iv abx as per id cons  DAPTOmycin IVPB      dexAMETHasone  Injectable 6 milliGRAM(s) IV Push daily    BP monitoring,continue current antihypertensive meds, low salt diet,followup with PMD in 1-2 weeks    ANEMIA PLAN:  Anemia of chronic disease:  We will continue epo  aiming for a HCT of 32-36 %.   We will monitor Iron stores, B12 and RBC folate .  
89 year old with  Dementia   s/p gastric perforation admitted with sepsis and respiratory distress  covid positive and diffuse infiltrates with positive BC for MRSA from 9/17  currently on high considerations of oxygen and decadron and remdesivir   while in the hospital, he has developed ARF  currently minimally responsive     Covid  complete course of remdesivr and decadron as planned   MRSA sepsis CT with bilateral effusions and GG changes doubt MRSA or bacterial pna   ARF secondary to sepsis but need to be careful with vanco ( dapto cannot be used for MRSA pna but  doubt that his pna is bacterial   Shoulder mass   seen on MRI  Doubt bacterial as present in JULY     suggest    continue dapto  complete therapy for Covid   follow up BC  check CK q week  at high risk for aspiration  can add ab for the latter if there is a change .  GOCdiscussion  prognosis poor  unresponiovethis am 
89 year old with  Dementia   s/p gastric perforation admitted with sepsis and respiratory distress  covid positive and diffuse infiltrates with positive BC for MRSA from 9/17  currently on high considerations of oxygen and decadron and remdesivir   while in the hospital, he has developed ARF  currently minimally responsive     Covid  complete course of remdesivr and decadron as planned   MRSA sepsis CT with bilateral effusions and GG changes doubt MRSA or bacterial pna   ARF secondary to sepsis but need to be careful with vanco ( dapto cannot be used for MRSA pna but  doubt that his pna is bacterial   Shoulder mass   seen on MRI  Doubt bacterial as present in JULY   repeat blood Cx (9/19): GPC (1/4 bottles)    Recommendations:   Daptomycin dose decreased to q48hrs (worsening kidney function)  Complete therapy for Covid   Send repeat blood cx X2   Get a TTE  Weekly CPK  at high risk for aspiration  can add ab for the latter if there is a change .  GOC discussion  prognosis poor    Plan discussed with primary team     Lencho Bocanegra MD  ID physician  [facility] [employer] Preferred  After 5pm/weekends call [phone]      
88 y/o M with PMH of asthma, CAROL (not on CPAP), dementia, GERD, large hiatal hernia, PUD c/b gastric perforation s/p exlap, pafib, hfref, prostate ca s/p radiation seeds, OA. Presents with SOB and QUACH. Found to be in acute hypoxemic respiratory failure. +COVID. CT chest with b/l pl effusions, b/l opacities. Course c/b worsening hypoxia s/p RRTs.  
88yo m mci/dementia, dorothea, asthma, gerd w large hiatal hernia, pud c/b gastric perforation s/p exlap, pafib, hfref, prostate ca s/p radiation seeds, oa, p/w sob/carter; in er, found to be meeting severe sepsis criteria, in acute hypoxemic respiratory failure; suspect multifactorial 2/2 afib rvr iso covid pna + adhf; admit to medicine for further mgmt (18 Sep 2024 00:52)    ACUTE RENAL FAILURE: oliguric atn , discuss with son who is pul , critical car md and is refusing hemodialysis    due to sepsis , start d5w while pt is npo   Serum creatinine is  increasing   There is no progression . No uremic symptoms  pos  evidence of anemia .  Fluid status stable.  Will continue to avoid nephrotoxic drugs.  Patient remains asymptomatic.   Continue current therapy.  hold  diuretic.  hold   ACE inhibitor.  hold   ARB.    hyperkalemia on kayxalate     Admit for septic workup and ID evaluation,send blood and urine cx,serial lactate levels,monitor vitals closley,ivfs hydration,monitor urine output and renal profile,iv abx as per id cons  DAPTOmycin IVPB      dexAMETHasone  Injectable 6 milliGRAM(s) IV Push daily    BP monitoring,continue current antihypertensive meds, low salt diet,followup with PMD in 1-2 weeks    ANEMIA PLAN:  Anemia of chronic disease:  We will continue epo  aiming for a HCT of 32-36 %.   We will monitor Iron stores, B12 and RBC folate .  
89yo m mci/dementia, dorothea, asthma, gerd w large hiatal hernia, pud c/b gastric perforation s/p exlap, pafib, hfref, prostate ca s/p radiation seeds, oa, p/w sob/carter; in er, found to be meeting severe sepsis criteria, in acute hypoxemic respiratory failure; suspect multifactorial 2/2 afib rvr iso covid pna + adhf; admit to medicine for further mgmt.    Problem/Plan - 1:  ·  Problem: Severe sepsis.   ·  Plan: leukocytosis, tachycardic, febrile + lactic acidosis; meets severe sepsis criteria  source likely covid, +/- superimposed pna, and/or shingles   s/p vanc + cefepime + 1.5 L LR i nER  trend lactate q4-6h until wnl  follow up blood cultures    Monitor for fever, changes in white count   abx therapy as described below.  Problem/Plan - 2:  ·  Problem: Acute hypoxemic respiratory failure.   ·  Plan: due to  pleural effusion/  COVID PNA/ bacterial PNA superimposed/ CHF  - Treat with IV Remdesivir, Decadron and IV abx . On AVAPS. F/u pulmonologist and cardiologist.   Problem/Plan - 3:  ·  Problem: Pneumonia due to COVID-19 virus.   ·  Plan: covid +  ct imaging showing possible superimposed bacterial pna  follow up sputum culture, atypical pna work up, mrsa screen  trend inflammatory markers   moinitor for fever, changes in white count  isolation precautions  bronchodilators + oxygen supplementation  as needed to maintain goal  symptomatic relief with antitussives, mucolytics, antipyretics  start remdesivir + dexamethasone.  Problem/Plan - 4:  ·  Problem: Acute decompensated heart failure.   ·  Plan: no clinft of acs; trop 89->82, 2/2 demand; ekg with no new st seg - t wave changes suggestive of acute ischemia  ct imaging suggestive of pulm edema; bnp ~25k; tte shows, "Left ventricular cavity is small. Left ventricular wall thickness is normal. Left ventricular systolic function is moderately decreased with an ejection fraction of 40 % by Sharma's method of disks. Global left ventricular hypokinesis....Multiple segmental abnormalities exist. ..... There is mild (grade 1) left ventricular diastolic dysfunction, with elevated filling pressure. ....Moderate aortic regurgitation."  trend volume status via I/Os, daily weights; salt and fluid restriction   diurese w lasix 20 ivp bid; adjust to maintain goal net negative fluid balance  cards consult in am.  Problem/Plan - 5:  ·  Problem: Atrial fibrillation with RVR.   ·  Plan: likely being driven by sepsis, hypoxemia, adhf  ekg shows afib rvr @ 126/min  monitor on telemetry  npo so switch po eliquis to ufh gtt  rate control w toprol   lopressor 2.5-5mg ivp for sustained rates of >120/min.  Problem/Plan - 6:  ·  Problem: Parotitis.   ·  Plan: ct imaging shows "Marked soft tissue swelling centered around the left parotid gland and adjacent soft tissues, incompletely visualized. Within the limitations of the study, no loculated collections are definitely seen."  consider us/ct w iv contrast   broad spec abx as above  id consult in am.  Problem/Plan - 7:  ·  Problem: Lesion of left shoulder.   ·  Plan: mr left shoulder joint from prev hospitalization reviewed; "Large rim-enhancing markedly T2 hyperintense lesion within the proximal humerus measuring 5.1 cm. Notable soft tissue extension of this process into the superior cuff fibers, as described. Markedly heterogeneously enhancing synovial/intra-articular involvement of this process measuring 5.2 cm (series 13 image 25) which is indeterminate, and has an enhancement pattern in appearance which differs from the intraosseous component. This could represent extension of the lesion into soft tissue versus possibly localized adjacent synovitis. Advise histopathological correlation. Moderate glenohumeral joint effusion. 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 If clinically indicated correlation with a staging FDG PET-CT and/or biopsy may be of additional value"  clarify goc in am prior to pursuing further work up.  Problem / Plan 8  Problem : bacteremia with MRSA  Plan: IV Daptomycin . F/u ID.   Problem/ Plan 9  Problem: Acute renal failure.   Plan: Renal ultrasound normal no obstruction. F/u nephrology. Consider to taper dose of Lasix per cardiologist .   Problem/ Plan 10:   Problem: hyperkalemia   Plan: F/u nephrology.   Problem/ Plan 11:   Problem: hypernatremia    Plan: F/u nephrology.     palliative care- Patient accepted by palliative care in PCU  unit per family request. 
89 y/o M with PMH of asthma, CAROL (not on CPAP), dementia, GERD, large hiatal hernia, PUD c/b gastric perforation s/p exlap, pafib, hfref, prostate ca s/p radiation seeds, OA. Presents with SOB and QUACH. Found to be in acute hypoxemic respiratory failure. +COVID. CT chest with b/l pl effusions, b/l opacities. Course c/b worsening hypoxia s/p RRTs.  
89 y/o M with PMH of asthma, CAROL (not on CPAP), dementia, GERD, large hiatal hernia, PUD c/b gastric perforation s/p exlap, pafib, hfref, prostate ca s/p radiation seeds, OA. Presents with SOB and QUACH. Found to be in acute hypoxemic respiratory failure. +COVID. CT chest with b/l pl effusions, b/l opacities. Course c/b worsening hypoxia s/p RRTs.  
89yo m mci/dementia, dorothea, asthma, gerd w large hiatal hernia, pud c/b gastric perforation s/p exlap, pafib, hfref, prostate ca s/p radiation seeds, oa, p/w sob/carter; in er, found to be meeting severe sepsis criteria, in acute hypoxemic respiratory failure; suspect multifactorial 2/2 afib rvr iso covid pna + adhf; admit to medicine for further mgmt.    Problem/Plan - 1:  ·  Problem: Severe sepsis.   ·  Plan: leukocytosis, tachycardic, febrile + lactic acidosis; meets severe sepsis criteria  source likely covid, +/- superimposed pna, and/or shingles   s/p vanc + cefepime + 1.5 L LR i nER  trend lactate q4-6h until wnl  follow up blood cultures    Monitor for fever, changes in white count  broad spec empirical abx therapy as described below.  Problem/Plan - 2:  ·  Problem: Acute hypoxemic respiratory failure.   ·  Plan: due to  pleural effusion/  COVID PNA/ bacterial PNA superimposed/ CHF  - Treat with IV Remdesivir, Decadron and IV Cefepime. On AVAPS. F/u pulmonologist and cardiologist.   Problem/Plan - 3:  ·  Problem: Pneumonia due to COVID-19 virus.   ·  Plan: covid +  ct imaging showing possible superimposed bacterial pna  follow up sputum culture, atypical pna work up, mrsa screen  trend inflammatory markers   moinitor for fever, changes in white count  isolation precautions  empirical abx with ceftriaxone and doxy  bronchodilators + oxygen supplementation  as needed to maintain goal  symptomatic relief with antitussives, mucolytics, antipyretics  start remdesivir + dexamethasone.  Problem/Plan - 4:  ·  Problem: Acute decompensated heart failure.   ·  Plan: no clinft of acs; trop 89->82, 2/2 demand; ekg with no new st seg - t wave changes suggestive of acute ischemia  ct imaging suggestive of pulm edema; bnp ~25k; tte shows, "Left ventricular cavity is small. Left ventricular wall thickness is normal. Left ventricular systolic function is moderately decreased with an ejection fraction of 40 % by Sharma's method of disks. Global left ventricular hypokinesis....Multiple segmental abnormalities exist. ..... There is mild (grade 1) left ventricular diastolic dysfunction, with elevated filling pressure. ....Moderate aortic regurgitation."  trend volume status via I/Os, daily weights; salt and fluid restriction   diurese w lasix 20 ivp bid; adjust to maintain goal net negative fluid balance  cards consult in am.  Problem/Plan - 5:  ·  Problem: Atrial fibrillation with RVR.   ·  Plan: likely being driven by sepsis, hypoxemia, adhf  ekg shows afib rvr @ 126/min  monitor on telemetry  npo so switch po eliquis to ufh gtt  rate control w toprol   lopressor 2.5-5mg ivp for sustained rates of >120/min.  Problem/Plan - 6:  ·  Problem: Parotitis.   ·  Plan: ct imaging shows "Marked soft tissue swelling centered around the left parotid gland and adjacent soft tissues, incompletely visualized. Within the limitations of the study, no loculated collections are definitely seen."  consider us/ct w iv contrast   broad spec abx as above  id consult in am.  Problem/Plan - 7:  ·  Problem: Lesion of left shoulder.   ·  Plan: mr left shoulder joint from prev hospitalization reviewed; "Large rim-enhancing markedly T2 hyperintense lesion within the proximal humerus measuring 5.1 cm. Notable soft tissue extension of this process into the superior cuff fibers, as described. Markedly heterogeneously enhancing synovial/intra-articular involvement of this process measuring 5.2 cm (series 13 image 25) which is indeterminate, and has an enhancement pattern in appearance which differs from the intraosseous component. This could represent extension of the lesion into soft tissue versus possibly localized adjacent synovitis. Advise histopathological correlation. Moderate glenohumeral joint effusion. 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 If clinically indicated correlation with a staging FDG PET-CT and/or biopsy may be of additional value"  clarify goc in am prior to pursuing further work up.  
88yo m mci/dementia, dorothea, asthma, gerd w large hiatal hernia, pud c/b gastric perforation s/p exlap, pafib, hfref, prostate ca s/p radiation seeds, oa, p/w sob/carter; in er, found to be meeting severe sepsis criteria, in acute hypoxemic respiratory failure; suspect multifactorial 2/2 afib rvr iso covid pna + adhf; admit to medicine for further mgmt.    Problem/Plan - 1:  ·  Problem: Severe sepsis.   ·  Plan: leukocytosis, tachycardic, febrile + lactic acidosis; meets severe sepsis criteria  source likely covid, +/- superimposed pna, and/or shingles   s/p vanc + cefepime + 1.5 L LR i nER  trend lactate q4-6h until wnl  follow up blood cultures    Monitor for fever, changes in white count   abx therapy as described below.  Problem/Plan - 2:  ·  Problem: Acute hypoxemic respiratory failure.   ·  Plan: due to  pleural effusion/  COVID PNA/ bacterial PNA superimposed/ CHF  - Treat with IV Remdesivir, Decadron and IV abx . On AVAPS. F/u pulmonologist and cardiologist.   Problem/Plan - 3:  ·  Problem: Pneumonia due to COVID-19 virus.   ·  Plan: covid +  ct imaging showing possible superimposed bacterial pna  follow up sputum culture, atypical pna work up, mrsa screen  trend inflammatory markers   moinitor for fever, changes in white count  isolation precautions  bronchodilators + oxygen supplementation  as needed to maintain goal  symptomatic relief with antitussives, mucolytics, antipyretics  start remdesivir + dexamethasone.  Problem/Plan - 4:  ·  Problem: Acute decompensated heart failure.   ·  Plan: no clinft of acs; trop 89->82, 2/2 demand; ekg with no new st seg - t wave changes suggestive of acute ischemia  ct imaging suggestive of pulm edema; bnp ~25k; tte shows, "Left ventricular cavity is small. Left ventricular wall thickness is normal. Left ventricular systolic function is moderately decreased with an ejection fraction of 40 % by Sharma's method of disks. Global left ventricular hypokinesis....Multiple segmental abnormalities exist. ..... There is mild (grade 1) left ventricular diastolic dysfunction, with elevated filling pressure. ....Moderate aortic regurgitation."  trend volume status via I/Os, daily weights; salt and fluid restriction   diurese w lasix 20 ivp bid; adjust to maintain goal net negative fluid balance  cards consult in am.  Problem/Plan - 5:  ·  Problem: Atrial fibrillation with RVR.   ·  Plan: likely being driven by sepsis, hypoxemia, adhf  ekg shows afib rvr @ 126/min  monitor on telemetry  npo so switch po eliquis to ufh gtt  rate control w toprol   lopressor 2.5-5mg ivp for sustained rates of >120/min.  Problem/Plan - 6:  ·  Problem: Parotitis.   ·  Plan: ct imaging shows "Marked soft tissue swelling centered around the left parotid gland and adjacent soft tissues, incompletely visualized. Within the limitations of the study, no loculated collections are definitely seen."  consider us/ct w iv contrast   broad spec abx as above  id consult in am.  Problem/Plan - 7:  ·  Problem: Lesion of left shoulder.   ·  Plan: mr left shoulder joint from prev hospitalization reviewed; "Large rim-enhancing markedly T2 hyperintense lesion within the proximal humerus measuring 5.1 cm. Notable soft tissue extension of this process into the superior cuff fibers, as described. Markedly heterogeneously enhancing synovial/intra-articular involvement of this process measuring 5.2 cm (series 13 image 25) which is indeterminate, and has an enhancement pattern in appearance which differs from the intraosseous component. This could represent extension of the lesion into soft tissue versus possibly localized adjacent synovitis. Advise histopathological correlation. Moderate glenohumeral joint effusion. 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 If clinically indicated correlation with a staging FDG PET-CT and/or biopsy may be of additional value"  clarify goc in am prior to pursuing further work up.  Problem / Plan 8  Problem : bacteremia with MRSA  Plan: IV Daptomycin . F/u ID.   Problem/ Plan 9  Problem: Acute renal failure.   Plan: Renal ultrasound normal no obstruction. F/u nephrology. Consider to taper dose of Lasix per cardiologist .   Problem/ Plan 10:   Problem: hyperkalemia   Plan: F/u nephrology.   Problem/ Plan 11:   Problem: hypernatremia    Plan: F/u nephrology. 
88yo m mci/dementia, dorothea, asthma, gerd w large hiatal hernia, pud c/b gastric perforation s/p exlap, pafib, hfref, prostate ca s/p radiation seeds, oa, p/w sob/carter; in er, found to be meeting severe sepsis criteria, in acute hypoxemic respiratory failure; suspect multifactorial 2/2 afib rvr iso covid pna + adhf; admit to medicine for further mgmt.    Problem/Plan - 1:  ·  Problem: Severe sepsis.   ·  Plan: leukocytosis, tachycardic, febrile + lactic acidosis; meets severe sepsis criteria  source likely covid, +/- superimposed pna, and/or shingles   s/p vanc + cefepime + 1.5 L LR i nER  trend lactate q4-6h until wnl  follow up blood cultures    Monitor for fever, changes in white count   abx therapy as described below.  Problem/Plan - 2:  ·  Problem: Acute hypoxemic respiratory failure.   ·  Plan: due to  pleural effusion/  COVID PNA/ bacterial PNA superimposed/ CHF  - Treat with IV Remdesivir, Decadron and IV abx . On AVAPS. F/u pulmonologist and cardiologist.   Problem/Plan - 3:  ·  Problem: Pneumonia due to COVID-19 virus.   ·  Plan: covid +  ct imaging showing possible superimposed bacterial pna  follow up sputum culture, atypical pna work up, mrsa screen  trend inflammatory markers   moinitor for fever, changes in white count  isolation precautions  bronchodilators + oxygen supplementation  as needed to maintain goal  symptomatic relief with antitussives, mucolytics, antipyretics  start remdesivir + dexamethasone.  Problem/Plan - 4:  ·  Problem: Acute decompensated heart failure.   ·  Plan: no clinft of acs; trop 89->82, 2/2 demand; ekg with no new st seg - t wave changes suggestive of acute ischemia  ct imaging suggestive of pulm edema; bnp ~25k; tte shows, "Left ventricular cavity is small. Left ventricular wall thickness is normal. Left ventricular systolic function is moderately decreased with an ejection fraction of 40 % by Sharma's method of disks. Global left ventricular hypokinesis....Multiple segmental abnormalities exist. ..... There is mild (grade 1) left ventricular diastolic dysfunction, with elevated filling pressure. ....Moderate aortic regurgitation."  trend volume status via I/Os, daily weights; salt and fluid restriction   diurese w lasix 20 ivp bid; adjust to maintain goal net negative fluid balance  cards consult in am.  Problem/Plan - 5:  ·  Problem: Atrial fibrillation with RVR.   ·  Plan: likely being driven by sepsis, hypoxemia, adhf  ekg shows afib rvr @ 126/min  monitor on telemetry  npo so switch po eliquis to ufh gtt  rate control w toprol   lopressor 2.5-5mg ivp for sustained rates of >120/min.  Problem/Plan - 6:  ·  Problem: Parotitis.   ·  Plan: ct imaging shows "Marked soft tissue swelling centered around the left parotid gland and adjacent soft tissues, incompletely visualized. Within the limitations of the study, no loculated collections are definitely seen."  consider us/ct w iv contrast   broad spec abx as above  id consult in am.  Problem/Plan - 7:  ·  Problem: Lesion of left shoulder.   ·  Plan: mr left shoulder joint from prev hospitalization reviewed; "Large rim-enhancing markedly T2 hyperintense lesion within the proximal humerus measuring 5.1 cm. Notable soft tissue extension of this process into the superior cuff fibers, as described. Markedly heterogeneously enhancing synovial/intra-articular involvement of this process measuring 5.2 cm (series 13 image 25) which is indeterminate, and has an enhancement pattern in appearance which differs from the intraosseous component. This could represent extension of the lesion into soft tissue versus possibly localized adjacent synovitis. Advise histopathological correlation. Moderate glenohumeral joint effusion. 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 If clinically indicated correlation with a staging FDG PET-CT and/or biopsy may be of additional value"  clarify goc in am prior to pursuing further work up.  Problem / Plan 8  Problem : bacteremia with MRSA  Plan: IV Daptomycin . F/u ID.   Problem/ Plan 9  Problem: Acute renal failure.   Plan: Renal ultrasound normal no obstruction. F/u nephrology. Consider to taper dose of Lasix per cardiologist .   Problem/ Plan 10:   Problem: hyperkalemia   Plan: F/u nephrology.   Problem/ Plan 11:   Problem: hypernatremia    Plan: F/u nephrology.     palliative care- Patient accepted by palliative care in PCU  unit per family request. 
89 y/o M with PMH of asthma, CAROL (not on CPAP), dementia, GERD, large hiatal hernia, PUD c/b gastric perforation s/p exlap, pafib, hfref, prostate ca s/p radiation seeds, OA. Presents with SOB and QUACH. Found to be in acute hypoxemic respiratory failure. +COVID. CT chest with b/l pl effusions, b/l opacities. Course c/b worsening hypoxia s/p RRTs.  
89yo m mci/dementia, dorothea, asthma, gerd w large hiatal hernia, pud c/b gastric perforation s/p exlap, pafib, hfref, prostate ca s/p radiation seeds, oa, p/w sob/carter; in er, found to be meeting severe sepsis criteria, in acute hypoxemic respiratory failure; suspect multifactorial 2/2 afib rvr iso covid pna + adhf; admit to medicine for further mgmt.    Problem/Plan - 1:  ·  Problem: Severe sepsis.   ·  Plan: leukocytosis, tachycardic, febrile + lactic acidosis; meets severe sepsis criteria  source likely covid, +/- superimposed pna, and/or shingles   s/p vanc + cefepime + 1.5 L LR i nER  trend lactate q4-6h until wnl  follow up blood cultures    Monitor for fever, changes in white count  broad spec empirical abx therapy as described below.  Problem/Plan - 2:  ·  Problem: Acute hypoxemic respiratory failure.   ·  Plan: due to  pleural effusion/  COVID PNA/ bacterial PNA superimposed/ CHF  - Treat with IV Remdesivir, Decadron and IV abx . On AVAPS. F/u pulmonologist and cardiologist.   Problem/Plan - 3:  ·  Problem: Pneumonia due to COVID-19 virus.   ·  Plan: covid +  ct imaging showing possible superimposed bacterial pna  follow up sputum culture, atypical pna work up, mrsa screen  trend inflammatory markers   moinitor for fever, changes in white count  isolation precautions  empirical abx with ceftriaxone and doxy  bronchodilators + oxygen supplementation  as needed to maintain goal  symptomatic relief with antitussives, mucolytics, antipyretics  start remdesivir + dexamethasone.  Problem/Plan - 4:  ·  Problem: Acute decompensated heart failure.   ·  Plan: no clinft of acs; trop 89->82, 2/2 demand; ekg with no new st seg - t wave changes suggestive of acute ischemia  ct imaging suggestive of pulm edema; bnp ~25k; tte shows, "Left ventricular cavity is small. Left ventricular wall thickness is normal. Left ventricular systolic function is moderately decreased with an ejection fraction of 40 % by Sharma's method of disks. Global left ventricular hypokinesis....Multiple segmental abnormalities exist. ..... There is mild (grade 1) left ventricular diastolic dysfunction, with elevated filling pressure. ....Moderate aortic regurgitation."  trend volume status via I/Os, daily weights; salt and fluid restriction   diurese w lasix 20 ivp bid; adjust to maintain goal net negative fluid balance  cards consult in am.  Problem/Plan - 5:  ·  Problem: Atrial fibrillation with RVR.   ·  Plan: likely being driven by sepsis, hypoxemia, adhf  ekg shows afib rvr @ 126/min  monitor on telemetry  npo so switch po eliquis to ufh gtt  rate control w toprol   lopressor 2.5-5mg ivp for sustained rates of >120/min.  Problem/Plan - 6:  ·  Problem: Parotitis.   ·  Plan: ct imaging shows "Marked soft tissue swelling centered around the left parotid gland and adjacent soft tissues, incompletely visualized. Within the limitations of the study, no loculated collections are definitely seen."  consider us/ct w iv contrast   broad spec abx as above  id consult in am.  Problem/Plan - 7:  ·  Problem: Lesion of left shoulder.   ·  Plan: mr left shoulder joint from prev hospitalization reviewed; "Large rim-enhancing markedly T2 hyperintense lesion within the proximal humerus measuring 5.1 cm. Notable soft tissue extension of this process into the superior cuff fibers, as described. Markedly heterogeneously enhancing synovial/intra-articular involvement of this process measuring 5.2 cm (series 13 image 25) which is indeterminate, and has an enhancement pattern in appearance which differs from the intraosseous component. This could represent extension of the lesion into soft tissue versus possibly localized adjacent synovitis. Advise histopathological correlation. Moderate glenohumeral joint effusion. 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 If clinically indicated correlation with a staging FDG PET-CT and/or biopsy may be of additional value"  clarify goc in am prior to pursuing further work up.  
88 y/o M with PMH of asthma, CAROL (not on CPAP), dementia, GERD, large hiatal hernia, PUD c/b gastric perforation s/p exlap, pafib, hfref, prostate ca s/p radiation seeds, OA. Presents with SOB and QUACH. Found to be in acute hypoxemic respiratory failure. +COVID. CT chest with b/l pl effusions, b/l opacities. Course c/b worsening hypoxia s/p RRTs.  
88yo m mci/dementia, dorothea, asthma, gerd w large hiatal hernia, pud c/b gastric perforation s/p exlap, pafib, hfref, prostate ca s/p radiation seeds, oa, p/w sob/carter; in er, found to be meeting severe sepsis criteria, in acute hypoxemic respiratory failure; suspect multifactorial 2/2 afib rvr iso covid pna + adhf. Course c/b worsening renal failure and worsening acidosis. HD not in line with GOC. Palliative consulted for assistance with GOC. Now admitted to PCU for symptom-directed care.

## 2024-09-24 NOTE — PROGRESS NOTE ADULT - PROBLEM SELECTOR PROBLEM 1
Acute respiratory failure with hypoxia and hypercapnia
Acute respiratory failure with hypoxia and hypercapnia
Acute respiratory failure with hypoxia
Acute respiratory failure with hypoxia and hypercapnia
Acute pain
Acute respiratory failure with hypoxia and hypercapnia

## 2024-09-24 NOTE — PROGRESS NOTE ADULT - REASON FOR ADMISSION
sob/carter
sob/cartre
sob/carter

## 2024-09-24 NOTE — DISCHARGE NOTE FOR THE EXPIRED PATIENT - SECONDARY DIAGNOSIS.
The pt vomited and the provider was notified.   Bilateral pleural effusion Pneumonia Pulmonary edema

## 2024-09-24 NOTE — PROGRESS NOTE ADULT - PROBLEM SELECTOR PLAN 2
+COVID  -CT chest with COVID PNA  -Creatinine continues to uptrending, consider stopping Remdesivir   -Continue Decadron 6mg qd x 10 days  -DVT ppx  -Trend inflammatory markers.
In the setting of COVID  - HFNC within GOC, wean as tolerated   - c/w IV dilaudid 0.3mg q1h PRN dyspnea (no PRNs required in last 24hr 7am-7am)  - c/w robinul 0.4mg q6h PRN excessive secretions (no PRNs required in last 24hr 7am-7am)
+COVID  -CT chest with COVID PNA  -Creatinine uptrending, Remdesivir stopped 9/20  -Continue Decadron 6mg qd x 10 days if aligned with GOC
+COVID  -CT chest with COVID PNA  -Creatinine and LFTs uptrending, may have to hold Remdesivir if continues to uptrend.   -Continue Decadron 6mg qd x 10 days  -DVT ppx  -Trend inflammatory markers.
+COVID  -CT chest with COVID PNA  -Creatinine uptrending, Remdesivir stopped 9/20  -Continue Decadron 6mg qd x 10 days  -DVT ppx  -Trend inflammatory markers.
+COVID  -CT chest with COVID PNA  -Creatinine uptrending, Remdesivir stopped 9/20  -Continue Decadron 6mg qd x 10 days  -DVT ppx  -Trend inflammatory markers.

## 2024-09-24 NOTE — PROGRESS NOTE ADULT - PROBLEM SELECTOR PROBLEM 2
Pneumonia due to COVID-19 virus
Acute hypoxemic respiratory failure
Pneumonia due to COVID-19 virus

## 2024-09-24 NOTE — PROGRESS NOTE ADULT - PROBLEM SELECTOR PLAN 5
CT chest with possible underlying bacterial PNA  -Monitoring off Cefepime for now as per ID
CT chest with possible underlying bacterial PNA  -Continue course of empiric Cefepime
Pt to remain in PCU for management of symptoms
CT chest with possible underlying bacterial PNA  -Monitoring off Cefepime for now as per ID

## 2024-09-24 NOTE — PROGRESS NOTE ADULT - NUTRITIONAL ASSESSMENT
This patient has been assessed with a concern for Malnutrition and has been determined to have a diagnosis/diagnoses of Severe protein-calorie malnutrition.    This patient is being managed with:   Diet NPO-  Entered: Sep 17 2024 11:50PM  
MEDICATIONS  (STANDING):  acetylcysteine 20%  Inhalation 4 milliLiter(s) Inhalation every 12 hours  albuterol/ipratropium for Nebulization 3 milliLiter(s) Nebulizer every 6 hours  aspirin  chewable 81 milliGRAM(s) Oral daily  chlorhexidine 2% Cloths 1 Application(s) Topical daily  DAPTOmycin IVPB      DAPTOmycin IVPB 500 milliGRAM(s) IV Intermittent every 48 hours  dexAMETHasone  Injectable 6 milliGRAM(s) IV Push daily  dextrose 5%. 1000 milliLiter(s) (50 mL/Hr) IV Continuous <Continuous>  dextrose 5%. 1000 milliLiter(s) (50 mL/Hr) IV Continuous <Continuous>  dextrose 5%. 1000 milliLiter(s) (100 mL/Hr) IV Continuous <Continuous>  dextrose 50% Injectable 12.5 Gram(s) IV Push once  dextrose 50% Injectable 25 Gram(s) IV Push once  dextrose 50% Injectable 25 Gram(s) IV Push once  epoetin tania-epbx (RETACRIT) Injectable 15186 Unit(s) SubCutaneous <User Schedule>  glucagon  Injectable 1 milliGRAM(s) IntraMuscular once  heparin  Infusion.  Unit(s)/Hr (11 mL/Hr) IV Continuous <Continuous>  insulin lispro (ADMELOG) corrective regimen sliding scale   SubCutaneous every 6 hours  metoprolol tartrate Injectable 5 milliGRAM(s) IV Push every 6 hours  pantoprazole  Injectable 40 milliGRAM(s) IV Push daily  sodium chloride 3%  Inhalation 4 milliLiter(s) Inhalation every 12 hours  sodium chloride 3%  Inhalation 4 milliLiter(s) Inhalation every 12 hours  sodium polystyrene sulfonate Enema 30 Gram(s) Rectal once
This patient has been assessed with a concern for Malnutrition and has been determined to have a diagnosis/diagnoses of Severe protein-calorie malnutrition.    This patient is being managed with:   Diet NPO-  Entered: Sep 17 2024 11:50PM  
MEDICATIONS  (STANDING):  acetylcysteine 20%  Inhalation 4 milliLiter(s) Inhalation every 12 hours  albuterol/ipratropium for Nebulization 3 milliLiter(s) Nebulizer every 6 hours  aspirin  chewable 81 milliGRAM(s) Oral daily  chlorhexidine 2% Cloths 1 Application(s) Topical daily  DAPTOmycin IVPB      DAPTOmycin IVPB 500 milliGRAM(s) IV Intermittent every 48 hours  dexAMETHasone  Injectable 6 milliGRAM(s) IV Push daily  dextrose 5%. 1000 milliLiter(s) (50 mL/Hr) IV Continuous <Continuous>  dextrose 5%. 1000 milliLiter(s) (50 mL/Hr) IV Continuous <Continuous>  dextrose 5%. 1000 milliLiter(s) (100 mL/Hr) IV Continuous <Continuous>  dextrose 50% Injectable 12.5 Gram(s) IV Push once  dextrose 50% Injectable 25 Gram(s) IV Push once  dextrose 50% Injectable 25 Gram(s) IV Push once  epoetin tania-epbx (RETACRIT) Injectable 85096 Unit(s) SubCutaneous <User Schedule>  glucagon  Injectable 1 milliGRAM(s) IntraMuscular once  heparin  Infusion.  Unit(s)/Hr (11 mL/Hr) IV Continuous <Continuous>  insulin lispro (ADMELOG) corrective regimen sliding scale   SubCutaneous every 6 hours  metoprolol tartrate Injectable 5 milliGRAM(s) IV Push every 6 hours  pantoprazole  Injectable 40 milliGRAM(s) IV Push daily  sodium chloride 3%  Inhalation 4 milliLiter(s) Inhalation every 12 hours  sodium chloride 3%  Inhalation 4 milliLiter(s) Inhalation every 12 hours  sodium polystyrene sulfonate Enema 30 Gram(s) Rectal once
This patient has been assessed with a concern for Malnutrition and has been determined to have a diagnosis/diagnoses of Severe protein-calorie malnutrition.    This patient is being managed with:   Diet NPO-  Entered: Sep 17 2024 11:50PM  
MEDICATIONS  (STANDING):  acetylcysteine 20%  Inhalation 4 milliLiter(s) Inhalation every 12 hours  albuterol/ipratropium for Nebulization 3 milliLiter(s) Nebulizer every 6 hours  aspirin  chewable 81 milliGRAM(s) Oral daily  chlorhexidine 2% Cloths 1 Application(s) Topical daily  DAPTOmycin IVPB      DAPTOmycin IVPB 500 milliGRAM(s) IV Intermittent every 48 hours  dexAMETHasone  Injectable 6 milliGRAM(s) IV Push daily  dextrose 5%. 1000 milliLiter(s) (50 mL/Hr) IV Continuous <Continuous>  epoetin tania-epbx (RETACRIT) Injectable 11224 Unit(s) SubCutaneous <User Schedule>  heparin  Infusion.  Unit(s)/Hr (11 mL/Hr) IV Continuous <Continuous>  metoprolol succinate  milliGRAM(s) Oral daily  pantoprazole  Injectable 40 milliGRAM(s) IV Push daily  sodium chloride 3%  Inhalation 4 milliLiter(s) Inhalation every 12 hours  sodium chloride 3%  Inhalation 4 milliLiter(s) Inhalation every 12 hours
This patient has been assessed with a concern for Malnutrition and has been determined to have a diagnosis/diagnoses of Severe protein-calorie malnutrition.    This patient is being managed with:   Diet NPO-  Entered: Sep 17 2024 11:50PM  
This patient has been assessed with a concern for Malnutrition and has been determined to have a diagnosis/diagnoses of Severe protein-calorie malnutrition.    This patient is being managed with:   Diet NPO-  Entered: Sep 17 2024 11:50PM

## 2024-09-24 NOTE — PROGRESS NOTE ADULT - SUBJECTIVE AND OBJECTIVE BOX
Patient is a 89y old  Male who presents with a chief complaint of sob/carter (24 Sep 2024 10:13)      INTERVAL HPI/OVERNIGHT EVENTS: On Comfort care. Comfortable without any pain. Very lethargic almost unresponsive . Off of abx. On high flow. PCU tried to wean him off of oxygen as much he can tolerate.     Pain Location & Control:     MEDICATIONS  (STANDING):  chlorhexidine 2% Cloths 1 Application(s) Topical daily  dexAMETHasone  Injectable 6 milliGRAM(s) IV Push daily  pantoprazole  Injectable 40 milliGRAM(s) IV Push daily    MEDICATIONS  (PRN):  acetaminophen  Suppository .. 650 milliGRAM(s) Rectal every 8 hours PRN Temp greater or equal to 38C (100.4F), Mild Pain (1 - 3)  bisacodyl Suppository 10 milliGRAM(s) Rectal daily PRN Constipation  glycopyrrolate Injectable 0.4 milliGRAM(s) IV Push every 6 hours PRN excessive audible secretions  HYDROmorphone  Injectable 0.3 milliGRAM(s) IV Push every 1 hour PRN Dyspnea/SOB  HYDROmorphone  Injectable 0.3 milliGRAM(s) IV Push every 1 hour PRN Severe Pain (7 - 10)  HYDROmorphone  Injectable 0.2 milliGRAM(s) IV Push every 1 hour PRN Moderate Pain (4 - 6)  LORazepam   Injectable 0.25 milliGRAM(s) IV Push every 1 hour PRN anxiety, agitation, delirium      Allergies    No Known Allergies    Intolerances    ROS:  UTO lethargic and unresponsive       Vital Signs Last 24 Hrs  T(C): 36.6 (24 Sep 2024 09:19), Max: 36.6 (24 Sep 2024 09:19)  T(F): 97.8 (24 Sep 2024 09:19), Max: 97.8 (24 Sep 2024 09:19)  HR: 99 (24 Sep 2024 09:19) (99 - 104)  BP: 90/52 (24 Sep 2024 09:19) (90/52 - 90/52)  BP(mean): --  RR: 24 (24 Sep 2024 10:45) (16 - 24)  SpO2: 96% (24 Sep 2024 10:45) (96% - 100%)    Parameters below as of 24 Sep 2024 10:45  Patient On (Oxygen Delivery Method): nasal cannula, high flow  O2 Flow (L/min): 40  O2 Concentration (%): 50    PHYSICAL EXAM:  GENERAL: NAD, well-groomed, well-developed  HEAD:  Atraumatic, Normocephalic  EYES: EOMI, PERRLA, conjunctiva and sclera clear  ENMT: No tonsillar erythema, exudates, or enlargement; Moist mucous membranes, Good dentition, No lesions  NECK: Supple, No JVD, Normal thyroid  NERVOUS SYSTEM:  Alert & Oriented X 0  CHEST/LUNG: Clear to auscultation bilaterally; No rales, rhonchi, wheezing, or rubs  HEART: Regular rate and rhythm; No murmurs, rubs, or gallops  ABDOMEN: Soft, Nontender, Nondistended; Bowel sounds present  EXTREMITIES:  2+ Peripheral Pulses, No clubbing or cyanosis  LYMPH: No lymphadenopathy noted  SKIN: No rashes or lesions  INCISION:  Dressing dry and intact    LABS:      Ca    7.2        23 Sep 2024 11:24      PT/INR - ( 23 Sep 2024 04:04 )   PT: 22.9 sec;   INR: 2.23 ratio         PTT - ( 23 Sep 2024 12:37 )  PTT:140.7 sec  Urinalysis Basic - ( 23 Sep 2024 11:24 )    Color: x / Appearance: x / SG: x / pH: x  Gluc: 167 mg/dL / Ketone: x  / Bili: x / Urobili: x   Blood: x / Protein: x / Nitrite: x   Leuk Esterase: x / RBC: x / WBC x   Sq Epi: x / Non Sq Epi: x / Bacteria: x      CAPILLARY BLOOD GLUCOSE            Cultures  Culture Results:   No growth at 5 days (09-19-24 @ 08:58)  Culture Results:   Growth in anaerobic bottle: Methicillin Resistant Staphylococcus aureus  See previous culture [id number] (09-19-24 @ 08:47)      RADIOLOGY & ADDITIONAL TESTS:    Imaging Personally Reviewed:  [ ] YES  [ ] NO    Consultant(s) Notes Reviewed:  [ ] YES  [ ] NO    Care Discussed with Consultants/Other Providers [ ] YES  [ ] NO

## 2024-09-24 NOTE — PROGRESS NOTE ADULT - PROBLEM SELECTOR PLAN 1
2nd to COVID PNA, +possible bacterial PNA  -Pleural effusions appear stable compared to prior   -ABG on 9/18 with no CO2 retention   -o2 sats <90% on HFNC 60L/100% on 9/18, AVAPS started.   -ABG drawn on 9/20. AVAPS settings changed to , back up rate 18, min pressure 10, max pressure 30, ePAP 5, FiO2 60%.    -Wean o2 as tolerated, keep sats >90%.
2nd to COVID PNA, +possible bacterial PNA  -Pleural effusions appear stable compared to prior   -ABG on 9/18 with no CO2 retention   -Pt was on AVAPS continuous for several days, then qHS and PRN daytime alternating with HFNC   -Pt now with worsening pH on VBG. Also with worsening renal function. Discussed with pts cathy Tracy via phone - requesting no further NIV, can continue with HFNC. Palliative care consult placed (son in agreement).   -Keep sats >90% with o2
2nd to COVID PNA, +possible bacterial PNA  -Pleural effusions appear stable compared to prior   -ABG on 9/18 with no CO2 retention   -Pt was on AVAPS continuous for several days, then qHS and PRN daytime alternating with HFNC   -Pt now with worsening pH on VBG on 9/23. Also with worsening renal function. Discussed with pts cathy Tracy via phone on 9/23 - requesting no further NIV, can continue with HFNC.   -Tx to PCU on 9/23. Comfort care measures per palliative care.
2nd to COVID PNA, +possible bacterial PNA  -Pleural effusions appear stable compared to prior   -ABG on 9/18 with no CO2 retention   -o2 sats <90% on HFNC 60L/100% on 9/18, AVAPS started.   -ABG drawn on 9/20. AVAPS settings changed to , back up rate 18, min pressure 10, max pressure 30, ePAP 5, FiO2 60%.    -AM ABG noted (7.37/35/156/20). Suggest place on HFNC 40L/60% and continue AVAPS qHS and PRN daytime. Decrease FiO2 on AVAPS to 50%.   -Wean o2 as tolerated, keep sats >90%.
2nd to COVID PNA, +possible bacterial PNA  -Pleural effusions appear stable compared to prior   -Continue abx   -ABG on 9/18 with no CO2 retention   -o2 sats <90% on HFNC 60L/100% on 9/18, AVAPS started.   -ABG drawn on 9/19. AVAPS settings changed to , back up rate 18, min pressure 10, max pressure 30, ePAP 5, FiO2 80%.    -Wean o2 as tolerated, keep sats >90%.
- c/w IV dilaudid 0.2mg q1h PRN MP (no PRNs required in last 24hr 7am-7am)  - c/w IV dilaudid 0.3mg q1h PRN SP (no PRNs required in last 24hr 7am-7am)  - Bowel regimen while on opioids. Monitor for constipation

## 2024-09-24 NOTE — PROVIDER CONTACT NOTE (OTHER) - REASON
Couldn't draw morning labs, pt is a hard stick
Patient without spontaneous respirations or pulse
Patient has decreased, RR 12, bladder scan 77, creatinine 5.98
Pt on heparin gtt, PTT not drawn since 9/18 18:52
Cannot get pulse ox. reading. Cannot get blood culture

## 2024-09-24 NOTE — PROGRESS NOTE ADULT - PROBLEM SELECTOR PLAN 3
- COVID +  - MRSA bacteremia  - c/w daptomycin  - spoke to Demarcus who would like to continue antibiotics as stopping would be distressing to rest of family
CT chest with moderate b/l pl effusions   -Appear grossly unchanged compared to prior scan  -Keep O>I as tolerated.

## 2024-09-24 NOTE — PROGRESS NOTE ADULT - SUBJECTIVE AND OBJECTIVE BOX
Follow-up Pulm Progress Note    tx to PCU   on HFNC, nonlabored  ROS unable to be obtained         Medications:  MEDICATIONS  (STANDING):  chlorhexidine 2% Cloths 1 Application(s) Topical daily  dexAMETHasone  Injectable 6 milliGRAM(s) IV Push daily  pantoprazole  Injectable 40 milliGRAM(s) IV Push daily    MEDICATIONS  (PRN):  acetaminophen  Suppository .. 650 milliGRAM(s) Rectal every 8 hours PRN Temp greater or equal to 38C (100.4F), Mild Pain (1 - 3)  bisacodyl Suppository 10 milliGRAM(s) Rectal daily PRN Constipation  glycopyrrolate Injectable 0.4 milliGRAM(s) IV Push every 6 hours PRN excessive audible secretions  HYDROmorphone  Injectable 0.3 milliGRAM(s) IV Push every 1 hour PRN Dyspnea/SOB  HYDROmorphone  Injectable 0.2 milliGRAM(s) IV Push every 1 hour PRN Moderate Pain (4 - 6)  HYDROmorphone  Injectable 0.3 milliGRAM(s) IV Push every 1 hour PRN Severe Pain (7 - 10)  LORazepam   Injectable 0.25 milliGRAM(s) IV Push every 1 hour PRN anxiety, agitation, delirium          Vital Signs Last 24 Hrs  T(C): 36.6 (24 Sep 2024 09:19), Max: 36.6 (24 Sep 2024 09:19)  T(F): 97.8 (24 Sep 2024 09:19), Max: 97.8 (24 Sep 2024 09:19)  HR: 99 (24 Sep 2024 09:19) (99 - 104)  BP: 90/52 (24 Sep 2024 09:19) (82/58 - 90/52)  BP(mean): --  RR: 22 (24 Sep 2024 09:19) (16 - 22)  SpO2: 100% (24 Sep 2024 09:19) (98% - 100%)    Parameters below as of 24 Sep 2024 09:19  Patient On (Oxygen Delivery Method): nasal cannula, high flow          VBG pH 7.13 09-23 @ 04:23    VBG pCO2 61 09-23 @ 04:23    VBG O2 sat 67.5 09-23 @ 04:23    VBG lactate 3.9 09-23 @ 04:23          LABS:                        8.4    26.07 )-----------( 89       ( 23 Sep 2024 05:25 )             27.1     09-23    145  |  104  |  135[H]  ----------------------------<  167[H]  5.9[H]   |  18[L]  |  7.03[H]    Ca    7.2[L]      23 Sep 2024 11:24    TPro  6.8  /  Alb  2.8[L]  /  TBili  0.9  /  DBili  0.6[H]  /  AST  689[H]  /  ALT  653[H]  /  AlkPhos  120  09-23          CAPILLARY BLOOD GLUCOSE      POCT Blood Glucose.: 145 mg/dL (23 Sep 2024 13:07)    PT/INR - ( 23 Sep 2024 04:04 )   PT: 22.9 sec;   INR: 2.23 ratio         PTT - ( 23 Sep 2024 12:37 )  PTT:140.7 sec  Urinalysis Basic - ( 23 Sep 2024 11:24 )    Color: x / Appearance: x / SG: x / pH: x  Gluc: 167 mg/dL / Ketone: x  / Bili: x / Urobili: x   Blood: x / Protein: x / Nitrite: x   Leuk Esterase: x / RBC: x / WBC x   Sq Epi: x / Non Sq Epi: x / Bacteria: x                CULTURES:     Culture - Blood (collected 09-19-24 @ 08:58)  Source: .Blood Blood-Peripheral  Preliminary Report (09-23-24 @ 15:00):    No growth at 4 days    Culture - Blood (collected 09-19-24 @ 08:47)  Source: .Blood Blood-Peripheral  Gram Stain (09-22-24 @ 12:35):    Growth in anaerobic bottle: Gram Positive Cocci in Clusters  Final Report (09-23-24 @ 10:49):    Growth in anaerobic bottle: Methicillin Resistant Staphylococcus aureus    See previous culture [id number]    Culture - Blood (collected 09-17-24 @ 15:30)  Source: .Blood Blood-Peripheral  Final Report (09-22-24 @ 22:00):    No growth at 5 days    Culture - Blood (collected 09-17-24 @ 15:20)  Source: .Blood Blood-Peripheral  Gram Stain (09-18-24 @ 18:29):    Growth in aerobic bottle: Gram Positive Cocci in Clusters    Growth in anaerobic bottle: Gram Positive Cocci in Clusters  Final Report (09-20-24 @ 10:32):    Growth in aerobic and anaerobic bottles: Methicillin Resistant    Staphylococcus aureus    Direct identification is available within approximately 3-5    hours either by Blood Panel Multiplexed PCR or Direct    MALDI-TOF. Details: [email or web address]  Organism: Blood Culture PCR  Methicillin resistant Staphylococcus aureus (09-20-24 @ 10:32)  Organism: Methicillin resistant Staphylococcus aureus (09-20-24 @ 10:32)      Method Type: BEATRIZ      -  Clindamycin: S <=0.25      -  Daptomycin: S 1      -  Erythromycin: R >4      -  Gentamicin: S <=1 Should not be used as monotherapy      -  Linezolid: S 2      -  Oxacillin: R >2      -  Penicillin: R >8      -  Rifampin: S <=1 Should not be used as monotherapy      -  Tetracycline: S <=1      -  Trimethoprim/Sulfamethoxazole: S <=0.5/9.5      -  Vancomycin: S 1  Organism: Blood Culture PCR (09-20-24 @ 10:32)      Method Type: PCR      -  Methicillin resistant Staphylococcus aureus (MRSA): Detec        Culture - Urine (collected 09-17-24 @ 17:46)  Source: Clean Catch Clean Catch (Midstream)  Final Report (09-19-24 @ 06:32):    <10,000 CFU/mL Normal Urogenital Maria Guadalupe            Physical Examination:  PULM: decreased BS  CVS: S1, S2 heard    RADIOLOGY REVIEWED    CT chest:    < from: CT Angio Chest PE Protocol w/ IV Cont (09.17.24 @ 20:46) >  FINDINGS:    AIRWAYS/LUNGS/PLEURA: Nonocclusive layering secretions in the distal   trachea. Otherwise patent central airways. Multifocal bilateral   groundglass opacities. Interlobular septal thickening. Moderate right   greater than left pleural effusions with adjacent compressive   atelectasis, similar to prior.    MEDIASTINUM AND ZANE: No lymphadenopathy. Mildly prominent nonspecific   paratracheal lymph nodesmeasuring up to 9 mm.    PULMONARY ANGIOGRAM: No pulmonary embolism with limited evaluation of the   subsegmental branches in the lower lobes.    VESSELS: Aortic calcifications. Coronary artery calcifications.    HEART: Stable heart size. No pericardial effusion. Mitral annular   calcification.    VISUALIZED UPPER ABDOMEN: Moderate hiatal hernia with a large portion of   the stomach within the thorax.    CHEST WALL AND BONES: Scoliosis and degenerative changes of the spine.    IMPRESSION:    No pulmonary embolism in the central, lobar, or segmental pulmonary   arteries.    Moderate right greater than left pleural effusions with interlobular   septal thickening and patchy bilateral groundglass opacities suggestive   of pulmonary edema. Superimposed infection cannot be entirely excluded.    < end of copied text >

## 2024-09-24 NOTE — PROGRESS NOTE ADULT - ATTENDING COMMENTS
89 year ole m with hx pa-fib, HFrEF, asthma, CAROL, dementia, GERD, PUD c.b gastric perf with e-lap, prostate ca s/p XRT, admitted with acute hypoxemic respiratory failure due to covid.  Course complicated by multiple episodes hypoxia, RRTs, family opted for conservative medical management with antibiotics, steroids and HFNC.  Otherwise for symptom directed care.    I have reviewed all documentation from prior primary team and consultants, as well as relevant imaging and laboratory data as this patient is new to me.    In the setting of parenteral controlled substance administration, clinical monitoring required for side effects such as respiratory depression, constipation and opioid induced neurotoxicity due to organ failure.    Wean high flow as able.      Family at bedside, updated as to current clinical condition and plan of care.  Educated as to what to expect, questions answered and emotional support provided.     Patient assessment and plan discussed on interdisciplinary team rounds today.

## 2024-09-24 NOTE — PROGRESS NOTE ADULT - SUBJECTIVE AND OBJECTIVE BOX
GAP TEAM PALLIATIVE CARE UNIT PROGRESS NOTE:      [  ] Patient on hospice program.    INDICATION FOR PALLIATIVE CARE UNIT SERVICES/Interval HPI: 89yo m mci/dementia, dorothea, asthma, gerd w large hiatal hernia, pud c/b gastric perforation s/p exlap, pafib, hfref, prostate ca s/p radiation seeds, oa, p/w sob/carter; in er, found to be meeting severe sepsis criteria, in acute hypoxemic respiratory failure; suspect multifactorial 2/2 afib rvr iso covid pna + adhf. Course c/b worsening renal failure and worsening acidosis. HD not in line with GOC. Palliative consulted for assistance with GOC. Now admitted to PCU for comfort care.       OVERNIGHT EVENTS: No PRNs required in last 24hr 7am-7am. Appears comfortable this morning.   Chart reviewed. Patient seen and examined at bedside.     DNR on chart:  DNI  DNI        Allergies    No Known Allergies    Intolerances    MEDICATIONS  (STANDING):  chlorhexidine 2% Cloths 1 Application(s) Topical daily  dexAMETHasone  Injectable 6 milliGRAM(s) IV Push daily  pantoprazole  Injectable 40 milliGRAM(s) IV Push daily    MEDICATIONS  (PRN):  acetaminophen  Suppository .. 650 milliGRAM(s) Rectal every 8 hours PRN Temp greater or equal to 38C (100.4F), Mild Pain (1 - 3)  bisacodyl Suppository 10 milliGRAM(s) Rectal daily PRN Constipation  glycopyrrolate Injectable 0.4 milliGRAM(s) IV Push every 6 hours PRN excessive audible secretions  HYDROmorphone  Injectable 0.3 milliGRAM(s) IV Push every 1 hour PRN Severe Pain (7 - 10)  HYDROmorphone  Injectable 0.2 milliGRAM(s) IV Push every 1 hour PRN Moderate Pain (4 - 6)  HYDROmorphone  Injectable 0.3 milliGRAM(s) IV Push every 1 hour PRN Dyspnea/SOB  LORazepam   Injectable 0.25 milliGRAM(s) IV Push every 1 hour PRN anxiety, agitation, delirium    ITEMS UNCHECKED ARE NOT PRESENT    PRESENT SYMPTOMS: [x ]Unable to self-report see PAINAD, RDOS below  Source if other than patient:  [ ]Family   [x ]Team     Pain: [ ] yes [ ] no  QOL impact -   Location -                    Aggravating factors -  Quality -  Radiation -  Timing-  Severity (0-10 scale):  Minimal acceptable level (0-10 scale):       PCSSQ [Palliative Care Spiritual Screening Question]   Severity (0-10):  Score of 4 or > indicate consideration of Chaplaincy referral.  Chaplaincy Referral: [ ] yes [ ] refused [ ] following [x ] deferred    Caregiver [place]? : [ ] yes [ ] no [x ] deferred:  Social work referral [ ] Patient & Family Centered Care Referral [ ]   Anticipatory Grief present?:  [ ] yes [ ] no [x] deferred:  Social work referral [ ] Patient & Family Centered Care Referral [ ]  	  Other Symptoms:  [ ]All other review of systems negative - unable to assess 2/2 poor mentation    PHYSICAL EXAM:   Vital Signs Last 24 Hrs  T(C): 36.6 (24 Sep 2024 09:19), Max: 36.6 (24 Sep 2024 09:19)  T(F): 97.8 (24 Sep 2024 09:19), Max: 97.8 (24 Sep 2024 09:19)  HR: 99 (24 Sep 2024 09:19) (99 - 104)  BP: 90/52 (24 Sep 2024 09:19) (82/58 - 90/52)  BP(mean): --  RR: 22 (24 Sep 2024 09:19) (16 - 22)  SpO2: 100% (24 Sep 2024 09:19) (98% - 100%)    Parameters below as of 24 Sep 2024 09:19  Patient On (Oxygen Delivery Method): nasal cannula, high flow     I&O's Summary    GENERAL: [ ] Cachexia  [ ]Alert  [ ]Oriented x   [ x]Lethargic  [ ]Unarousable  [ ]Verbal  [x ]Non-Verbal  Behavioral:   [ ] Anxiety  [ ] Delirium [ ] Agitation [ ] Other  HEENT:  [ ]Normal   [ x]Dry mouth   [ ]ET Tube/Trach  [ ]Oral lesions  PULMONARY:   [x ]Clear [ ]Tachypnea  [ ]Audible excessive secretions   [ ]Rhonchi        [ ]Right [ ]Left [ ]Bilateral  [ ]Crackles        [ ]Right [ ]Left [ ]Bilateral  [ ]Wheezing     [ ]Right [ ]Left [ ]Bilateral  [ ]Diminished BS [ ]Right [ ]Left [ ]Bilateral    CARDIOVASCULAR:    [ ]Regular [x ]Irregular [ ]Tachy  [ ]Tod [ ]Murmur [ ]Other  GASTROINTESTINAL:  [x ]Soft  [ ]Distended   [x ]+BS  [x ]Non tender [ ]Tender  [ ]Other [ ]PEG [ ]OGT/ NGT   Last BM:   9/23  GENITOURINARY:  [ ]Normal [ ] Incontinent   [ ]Oliguria/Anuria   [ ]Gill  MUSCULOSKELETAL:   [ ]Normal   [x ]Weakness  [x ]Bed/Wheelchair bound [ ]Edema  NEUROLOGIC:   [ ]No focal deficits  [x ] Cognitive impairment  [ ] Dysphagia [ ]Dysarthria [ ] Paresis [ ]Other   SKIN:   [ ]Normal  [ ]Rash  [ x]Other sacrum and b/l buttock DTI - refer to nursing documentation which has been reviewed   [ ]Pressure ulcer(s)  [ ]y [ ]n  Present on admission      CRITICAL CARE:  [ ] Shock Present  [ ]Septic [ ]Cardiogenic [ ]Neurologic [ ]Hypovolemic  [ ]  Vasopressors [ ]  Inotropes   [x ] Respiratory failure present [ ] Mechanical Ventilation [ ] Non-invasive ventilatory support [ x] High-Flow    [ ] Acute  [ ] Chronic [ ] Hypoxic  [ ] Hypercarbic [ ] Other  [ ] Other organ failure     LABS:                        8.4    26.07 )-----------( 89       ( 23 Sep 2024 05:25 )             27.1   09-23    145  |  104  |  135[H]  ----------------------------<  167[H]  5.9[H]   |  18[L]  |  7.03[H]    Ca    7.2[L]      23 Sep 2024 11:24    TPro  6.8  /  Alb  2.8[L]  /  TBili  0.9  /  DBili  0.6[H]  /  AST  689[H]  /  ALT  653[H]  /  AlkPhos  120  09-23  PT/INR - ( 23 Sep 2024 04:04 )   PT: 22.9 sec;   INR: 2.23 ratio         PTT - ( 23 Sep 2024 12:37 )  PTT:140.7 sec    Urinalysis Basic - ( 23 Sep 2024 11:24 )    Color: x / Appearance: x / SG: x / pH: x  Gluc: 167 mg/dL / Ketone: x  / Bili: x / Urobili: x   Blood: x / Protein: x / Nitrite: x   Leuk Esterase: x / RBC: x / WBC x   Sq Epi: x / Non Sq Epi: x / Bacteria: x      RADIOLOGY & ADDITIONAL STUDIES:    PROTEIN CALORIE MALNUTRITION: [ ] mild [ ] moderate [ ] severe  [ ] underweight [ ] morbid obesity    https://www.andeal.org/vault/2440/web/files/ONC/Table_Clinical%20Characteristics%20to%20Document%20Malnutrition-White%20JV%20et%20al%202012.pdf        [ x] PPSV2 < or = 30% [ ] significant weight loss [ ] poor nutritional intake [ ] anasarca   Artificial Nutrition [ ]     Other REFERRALS:    [ ] Hospice  [ ]Child Life  [ ]Social Work  [ ]Case management [ ]Holistic Therapy [ ] Physical Therapy [ ] Dietary         Palliative Performance Scale:  http://npcrc.org/files/news/palliative_performance_scale_ppsv2.pdf  (Ctrl +  left click to view)  Respiratory Distress Observation Tool:  https://homecareinformation.net/handouts/hen/Respiratory_Distress_Observation_Scale.pdf (Ctrl +  left click to view)  PAINAD Score:  http://geriatrictoolkit.[place].[facility]/cog/painad.pdf (Ctrl +  left click to view)   GAP TEAM PALLIATIVE CARE UNIT PROGRESS NOTE:      [  ] Patient on hospice program.    INDICATION FOR PALLIATIVE CARE UNIT SERVICES/Interval HPI: 88yo m mci/dementia, dorothea, asthma, gerd w large hiatal hernia, pud c/b gastric perforation s/p exlap, pafib, hfref, prostate ca s/p radiation seeds, oa, p/w sob/carter; in er, found to be meeting severe sepsis criteria, in acute hypoxemic respiratory failure; suspect multifactorial 2/2 afib rvr iso covid pna + adhf. Course c/b worsening renal failure and worsening acidosis. HD not in line with GOC. Palliative consulted for assistance with GOC. Now admitted to PCU for comfort care.       OVERNIGHT EVENTS: No PRNs required in last 24hr 7am-7am. Appears comfortable this morning.   Chart reviewed. Patient seen and examined at bedside.     DNR on chart:  Yes  DNI    Allergies    No Known Allergies    Intolerances    MEDICATIONS  (STANDING):  chlorhexidine 2% Cloths 1 Application(s) Topical daily  dexAMETHasone  Injectable 6 milliGRAM(s) IV Push daily  pantoprazole  Injectable 40 milliGRAM(s) IV Push daily    MEDICATIONS  (PRN):  acetaminophen  Suppository .. 650 milliGRAM(s) Rectal every 8 hours PRN Temp greater or equal to 38C (100.4F), Mild Pain (1 - 3)  bisacodyl Suppository 10 milliGRAM(s) Rectal daily PRN Constipation  glycopyrrolate Injectable 0.4 milliGRAM(s) IV Push every 6 hours PRN excessive audible secretions  HYDROmorphone  Injectable 0.3 milliGRAM(s) IV Push every 1 hour PRN Severe Pain (7 - 10)  HYDROmorphone  Injectable 0.2 milliGRAM(s) IV Push every 1 hour PRN Moderate Pain (4 - 6)  HYDROmorphone  Injectable 0.3 milliGRAM(s) IV Push every 1 hour PRN Dyspnea/SOB  LORazepam   Injectable 0.25 milliGRAM(s) IV Push every 1 hour PRN anxiety, agitation, delirium    ITEMS UNCHECKED ARE NOT PRESENT    PRESENT SYMPTOMS: [x ]Unable to self-report see PAINAD, RDOS below  Source if other than patient:  [ ]Family   [x ]Team     Pain: [ ] yes [ ] no  QOL impact -   Location -                    Aggravating factors -  Quality -  Radiation -  Timing-  Severity (0-10 scale):  Minimal acceptable level (0-10 scale):       PCSSQ [Palliative Care Spiritual Screening Question]   Severity (0-10):  Score of 4 or > indicate consideration of Chaplaincy referral.  Chaplaincy Referral: [ ] yes [ ] refused [ ] following [x ] deferred    Caregiver [place]? : [ ] yes [ ] no [x ] deferred:  Social work referral [ ] Patient & Family Centered Care Referral [ ]   Anticipatory Grief present?:  [ ] yes [ ] no [x] deferred:  Social work referral [ ] Patient & Family Centered Care Referral [ ]  	  Other Symptoms:  [ ]All other review of systems negative - unable to assess 2/2 poor mentation    PHYSICAL EXAM:   Vital Signs Last 24 Hrs  T(C): 36.6 (24 Sep 2024 09:19), Max: 36.6 (24 Sep 2024 09:19)  T(F): 97.8 (24 Sep 2024 09:19), Max: 97.8 (24 Sep 2024 09:19)  HR: 99 (24 Sep 2024 09:19) (99 - 104)  BP: 90/52 (24 Sep 2024 09:19) (82/58 - 90/52)  BP(mean): --  RR: 22 (24 Sep 2024 09:19) (16 - 22)  SpO2: 100% (24 Sep 2024 09:19) (98% - 100%)    Parameters below as of 24 Sep 2024 09:19  Patient On (Oxygen Delivery Method): nasal cannula, high flow     I&O's Summary    GENERAL: [ ] Cachexia  [ ]Alert  [ ]Oriented x   [ x]Lethargic  [ ]Unarousable  [ ]Verbal  [x ]Non-Verbal  Behavioral:   [ ] Anxiety  [ ] Delirium [ ] Agitation [ ] Other  HEENT:  [ ]Normal   [ x]Dry mouth   [ ]ET Tube/Trach  [ ]Oral lesions  PULMONARY:   [x ]Clear [ ]Tachypnea  [ ]Audible excessive secretions   [ ]Rhonchi        [ ]Right [ ]Left [ ]Bilateral  [ ]Crackles        [ ]Right [ ]Left [ ]Bilateral  [ ]Wheezing     [ ]Right [ ]Left [ ]Bilateral  [ ]Diminished BS [ ]Right [ ]Left [ ]Bilateral    CARDIOVASCULAR:    [ ]Regular [x ]Irregular [ ]Tachy  [ ]Tod [ ]Murmur [ ]Other  GASTROINTESTINAL:  [x ]Soft  [ ]Distended   [x ]+BS  [x ]Non tender [ ]Tender  [ ]Other [ ]PEG [ ]OGT/ NGT   Last BM:   9/23  GENITOURINARY:  [x ]Normal [x ] Incontinent   [ ]Oliguria/Anuria   [ ]Gill  MUSCULOSKELETAL:   [ ]Normal   [x ]Weakness  [x ]Bed/Wheelchair bound [ ]Edema  NEUROLOGIC:   [ ]No focal deficits  [x ] Cognitive impairment  [ ] Dysphagia [ ]Dysarthria [ ] Paresis [ ]Other   SKIN:   [ ]Normal  [ ]Rash  [ x]Other sacrum and b/l buttock DTI - refer to nursing documentation which has been reviewed   [ ]Pressure ulcer(s)  [ ]y [ ]n  Present on admission      CRITICAL CARE:  [ ] Shock Present  [ ]Septic [ ]Cardiogenic [ ]Neurologic [ ]Hypovolemic  [ ]  Vasopressors [ ]  Inotropes   [x ] Respiratory failure present [ ] Mechanical Ventilation [ ] Non-invasive ventilatory support [ x] High-Flow    [ ] Acute  [ ] Chronic [ ] Hypoxic  [ ] Hypercarbic [ ] Other  [x ] Other organ failure  kidney, brain    LABS:                        8.4    26.07 )-----------( 89       ( 23 Sep 2024 05:25 )             27.1   09-23    145  |  104  |  135[H]  ----------------------------<  167[H]  5.9[H]   |  18[L]  |  7.03[H]    Ca    7.2[L]      23 Sep 2024 11:24    TPro  6.8  /  Alb  2.8[L]  /  TBili  0.9  /  DBili  0.6[H]  /  AST  689[H]  /  ALT  653[H]  /  AlkPhos  120  09-23  PT/INR - ( 23 Sep 2024 04:04 )   PT: 22.9 sec;   INR: 2.23 ratio         PTT - ( 23 Sep 2024 12:37 )  PTT:140.7 sec    Urinalysis Basic - ( 23 Sep 2024 11:24 )    Color: x / Appearance: x / SG: x / pH: x  Gluc: 167 mg/dL / Ketone: x  / Bili: x / Urobili: x   Blood: x / Protein: x / Nitrite: x   Leuk Esterase: x / RBC: x / WBC x   Sq Epi: x / Non Sq Epi: x / Bacteria: x      RADIOLOGY & ADDITIONAL STUDIES:  < from: US Kidney and Bladder (09.19.24 @ 23:09) >  IMPRESSION:  *  Right kidney is sonographically within normal limits.  *  Left kidney not visualized.          --- End of Report ---            MIKE OROZCO MD; Attending Radiologist  This document has been electronically signed. Sep 20 2024  6:40AM    < end of copied text >  < from: CT Angio Chest PE Protocol w/ IV Cont (09.17.24 @ 20:46) >  IMPRESSION:    No pulmonary embolism in the central, lobar, or segmental pulmonary   arteries.    Moderate right greater than left pleural effusions with interlobular   septal thickening and patchy bilateral groundglass opacities suggestive   of pulmonary edema. Superimposed infection cannot be entirely excluded.    --- End of Report ---           PETER GUERIN DO; Resident Radiologist  This document has been electronically signed.   SHERRIE PINTO MD; Attending Radiologist  This document has been electronically signed. Sep 17 2024  9:40PM    < end of copied text >  < from: CT Head No Cont (09.17.24 @ 20:46) >  IMPRESSION:  No acute intracranial hemorrhage, mass effect, or midline shift.    Marked soft tissue swelling centered around the left parotid gland and   adjacent soft tissues, incompletely visualized. Within the limitations of   the study, no loculated collections are definitely seen.        --- End of Report ---             SHERRIE PINTO MD; Attending Radiologist  This document has been electronically signed. Sep 17 2024  9:00PM    < end of copied text >      PROTEIN CALORIE MALNUTRITION: [ ] mild [ ] moderate [ ] severe  [ ] underweight [ ] morbid obesity    https://www.andeal.org/vault/2440/web/files/ONC/Table_Clinical%20Characteristics%20to%20Document%20Malnutrition-White%20JV%20et%20al%202012.pdf        [ x] PPSV2 < or = 30% [ ] significant weight loss [ ] poor nutritional intake [ ] anasarca   Artificial Nutrition [ ]     Other REFERRALS:    [ ] Hospice  [ ]Child Life  [ ]Social Work  [ ]Case management [ ]Holistic Therapy [ ] Physical Therapy [ ] Dietary         Palliative Performance Scale:  http://npcrc.org/files/news/palliative_performance_scale_ppsv2.pdf  (Ctrl +  left click to view)  Respiratory Distress Observation Tool:  https://homecareinformation.net/handouts/hen/Respiratory_Distress_Observation_Scale.pdf (Ctrl +  left click to view)  PAINAD Score:  http://geriatrictoolkit.[place].[facility]/cog/painad.pdf (Ctrl +  left click to view)                                Care Coordination Assessment 201    COGNITIVE/LEARNING 201  Mental Status    Answers: Unable to assess    Notes: Mental Status    Notes: Pt is confuse on H/F oxygen and cannot participate in interview.  Information obtained form pts son Dr. Demarcus Andrade [phone] and Madelyn at  [facility] [phone]    ADMISSION HISTORY 201  Admitted From    Answers: Skilled Nursing Facility-Short Term    Functional Status Prior to Admission    Answers: Assistive equipment,  Answers: Assistive person    Services Present on Admission    Answers: Physical, occupational, speech therapist    LIVING ARRANGEMENTS/SUPPORT 201  Housing Environment    Answers: Skilled Nursing Facility    Sources of support/caregivers    Answers: Spouse/Significant Other,  Answers: Children    CAREGIVER CONTACT 201  Does the patient wish to identify a Caregiver?    Answers: Unable to assess    EMERGENCY CONTACTS OUTSIDE HOME 201  Emergency Contact    Answers: Emergency Contact Name Dr. Demarcus Andrade,  Answers: Emergency Contact Phone # [phone],  Answers: Emergency Contact Relationship son    DISCHARGE PLANNING 201  Potential Discharge Plan and Services    Answers: Skilled Nursing Facility-Short Term    Anticipated Transportation Needs for Discharge    Answers: Ambulance    Who will accompany patient at discharge?    Answers: Self    SUMMARY 201  Initial Clinical Summary    Notes: Case management referral received and completed: 89-year-old male past  medical history of paroxysmal A-fib , prostate CA in remission s/p radiation,  iron deficiency anemia, asthma, GERD, dementia who is nonverbal baseline)  presents to ED for increased agitation, hypoxia to 88% and improved to 95% on 3  L, RRT x 2 now on HF oxygen, IV Remdesivir Decadron, ABXT  and Heparin  infusion, admitted for  COVID 19 infection,AHRF and AFIB with RVR. Patient has  dementia and is unable to participate in interview. Information obtained from  son Dr. Demarcus Andrade, [phone] and Madelyn  in admission at  [facility], [phone]. As per Madelyn pt at facility with spouse for LTC, but son   said pt is there for Short term and will like patient return there for Short  term rehab until  a decision  is made by family regarding  alternate plan  ([facility]/LTC). Pt unable to designate care giver currently and Dr Nick Pulido  , [phone], identified as  his PCP on chart.   will continue to  collaborate with team for ongoing discharge needs.    Anticipated Discharge Plan and Services    Notes: Return to [facility] for [facility]       Electronically signed by:  Francie Christianson  Electronically signed on:  2024-09-18  11:06

## 2024-09-24 NOTE — PROVIDER CONTACT NOTE (OTHER) - ASSESSMENT
AAOx0. VSS, pt on continuous AVAPS; no s/s of CP, SOB, no acute events on tele.
AAOx0. Pt on continuous AVAPS; no s/s of CP, or respiratory distress. Pt  A-fib on tele.
Patient is A&Ox0. Patient HR 110s, /73, RR 12, spo2 97% on HFNC. Patient extremities are cool. Patient bladder scan is 77.
No response to external stimuli  No spontaneous respirations  No apical heart rate  Negative papillary response to light
Patient is A&Ox0, Patient extremities are cold. sbp 102, RR 11, , Spo2 100%

## 2024-09-24 NOTE — DISCHARGE NOTE FOR THE EXPIRED PATIENT - HOSPITAL COURSE
88y/o M with hx pa-fib, HFrEF, asthma, CAROL, dementia, GERD, PUD c.b gastric perf with e-lap, prostate ca s/p XRT, admitted with acute hypoxemic respiratory failure due to covid.  Course complicated by multiple episodes hypoxia, RRTs, family opted for conservative medical management with antibiotics, steroids and HFNC.  Otherwise for symptom directed care. The patient  on 24 at 19:02 88y/o M with hx pa-fib, HFrEF, asthma, CAROL, dementia, GERD, PUD c.b gastric perf with e-lap, prostate ca s/p XRT, admitted with acute hypoxemic respiratory failure due to covid.  Course complicated by multiple episodes hypoxia, RRTs, family opted for conservative medical management with antibiotics, steroids and HFNC.  Otherwise for symptom directed care. The patient  on 24 at 19:02    This note and notification is a continuation of the e and m service from earlier today.    Shane Shi MD MSEd FACP  Available on Teams during regular business hours  Pager after hours [phone]  Division of Geriatrics and Palliative Medicine

## 2024-09-24 NOTE — PROVIDER CONTACT NOTE (OTHER) - RECOMMENDATIONS
Patient pronounced at 1902  Family (daughter) at bedside, spoke to son (Demarcus) & spouse (Shelley) on the phone

## 2024-09-24 NOTE — PROGRESS NOTE ADULT - PROBLEM SELECTOR PLAN 4
BC +MRSA  -Abx as per ID if aligned with GOC
BC +MRSA  -F/u repeat BC  -Abx as per primary team
BC +MRSA  -Repeat BC NGTD   -Abx as per ID
BC +MRSA  -F/u repeat BC  -Abx as per ID
BC +MRSA  -Repeat BC NGTD   -Abx as per ID
- refer to [facility] 9/23  - DNR/DNI, c/w HFNC, wean as tolerated   - surrogates are pt's kids, spokesperson is Demarcus (pt's son, pulm-crit physician)

## 2024-09-24 NOTE — PROGRESS NOTE ADULT - PROVIDER SPECIALTY LIST ADULT
Infectious Disease
Infectious Disease
Internal Medicine
Nephrology
Infectious Disease
Internal Medicine
Nephrology
Internal Medicine
Pulmonology
Internal Medicine
Nephrology
Internal Medicine
Palliative Care
Pulmonology

## 2024-09-24 NOTE — PROVIDER CONTACT NOTE (OTHER) - BACKGROUND
Patient admitted for Covid
Pt admitted with sepsis 2/2 to covid PNA
Patient admitted for covid
Patient has DNR order
Pt admitted with sepsis 2/2 to covid PNA

## 2024-09-29 PROCEDURE — 96375 TX/PRO/DX INJ NEW DRUG ADDON: CPT

## 2024-09-29 PROCEDURE — 84443 ASSAY THYROID STIM HORMONE: CPT

## 2024-09-29 PROCEDURE — 87150 DNA/RNA AMPLIFIED PROBE: CPT

## 2024-09-29 PROCEDURE — 87186 SC STD MICRODIL/AGAR DIL: CPT

## 2024-09-29 PROCEDURE — 83540 ASSAY OF IRON: CPT

## 2024-09-29 PROCEDURE — 83735 ASSAY OF MAGNESIUM: CPT

## 2024-09-29 PROCEDURE — 93005 ELECTROCARDIOGRAM TRACING: CPT

## 2024-09-29 PROCEDURE — 71275 CT ANGIOGRAPHY CHEST: CPT | Mod: MC

## 2024-09-29 PROCEDURE — 82330 ASSAY OF CALCIUM: CPT

## 2024-09-29 PROCEDURE — 82607 VITAMIN B-12: CPT

## 2024-09-29 PROCEDURE — P9047: CPT

## 2024-09-29 PROCEDURE — 85045 AUTOMATED RETICULOCYTE COUNT: CPT

## 2024-09-29 PROCEDURE — 85018 HEMOGLOBIN: CPT

## 2024-09-29 PROCEDURE — 81001 URINALYSIS AUTO W/SCOPE: CPT

## 2024-09-29 PROCEDURE — 85730 THROMBOPLASTIN TIME PARTIAL: CPT

## 2024-09-29 PROCEDURE — 80076 HEPATIC FUNCTION PANEL: CPT

## 2024-09-29 PROCEDURE — 80061 LIPID PANEL: CPT

## 2024-09-29 PROCEDURE — 82962 GLUCOSE BLOOD TEST: CPT

## 2024-09-29 PROCEDURE — 87637 SARSCOV2&INF A&B&RSV AMP PRB: CPT

## 2024-09-29 PROCEDURE — 85014 HEMATOCRIT: CPT

## 2024-09-29 PROCEDURE — 85027 COMPLETE CBC AUTOMATED: CPT

## 2024-09-29 PROCEDURE — 80053 COMPREHEN METABOLIC PANEL: CPT

## 2024-09-29 PROCEDURE — 82565 ASSAY OF CREATININE: CPT

## 2024-09-29 PROCEDURE — 31720 CLEARANCE OF AIRWAYS: CPT

## 2024-09-29 PROCEDURE — 70450 CT HEAD/BRAIN W/O DYE: CPT | Mod: MC

## 2024-09-29 PROCEDURE — 83010 ASSAY OF HAPTOGLOBIN QUANT: CPT

## 2024-09-29 PROCEDURE — 85610 PROTHROMBIN TIME: CPT

## 2024-09-29 PROCEDURE — 84295 ASSAY OF SERUM SODIUM: CPT

## 2024-09-29 PROCEDURE — 85025 COMPLETE CBC W/AUTO DIFF WBC: CPT

## 2024-09-29 PROCEDURE — 83615 LACTATE (LD) (LDH) ENZYME: CPT

## 2024-09-29 PROCEDURE — 94640 AIRWAY INHALATION TREATMENT: CPT

## 2024-09-29 PROCEDURE — 82435 ASSAY OF BLOOD CHLORIDE: CPT

## 2024-09-29 PROCEDURE — 83880 ASSAY OF NATRIURETIC PEPTIDE: CPT

## 2024-09-29 PROCEDURE — 99285 EMERGENCY DEPT VISIT HI MDM: CPT

## 2024-09-29 PROCEDURE — 87040 BLOOD CULTURE FOR BACTERIA: CPT

## 2024-09-29 PROCEDURE — 82728 ASSAY OF FERRITIN: CPT

## 2024-09-29 PROCEDURE — 82803 BLOOD GASES ANY COMBINATION: CPT

## 2024-09-29 PROCEDURE — 94660 CPAP INITIATION&MGMT: CPT

## 2024-09-29 PROCEDURE — 82550 ASSAY OF CK (CPK): CPT

## 2024-09-29 PROCEDURE — 76770 US EXAM ABDO BACK WALL COMP: CPT

## 2024-09-29 PROCEDURE — 36600 WITHDRAWAL OF ARTERIAL BLOOD: CPT

## 2024-09-29 PROCEDURE — 84100 ASSAY OF PHOSPHORUS: CPT

## 2024-09-29 PROCEDURE — 83605 ASSAY OF LACTIC ACID: CPT

## 2024-09-29 PROCEDURE — 87086 URINE CULTURE/COLONY COUNT: CPT

## 2024-09-29 PROCEDURE — 84484 ASSAY OF TROPONIN QUANT: CPT

## 2024-09-29 PROCEDURE — 36415 COLL VENOUS BLD VENIPUNCTURE: CPT

## 2024-09-29 PROCEDURE — 84132 ASSAY OF SERUM POTASSIUM: CPT

## 2024-09-29 PROCEDURE — 83036 HEMOGLOBIN GLYCOSYLATED A1C: CPT

## 2024-09-29 PROCEDURE — 82947 ASSAY GLUCOSE BLOOD QUANT: CPT

## 2024-09-29 PROCEDURE — 83550 IRON BINDING TEST: CPT

## 2024-09-29 PROCEDURE — 96374 THER/PROPH/DIAG INJ IV PUSH: CPT

## 2024-09-29 PROCEDURE — 82746 ASSAY OF FOLIC ACID SERUM: CPT

## 2024-09-29 PROCEDURE — 80048 BASIC METABOLIC PNL TOTAL CA: CPT

## 2025-02-10 NOTE — ED ADULT TRIAGE NOTE - SOURCE OF INFORMATION
Wt Readings from Last 3 Encounters:   10/31/24 69.9 kg (154 lb)   10/24/24 70 kg (154 lb 5.2 oz)   10/09/24 68.9 kg (152 lb)     Not in acute exacerbation   Appreciate SLIM management      Patient 127

## 2025-08-01 NOTE — ED PROVIDER NOTE - DISPOSITION TYPE
Follow Up Office Visit      Patient Name: Greyson Eagle  : 1948   MRN: [id number]     Chief Complaint:    Chief Complaint   Patient presents with   • Diabetes     3 mo follow up  Pt was in the ER on 2025 for leg pain       History of Present Illness: Greyson Eagle is a 76 y.o. male who is here today to   follow-up on chronic medical problems.    History of Present Illness  The patient is a 76-year-old male who comes in today for follow-up.    He experienced severe hip pain for 3 to 4 days, which led him to seek medical attention at [facility]. A x-ray revealed osteoarthritis in the left hip joint, which was more severe than in the right. He received a cortisone injection from his orthopedic doctor yesterday. He was prescribed Norco and naproxen for 5 days, which provided relief. He also found Tylenol helpful and is requesting a refill. He is currently taking glucosamine chondroitin and meloxicam without any issues.  He requests refills of Naprosyn he is no longer on Norco just got 2 tablets in the ER    He was concerned about his drug screen test today      He is not under the care of an endocrinologist for his diabetes. His blood sugar levels have been well-controlled recently, with a reading of 90 this morning. He is due for an A1c test today. He is currently on metformin  mg twice daily and Januvia.    He has difficulty falling asleep and staying asleep. He is considering trying trazodone as someone recommended it for sleep but does not want to discontinue Xanax until he is sure it will be effective.    He experiences numbness in his right thumb when lying in bed or sitting for extended periods, which sometimes extends up to his elbow. This issue was previously discussed 3 months ago.  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 The bruises typically resolve within a day or two and are not painful.    He is not currently taking any medication for stomach acid. He uses Mylanta as needed for flare-ups, which he tolerates well. He was previously on Nexium but switched to Pepcid AC due to side effects.     He had a diabetic eye exam this year and was informed that he will need cataract surgery in the future. However, he has postponed the surgery as his vision remains good.     He is on finasteride and tamsulosin for prostate and urine flow. He is taking multivitamins and Pepcid. He is on baby aspirin, Xanax, metformin  mg twice a day, Januvia, metoprolol 50 mg once a day, nystatin cream, finasteride, tamsulosin, multivitamins, and Pepcid.    Social History:  Sleep: He has difficulty falling asleep and staying asleep.      Subjective      Review of Systems:   Review of Systems  Review of Systems   Constitutional: Negative for fatigue and fever.   Respiratory: Negative for cough and shortness of breath.    Cardiovascular: Negative for chest pain and palpitations.   Skin: Negative for rash or itching    Past Medical History:   Past Medical History:   Diagnosis Date   • Allergic rhinitis    • Anxiety    • Benign essential hypertension    • Carotid artery disease     Modwerate carotid disease noted at vascular screening   • Diabetes    • Mixed hyperlipidemia        Past Surgical History:   Past Surgical History:   Procedure Laterality Date   • COLON RESECTION  2001    Colonic resection due to large polyp   • CYSTECTOMY      Resection of cyst at previous site of colonic surg   • HEMORRHOIDECTOMY         Family History: History reviewed. No pertinent family history.    Social History:   Social History     Socioeconomic History   • Marital status:    Tobacco Use   • Smoking status: Never   • Smokeless tobacco: Never   Vaping Use   • Vaping status: Never Used   Substance and Sexual Activity    • Alcohol use: Not Currently   • Drug use: Never   • Sexual activity: Defer       Medications:     Current Outpatient Medications:   •  ALPRAZolam (Xanax) 0.5 MG tablet, Take 1 tablet by mouth 2 (Two) Times a Day As Needed for Anxiety., Disp: 30 tablet, Rfl: 2  •  aspirin 81 MG EC tablet, Take 1 tablet by mouth Daily., Disp: , Rfl:   •  famotidine (PEPCID) 20 MG tablet, Take 1 tablet by mouth Daily., Disp: , Rfl:   •  finasteride (PROSCAR) 5 MG tablet, Take 1 tablet by mouth Daily., Disp: , Rfl:   •  glucosamine-chondroitin 500-400 MG capsule capsule, Take  by mouth 3 (Three) Times a Day With Meals., Disp: , Rfl:   •  glucose blood (True Metrix Blood Glucose Test) test strip, AS DIRECTED, Disp: 100 each, Rfl: 5  •  ketoconazole (NIZORAL) 2 % cream, Apply 1 Application topically to the appropriate area as directed Daily., Disp: , Rfl:   •  metFORMIN ER (GLUCOPHAGE-XR) 750 MG 24 hr tablet, Take 1 tablet by mouth 2 (Two) Times a Day With Meals., Disp: 180 tablet, Rfl: 0  •  metoprolol succinate XL (Toprol XL) 50 MG 24 hr tablet, Take 1 tablet by mouth Daily., Disp: 90 tablet, Rfl: 1  •  multivitamin with minerals (ONE-A-DAY 50 PLUS PO), Take 1 tablet by mouth Daily., Disp: , Rfl:   •  SITagliptin (Januvia) 50 MG tablet, Take 1 tablet by mouth Daily., Disp: 30 tablet, Rfl: 5  •  tamsulosin (FLOMAX) 0.4 MG capsule 24 hr capsule, Take 1 capsule by mouth Daily., Disp: , Rfl:   •  naproxen (Naprosyn) 500 MG tablet, Take 1 tablet by mouth 2 (Two) Times a Day With Meals. prn, Disp: 12 tablet, Rfl: 0  •  traZODone (DESYREL) 50 MG tablet, Take 1 tablet by mouth Every Night. Prn insomnia, Disp: 90 tablet, Rfl: 1    Allergies:   Allergies   Allergen Reactions   • Azithromycin Unknown - High Severity   • Crestor [Rosuvastatin] Unknown - High Severity   • Methylprednisolone Unknown - High Severity   • Percocet [Oxycodone-Acetaminophen] Unknown - High Severity   • Protonix [Pantoprazole] Unknown - High Severity   • Tricor  "[Fenofibrate] Unknown - High Severity       Objective     Physical Exam:  Vital Signs:   Vitals:    08/01/25 1313   BP: 116/64   Pulse: 80   Resp: 12   SpO2: 97%   Weight: 78.1 kg (172 lb 1.6 oz)   Height: 167.6 cm (66\")   PainSc: 0-No pain     Facility age limit for growth %jose is 20 years.  Body mass index is 27.78 kg/m².     Physical Exam  Vitals and nursing note reviewed.   Constitutional:       Appearance: Normal appearance.   HENT:      Head: Normocephalic and atraumatic.   Cardiovascular:      Rate and Rhythm: Normal rate and regular rhythm.   Pulmonary:      Effort: Pulmonary effort is normal.      Breath sounds: Normal breath sounds.   Musculoskeletal:         General: Normal range of motion.      Cervical back: Normal range of motion and neck supple.      Right lower leg: No edema.      Left lower leg: No edema.      Comments: Good strength of both hands mild osteoarthritic changes of his fingers  Negative Tinel's   negative tenderness over the elbow.  2+ radial pulses bilaterally   Skin:     General: Skin is warm and dry.      Comments: He has a few minor ecchymoses on the upper extremities forearms hands   Neurological:      General: No focal deficit present.      Mental Status: He is alert.   Psychiatric:         Mood and Affect: Mood normal.         Behavior: Behavior normal.         Procedures    PHQ-9 Total Score:      Assessment / Plan      Assessment/Plan:   Diagnoses and all orders for this visit:    1. Osteoarthritis of multiple joints, unspecified osteoarthritis type (Primary)  -     naproxen (Naprosyn) 500 MG tablet; Take 1 tablet by mouth 2 (Two) Times a Day With Meals. prn  Dispense: 12 tablet; Refill: 0    2. Anxiety  -     ALPRAZolam (Xanax) 0.5 MG tablet; Take 1 tablet by mouth 2 (Two) Times a Day As Needed for Anxiety.  Dispense: 30 tablet; Refill: 2    3. Type 2 diabetes mellitus with hyperglycemia, without long-term current use of insulin  -     metFORMIN ER (GLUCOPHAGE-XR) 750 MG 24 " hr tablet; Take 1 tablet by mouth 2 (Two) Times a Day With Meals.  Dispense: 180 tablet; Refill: 0  -     Hemoglobin A1c  -     Comprehensive Metabolic Panel  -     CBC Auto Differential    4. Primary hypertension  -     metoprolol succinate XL (Toprol XL) 50 MG 24 hr tablet; Take 1 tablet by mouth Daily.  Dispense: 90 tablet; Refill: 1    5. Primary insomnia  -     traZODone (DESYREL) 50 MG tablet; Take 1 tablet by mouth Every Night. Prn insomnia  Dispense: 90 tablet; Refill: 1    6. Paresthesias in right hand  -     EMG & Nerve Conduction Test; Future    7. High risk medication use  -     Comprehensive Metabolic Panel  -     CBC Auto Differential    Labs reviewed with him    Will get blood work today for follow-up    Will try trazodone for insomnia he knows not to take Xanax on top of that unless he really cannot fall asleep after an hour or more.    Jose Luis reviewed he is already signed the consent UDS up-to-date he has been stable on medications    I did give him a few Naprosyn he is well aware of the risk of GI bleed kidney damage heart attack stroke and agrees to proceed cautiously.    Follow-up in 3 months for annual wellness and repeat exam.    Follow-up on blood work within 1 week    Assessment & Plan  1. Osteoarthritis of the hips.  - Severe pain in the left hip, confirmed as osteoarthritis through a CT scan.  - Cortisone shot received from orthopedic doctor yesterday.  - Naproxen and Tylenol taken for pain relief; naproxen prescription will be provided for short-term use.  - Caution advised regarding potential kidney damage and gastrointestinal bleeding; continue Tylenol as needed, provided kidney function remains stable.    2. Diabetes mellitus.  - Blood sugar levels generally stable, with a recent reading of 90.  - Currently on metformin  mg twice a day and Januvia.  - Comprehensive metabolic panel (CMP), complete blood count (CBC), and hemoglobin A1c test will be conducted today.  - Continue  current medication regimen.    3. Insomnia.  - Difficulty falling and staying asleep reported.  - Trazodone 50 mg at bedtime will be prescribed, to be taken 30 to 60 minutes before sleep.  - Continue using Xanax as needed for anxiety.    4. Numbness in the right thumb.  - Numbness in the right thumb extending to the elbow, likely due to nerve compression.  - No immediate intervention planned; advised to monitor symptoms and report any worsening.    Will get EMG with Dr. Conner as requested    BMI is >= 25 and <30. (Overweight) The following options were offered after discussion;: exercise counseling/recommendations and nutrition counseling/recommendations      Follow Up:   Return in about 3 months (around 11/1/2025) for Recheck, Labs prior next visit, Schedule AWV w/next office visit.        Patient or patient representative verbalized consent for the use of Ambient Listening during the visit with  Marco Ontiveros MD for chart documentation. 8/1/2025  14:27 EDT    Marco Ontiveros MD  [facility]   Portions of note created with Dragon voice recognition technology   ADMIT

## (undated) DEVICE — PREP CHLORAPREP HI-LITE ORANGE 26ML

## (undated) DEVICE — DRAIN PENROSE .25" X 18" LATEX

## (undated) DEVICE — TROCAR COVIDIEN VERSAPORT BLADELESS OPTICAL 5MM STANDARD

## (undated) DEVICE — DRSG TAPE UMBILICAL COTTON 2" X 30 X 1/8"

## (undated) DEVICE — ELCTR CORD FOOTSWITCH 1PLR LAPSCP 10FT

## (undated) DEVICE — GLV 8 PROTEXIS (WHITE)

## (undated) DEVICE — TUBING STRYKEFLOW II SUCTION / IRRIGATOR

## (undated) DEVICE — FOLEY TRAY 16FR 5CC LTX UMETER CLOSED

## (undated) DEVICE — STAPLER COVIDIEN ENDO GIA STANDARD HANDLE

## (undated) DEVICE — Device

## (undated) DEVICE — SHEARS COVIDIEN ENDO SHEAR 5MM X 31CM W UNIPOLAR CAUTERY

## (undated) DEVICE — GLV 7.5 PROTEXIS (WHITE)

## (undated) DEVICE — SPECIMEN CONTAINER 100ML

## (undated) DEVICE — TUBING INSUFFLATION LAP FILTER 10FT

## (undated) DEVICE — SOL IRR POUR NS 0.9% 500ML

## (undated) DEVICE — STAPLER SKIN VISI-STAT 35 WIDE

## (undated) DEVICE — DRAPE IOBAN 23" X 23"

## (undated) DEVICE — PACK COLON BUNDLE

## (undated) DEVICE — VENODYNE/SCD SLEEVE CALF MEDIUM

## (undated) DEVICE — SUT POLYSORB 3-0 30" V-20 UNDYED

## (undated) DEVICE — DRAPE GENERAL ENDOSCOPY

## (undated) DEVICE — WARMING BLANKET UPPER ADULT

## (undated) DEVICE — TROCAR COVIDIEN VERSASTEP PLUS 12MM STANDARD

## (undated) DEVICE — TROCAR APPLIED MEDICAL KII BALLOON BLUNT TIP 12MM X 100MM

## (undated) DEVICE — ELCTR BOVIE PENCIL SMOKE EVACUATION

## (undated) DEVICE — SUT POLYSORB 1 60" TIES

## (undated) DEVICE — SOL IRR POUR H2O 250ML

## (undated) DEVICE — GLV 7 PROTEXIS (WHITE)

## (undated) DEVICE — POSITIONER FOAM EGG CRATE ULNAR 2PCS (PINK)

## (undated) DEVICE — GOWN TRIMAX LG

## (undated) DEVICE — DRAPE TOWEL BLUE 17" X 24"

## (undated) DEVICE — D HELP - CLEARVIEW CLEARIFY SYSTEM

## (undated) DEVICE — SUT SOFSILK 3-0 18" V-20 (POP-OFF)

## (undated) DEVICE — DRSG STERISTRIPS 0.5 X 4"

## (undated) DEVICE — LIGASURE MARYLAND 37CM

## (undated) DEVICE — PACK MAJOR ABDOMINAL SUPINE

## (undated) DEVICE — BLADE SCALPEL SAFETYLOCK #15

## (undated) DEVICE — DRSG TEGADERM 6"X8"

## (undated) DEVICE — SUT MAXON 1 36" GS-24

## (undated) DEVICE — SUT POLYSORB 0 18" MP UNDYED

## (undated) DEVICE — DRAPE 1/2 SHEET 40X57"

## (undated) DEVICE — DRSG OPSITE 13.75 X 4"

## (undated) DEVICE — GLV 8.5 PROTEXIS (WHITE)

## (undated) DEVICE — GLV 6.5 PROTEXIS (WHITE)

## (undated) DEVICE — LIGASURE IMPACT